# Patient Record
Sex: MALE | Race: WHITE | NOT HISPANIC OR LATINO | URBAN - METROPOLITAN AREA
[De-identification: names, ages, dates, MRNs, and addresses within clinical notes are randomized per-mention and may not be internally consistent; named-entity substitution may affect disease eponyms.]

---

## 2017-08-16 ENCOUNTER — APPOINTMENT (OUTPATIENT)
Dept: URBAN - METROPOLITAN AREA CLINIC 200 | Age: 67
Setting detail: DERMATOLOGY
End: 2017-08-28

## 2017-08-16 DIAGNOSIS — L57.0 ACTINIC KERATOSIS: ICD-10-CM

## 2017-08-16 DIAGNOSIS — D485 NEOPLASM OF UNCERTAIN BEHAVIOR OF SKIN: ICD-10-CM

## 2017-08-16 DIAGNOSIS — L30.4 ERYTHEMA INTERTRIGO: ICD-10-CM

## 2017-08-16 PROBLEM — D48.5 NEOPLASM OF UNCERTAIN BEHAVIOR OF SKIN: Status: ACTIVE | Noted: 2017-08-16

## 2017-08-16 PROBLEM — E03.9 HYPOTHYROIDISM, UNSPECIFIED: Status: ACTIVE | Noted: 2017-08-16

## 2017-08-16 PROBLEM — I10 ESSENTIAL (PRIMARY) HYPERTENSION: Status: ACTIVE | Noted: 2017-08-16

## 2017-08-16 PROCEDURE — 17000 DESTRUCT PREMALG LESION: CPT

## 2017-08-16 PROCEDURE — 99213 OFFICE O/P EST LOW 20 MIN: CPT | Mod: 25

## 2017-08-16 PROCEDURE — OTHER LIQUID NITROGEN: OTHER

## 2017-08-16 PROCEDURE — 11100: CPT | Mod: 59

## 2017-08-16 PROCEDURE — OTHER BIOPSY BY SHAVE METHOD: OTHER

## 2017-08-16 PROCEDURE — 17003 DESTRUCT PREMALG LES 2-14: CPT

## 2017-08-16 PROCEDURE — OTHER PRESCRIPTION: OTHER

## 2017-08-16 RX ORDER — NYSTATIN AND TRIAMCINOLONE ACETONIDE 100000; 1 [USP'U]/G; MG/G
CREAM TOPICAL
Qty: 1 | Refills: 5 | Status: ERX

## 2017-08-16 ASSESSMENT — LOCATION SIMPLE DESCRIPTION DERM
LOCATION SIMPLE: LEFT CHEEK
LOCATION SIMPLE: RIGHT EAR
LOCATION SIMPLE: NOSE
LOCATION SIMPLE: LEFT THIGH

## 2017-08-16 ASSESSMENT — LOCATION DETAILED DESCRIPTION DERM
LOCATION DETAILED: NASAL ROOT
LOCATION DETAILED: RIGHT SUPERIOR HELIX
LOCATION DETAILED: NASAL DORSUM
LOCATION DETAILED: LEFT ANTERIOR PROXIMAL THIGH
LOCATION DETAILED: LEFT SUPERIOR LATERAL MALAR CHEEK

## 2017-08-16 ASSESSMENT — LOCATION ZONE DERM
LOCATION ZONE: FACE
LOCATION ZONE: LEG
LOCATION ZONE: EAR
LOCATION ZONE: NOSE

## 2017-08-16 NOTE — PROCEDURE: BIOPSY BY SHAVE METHOD
Anesthesia Volume In Cc (Will Not Render If 0): 0.1
Type Of Destruction Used: Curettage
Notification Instructions: Patient will be notified of biopsy results. However, patient instructed to call the office if not contacted within 2 weeks.
Bill [id number] For Specimen Handling/Conveyance To Laboratory?: no
Biopsy Method: Personna blade
X Size Of Lesion In Cm: 0
Wound Care: Vaseline
Size Of Lesion In Cm: 0.5
Biopsy Type: H and E
Anesthesia Type: 1% lidocaine with epinephrine
Post-Care Instructions: I reviewed with the patient in detail post-care instructions. Patient is to keep the biopsy site dry overnight, and then apply bacitracin twice daily until healed. Patient may apply hydrogen peroxide soaks to remove any crusting.
Dressing: bandage
Hemostasis: Drysol
consent was obtained and risks were reviewed including but not limited to scarring, infection, bleeding, scabbing, incomplete removal, nerve damage and allergy to anesthesia.
Billing Type: Third-Party Bill
Detail Level: Detailed

## 2017-08-16 NOTE — PROCEDURE: LIQUID NITROGEN
Post-Care Instructions: I reviewed with the patient in detail post-care instructions. Patient is to wear sunprotection, and avoid picking at any of the treated lesions. Pt may apply Vaseline to crusted or scabbing areas.
Detail Level: Detailed
Consent: The patient's consent was obtained including but not limited to risks of crusting, scabbing, blistering, scarring, darker or lighter pigmentary change, recurrence, incomplete removal and infection.
Number Of Freeze-Thaw Cycles: 2 freeze-thaw cycles
Render Post-Care Instructions In Note?: no
Duration Of Freeze Thaw-Cycle (Seconds): 10

## 2017-09-18 ENCOUNTER — APPOINTMENT (OUTPATIENT)
Dept: URBAN - METROPOLITAN AREA CLINIC 200 | Age: 67
Setting detail: DERMATOLOGY
End: 2017-09-24

## 2017-09-18 DIAGNOSIS — L57.8 OTHER SKIN CHANGES DUE TO CHRONIC EXPOSURE TO NONIONIZING RADIATION: ICD-10-CM

## 2017-09-18 DIAGNOSIS — L57.0 ACTINIC KERATOSIS: ICD-10-CM

## 2017-09-18 PROCEDURE — 17004 DESTROY PREMAL LESIONS 15/>: CPT

## 2017-09-18 PROCEDURE — OTHER COUNSELING: OTHER

## 2017-09-18 PROCEDURE — 99213 OFFICE O/P EST LOW 20 MIN: CPT | Mod: 25

## 2017-09-18 PROCEDURE — OTHER LIQUID NITROGEN: OTHER

## 2017-09-18 ASSESSMENT — LOCATION DETAILED DESCRIPTION DERM
LOCATION DETAILED: LEFT LATERAL MALAR CHEEK
LOCATION DETAILED: LEFT LATERAL ZYGOMA
LOCATION DETAILED: LEFT DISTAL DORSAL FOREARM
LOCATION DETAILED: RIGHT CENTRAL MALAR CHEEK
LOCATION DETAILED: LEFT DISTAL RADIAL DORSAL FOREARM
LOCATION DETAILED: RIGHT INFERIOR LATERAL MALAR CHEEK
LOCATION DETAILED: LEFT SUPERIOR CENTRAL MALAR CHEEK
LOCATION DETAILED: LEFT CENTRAL MALAR CHEEK
LOCATION DETAILED: RIGHT MEDIAL MALAR CHEEK
LOCATION DETAILED: LEFT INFERIOR LATERAL FOREHEAD
LOCATION DETAILED: LEFT PROXIMAL RADIAL DORSAL FOREARM
LOCATION DETAILED: LEFT FOREHEAD
LOCATION DETAILED: NASAL DORSUM
LOCATION DETAILED: LEFT DISTAL POSTERIOR UPPER ARM
LOCATION DETAILED: RIGHT LATERAL MALAR CHEEK
LOCATION DETAILED: LEFT SUPERIOR LATERAL MALAR CHEEK
LOCATION DETAILED: RIGHT MEDIAL UPPER BACK
LOCATION DETAILED: LEFT INFERIOR CENTRAL MALAR CHEEK
LOCATION DETAILED: LEFT EXTERNAL AUDITORY CANAL
LOCATION DETAILED: LEFT SUPERIOR LATERAL BUCCAL CHEEK
LOCATION DETAILED: RIGHT INFERIOR CENTRAL MALAR CHEEK
LOCATION DETAILED: RIGHT INFERIOR HELIX
LOCATION DETAILED: RIGHT INFERIOR MEDIAL FOREHEAD

## 2017-09-18 ASSESSMENT — LOCATION SIMPLE DESCRIPTION DERM
LOCATION SIMPLE: LEFT CHEEK
LOCATION SIMPLE: LEFT FOREHEAD
LOCATION SIMPLE: LEFT ZYGOMA
LOCATION SIMPLE: LEFT UPPER ARM
LOCATION SIMPLE: RIGHT EAR
LOCATION SIMPLE: RIGHT CHEEK
LOCATION SIMPLE: LEFT FOREARM
LOCATION SIMPLE: LEFT EAR
LOCATION SIMPLE: RIGHT FOREHEAD
LOCATION SIMPLE: NOSE
LOCATION SIMPLE: RIGHT UPPER BACK

## 2017-09-18 ASSESSMENT — LOCATION ZONE DERM
LOCATION ZONE: ARM
LOCATION ZONE: NOSE
LOCATION ZONE: TRUNK
LOCATION ZONE: FACE
LOCATION ZONE: EAR

## 2017-09-18 NOTE — PROCEDURE: LIQUID NITROGEN
Duration Of Freeze Thaw-Cycle (Seconds): 10
Detail Level: Detailed
Consent: The patient's consent was obtained including but not limited to risks of crusting, scabbing, blistering, scarring, darker or lighter pigmentary change, recurrence, incomplete removal and infection.
Number Of Freeze-Thaw Cycles: 2 freeze-thaw cycles
Post-Care Instructions: I reviewed with the patient in detail post-care instructions. Patient is to wear sunprotection, and avoid picking at any of the treated lesions. Pt may apply Vaseline to crusted or scabbing areas.
Render Post-Care Instructions In Note?: no

## 2018-03-06 LAB
ALBUMIN SERPL-MCNC: 4 G/DL (ref 3.6–4.8)
ALBUMIN/GLOB SERPL: 1.9 {RATIO} (ref 1.2–2.2)
ALP SERPL-CCNC: 110 IU/L (ref 39–117)
ALT SERPL-CCNC: 18 IU/L (ref 0–44)
AST SERPL-CCNC: 13 IU/L (ref 0–40)
BILIRUB SERPL-MCNC: 0.9 MG/DL (ref 0–1.2)
BUN SERPL-MCNC: 20 MG/DL (ref 8–27)
BUN/CREAT SERPL: 12 (ref 10–24)
CALCIUM SERPL-MCNC: 9.1 MG/DL (ref 8.6–10.2)
CHLORIDE SERPL-SCNC: 100 MMOL/L (ref 96–106)
CO2 SERPL-SCNC: 26 MMOL/L (ref 18–29)
CREAT SERPL-MCNC: 1.69 MG/DL (ref 0.76–1.27)
GFR SERPLBLD CREATININE-BSD FMLA CKD-EPI: 41 ML/MIN/1.73
GFR SERPLBLD CREATININE-BSD FMLA CKD-EPI: 47 ML/MIN/1.73
GLOBULIN SER CALC-MCNC: 2.1 G/DL (ref 1.5–4.5)
GLUCOSE SERPL-MCNC: 282 MG/DL (ref 65–99)
HBA1C MFR BLD: 7.9 % (ref 4.8–5.6)
POTASSIUM SERPL-SCNC: 3.7 MMOL/L (ref 3.5–5.2)
PROT SERPL-MCNC: 6.1 G/DL (ref 6–8.5)
SODIUM SERPL-SCNC: 139 MMOL/L (ref 134–144)

## 2018-03-08 ENCOUNTER — TELEPHONE (OUTPATIENT)
Dept: PRIMARY CARE | Facility: CLINIC | Age: 68
End: 2018-03-08

## 2018-03-08 RX ORDER — HYDROCHLOROTHIAZIDE 25 MG/1
25 TABLET ORAL DAILY
Qty: 15 TABLET | Refills: 0 | Status: SHIPPED | OUTPATIENT
Start: 2018-03-08 | End: 2018-12-26 | Stop reason: ALTCHOICE

## 2018-03-08 NOTE — TELEPHONE ENCOUNTER
I dont want to put him on longterm or chronic fluid meds due to renal function. But we gotta try somehting.  Ill send in hctz 25 mg and we'll see how that looks

## 2018-03-16 ENCOUNTER — TRANSCRIBE ORDERS (OUTPATIENT)
Dept: UROLOGY | Facility: HOSPITAL | Age: 68
End: 2018-03-16

## 2018-03-16 ENCOUNTER — APPOINTMENT (OUTPATIENT)
Dept: PREADMISSION TESTING | Facility: HOSPITAL | Age: 68
Setting detail: HOSPITAL OUTPATIENT SURGERY
End: 2018-03-16
Payer: MEDICARE

## 2018-03-16 VITALS
TEMPERATURE: 98.1 F | SYSTOLIC BLOOD PRESSURE: 120 MMHG | HEART RATE: 60 BPM | BODY MASS INDEX: 33.5 KG/M2 | WEIGHT: 261 LBS | HEIGHT: 74 IN | DIASTOLIC BLOOD PRESSURE: 80 MMHG

## 2018-03-16 DIAGNOSIS — Z01.818 PREOP TESTING: Primary | ICD-10-CM

## 2018-03-16 DIAGNOSIS — N13.2 HYDRONEPHROSIS WITH RENAL AND URETERAL CALCULUS OBSTRUCTION: Primary | ICD-10-CM

## 2018-03-16 DIAGNOSIS — N13.2 HYDRONEPHROSIS WITH RENAL AND URETERAL CALCULUS OBSTRUCTION: ICD-10-CM

## 2018-03-16 DIAGNOSIS — Z01.818 PREOP TESTING: ICD-10-CM

## 2018-03-16 PROBLEM — I48.20 CHRONIC ATRIAL FIBRILLATION (CMS/HCC): Status: ACTIVE | Noted: 2018-03-16

## 2018-03-16 PROBLEM — E03.9 HYPOTHYROID: Status: ACTIVE | Noted: 2018-03-16

## 2018-03-16 PROBLEM — N20.0 KIDNEY STONE: Status: ACTIVE | Noted: 2018-03-16

## 2018-03-16 PROBLEM — E78.5 HLD (HYPERLIPIDEMIA): Status: ACTIVE | Noted: 2018-03-16

## 2018-03-16 PROBLEM — I25.810 CAD (CORONARY ARTERY DISEASE) OF ARTERY BYPASS GRAFT: Status: ACTIVE | Noted: 2018-03-16

## 2018-03-16 PROBLEM — E11.9 DM (DIABETES MELLITUS) (CMS/HCC): Status: ACTIVE | Noted: 2018-03-16

## 2018-03-16 PROBLEM — I10 HTN (HYPERTENSION): Status: ACTIVE | Noted: 2018-03-16

## 2018-03-16 LAB
BACTERIA URNS QL MICRO: ABNORMAL /UL
BILIRUB UR QL STRIP.AUTO: ABNORMAL MG/DL
CLARITY UR REFRACT.AUTO: ABNORMAL
COLOR UR AUTO: ABNORMAL
ERYTHROCYTE [DISTWIDTH] IN BLOOD BY AUTOMATED COUNT: 14.1 % (ref 11.6–14.4)
GLUCOSE UR STRIP.AUTO-MCNC: ABNORMAL MG/DL
HCT VFR BLDCO AUTO: 34.4 % (ref 40–51)
HGB BLD-MCNC: 11.7 G/DL (ref 13.7–17.5)
HGB UR QL STRIP.AUTO: 3
HYALINE CASTS #/AREA URNS LPF: ABNORMAL /LPF
KETONES UR STRIP.AUTO-MCNC: ABNORMAL MG/DL
LEUKOCYTE ESTERASE UR QL STRIP.AUTO: 2
MCH RBC QN AUTO: 30.5 PG (ref 28–33.2)
MCHC RBC AUTO-ENTMCNC: 34 G/DL (ref 32.2–36.5)
MCV RBC AUTO: 89.6 FL (ref 83–98)
NITRITE UR QL STRIP.AUTO: ABNORMAL
PDW BLD AUTO: 11 FL (ref 9.4–12.4)
PH UR STRIP.AUTO: ABNORMAL [PH]
PLATELET # BLD AUTO: 164 K/UL (ref 150–350)
PROT UR QL STRIP.AUTO: ABNORMAL
RBC # BLD AUTO: 3.84 M/UL (ref 4.5–5.8)
RBC #/AREA URNS HPF: ABNORMAL /HPF
SP GR UR REFRACT.AUTO: 1.02
SQUAMOUS URNS QL MICRO: 2 /HPF
UROBILINOGEN UR STRIP-ACNC: ABNORMAL EU/DL
WBC # BLD AUTO: 9.43 K/UL (ref 3.8–10.5)
WBC #/AREA URNS HPF: ABNORMAL /HPF

## 2018-03-16 PROCEDURE — 36415 COLL VENOUS BLD VENIPUNCTURE: CPT

## 2018-03-16 PROCEDURE — 81003 URINALYSIS AUTO W/O SCOPE: CPT

## 2018-03-16 PROCEDURE — 85027 COMPLETE CBC AUTOMATED: CPT

## 2018-03-16 RX ORDER — LEVOTHYROXINE SODIUM 88 UG/1
TABLET ORAL
Refills: 5 | Status: ON HOLD | COMMUNITY
Start: 2018-01-09 | End: 2018-07-06 | Stop reason: ALTCHOICE

## 2018-03-16 RX ORDER — SPIRONOLACTONE 25 MG/1
TABLET ORAL
Refills: 3 | Status: ON HOLD | COMMUNITY
Start: 2018-01-16 | End: 2018-07-06 | Stop reason: ALTCHOICE

## 2018-03-16 RX ORDER — VITAMIN E (DL,TOCOPHERYL ACET) 90 MG
400 CAPSULE ORAL DAILY
COMMUNITY
Start: 2016-03-25

## 2018-03-16 RX ORDER — AMIODARONE HYDROCHLORIDE 200 MG/1
TABLET ORAL
Refills: 0 | Status: ON HOLD | COMMUNITY
Start: 2018-03-04 | End: 2018-07-06 | Stop reason: ALTCHOICE

## 2018-03-16 RX ORDER — CARVEDILOL 25 MG/1
25 TABLET ORAL 2 TIMES DAILY WITH MEALS
COMMUNITY
Start: 2016-03-25 | End: 2018-09-25

## 2018-03-16 RX ORDER — AMLODIPINE BESYLATE 5 MG/1
5 TABLET ORAL
COMMUNITY
Start: 2016-03-25 | End: 2018-03-19 | Stop reason: ENTERED-IN-ERROR

## 2018-03-16 RX ORDER — OMEPRAZOLE 40 MG/1
CAPSULE, DELAYED RELEASE ORAL
Refills: 3 | Status: ON HOLD | COMMUNITY
Start: 2018-03-04 | End: 2018-07-06 | Stop reason: ALTCHOICE

## 2018-03-16 RX ORDER — APIXABAN 5 MG/1
TABLET, FILM COATED ORAL
Refills: 3 | Status: ON HOLD | COMMUNITY
Start: 2018-03-04 | End: 2018-07-06 | Stop reason: HOSPADM

## 2018-03-16 RX ORDER — CLONIDINE HYDROCHLORIDE 0.2 MG/1
TABLET ORAL
Refills: 3 | Status: ON HOLD | COMMUNITY
Start: 2018-03-04 | End: 2018-07-06 | Stop reason: ALTCHOICE

## 2018-03-16 RX ORDER — INSULIN GLARGINE 100 [IU]/ML
20 INJECTION, SOLUTION SUBCUTANEOUS DAILY
COMMUNITY
End: 2018-03-19 | Stop reason: ENTERED-IN-ERROR

## 2018-03-16 RX ORDER — ATORVASTATIN CALCIUM 40 MG/1
40 TABLET, FILM COATED ORAL
COMMUNITY
Start: 2016-03-25

## 2018-03-16 RX ORDER — TAMSULOSIN HYDROCHLORIDE 0.4 MG/1
CAPSULE ORAL
Refills: 0 | COMMUNITY
Start: 2018-02-08 | End: 2018-03-19 | Stop reason: ENTERED-IN-ERROR

## 2018-03-16 ASSESSMENT — ENCOUNTER SYMPTOMS
ABDOMINAL PAIN: 0
APNEA: 0
FLANK PAIN: 0
NUMBNESS: 0
CHEST TIGHTNESS: 0
FEVER: 0
CHILLS: 0
HEMATURIA: 1
FREQUENCY: 1
VOMITING: 0
NAUSEA: 0
MUSCULOSKELETAL NEGATIVE: 1
WEAKNESS: 0
SHORTNESS OF BREATH: 0
WOUND: 0
DIFFICULTY URINATING: 1
PALPITATIONS: 0

## 2018-03-16 ASSESSMENT — PAIN SCALES - GENERAL: PAINLEVEL: 0-NO PAIN

## 2018-03-16 NOTE — H&P
Chief complaint: kidney stone  HPI: 67 year old male with history of kidney stones and renal cancer s/p partial nephrectomy 9/2016. He states that he was admitted in February 2018 with kidney stone. He states that he had a right ureteral stent placed 2/16/18. His creatinine was over 5 during the hospital admission;last check 1.6=followed by Dr Hernandez nephrologist. He denies flank pain currently. He has gross hematuria. NO fever /chills    Allergies:   Allergies   Allergen Reactions   • Amlodipine      Muscle swelling   • Cephalexin Monohydrate    • Codeine Other (see comments)     unknown   • Latex, Natural Rubber      Added based on information entered during case entry, please review and add reactions, type, and severity as needed   • Oxycodone      Other reaction(s): Hives     Patient's Meds:   Current Outpatient Prescriptions:   •  aspirin 81 mg enteric coated tablet, Take 81 mg by mouth daily., Disp: , Rfl:   •  atorvastatin (LIPITOR) 40 mg tablet, Take 40 mg by mouth daily.  , Disp: , Rfl:   •  carvedilol (COREG) 25 mg tablet, Take 25 mg by mouth 2 (two) times a day with meals.  , Disp: , Rfl:   •  chlorthalidone (HYGROTEN) 25 mg tablet, Take 25 mg by mouth daily., Disp: , Rfl:   •  coenzyme Q10 (CO Q-10) 400 mg capsule, Take 400 mg by mouth daily.  , Disp: , Rfl:   •  INSULIN GLARGINE,HUM.REC.ANLOG (INSULIN GLARGINE SUBQ), Inject 15 unit marking on U-100 syringe under the skin daily., Disp: , Rfl:   •  lamoTRIgine (LaMICtal) 100 mg tablet, Take 100 mg by mouth daily., Disp: , Rfl:   •  metFORMIN (GLUCOPHAGE) 500 mg tablet, Take 500 mg by mouth 2 (two) times a day with meals., Disp: , Rfl:   •  mirtazapine (REMERON) 15 mg tablet, Take 15 mg by mouth nightly., Disp: , Rfl:   •  amiodarone (PACERONE) 200 mg tablet, TK 1 T PO D, Disp: , Rfl: 0  •  cloNIDine (CATAPRES) 0.2 mg tablet, TK 1 T PO BID, Disp: , Rfl: 3  •  ELIQUIS 5 mg tablet, TK 1 T PO TWICE DAILY. DO NOT. STOP UNLESS DIRECTED BY DOCTOR.STILLABOWER,  Disp: , Rfl: 3  •  hydrochlorothiazide (HYDRODIURIL) 25 mg tablet, Take 1 tablet (25 mg total) by mouth daily for 15 doses., Disp: 15 tablet, Rfl: 0  •  levothyroxine (SYNTHROID) 88 mcg tablet, TK 1 T PO D, Disp: , Rfl: 5  •  omeprazole (PriLOSEC) 40 mg capsule, TK 1 C PO D, Disp: , Rfl: 3  •  spironolactone (ALDACTONE) 25 mg tablet, TK 1 T PO D, Disp: , Rfl: 3  Medical History:   Past Medical History:   Diagnosis Date   • Abnormal ECG     a fib   • BPH (benign prostatic hyperplasia)    • Coronary artery disease    • Disease of thyroid gland    • Hypertension    • Renal calculi     right   • Renal cancer (CMS/HCC) (HCC)    • Sleep apnea     uses cpap   • Type 2 diabetes mellitus (CMS/HCC) (HCC)      Surgical History:   Past Surgical History:   Procedure Laterality Date   • COLONOSCOPY  2016   • CORONARY ARTERY BYPASS GRAFT      x3   • NEPHRECTOMY Right    • TONSILLECTOMY     • UVULECTOMY  2005     Social History:   Social History     Social History   • Marital status:      Spouse name: N/A   • Number of children: N/A   • Years of education: N/A     Social History Main Topics   • Smoking status: Never Smoker   • Smokeless tobacco: Never Used   • Alcohol use No   • Drug use: No   • Sexual activity: Not Asked     Other Topics Concern   • None     Social History Narrative   • None     Family History: non contributory  Review of Systems   Constitutional: Negative for chills and fever.   HENT: Negative for dental problem and hearing loss.    Eyes: Positive for visual disturbance (glasses).   Respiratory: Negative for apnea, chest tightness and shortness of breath.         LILLIAM +CPAP  History of bronchitis with inhaler use 2017   Cardiovascular: Negative for palpitations and leg swelling.        CAD/CABG 5/2017/A Fib.;controlled with medication.Cardiologist Dr Mercado;last stress test 2017   HTN=current medication  Hyperlipidemia-current medication  Syncope 2/2018-due to hyponatremia/dehydration  >4MET activity    Gastrointestinal: Negative for abdominal pain, nausea and vomiting.        GERD-controlled with medication   Endocrine:        DM II-last HgbA1c 7.6 3/2018  Hypothyroidism-current treatment   Genitourinary: Positive for difficulty urinating, frequency (q 2 hours) and hematuria. Negative for flank pain.        Renal cancer s/p right partial nephrectomy 2017  Kidney stones;recent ARF/hydronephrosis-nephrologist Dr Ayala  BPH   Musculoskeletal: Negative.    Skin: Negative for wound.        Lesion of nose current   Neurological: Positive for syncope (near syncope 2/9/18). Negative for weakness and numbness.     Vitals:   Vitals:    03/16/18 0838   BP: 120/80   Pulse: 60   Temp: 36.7 °C (98.1 °F)   BMI 33.6  Physical Exam   Constitutional: He is oriented to person, place, and time. He appears well-developed and well-nourished.   HENT:   Head: Normocephalic.   Eyes: Pupils are equal, round, and reactive to light.   Neck: Normal range of motion. Neck supple.   Cardiovascular: Normal rate and intact distal pulses.    Irregularly irreg.   Pulmonary/Chest: Effort normal and breath sounds normal.   Abdominal: Soft. Bowel sounds are normal.   Genitourinary:   Genitourinary Comments: deferred   Musculoskeletal: Normal range of motion.   Neurological: He is alert and oriented to person, place, and time.   Skin: Skin is warm and dry.   Crusted lesion nose   Psychiatric: He has a normal mood and affect.     Patient Active Problem List   Diagnosis   • Kidney stone   • Chronic atrial fibrillation (CMS/HCC) ([facility])   • CAD (coronary artery disease) of artery bypass graft   • HTN (hypertension)   • HLD (hyperlipidemia)   • Hypothyroid   • DM (diabetes mellitus) (CMS/HCC) ([facility])   • Anxiety   • GERD (gastroesophageal reflux disease)       Assessment/Plan:hydronephrosis/renal calculus s/p stent placement.Plan for cysto.,right ureteroscopy, laser lithotripsy, stone basket extraction, stent change.  Patient on Eliquis -will hold per  cardiologist recommendation  Cardiac clearance : Dr Mercado.  Patient instructed to take carvedilol, amiodarone am of surgery  Accu check on admission;last HgbA1c 7.6 Patient instructed to hold am insulin day of surgery  LILLIAM-patient instructed to bring CPAP to hospital

## 2018-03-16 NOTE — PRE-PROCEDURE INSTRUCTIONS
1. We will call you between 1600 and 1900 on the date before your surgery to determine that arrival time for your procedure.   2. Please report to outpatient registration on the day of your procedure.   3. Please follow the following fasting guidelines:   · Nothing to eat or drink 8 hours prior to arrival   4. Early on the morning of the procedure please take your usual dose of the listed medications with a sip of water:    Carvedilol, amiodarone   5. Other Instructions: bring CPAP to hospital.Hold Eliquis per Dr Mercado instruction   6. If you develop a cold, cough, fever, rash, or other symptom prior to the data of the procedure, please report it to your physician immediately.   7. If you need to cancel the procedure for any reason, please contact your physician or call the unit listed above.   8. Make arrangements to have someone drive you home from the procedure. If you have not arranged for transportation home, your surgery may be cancelled.    9. You may not take public transportation unless accompanied by a responsible person.   10. You may not drive a car or operate complex or potentially dangerous machinery for 24 hours following anesthesia and/or sedation.   11. If it is medically necessary for you to have a longer stay, you will be informed as soon as the decision is made.   12. Do not wear or being anything of value to the hospital including jewelry of any kind. Do not wear make-up or contact lenses. DO bring your glasses and hearing aid.   13. Dress in comfortable clothes.   14.  If instructed, please bring a copy of your Advanced Directive (Living Will/Durable Power of ) on the day of your procedure.      Pre operative instructions given as per protocol.  Form explained by:      I have read and understand the above information. I have had sufficient opportunity to ask questions I might have and they have been answered to my satisfaction. I agree to comply with the Patient Responsibilities  listed above and have received a copy of this form.

## 2018-03-19 RX ORDER — ASPIRIN 81 MG/1
81 TABLET ORAL DAILY
COMMUNITY

## 2018-03-19 RX ORDER — LAMOTRIGINE 100 MG/1
100 TABLET ORAL NIGHTLY
COMMUNITY
End: 2018-07-16 | Stop reason: ALTCHOICE

## 2018-03-19 RX ORDER — MIRTAZAPINE 15 MG/1
15 TABLET, FILM COATED ORAL NIGHTLY
Status: ON HOLD | COMMUNITY
End: 2018-07-06 | Stop reason: ALTCHOICE

## 2018-03-19 RX ORDER — METFORMIN HYDROCHLORIDE 500 MG/1
500 TABLET ORAL 2 TIMES DAILY WITH MEALS
Status: ON HOLD | COMMUNITY
End: 2018-10-18 | Stop reason: ALTCHOICE

## 2018-03-19 RX ORDER — CHLORTHALIDONE 25 MG/1
25 TABLET ORAL DAILY
COMMUNITY
End: 2018-07-16 | Stop reason: ALTCHOICE

## 2018-03-20 PROBLEM — N28.9 KIDNEY DISEASE: Status: ACTIVE | Noted: 2018-03-16

## 2018-03-20 PROBLEM — E78.00 HYPERCHOLESTEREMIA: Status: ACTIVE | Noted: 2018-03-16

## 2018-03-20 PROBLEM — I48.91 ATRIAL FIBRILLATION (CMS/HCC): Status: ACTIVE | Noted: 2018-03-16

## 2018-03-23 ENCOUNTER — APPOINTMENT (OUTPATIENT)
Dept: LAB | Facility: HOSPITAL | Age: 68
End: 2018-03-23
Attending: UROLOGY
Payer: MEDICARE

## 2018-03-23 PROCEDURE — 87086 URINE CULTURE/COLONY COUNT: CPT | Mod: DBM

## 2018-03-24 LAB — BACTERIA UR CULT: NORMAL

## 2018-03-29 RX ORDER — CIPROFLOXACIN 2 MG/ML
200 INJECTION, SOLUTION INTRAVENOUS ONCE
Status: CANCELLED | OUTPATIENT
Start: 2018-03-30 | End: 2018-03-30

## 2018-03-29 RX ORDER — CIPROFLOXACIN 2 MG/ML
200 INJECTION, SOLUTION INTRAVENOUS ONCE
Status: DISCONTINUED | OUTPATIENT
Start: 2018-03-29 | End: 2018-03-29

## 2018-03-30 ENCOUNTER — HOSPITAL ENCOUNTER (OUTPATIENT)
Facility: HOSPITAL | Age: 68
Setting detail: HOSPITAL OUTPATIENT SURGERY
Discharge: HOME | End: 2018-03-30
Attending: UROLOGY | Admitting: UROLOGY
Payer: MEDICARE

## 2018-03-30 ENCOUNTER — ANESTHESIA (OUTPATIENT)
Dept: OPERATING ROOM | Facility: HOSPITAL | Age: 68
Setting detail: HOSPITAL OUTPATIENT SURGERY
End: 2018-03-30
Payer: MEDICARE

## 2018-03-30 ENCOUNTER — APPOINTMENT (OUTPATIENT)
Dept: RADIOLOGY | Facility: HOSPITAL | Age: 68
Setting detail: HOSPITAL OUTPATIENT SURGERY
End: 2018-03-30
Attending: UROLOGY
Payer: MEDICARE

## 2018-03-30 VITALS
DIASTOLIC BLOOD PRESSURE: 88 MMHG | SYSTOLIC BLOOD PRESSURE: 181 MMHG | TEMPERATURE: 98.2 F | RESPIRATION RATE: 18 BRPM | HEIGHT: 74 IN | WEIGHT: 259 LBS | OXYGEN SATURATION: 100 % | BODY MASS INDEX: 33.24 KG/M2 | HEART RATE: 60 BPM

## 2018-03-30 DIAGNOSIS — Z01.818 PREOP TESTING: ICD-10-CM

## 2018-03-30 LAB
GLUCOSE BLD-MCNC: 275 MG/DL (ref 70–99)
GLUCOSE BLD-MCNC: 323 MG/DL (ref 70–99)

## 2018-03-30 PROCEDURE — 36000003 HC OR LEVEL 3 INITIAL 30MIN: Performed by: UROLOGY

## 2018-03-30 PROCEDURE — 25000000 HC PHARMACY GENERAL: Performed by: NURSE ANESTHETIST, CERTIFIED REGISTERED

## 2018-03-30 PROCEDURE — 0T768DZ DILATION OF RIGHT URETER WITH INTRALUMINAL DEVICE, VIA NATURAL OR ARTIFICIAL OPENING ENDOSCOPIC: ICD-10-PCS | Performed by: UROLOGY

## 2018-03-30 PROCEDURE — 36000013 HC OR LEVEL 3 EA ADDL MIN: Performed by: UROLOGY

## 2018-03-30 PROCEDURE — C1758 CATHETER, URETERAL: HCPCS | Performed by: UROLOGY

## 2018-03-30 PROCEDURE — 71000002 HC PACU PHASE 2 INITIAL 30MIN: Performed by: UROLOGY

## 2018-03-30 PROCEDURE — 63600000 HC DRUGS/DETAIL CODE: Performed by: NURSE ANESTHETIST, CERTIFIED REGISTERED

## 2018-03-30 PROCEDURE — 71000011 HC PACU PHASE 1 EA ADDL MIN: Performed by: UROLOGY

## 2018-03-30 PROCEDURE — 63600000 HC DRUGS/DETAIL CODE: Performed by: UROLOGY

## 2018-03-30 PROCEDURE — 71000001 HC PACU PHASE 1 INITIAL 30MIN: Performed by: UROLOGY

## 2018-03-30 PROCEDURE — 27200000 HC STERILE SUPPLY: Performed by: UROLOGY

## 2018-03-30 PROCEDURE — 0TF68ZZ FRAGMENTATION IN RIGHT URETER, VIA NATURAL OR ARTIFICIAL OPENING ENDOSCOPIC: ICD-10-PCS | Performed by: UROLOGY

## 2018-03-30 PROCEDURE — 71000012 HC PACU PHASE 2 EA ADDL MIN: Performed by: UROLOGY

## 2018-03-30 PROCEDURE — 63700000 HC SELF-ADMINISTRABLE DRUG: Performed by: UROLOGY

## 2018-03-30 PROCEDURE — 74018 RADEX ABDOMEN 1 VIEW: CPT

## 2018-03-30 PROCEDURE — C2617 STENT, NON-COR, TEM W/O DEL: HCPCS | Performed by: UROLOGY

## 2018-03-30 PROCEDURE — 37000001 HC ANESTHESIA GENERAL: Performed by: UROLOGY

## 2018-03-30 PROCEDURE — BT141ZZ FLUOROSCOPY OF KIDNEYS, URETERS AND BLADDER USING LOW OSMOLAR CONTRAST: ICD-10-PCS | Performed by: UROLOGY

## 2018-03-30 PROCEDURE — 63600000 HC DRUGS/DETAIL CODE: Mod: GZ | Performed by: UROLOGY

## 2018-03-30 PROCEDURE — 63600105 HC IODINE BASED CONTRAST: Performed by: UROLOGY

## 2018-03-30 DEVICE — STENT URETERAL 6FR 26CM: Type: IMPLANTABLE DEVICE | Status: FUNCTIONAL

## 2018-03-30 RX ORDER — ACETAMINOPHEN 325 MG/1
650 TABLET ORAL EVERY 4 HOURS PRN
Status: DISCONTINUED | OUTPATIENT
Start: 2018-03-30 | End: 2018-03-30 | Stop reason: HOSPADM

## 2018-03-30 RX ORDER — LIDOCAINE HYDROCHLORIDE 20 MG/ML
INJECTION, SOLUTION EPIDURAL; INFILTRATION; INTRACAUDAL; PERINEURAL AS NEEDED
Status: DISCONTINUED | OUTPATIENT
Start: 2018-03-30 | End: 2018-03-30 | Stop reason: SURG

## 2018-03-30 RX ORDER — PROPOFOL 10 MG/ML
INJECTION, EMULSION INTRAVENOUS AS NEEDED
Status: DISCONTINUED | OUTPATIENT
Start: 2018-03-30 | End: 2018-03-30 | Stop reason: SURG

## 2018-03-30 RX ORDER — LEVOFLOXACIN 500 MG/1
500 TABLET, FILM COATED ORAL DAILY
Status: DISCONTINUED | OUTPATIENT
Start: 2018-03-30 | End: 2018-03-30 | Stop reason: HOSPADM

## 2018-03-30 RX ORDER — CIPROFLOXACIN 250 MG/1
250 TABLET, FILM COATED ORAL 2 TIMES DAILY
Qty: 20 TABLET | Refills: 0 | Status: SHIPPED | OUTPATIENT
Start: 2018-03-30 | End: 2019-01-03 | Stop reason: ALTCHOICE

## 2018-03-30 RX ORDER — FENTANYL CITRATE 50 UG/ML
INJECTION, SOLUTION INTRAMUSCULAR; INTRAVENOUS AS NEEDED
Status: DISCONTINUED | OUTPATIENT
Start: 2018-03-30 | End: 2018-03-30 | Stop reason: SURG

## 2018-03-30 RX ORDER — CIPROFLOXACIN 2 MG/ML
200 INJECTION, SOLUTION INTRAVENOUS ONCE
Status: COMPLETED | OUTPATIENT
Start: 2018-03-30 | End: 2018-03-30

## 2018-03-30 RX ORDER — EPHEDRINE SULFATE/0.9% NACL/PF 50 MG/5 ML
SYRINGE (ML) INTRAVENOUS AS NEEDED
Status: DISCONTINUED | OUTPATIENT
Start: 2018-03-30 | End: 2018-03-30 | Stop reason: SURG

## 2018-03-30 RX ORDER — SODIUM CHLORIDE, SODIUM LACTATE, POTASSIUM CHLORIDE, CALCIUM CHLORIDE 600; 310; 30; 20 MG/100ML; MG/100ML; MG/100ML; MG/100ML
INJECTION, SOLUTION INTRAVENOUS CONTINUOUS
Status: DISCONTINUED | OUTPATIENT
Start: 2018-03-30 | End: 2018-03-30 | Stop reason: HOSPADM

## 2018-03-30 RX ORDER — ONDANSETRON HYDROCHLORIDE 2 MG/ML
INJECTION, SOLUTION INTRAVENOUS AS NEEDED
Status: DISCONTINUED | OUTPATIENT
Start: 2018-03-30 | End: 2018-03-30 | Stop reason: SURG

## 2018-03-30 RX ORDER — MIDAZOLAM HYDROCHLORIDE 2 MG/2ML
INJECTION, SOLUTION INTRAMUSCULAR; INTRAVENOUS AS NEEDED
Status: DISCONTINUED | OUTPATIENT
Start: 2018-03-30 | End: 2018-03-30 | Stop reason: SURG

## 2018-03-30 RX ADMIN — Medication 10 MG: at 09:37

## 2018-03-30 RX ADMIN — FENTANYL CITRATE 25 MCG: 50 INJECTION, SOLUTION INTRAMUSCULAR; INTRAVENOUS at 08:58

## 2018-03-30 RX ADMIN — FENTANYL CITRATE 25 MCG: 50 INJECTION, SOLUTION INTRAMUSCULAR; INTRAVENOUS at 09:43

## 2018-03-30 RX ADMIN — IOHEXOL 30 ML: 300 INJECTION, SOLUTION INTRAVENOUS at 11:20

## 2018-03-30 RX ADMIN — PROPOFOL 200 MG: 10 INJECTION, EMULSION INTRAVENOUS at 09:07

## 2018-03-30 RX ADMIN — Medication 10 MG: at 10:32

## 2018-03-30 RX ADMIN — LEVOFLOXACIN 500 MG: 500 TABLET, FILM COATED ORAL at 13:32

## 2018-03-30 RX ADMIN — FENTANYL CITRATE 50 MCG: 50 INJECTION, SOLUTION INTRAMUSCULAR; INTRAVENOUS at 09:24

## 2018-03-30 RX ADMIN — MIDAZOLAM HYDROCHLORIDE 2 MG: 1 INJECTION, SOLUTION INTRAMUSCULAR; INTRAVENOUS at 08:58

## 2018-03-30 RX ADMIN — LIDOCAINE HYDROCHLORIDE 100 MG: 20 INJECTION, SOLUTION INTRAVENOUS at 09:07

## 2018-03-30 RX ADMIN — ONDANSETRON 4 MG: 2 INJECTION INTRAMUSCULAR; INTRAVENOUS at 10:52

## 2018-03-30 RX ADMIN — CIPROFLOXACIN 200 MG: 2 INJECTION, SOLUTION INTRAVENOUS at 09:02

## 2018-03-30 RX ADMIN — SODIUM CHLORIDE, POTASSIUM CHLORIDE, SODIUM LACTATE AND CALCIUM CHLORIDE: 600; 310; 30; 20 INJECTION, SOLUTION INTRAVENOUS at 07:54

## 2018-03-30 ASSESSMENT — PAIN - FUNCTIONAL ASSESSMENT: PAIN_FUNCTIONAL_ASSESSMENT: NO/DENIES PAIN

## 2018-03-30 NOTE — ANESTHESIA PREPROCEDURE EVALUATION
Anesthesia ROS/MED HX    Anesthesia History - neg  Pulmonary    Sleep apnea  Neuro/Psych - neg  Cardiovascular   CAD   hypertension  GI/Hepatic   GERD  Endo/Other   Diabetes (poorly controlled)   Hypothyroidism   Chronic renal disease   Body Habitus: Obese      Relevant Problems   No active problems are marked relevant to this note.       Physical Exam    Airway   Mallampati: II   TM distance: >3 FB   Neck ROM: full  Cardiovascular - normal   Rhythm: regular   Rate: normal  Pulmonary - normal   clear to auscultation        Anesthesia Plan    Plan: general    Technique: general LMA     Lines and Monitors: PIV     Airway: natural airway / supplemental oxygen   ASA 3  Anesthetic plan and risks discussed with: patient  Induction:    intravenous   Postop Plan:   Patient Disposition: phase II then home   Pain Management: IV analgesics

## 2018-03-30 NOTE — PERIOPERATIVE NURSING NOTE
1115 right ureteral stent black thread pull noted coming out of meatus and taped to shaft of penis by .

## 2018-03-30 NOTE — PERIOPERATIVE NURSING NOTE
1120  from anesthesia made aware of patient's blood glucose (=275).  She did not wish to treat.  No new orders.

## 2018-03-30 NOTE — ANESTHESIA POSTPROCEDURE EVALUATION
Patient: Nathan Alonzo    Procedure Summary     Date:  03/30/18 Room / Location:   OR 10 /  OR    Anesthesia Start:  0900 Anesthesia Stop:  1107    Procedure:  Cysto, Bilateral Retrograde, Bilateral Ureterosocpy laser lithotripsy, Right stent exchange (Bilateral ) Diagnosis:  (hydro w/ renal stone)    Surgeon:  Nazario Romo MD Responsible Provider:  Juliette Campos DO    Anesthesia Type:  general ASA Status:  3          Anesthesia Type: general  PACU Vitals  3/30/2018 1101 - 3/30/2018 1201      3/30/2018 1105 3/30/2018 1110 3/30/2018 1115 3/30/2018 1120    BP: (!)  190/94 - (!)  187/94 -    Temp: 36.8 °C (98.2 °F) - - -    Pulse: (!)  54 (!)  53 (!)  53 (!)  53    Resp: 13 (!)  21 (!)  11 13    SpO2: 97 % 100 % 100 % 100 %              3/30/2018 1125 3/30/2018 1130 3/30/2018 1135 3/30/2018 1140    BP: (!)  192/92 - - -    Temp: - - - -    Pulse: (!)  53 (!)  53 (!)  53 (!)  53    Resp: 14 12 13 14    SpO2: 100 % 100 % 100 % 100 %              3/30/2018 1145 3/30/2018 1150          BP: (!)  198/89 -      Temp: - -      Pulse: (!)  53 (!)  53      Resp: 17 14      SpO2: 100 % 98 %              Anesthesia Post Evaluation    Pain management: adequate  Mode of pain management: IV medication  Patient location during evaluation: PACU  Patient participation: complete - patient participated  Level of consciousness: awake and alert  Cardiovascular status: acceptable  Respiratory status: acceptable  Hydration status: acceptable  Anesthetic complications: no

## 2018-03-30 NOTE — H&P (VIEW-ONLY)
Chief complaint: kidney stone  HPI: 67 year old male with history of kidney stones and renal cancer s/p partial nephrectomy 9/2016. He states that he was admitted in February 2018 with kidney stone. He states that he had a right ureteral stent placed 2/16/18. His creatinine was over 5 during the hospital admission;last check 1.6=followed by Dr Hernandez nephrologist. He denies flank pain currently. He has gross hematuria. NO fever /chills    Allergies:   Allergies   Allergen Reactions   • Amlodipine      Muscle swelling   • Cephalexin Monohydrate    • Codeine Other (see comments)     unknown   • Latex, Natural Rubber      Added based on information entered during case entry, please review and add reactions, type, and severity as needed   • Oxycodone      Other reaction(s): Hives     Patient's Meds:   Current Outpatient Prescriptions:   •  aspirin 81 mg enteric coated tablet, Take 81 mg by mouth daily., Disp: , Rfl:   •  atorvastatin (LIPITOR) 40 mg tablet, Take 40 mg by mouth daily.  , Disp: , Rfl:   •  carvedilol (COREG) 25 mg tablet, Take 25 mg by mouth 2 (two) times a day with meals.  , Disp: , Rfl:   •  chlorthalidone (HYGROTEN) 25 mg tablet, Take 25 mg by mouth daily., Disp: , Rfl:   •  coenzyme Q10 (CO Q-10) 400 mg capsule, Take 400 mg by mouth daily.  , Disp: , Rfl:   •  INSULIN GLARGINE,HUM.REC.ANLOG (INSULIN GLARGINE SUBQ), Inject 15 unit marking on U-100 syringe under the skin daily., Disp: , Rfl:   •  lamoTRIgine (LaMICtal) 100 mg tablet, Take 100 mg by mouth daily., Disp: , Rfl:   •  metFORMIN (GLUCOPHAGE) 500 mg tablet, Take 500 mg by mouth 2 (two) times a day with meals., Disp: , Rfl:   •  mirtazapine (REMERON) 15 mg tablet, Take 15 mg by mouth nightly., Disp: , Rfl:   •  amiodarone (PACERONE) 200 mg tablet, TK 1 T PO D, Disp: , Rfl: 0  •  cloNIDine (CATAPRES) 0.2 mg tablet, TK 1 T PO BID, Disp: , Rfl: 3  •  ELIQUIS 5 mg tablet, TK 1 T PO TWICE DAILY. DO NOT. STOP UNLESS DIRECTED BY DOCTOR.STILLABOWER,  Disp: , Rfl: 3  •  hydrochlorothiazide (HYDRODIURIL) 25 mg tablet, Take 1 tablet (25 mg total) by mouth daily for 15 doses., Disp: 15 tablet, Rfl: 0  •  levothyroxine (SYNTHROID) 88 mcg tablet, TK 1 T PO D, Disp: , Rfl: 5  •  omeprazole (PriLOSEC) 40 mg capsule, TK 1 C PO D, Disp: , Rfl: 3  •  spironolactone (ALDACTONE) 25 mg tablet, TK 1 T PO D, Disp: , Rfl: 3  Medical History:   Past Medical History:   Diagnosis Date   • Abnormal ECG     a fib   • BPH (benign prostatic hyperplasia)    • Coronary artery disease    • Disease of thyroid gland    • Hypertension    • Renal calculi     right   • Renal cancer (CMS/HCC) (HCC)    • Sleep apnea     uses cpap   • Type 2 diabetes mellitus (CMS/HCC) (HCC)      Surgical History:   Past Surgical History:   Procedure Laterality Date   • COLONOSCOPY  2016   • CORONARY ARTERY BYPASS GRAFT      x3   • NEPHRECTOMY Right    • TONSILLECTOMY     • UVULECTOMY  2005     Social History:   Social History     Social History   • Marital status:      Spouse name: N/A   • Number of children: N/A   • Years of education: N/A     Social History Main Topics   • Smoking status: Never Smoker   • Smokeless tobacco: Never Used   • Alcohol use No   • Drug use: No   • Sexual activity: Not Asked     Other Topics Concern   • None     Social History Narrative   • None     Family History: non contributory  Review of Systems   Constitutional: Negative for chills and fever.   HENT: Negative for dental problem and hearing loss.    Eyes: Positive for visual disturbance (glasses).   Respiratory: Negative for apnea, chest tightness and shortness of breath.         LILLIAM +CPAP  History of bronchitis with inhaler use 2017   Cardiovascular: Negative for palpitations and leg swelling.        CAD/CABG 5/2017/A Fib.;controlled with medication.Cardiologist Dr Mercado;last stress test 2017   HTN=current medication  Hyperlipidemia-current medication  Syncope 2/2018-due to hyponatremia/dehydration  >4MET activity    Gastrointestinal: Negative for abdominal pain, nausea and vomiting.        GERD-controlled with medication   Endocrine:        DM II-last HgbA1c 7.6 3/2018  Hypothyroidism-current treatment   Genitourinary: Positive for difficulty urinating, frequency (q 2 hours) and hematuria. Negative for flank pain.        Renal cancer s/p right partial nephrectomy 2017  Kidney stones;recent ARF/hydronephrosis-nephrologist Dr Ayala  BPH   Musculoskeletal: Negative.    Skin: Negative for wound.        Lesion of nose current   Neurological: Positive for syncope (near syncope 2/9/18). Negative for weakness and numbness.     Vitals:   Vitals:    03/16/18 0838   BP: 120/80   Pulse: 60   Temp: 36.7 °C (98.1 °F)   BMI 33.6  Physical Exam   Constitutional: He is oriented to person, place, and time. He appears well-developed and well-nourished.   HENT:   Head: Normocephalic.   Eyes: Pupils are equal, round, and reactive to light.   Neck: Normal range of motion. Neck supple.   Cardiovascular: Normal rate and intact distal pulses.    Irregularly irreg.   Pulmonary/Chest: Effort normal and breath sounds normal.   Abdominal: Soft. Bowel sounds are normal.   Genitourinary:   Genitourinary Comments: deferred   Musculoskeletal: Normal range of motion.   Neurological: He is alert and oriented to person, place, and time.   Skin: Skin is warm and dry.   Crusted lesion nose   Psychiatric: He has a normal mood and affect.     Patient Active Problem List   Diagnosis   • Kidney stone   • Chronic atrial fibrillation (CMS/HCC) ([facility])   • CAD (coronary artery disease) of artery bypass graft   • HTN (hypertension)   • HLD (hyperlipidemia)   • Hypothyroid   • DM (diabetes mellitus) (CMS/HCC) ([facility])   • Anxiety   • GERD (gastroesophageal reflux disease)       Assessment/Plan:hydronephrosis/renal calculus s/p stent placement.Plan for cysto.,right ureteroscopy, laser lithotripsy, stone basket extraction, stent change.  Patient on Eliquis -will hold per  cardiologist recommendation  Cardiac clearance : Dr Mercado.  Patient instructed to take carvedilol, amiodarone am of surgery  Accu check on admission;last HgbA1c 7.6 Patient instructed to hold am insulin day of surgery  LILLIAM-patient instructed to bring CPAP to hospital

## 2018-03-30 NOTE — OP NOTE
Cysto, Bilateral Retrograde, Bilateral Ureterosocpy laser lithotripsy, Right stent exchange (B) Procedure Note      Pre-operative Diagnosis: Hydronephrosis with renal and ureteral calculi, renal insufficiency, retained bilateral double-J stents.  BPH.  History of renal cell CA with partial nephrectomy    Post-operative Diagnosis: same .  Presence of right ureteral calculi.  No ureteral calculi on the left.  Right ureteral stricture      Procedure : cystoscopy, removal of bilateral double-J stents.  Left ureteroscopy and retrograde.  Right ureteroscopy with ureteral dilatation and laser fragmentation of stone.  Right retrograde and right double-J    Surgeon: Nazario Romo MD      Anesthesia: General LMA anesthesia      Indications:   Patient was admitted with renal insufficiency and high creatinine.  He did have bilateral renal calculi and right ureteral calculi.  Patient has a history of renal cell CA and had a partial nephrectomy at another institution.  His kidney function deteriorated and bilateral double-J stent were placed with improvement of his renal function.  Patient is here for further evaluation and management of this new condition.  Discussed options of treatment with risks and complications.  Informed consent was obtained      Procedure Details   Patient was placed in the dorsolithotomy position.  General anesthesia was obtained.  Patient was prepped and draped in the usual manner.  Cystoscopy was done which showed large prostate and a quick view of the bladder did not show any tumors or stones.  The left double-J stent was removed first and then a left ureteroscopy and retrograde were performed and that showed only nonobstructing left renal calculi.  No ureteral calculi  Then the right double-J stent was removed.  Urethral dilatation was performed because of a mid ureteral stricture with a stone was impacted.  Then ureteroscopy was performed with laser fragmentation of his a ureteral stone and  renal stones.  Retrograde performed which showed no evidence of obstruction at this time and a new double-J stent was placed    Estimated Blood Loss:  No blood loss documented.           Drains: Right double-J stent                    Specimens: * No specimens in log *           Implants:   Implant Name Type Inv. Item Serial No.  Lot No. LRB No. Used   STENT URETERAL 6FR 26CM - [id number] Stent STENT URETERAL 6FR 26CM  [employer] [id number]  1   STENT URETERAL 6FR 26CM - [id number]  STENT URETERAL 6FR 26CM  [employer]  Left 1   STENT URETERAL 6FR 26CM - [id number]   STENT URETERAL 6FR 26CM   [employer]   Right 1              Complications:  none           Disposition: PACU - hemodynamically stable.           Condition: stable    Nazario Romo MD

## 2018-03-30 NOTE — OR SURGEON
Pre-Procedure patient identification:  I am the primary operating surgeon/proceduralist and I have identified the patient on 03/30/18 at 8:53 AM Nazario Romo MD  Phone Number: [phone]

## 2018-03-30 NOTE — DISCHARGE INSTRUCTIONS
Need to come to office in 1 week to remove stent      Cystoscopy, Care After  Refer to this sheet in the next few weeks. These instructions provide you with information about caring for yourself after your procedure. Your health care provider may also give you more specific instructions. Your treatment has been planned according to current medical practices, but problems sometimes occur. Call your health care provider if you have any problems or questions after your procedure.  What can I expect after the procedure?  After the procedure, it is common to have:  · Mild pain when you urinate. Pain should stop within a few minutes after you urinate. This may last for up to 1 week.  · A small amount of blood in your urine for several days.  · Feeling like you need to urinate but producing only a small amount of urine.  Follow these instructions at home:    Medicines  · Take over-the-counter and prescription medicines only as told by your health care provider.  · If you were prescribed an antibiotic medicine, take it as told by your health care provider. Do not stop taking the antibiotic even if you start to feel better.  General instructions    · Return to your normal activities as told by your health care provider. Ask your health care provider what activities are safe for you.  · Do not drive for 24 hours if you received a sedative.  · Watch for any blood in your urine. If the amount of blood in your urine increases, call your health care provider.  · Follow instructions from your health care provider about eating or drinking restrictions.  · If a tissue sample was removed for testing (biopsy) during your procedure, it is your responsibility to get your test results. Ask your health care provider or the department performing the test when your results will be ready.  · Drink enough fluid to keep your urine clear or pale yellow.  · Keep all follow-up visits as told by your health care provider. This is  important.  Contact a health care provider if:  · You have pain that gets worse or does not get better with medicine, especially pain when you urinate.  · You have difficulty urinating.  Get help right away if:  · You have more blood in your urine.  · You have blood clots in your urine.  · You have abdominal pain.  · You have a fever or chills.  · You are unable to urinate.  ·   General Anesthesia, Adult, Care After  These instructions provide you with information about caring for yourself after your procedure. Your health care provider may also give you more specific instructions. Your treatment has been planned according to current medical practices, but problems sometimes occur. Call your health care provider if you have any problems or questions after your procedure.  What can I expect after the procedure?  After the procedure, it is common to have:  · Vomiting.  · A sore throat.  · Mental slowness.  It is common to feel:  · Nauseous.  · Cold or shivery.  · Sleepy.  · Tired.  · Sore or achy, even in parts of your body where you did not have surgery.  Follow these instructions at home:  For at least 24 hours after the procedure:  · Do not:  ¨ Participate in activities where you could fall or become injured.  ¨ Drive.  ¨ Use heavy machinery.  ¨ Drink alcohol.  ¨ Take sleeping pills or medicines that cause drowsiness.  ¨ Make important decisions or sign legal documents.  ¨ Take care of children on your own.  · Rest.  Eating and drinking  · If you vomit, drink water, juice, or soup when you can drink without vomiting.  · Drink enough fluid to keep your urine clear or pale yellow.  · Make sure you have little or no nausea before eating solid foods.  · Follow the diet recommended by your health care provider.  General instructions  · Have a responsible adult stay with you until you are awake and alert.  · Return to your normal activities as told by your health care provider. Ask your health care provider what  activities are safe for you.  · Take over-the-counter and prescription medicines only as told by your health care provider.  · If you smoke, do not smoke without supervision.  · Keep all follow-up visits as told by your health care provider. This is important.  Contact a health care provider if:  · You continue to have nausea or vomiting at home, and medicines are not helpful.  · You cannot drink fluids or start eating again.  · You cannot urinate after 8-12 hours.  · You develop a skin rash.  · You have fever.  · You have increasing redness at the site of your procedure.

## 2018-03-30 NOTE — ANESTHESIA PROCEDURE NOTES
Airway  Urgency: elective    Start Time: 3/30/2018 9:07 AM  Airway not difficult    General Information and Staff    Patient location during procedure: OR  Resident/CRNA: NI KRAMER    Indications and Patient Condition  Indications for airway management: anesthesia  Sedation level: deep  Preoxygenated: yes  Mask difficulty assessment: 0 - not attempted    Final Airway Details  Final airway type: supraglottic airway (LMA)      Successful airway: classic  Size 5    Number of attempts at approach: 1

## 2018-04-19 ENCOUNTER — OFFICE VISIT (OUTPATIENT)
Dept: PRIMARY CARE | Facility: CLINIC | Age: 68
End: 2018-04-19
Payer: MEDICARE

## 2018-04-19 VITALS
OXYGEN SATURATION: 97 % | DIASTOLIC BLOOD PRESSURE: 62 MMHG | BODY MASS INDEX: 33.24 KG/M2 | RESPIRATION RATE: 16 BRPM | WEIGHT: 259 LBS | SYSTOLIC BLOOD PRESSURE: 120 MMHG | HEART RATE: 86 BPM | HEIGHT: 74 IN

## 2018-04-19 DIAGNOSIS — R42 VERTIGO: ICD-10-CM

## 2018-04-19 DIAGNOSIS — I10 ESSENTIAL HYPERTENSION: Primary | ICD-10-CM

## 2018-04-19 DIAGNOSIS — E11.9 TYPE 2 DIABETES MELLITUS WITHOUT COMPLICATION, WITHOUT LONG-TERM CURRENT USE OF INSULIN (CMS/HCC): ICD-10-CM

## 2018-04-19 PROCEDURE — 99214 OFFICE O/P EST MOD 30 MIN: CPT | Performed by: FAMILY MEDICINE

## 2018-04-19 RX ORDER — MECLIZINE HCL 25MG 25 MG/1
25 TABLET, CHEWABLE ORAL 3 TIMES DAILY PRN
Qty: 20 TABLET | Refills: 0 | Status: SHIPPED | OUTPATIENT
Start: 2018-04-19 | End: 2018-12-26 | Stop reason: ALTCHOICE

## 2018-04-19 ASSESSMENT — ENCOUNTER SYMPTOMS
NAUSEA: 0
BACK PAIN: 0
TROUBLE SWALLOWING: 0
NERVOUS/ANXIOUS: 0
BLOOD IN STOOL: 0
EYE PAIN: 0
WEAKNESS: 0
PALPITATIONS: 0
DIARRHEA: 0
FREQUENCY: 0
NECK PAIN: 0
FATIGUE: 0
HEADACHES: 0
SHORTNESS OF BREATH: 0
DIZZINESS: 1
CHEST TIGHTNESS: 0
ABDOMINAL PAIN: 0
ARTHRALGIAS: 0
ACTIVITY CHANGE: 0
DYSURIA: 0

## 2018-04-19 NOTE — PROGRESS NOTES
Daily Progress Note      Subjective      Patient ID: Nathan Alonzo is a 67 y.o. male.    HPI  For bp f/u, doing well, feeling better    Also, c/o 2-3 w dizzy w/ laying down, balance, wobbly  No ha, other issues, no uri    The following have been reviewed and updated as appropriate in this visit:       Review of Systems   Constitutional: Negative for activity change and fatigue.   HENT: Negative for congestion, ear pain, tinnitus and trouble swallowing.         Balance issue w/ positional changes   Eyes: Negative for pain.   Respiratory: Negative for chest tightness and shortness of breath.    Cardiovascular: Negative for chest pain and palpitations.   Gastrointestinal: Negative for abdominal pain, blood in stool, diarrhea and nausea.   Genitourinary: Negative for dysuria, frequency and urgency.   Musculoskeletal: Negative for arthralgias, back pain and neck pain.   Neurological: Positive for dizziness. Negative for weakness and headaches.   Psychiatric/Behavioral: The patient is not nervous/anxious.    All other systems reviewed and are negative.      Current Outpatient Prescriptions   Medication Sig Dispense Refill   • amiodarone (PACERONE) 200 mg tablet TK 1 T PO D  0   • aspirin 81 mg enteric coated tablet Take 81 mg by mouth daily.     • atorvastatin (LIPITOR) 40 mg tablet Take 40 mg by mouth daily.       • carvedilol (COREG) 25 mg tablet Take 25 mg by mouth 2 (two) times a day with meals.       • chlorthalidone (HYGROTEN) 25 mg tablet Take 25 mg by mouth daily.     • cloNIDine (CATAPRES) 0.2 mg tablet TK 1 T PO BID  3   • coenzyme Q10 (CO Q-10) 400 mg capsule Take 400 mg by mouth daily.       • ELIQUIS 5 mg tablet TK 1 T PO TWICE DAILY. DO NOT. STOP UNLESS DIRECTED BY DOCTOR.STILLABOWER  3   • INSULIN GLARGINE,HUM.REC.ANLOG (INSULIN GLARGINE SUBQ) Inject 15 unit marking on U-100 syringe under the skin daily.     • lamoTRIgine (LaMICtal) 100 mg tablet Take 100 mg by mouth daily.     • levothyroxine  (SYNTHROID) 88 mcg tablet TK 1 T PO D  5   • metFORMIN (GLUCOPHAGE) 500 mg tablet Take 500 mg by mouth 2 (two) times a day with meals.     • mirtazapine (REMERON) 15 mg tablet Take 15 mg by mouth nightly.     • omeprazole (PriLOSEC) 40 mg capsule TK 1 C PO D  3   • spironolactone (ALDACTONE) 25 mg tablet TK 1 T PO D  3     No current facility-administered medications for this visit.      Past Medical History:   Diagnosis Date   • Abnormal ECG     a fib   • BPH (benign prostatic hyperplasia)    • Coronary artery disease    • Disease of thyroid gland    • Hypertension    • Renal calculi     right   • Renal cancer (CMS/HCC) (HCC)    • Sleep apnea     uses cpap   • Type 2 diabetes mellitus (CMS/HCC) (HCC)      History reviewed. No pertinent family history.  Past Surgical History:   Procedure Laterality Date   • COLONOSCOPY  2016   • CORONARY ARTERY BYPASS GRAFT      x3   • NEPHRECTOMY Right    • TONSILLECTOMY     • UVULECTOMY  2005     Social History     Social History   • Marital status:      Spouse name: N/A   • Number of children: N/A   • Years of education: N/A     Occupational History   • Not on file.     Social History Main Topics   • Smoking status: Never Smoker   • Smokeless tobacco: Never Used   • Alcohol use No   • Drug use: No   • Sexual activity: Defer     Other Topics Concern   • Not on file     Social History Narrative   • No narrative on file     Allergies   Allergen Reactions   • Amlodipine      Muscle swelling   • Cephalexin Monohydrate    • Codeine Other (see comments)     unknown   • Latex, Natural Rubber      Added based on information entered during case entry, please review and add reactions, type, and severity as needed   • Oxycodone      Other reaction(s): Hives       Objective   I have reviewed the patient's pertinent labs. Pertinent labs are within normal limits.    Physical Exam   HENT:   Right Ear: Hearing, tympanic membrane, external ear and ear canal normal. No decreased hearing is  noted.   Left Ear: Hearing, tympanic membrane, external ear and ear canal normal. No decreased hearing is noted.   Cardiovascular: Normal rate, regular rhythm, S1 normal, S2 normal, normal heart sounds and normal pulses.    No murmur heard.      Assessment/Plan     Problem List Items Addressed This Visit     Hypertension - Primary      Other Visit Diagnoses     Vertigo            No orders of the defined types were placed in this encounter.    bp much better  Will c/w present rx, dx, tx  Diary x 6 m  Discussed, described, reviewed at length  Aware, agrees, and  understands plan    Will antivert prn/qhs  Discussed, described, reviewed at length  Aware, agrees, and  understands plan  W/u if no change  T/c in 5 d    Serafin Zavaleta, DO  4/19/2018

## 2018-04-26 ENCOUNTER — TELEPHONE (OUTPATIENT)
Dept: PRIMARY CARE | Facility: CLINIC | Age: 68
End: 2018-04-26

## 2018-04-26 NOTE — TELEPHONE ENCOUNTER
Pt requested hospital records from feb regarding kidney stones for new appt with nephrologist. Faxed to patient at [phone].

## 2018-05-04 ENCOUNTER — APPOINTMENT (OUTPATIENT)
Dept: URBAN - METROPOLITAN AREA CLINIC 200 | Age: 68
Setting detail: DERMATOLOGY
End: 2018-05-09

## 2018-05-04 DIAGNOSIS — L57.0 ACTINIC KERATOSIS: ICD-10-CM

## 2018-05-04 DIAGNOSIS — B35.3 TINEA PEDIS: ICD-10-CM

## 2018-05-04 DIAGNOSIS — D485 NEOPLASM OF UNCERTAIN BEHAVIOR OF SKIN: ICD-10-CM

## 2018-05-04 PROBLEM — D48.5 NEOPLASM OF UNCERTAIN BEHAVIOR OF SKIN: Status: ACTIVE | Noted: 2018-05-04

## 2018-05-04 PROBLEM — E03.9 HYPOTHYROIDISM, UNSPECIFIED: Status: ACTIVE | Noted: 2018-05-04

## 2018-05-04 PROCEDURE — OTHER BIOPSY BY SHAVE METHOD: OTHER

## 2018-05-04 PROCEDURE — 17000 DESTRUCT PREMALG LESION: CPT

## 2018-05-04 PROCEDURE — OTHER LIQUID NITROGEN: OTHER

## 2018-05-04 PROCEDURE — OTHER PRESCRIPTION: OTHER

## 2018-05-04 PROCEDURE — 99213 OFFICE O/P EST LOW 20 MIN: CPT | Mod: 25

## 2018-05-04 PROCEDURE — 11100: CPT | Mod: 59

## 2018-05-04 PROCEDURE — 17003 DESTRUCT PREMALG LES 2-14: CPT

## 2018-05-04 RX ORDER — KETOCONAZOLE 20 MG/G
CREAM TOPICAL QD
Qty: 1 | Refills: 10 | Status: ERX

## 2018-05-04 RX ORDER — FLUOROURACIL 50 MG/G
CREAM TOPICAL
Qty: 1 | Refills: 2 | Status: ERX

## 2018-05-04 ASSESSMENT — LOCATION DETAILED DESCRIPTION DERM
LOCATION DETAILED: RIGHT SUPERIOR LATERAL MALAR CHEEK
LOCATION DETAILED: LEFT DISTAL DORSAL FOREARM
LOCATION DETAILED: LEFT FOREHEAD
LOCATION DETAILED: RIGHT SUPERIOR HELIX
LOCATION DETAILED: RIGHT CENTRAL BUCCAL CHEEK
LOCATION DETAILED: LEFT DORSAL FOOT
LOCATION DETAILED: LEFT INFERIOR LATERAL FOREHEAD
LOCATION DETAILED: INFERIOR MID FOREHEAD
LOCATION DETAILED: LEFT LATERAL MALAR CHEEK
LOCATION DETAILED: RIGHT MEDIAL ZYGOMA
LOCATION DETAILED: RIGHT CENTRAL EYEBROW
LOCATION DETAILED: NASAL DORSUM

## 2018-05-04 ASSESSMENT — LOCATION ZONE DERM
LOCATION ZONE: ARM
LOCATION ZONE: NOSE
LOCATION ZONE: EAR
LOCATION ZONE: FACE
LOCATION ZONE: FEET

## 2018-05-04 ASSESSMENT — LOCATION SIMPLE DESCRIPTION DERM
LOCATION SIMPLE: INFERIOR FOREHEAD
LOCATION SIMPLE: LEFT FOREARM
LOCATION SIMPLE: RIGHT CHEEK
LOCATION SIMPLE: RIGHT ZYGOMA
LOCATION SIMPLE: RIGHT EYEBROW
LOCATION SIMPLE: RIGHT EAR
LOCATION SIMPLE: LEFT CHEEK
LOCATION SIMPLE: LEFT FOREHEAD
LOCATION SIMPLE: NOSE
LOCATION SIMPLE: LEFT FOOT

## 2018-05-04 ASSESSMENT — PAIN INTENSITY VAS: HOW INTENSE IS YOUR PAIN 0 BEING NO PAIN, 10 BEING THE MOST SEVERE PAIN POSSIBLE?: NO PAIN

## 2018-05-04 NOTE — PROCEDURE: BIOPSY BY SHAVE METHOD
Type Of Destruction Used: Curettage
consent was obtained and risks were reviewed including but not limited to scarring, infection, bleeding, scabbing, incomplete removal, nerve damage and allergy to anesthesia.
Biopsy Method: Personna blade
Wound Care: Vaseline
Billing Type: Third-Party Bill
Destruction After The Procedure: No
Additional Anesthesia Volume In Cc (Will Not Render If 0): 0
Hemostasis: Drysol
Anesthesia Type: 1% lidocaine with epinephrine
Post-Care Instructions: I reviewed with the patient in detail post-care instructions. Patient is to keep the biopsy site dry overnight, and then apply bacitracin twice daily until healed. Patient may apply hydrogen peroxide soaks to remove any crusting.
Dressing: bandage
Size Of Lesion In Cm: 0.5
Biopsy Type: H and E
Notification Instructions: Patient will be notified of biopsy results. However, patient instructed to call the office if not contacted within 2 weeks.
Anesthesia Volume In Cc (Will Not Render If 0): 0.1
Detail Level: Detailed
Was A Bandage Applied: Yes

## 2018-05-30 ENCOUNTER — APPOINTMENT (OUTPATIENT)
Dept: URBAN - METROPOLITAN AREA CLINIC 200 | Age: 68
Setting detail: DERMATOLOGY
End: 2018-05-30

## 2018-05-30 PROBLEM — C44.321 SQUAMOUS CELL CARCINOMA OF SKIN OF NOSE: Status: ACTIVE | Noted: 2018-05-30

## 2018-05-30 PROCEDURE — OTHER REFERRAL: OTHER

## 2018-07-03 ENCOUNTER — APPOINTMENT (EMERGENCY)
Dept: RADIOLOGY | Facility: HOSPITAL | Age: 68
End: 2018-07-03
Attending: EMERGENCY MEDICINE
Payer: MEDICARE

## 2018-07-03 ENCOUNTER — HOSPITAL ENCOUNTER (EMERGENCY)
Facility: HOSPITAL | Age: 68
Discharge: HOME | End: 2018-07-03
Attending: EMERGENCY MEDICINE | Admitting: EMERGENCY MEDICINE
Payer: MEDICARE

## 2018-07-03 VITALS
DIASTOLIC BLOOD PRESSURE: 85 MMHG | HEIGHT: 74 IN | SYSTOLIC BLOOD PRESSURE: 138 MMHG | BODY MASS INDEX: 33.11 KG/M2 | HEART RATE: 60 BPM | OXYGEN SATURATION: 99 % | WEIGHT: 258 LBS | TEMPERATURE: 98 F | RESPIRATION RATE: 16 BRPM

## 2018-07-03 DIAGNOSIS — S09.90XA HEAD INJURY, INITIAL ENCOUNTER: ICD-10-CM

## 2018-07-03 DIAGNOSIS — R55 NEAR SYNCOPE: ICD-10-CM

## 2018-07-03 DIAGNOSIS — S00.81XA ABRASION OF FOREHEAD, INITIAL ENCOUNTER: ICD-10-CM

## 2018-07-03 DIAGNOSIS — E86.0 DEHYDRATION: ICD-10-CM
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
## 2018-07-03 LAB
ANION GAP SERPL CALC-SCNC: 6 MEQ/L (ref 3–15)
APTT PPP: 49 SEC. (ref 23–35)
ATRIAL RATE: 250
BASOPHILS # BLD: 0.03 K/UL (ref 0.01–0.1)
BASOPHILS NFR BLD: 0.3 %
BUN SERPL-MCNC: 24 MG/DL (ref 8–20)
CALCIUM SERPL-MCNC: 8.8 MG/DL (ref 8.9–10.3)
CHLORIDE SERPL-SCNC: 101 MMOL/L (ref 98–109)
CK SERPL-CCNC: 107 IU/L (ref 16–300)
CO2 SERPL-SCNC: 26 MMOL/L (ref 22–32)
CREAT SERPL-MCNC: 1.7 MG/DL (ref 0.8–1.3)
DIFFERENTIAL METHOD BLD: ABNORMAL
EOSINOPHIL # BLD: 0.22 K/UL (ref 0.04–0.54)
EOSINOPHIL NFR BLD: 1.9 %
ERYTHROCYTE [DISTWIDTH] IN BLOOD BY AUTOMATED COUNT: 13.8 % (ref 11.6–14.4)
GFR SERPL CREATININE-BSD FRML MDRD: 40.2 ML/MIN/1.73M*2
GLUCOSE SERPL-MCNC: 237 MG/DL (ref 70–99)
HCT VFR BLDCO AUTO: 34 % (ref 40–51)
HGB BLD-MCNC: 11.9 G/DL (ref 13.7–17.5)
IMM GRANULOCYTES # BLD AUTO: 0.09 K/UL (ref 0–0.08)
IMM GRANULOCYTES NFR BLD AUTO: 0.8 %
INR PPP: 1.4 INR
LYMPHOCYTES # BLD: 1.03 K/UL (ref 1.2–3.5)
LYMPHOCYTES NFR BLD: 8.7 %
MCH RBC QN AUTO: 30.6 PG (ref 28–33.2)
MCHC RBC AUTO-ENTMCNC: 35 G/DL (ref 32.2–36.5)
MCV RBC AUTO: 87.4 FL (ref 83–98)
MONOCYTES # BLD: 0.7 K/UL (ref 0.3–1)
MONOCYTES NFR BLD: 5.9 %
NEUTROPHILS # BLD: 9.81 K/UL (ref 1.7–7)
NEUTS SEG NFR BLD: 82.4 %
NRBC BLD-RTO: 0 %
P AXIS: 69
PDW BLD AUTO: 10.8 FL (ref 9.4–12.4)
PLATELET # BLD AUTO: 166 K/UL (ref 150–350)
POTASSIUM SERPL-SCNC: 3.6 MMOL/L (ref 3.6–5.1)
PROTHROMBIN TIME: 17 SEC. (ref 12.2–14.5)
QRS DURATION: 160
QT INTERVAL: 482
QTC CALCULATION(BAZETT): 497
R AXIS: 263
RBC # BLD AUTO: 3.89 M/UL (ref 4.5–5.8)
SODIUM SERPL-SCNC: 133 MMOL/L (ref 136–144)
T WAVE AXIS: -27
TROPONIN I SERPL-MCNC: 0.03 NG/ML
VENTRICULAR RATE: 64
WBC # BLD AUTO: 11.88 K/UL (ref 3.8–10.5)

## 2018-07-03 PROCEDURE — 96360 HYDRATION IV INFUSION INIT: CPT

## 2018-07-03 PROCEDURE — 80048 BASIC METABOLIC PNL TOTAL CA: CPT | Performed by: EMERGENCY MEDICINE

## 2018-07-03 PROCEDURE — 70450 CT HEAD/BRAIN W/O DYE: CPT

## 2018-07-03 PROCEDURE — 84484 ASSAY OF TROPONIN QUANT: CPT | Performed by: EMERGENCY MEDICINE

## 2018-07-03 PROCEDURE — 3E033GC INTRODUCTION OF OTHER THERAPEUTIC SUBSTANCE INTO PERIPHERAL VEIN, PERCUTANEOUS APPROACH: ICD-10-PCS | Performed by: EMERGENCY MEDICINE

## 2018-07-03 PROCEDURE — 82550 ASSAY OF CK (CPK): CPT | Performed by: EMERGENCY MEDICINE

## 2018-07-03 PROCEDURE — 85610 PROTHROMBIN TIME: CPT | Performed by: EMERGENCY MEDICINE

## 2018-07-03 PROCEDURE — 99284 EMERGENCY DEPT VISIT MOD MDM: CPT | Mod: 25

## 2018-07-03 PROCEDURE — 85025 COMPLETE CBC W/AUTO DIFF WBC: CPT | Performed by: EMERGENCY MEDICINE

## 2018-07-03 PROCEDURE — 36415 COLL VENOUS BLD VENIPUNCTURE: CPT | Performed by: EMERGENCY MEDICINE

## 2018-07-03 PROCEDURE — 25800000 HC PHARMACY IV SOLUTIONS: Performed by: EMERGENCY MEDICINE

## 2018-07-03 PROCEDURE — 85730 THROMBOPLASTIN TIME PARTIAL: CPT | Performed by: EMERGENCY MEDICINE

## 2018-07-03 PROCEDURE — 93005 ELECTROCARDIOGRAM TRACING: CPT | Performed by: EMERGENCY MEDICINE

## 2018-07-03 RX ADMIN — SODIUM CHLORIDE 1000 ML: 9 INJECTION, SOLUTION INTRAVENOUS at 11:28

## 2018-07-03 ASSESSMENT — ENCOUNTER SYMPTOMS
FLANK PAIN: 0
LIGHT-HEADEDNESS: 1
FACIAL ASYMMETRY: 0
CHEST TIGHTNESS: 0
SEIZURES: 0
ACTIVITY CHANGE: 0
NECK STIFFNESS: 0
DIARRHEA: 0
PHOTOPHOBIA: 0
SPEECH DIFFICULTY: 0
TREMORS: 0
NECK PAIN: 0
DIZZINESS: 0
SHORTNESS OF BREATH: 0
APPETITE CHANGE: 0
LOSS OF CONSCIOUSNESS: 0
NUMBNESS: 0
BACK PAIN: 0
TROUBLE SWALLOWING: 0
VOMITING: 0
FEVER: 0
HEADACHES: 0
NAUSEA: 0
PALPITATIONS: 0
WEAKNESS: 0
ABDOMINAL PAIN: 0

## 2018-07-03 NOTE — ED PROVIDER NOTES
HPI     Chief Complaint   Patient presents with   • Fall     Had skin cancer removed yesterday, this morning was at f/u appointment and became dizzy and fell at dr. office.          History provided by:  Patient  Trauma  Mechanism of injury: Became lightheaded after leaving doctor's appointment and was falling forward.  Caught himself but brushed his forehead along carpet floor and fall  Injury location: face  Injury location detail: forehead     Fall:       Fall occurred: walking       Impact surface: carpet       Point of impact: face       Entrapped after fall: no    Protective equipment:        None       Suspicion of alcohol use: no       Suspicion of drug use: no    EMS/PTA data:       Ambulatory at scene: yes       Blood loss: none       Responsiveness: alert       Oriented to: person, place, situation and time       Loss of consciousness: no       Amnesic to event: no    Current symptoms:       Pain scale: 1/10       Pain quality: burning       Pain timing: constant       Associated symptoms:             Denies abdominal pain, back pain, chest pain, headache, loss of consciousness, nausea, neck pain, seizures and vomiting.     Relevant PMH:       Medical risk factors:             Past MI and CABG.        Pharmacological risk factors:             Anticoagulation therapy.        Patient History     Past Medical History:   Diagnosis Date   • Abnormal ECG     a fib   • BPH (benign prostatic hyperplasia)    • Coronary artery disease    • Disease of thyroid gland    • Hypertension    • Renal calculi     right   • Renal cancer (CMS/HCC) (HCC)    • Sleep apnea     uses cpap   • Type 2 diabetes mellitus (CMS/HCC) (HCC)        Past Surgical History:   Procedure Laterality Date   • COLONOSCOPY  2016   • CORONARY ARTERY BYPASS GRAFT      x3   • NEPHRECTOMY Right    • TONSILLECTOMY     • UVULECTOMY  2005       History reviewed. No pertinent family history.    Social History   Substance Use Topics   • Smoking status:  "Never Smoker   • Smokeless tobacco: Never Used   • Alcohol use No       Systems Reviewed from Nursing Triage:          Review of Systems     Review of Systems   Constitutional: Negative for activity change, appetite change and fever.   HENT: Negative for congestion and trouble swallowing.    Eyes: Negative for photophobia and visual disturbance.   Respiratory: Negative for chest tightness and shortness of breath.    Cardiovascular: Negative for chest pain, palpitations and leg swelling.   Gastrointestinal: Negative for abdominal pain, diarrhea, nausea and vomiting.   Genitourinary: Negative for flank pain.   Musculoskeletal: Negative for back pain, gait problem, neck pain and neck stiffness.   Neurological: Positive for light-headedness. Negative for dizziness, tremors, seizures, loss of consciousness, facial asymmetry, speech difficulty, weakness, numbness and headaches.        Physical Exam     ED Triage Vitals [07/03/18 1056]   Temp Heart Rate Resp BP SpO2   36.7 °C (98 °F) 61 16 122/88 97 %      Temp Source Heart Rate Source Patient Position BP Location FiO2 (%) (Set)   Oral -- Lying Right upper arm --                     Patient Vitals for the past 24 hrs:   BP Temp Temp src Pulse Resp SpO2 Height Weight   07/03/18 1056 122/88 36.7 °C (98 °F) Oral 61 16 97 % 1.88 m (6' 2\") 117 kg (258 lb)           Physical Exam   Constitutional: He is oriented to person, place, and time. He appears well-developed and well-nourished.   HENT:   Head: Normocephalic.   Right Ear: External ear normal.   Left Ear: External ear normal.   Mouth/Throat: Oropharynx is clear and moist.   Bilateral periorbital ecchymosis and swelling (unrelated to fall, due to recent dermatologic surgery for removal of skin cancer) incision along nasal bridge clean dry and intact  Right upper forehead 3 cm abrasion secondary to today's trauma   Eyes: Conjunctivae and EOM are normal. Pupils are equal, round, and reactive to light. No scleral icterus. "   Neck: Normal range of motion and full passive range of motion without pain. Neck supple. No spinous process tenderness and no muscular tenderness present. Normal range of motion present.   Cardiovascular: Normal rate, normal heart sounds and intact distal pulses.    No murmur heard.  Irregular   Pulmonary/Chest: Effort normal and breath sounds normal. He exhibits no tenderness.   Abdominal: Soft. Bowel sounds are normal. He exhibits no distension. There is no tenderness.   Musculoskeletal: Normal range of motion. He exhibits no edema or tenderness.   Neurological: He is alert and oriented to person, place, and time. He displays normal reflexes. No cranial nerve deficit or sensory deficit. He exhibits normal muscle tone. Coordination normal.   Skin: Skin is warm and dry.   Nursing note and vitals reviewed.           Procedures    ED Course & MDM     Labs Reviewed   CBC AND DIFFERENTIAL    Narrative:     The following orders were created for panel order CBC and differential.  Procedure                               Abnormality         Status                     ---------                               -----------         ------                     CBC[[id number]]                                               In process                 Diff Count[[id number]]                                        In process                   Please view results for these tests on the individual orders.   PROTIME-INR   PTT   BASIC METABOLIC PANEL   CK TOTAL WITH CKMB REFLEX    Narrative:     The following orders were created for panel order CK total and CKMB.  Procedure                               Abnormality         Status                     ---------                               -----------         ------                     CK[[id number]]                                                In process                   Please view results for these tests on the individual orders.   TROPONIN I   CBC   DIFF COUNT   CK, TOTAL (PERF)       ECG  12 lead    (Results Pending)   CT HEAD WITHOUT IV CONTRAST    (Results Pending)           [facility]         ED Course as of Jul 03 1347   Tue Jul 03, 2018   1245 Symptoms resolved.  Vitals stable.  Negative tilt.  Patient denies headache.  Normal neurologic exam.  Per patient he believes he was mildly dehydrated as a result of the recent procedure and lack of sleep.  Feels much better now after eating drinking getting IV fluids  [JK]      ED Course User Index  [JK] Lupillo Walker MD         Clinical Impressions as of Jul 03 1347   Fall, initial encounter   Head injury, initial encounter   Dehydration   Abrasion of forehead, initial encounter   Near syncope        Lupillo Walker MD  07/03/18 0018

## 2018-07-05 ENCOUNTER — HOSPITAL ENCOUNTER (OUTPATIENT)
Facility: HOSPITAL | Age: 68
Setting detail: OBSERVATION
Discharge: HOME | End: 2018-07-06
Attending: EMERGENCY MEDICINE | Admitting: EMERGENCY MEDICINE
Payer: MEDICARE

## 2018-07-05 DIAGNOSIS — I10 ESSENTIAL HYPERTENSION: ICD-10-CM

## 2018-07-05 DIAGNOSIS — I16.0 HYPERTENSIVE URGENCY: Primary | ICD-10-CM

## 2018-07-05 DIAGNOSIS — I48.91 ATRIAL FIBRILLATION, UNSPECIFIED TYPE (CMS/HCC): ICD-10-CM

## 2018-07-05 LAB
ALBUMIN SERPL-MCNC: 4.3 G/DL (ref 3.4–5)
ALP SERPL-CCNC: 73 IU/L (ref 35–126)
ALT SERPL-CCNC: 21 IU/L (ref 16–63)
ANION GAP SERPL CALC-SCNC: 8 MEQ/L (ref 3–15)
AST SERPL-CCNC: 20 IU/L (ref 15–41)
BACTERIA URNS QL MICRO: 1 /UL
BASOPHILS # BLD: 0.07 K/UL (ref 0.01–0.1)
BASOPHILS NFR BLD: 0.5 %
BILIRUB SERPL-MCNC: 1.3 MG/DL (ref 0.3–1.2)
BILIRUB UR QL STRIP.AUTO: NEGATIVE MG/DL
BUN SERPL-MCNC: 24 MG/DL (ref 8–20)
CALCIUM SERPL-MCNC: 8.7 MG/DL (ref 8.9–10.3)
CHLORIDE SERPL-SCNC: 106 MMOL/L (ref 98–109)
CLARITY UR REFRACT.AUTO: CLEAR
CO2 SERPL-SCNC: 23 MMOL/L (ref 22–32)
COLOR UR AUTO: YELLOW
CREAT SERPL-MCNC: 1.3 MG/DL (ref 0.8–1.3)
DIFFERENTIAL METHOD BLD: ABNORMAL
EOSINOPHIL # BLD: 0.08 K/UL (ref 0.04–0.54)
EOSINOPHIL NFR BLD: 0.5 %
ERYTHROCYTE [DISTWIDTH] IN BLOOD BY AUTOMATED COUNT: 14.3 % (ref 11.6–14.4)
GFR SERPL CREATININE-BSD FRML MDRD: 54.7 ML/MIN/1.73M*2
GLUCOSE SERPL-MCNC: 206 MG/DL (ref 70–99)
GLUCOSE UR STRIP.AUTO-MCNC: ABNORMAL MG/DL
HCT VFR BLDCO AUTO: 35.7 % (ref 40–51)
HGB BLD-MCNC: 12.3 G/DL (ref 13.7–17.5)
HGB UR QL STRIP.AUTO: ABNORMAL
HYALINE CASTS #/AREA URNS LPF: ABNORMAL /LPF
IMM GRANULOCYTES # BLD AUTO: 0.15 K/UL (ref 0–0.08)
IMM GRANULOCYTES NFR BLD AUTO: 1 %
INR PPP: 1.1 INR
KETONES UR STRIP.AUTO-MCNC: 2 MG/DL
LEUKOCYTE ESTERASE UR QL STRIP.AUTO: NEGATIVE
LYMPHOCYTES # BLD: 0.64 K/UL (ref 1.2–3.5)
LYMPHOCYTES NFR BLD: 4.1 %
MCH RBC QN AUTO: 30.1 PG (ref 28–33.2)
MCHC RBC AUTO-ENTMCNC: 34.5 G/DL (ref 32.2–36.5)
MCV RBC AUTO: 87.3 FL (ref 83–98)
MONOCYTES # BLD: 0.47 K/UL (ref 0.3–1)
MONOCYTES NFR BLD: 3 %
NEUTROPHILS # BLD: 14.08 K/UL (ref 1.7–7)
NEUTS SEG NFR BLD: 90.9 %
NITRITE UR QL STRIP.AUTO: NEGATIVE
NRBC BLD-RTO: 0 %
PDW BLD AUTO: 11 FL (ref 9.4–12.4)
PH UR STRIP.AUTO: 6.5 [PH]
PLATELET # BLD AUTO: 162 K/UL (ref 150–350)
POTASSIUM SERPL-SCNC: 3.5 MMOL/L (ref 3.6–5.1)
PROT SERPL-MCNC: 7 G/DL (ref 6–8.2)
PROT UR QL STRIP.AUTO: 1
PROTHROMBIN TIME: 13.9 SEC. (ref 12.2–14.5)
RBC # BLD AUTO: 4.09 M/UL (ref 4.5–5.8)
RBC #/AREA URNS HPF: ABNORMAL /HPF
SODIUM SERPL-SCNC: 137 MMOL/L (ref 136–144)
SP GR UR REFRACT.AUTO: 1.01
SQUAMOUS URNS QL MICRO: ABNORMAL /HPF
UROBILINOGEN UR STRIP-ACNC: 0.2 EU/DL
WBC # BLD AUTO: 15.49 K/UL (ref 3.8–10.5)
WBC #/AREA URNS HPF: ABNORMAL /HPF

## 2018-07-05 PROCEDURE — 63600000 HC DRUGS/DETAIL CODE: Performed by: EMERGENCY MEDICINE

## 2018-07-05 PROCEDURE — 85025 COMPLETE CBC W/AUTO DIFF WBC: CPT | Performed by: EMERGENCY MEDICINE

## 2018-07-05 PROCEDURE — 3E033GC INTRODUCTION OF OTHER THERAPEUTIC SUBSTANCE INTO PERIPHERAL VEIN, PERCUTANEOUS APPROACH: ICD-10-PCS | Performed by: EMERGENCY MEDICINE

## 2018-07-05 PROCEDURE — 25800000 HC PHARMACY IV SOLUTIONS: Performed by: EMERGENCY MEDICINE

## 2018-07-05 PROCEDURE — 96361 HYDRATE IV INFUSION ADD-ON: CPT

## 2018-07-05 PROCEDURE — 99220 PR INITIAL OBSERVATION CARE/DAY 70 MINUTES: CPT | Performed by: HOSPITALIST

## 2018-07-05 PROCEDURE — 82947 ASSAY GLUCOSE BLOOD QUANT: CPT | Performed by: EMERGENCY MEDICINE

## 2018-07-05 PROCEDURE — 36415 COLL VENOUS BLD VENIPUNCTURE: CPT | Performed by: EMERGENCY MEDICINE

## 2018-07-05 PROCEDURE — 85610 PROTHROMBIN TIME: CPT | Performed by: EMERGENCY MEDICINE

## 2018-07-05 PROCEDURE — 93005 ELECTROCARDIOGRAM TRACING: CPT | Performed by: EMERGENCY MEDICINE

## 2018-07-05 PROCEDURE — 81001 URINALYSIS AUTO W/SCOPE: CPT | Performed by: EMERGENCY MEDICINE

## 2018-07-05 PROCEDURE — G0378 HOSPITAL OBSERVATION PER HR: HCPCS

## 2018-07-05 PROCEDURE — 63700000 HC SELF-ADMINISTRABLE DRUG: Performed by: EMERGENCY MEDICINE

## 2018-07-05 PROCEDURE — 99285 EMERGENCY DEPT VISIT HI MDM: CPT | Mod: 25

## 2018-07-05 RX ORDER — LABETALOL HCL 20 MG/4 ML
20 SYRINGE (ML) INTRAVENOUS ONCE
Status: DISCONTINUED | OUTPATIENT
Start: 2018-07-05 | End: 2018-07-05

## 2018-07-05 RX ORDER — SODIUM CHLORIDE 9 MG/ML
INJECTION, SOLUTION INTRAVENOUS ONCE
Status: COMPLETED | OUTPATIENT
Start: 2018-07-05 | End: 2018-07-05

## 2018-07-05 RX ORDER — MORPHINE SULFATE 2 MG/ML
4 INJECTION, SOLUTION INTRAMUSCULAR; INTRAVENOUS ONCE
Status: COMPLETED | OUTPATIENT
Start: 2018-07-05 | End: 2018-07-05

## 2018-07-05 RX ORDER — ONDANSETRON HYDROCHLORIDE 2 MG/ML
INJECTION, SOLUTION INTRAVENOUS
Status: DISPENSED
Start: 2018-07-05 | End: 2018-07-06

## 2018-07-05 RX ORDER — VALSARTAN 160 MG/1
160 TABLET ORAL DAILY
Status: DISCONTINUED | OUTPATIENT
Start: 2018-07-05 | End: 2018-07-06

## 2018-07-05 RX ORDER — CLONIDINE HYDROCHLORIDE 0.2 MG/1
0.2 TABLET ORAL ONCE
Status: COMPLETED | OUTPATIENT
Start: 2018-07-05 | End: 2018-07-05

## 2018-07-05 RX ORDER — HYDRALAZINE HYDROCHLORIDE 25 MG/1
50 TABLET, FILM COATED ORAL ONCE
Status: COMPLETED | OUTPATIENT
Start: 2018-07-05 | End: 2018-07-05

## 2018-07-05 RX ORDER — GLIPIZIDE 5 MG/1
5 TABLET ORAL
Status: ON HOLD | COMMUNITY
End: 2018-07-06 | Stop reason: ALTCHOICE

## 2018-07-05 RX ORDER — ONDANSETRON HYDROCHLORIDE 2 MG/ML
4 INJECTION, SOLUTION INTRAVENOUS EVERY 6 HOURS PRN
Status: DISCONTINUED | OUTPATIENT
Start: 2018-07-05 | End: 2018-07-06 | Stop reason: HOSPADM

## 2018-07-05 RX ORDER — EPLERENONE 25 MG/1
25 TABLET, FILM COATED ORAL DAILY
COMMUNITY
End: 2019-01-29 | Stop reason: HOSPADM

## 2018-07-05 RX ADMIN — HYDRALAZINE HYDROCHLORIDE 50 MG: 25 TABLET, FILM COATED ORAL at 20:41

## 2018-07-05 RX ADMIN — CLONIDINE HYDROCHLORIDE 0.2 MG: 0.2 TABLET ORAL at 18:21

## 2018-07-05 RX ADMIN — MORPHINE SULFATE 4 MG: 2 INJECTION, SOLUTION INTRAMUSCULAR; INTRAVENOUS at 23:42

## 2018-07-05 RX ADMIN — MORPHINE SULFATE 4 MG: 2 INJECTION, SOLUTION INTRAMUSCULAR; INTRAVENOUS at 19:54

## 2018-07-05 RX ADMIN — SODIUM CHLORIDE: 9 INJECTION, SOLUTION INTRAVENOUS at 20:40

## 2018-07-05 RX ADMIN — VALSARTAN 160 MG: 160 TABLET, FILM COATED ORAL at 20:41

## 2018-07-05 ASSESSMENT — ENCOUNTER SYMPTOMS
FACIAL SWELLING: 1
CARDIOVASCULAR NEGATIVE: 1
RESPIRATORY NEGATIVE: 1
NEUROLOGICAL NEGATIVE: 1
CONSTITUTIONAL NEGATIVE: 1
MUSCULOSKELETAL NEGATIVE: 1
EYES NEGATIVE: 1
GASTROINTESTINAL NEGATIVE: 1

## 2018-07-05 NOTE — ED PROVIDER NOTES
HPI     Chief Complaint   Patient presents with   • Hypertension     Pt had skin grafting done at [facility] and his post op BP was very high. Pt received Hydralazine and Labetolol with no relief from his high BP.        HPI   7:30 PM  67-year-old male presents from a local surgery center.  Has had recent surgery on skin cancer this week.  Today he went back for plastic surgery repair.  He was sent to the emergency department with elevated blood pressures.  His pressure has been 228/115.  Denies headache, however he does have pain related to the surgery.  Denies nausea or vomiting, just states that he does not feel well.  He was here earlier this week having fallen and struck his head.  CAT scan of the brain at that time was negative for intracranial bleeding.  Lab work done at that time that was unremarkable.  Patient states that he took a 5 mg of carvedilol at noon today.  He had 0.2 mg of clonidine prior to arrival here.  He feels that there is a third medication that he has been taking but I see no mention of it in his medication reconciliation.    7:57 PM  Apparently, this patient received 4 doses of 5 mg IV hydralazine and 2 doses of 5 mg labetalol IV without any change.            Patient History     Past Medical History:   Diagnosis Date   • Abnormal ECG     a fib   • BPH (benign prostatic hyperplasia)    • Coronary artery disease    • Disease of thyroid gland    • Hypertension    • Renal calculi     right   • Renal cancer (CMS/HCC) (HCC)    • Sleep apnea     uses cpap   • Type 2 diabetes mellitus (CMS/HCC) (HCC)        Past Surgical History:   Procedure Laterality Date   • COLONOSCOPY  2016   • CORONARY ARTERY BYPASS GRAFT      x3   • NEPHRECTOMY Right    • TONSILLECTOMY     • UVULECTOMY  2005       No family history on file.    Social History   Substance Use Topics   • Smoking status: Never Smoker   • Smokeless tobacco: Never Used   • Alcohol use No       Systems Reviewed from Nursing Triage:           "Review of Systems     Review of Systems   Constitutional: Negative.    HENT: Positive for facial swelling.    Eyes: Negative.    Respiratory: Negative.    Cardiovascular: Negative.    Gastrointestinal: Negative.    Genitourinary: Negative.    Musculoskeletal: Negative.    Neurological: Negative.    All other systems reviewed and are negative.        Physical Exam     ED Triage Vitals [07/05/18 1753]   Temp Heart Rate Resp BP SpO2   36.7 °C (98 °F) 75 12 (!) 220/123 98 %      Temp Source Heart Rate Source Patient Position BP Location FiO2 (%) (Set)   Oral Monitor Lying Right upper arm --                     Patient Vitals for the past 24 hrs:   BP Temp Temp src Pulse Resp SpO2 Height Weight   07/05/18 1914 - - - 77 13 98 % - -   07/05/18 1900 - - - 77 20 96 % - -   07/05/18 1900 (!) 228/117 - - 77 20 97 % - -   07/05/18 1900 (!) 228/117 - - - - - - -   07/05/18 1849 (!) 226/121 - - 77 19 98 % - -   07/05/18 1821 (!) 217/118 - - 76 - - - -   07/05/18 1818 (!) 217/118 - - 76 14 98 % - -   07/05/18 1753 (!) 220/123 36.7 °C (98 °F) Oral 75 12 98 % 1.88 m (6' 2\") 117 kg (259 lb)           Physical Exam   Constitutional: He is oriented to person, place, and time. He appears well-developed and well-nourished.   HENT:   Right Ear: External ear normal.   Left Ear: External ear normal.   An abrasion over the right forehead with healing scab. No evidence of infection.  No evidence of infection about the surgical sites.   Eyes: EOM are normal. Pupils are equal, round, and reactive to light.   Neck: Normal range of motion. Neck supple.   Cardiovascular: Normal rate, regular rhythm, normal heart sounds and intact distal pulses.    Pulmonary/Chest: Effort normal and breath sounds normal.   Abdominal: Soft. Bowel sounds are normal. He exhibits no distension and no mass. There is no tenderness. There is no guarding.   Musculoskeletal: Normal range of motion. He exhibits no edema, tenderness or deformity.   Neurological: He is alert " and oriented to person, place, and time.   No Focal deficits.               Procedures    ED Course & MDM     Labs Reviewed - No data to display    No orders to display           MDM    It seems that we will need to start an IV infusion for control of the patient's Bp, however he is allergic to Nicardipine by virtue of previous reactions to amlodipine which includes swelling of legs. Perhaps IV NTG. Will d/w cardiology.    8:12 PM  Discussed with Dr. Cross, commending 160 mg of Diovan by mouth, and 50 mg of hydralazine by mouth.  And then, admit the patient to a monitored bed with 50 mg of hydralazine 3 times daily.  Will be admitted to a monitored bed.  Critical Care 30 minutes.             ED Course as of Jul 06 0012   Thu Jul 05, 2018   2228 [facility]: 30.1 [RG]      ED Course User Index  [RG] Joey Goins DO         Clinical Impressions as of Jul 06 0012   Hypertensive urgency        Joey Goins,   07/05/18 2013       Joey Goins,   07/06/18 0014

## 2018-07-06 VITALS
RESPIRATION RATE: 18 BRPM | BODY MASS INDEX: 33.24 KG/M2 | HEART RATE: 59 BPM | DIASTOLIC BLOOD PRESSURE: 66 MMHG | TEMPERATURE: 98.8 F | HEIGHT: 74 IN | OXYGEN SATURATION: 100 % | SYSTOLIC BLOOD PRESSURE: 106 MMHG | WEIGHT: 259 LBS

## 2018-07-06 LAB
ATRIAL RATE: 240
ATRIAL RATE: 78
GLUCOSE BLD-MCNC: 170 MG/DL (ref 70–99)
GLUCOSE BLD-MCNC: 188 MG/DL (ref 70–99)
GLUCOSE BLD-MCNC: 234 MG/DL (ref 70–99)
P AXIS: 249
P AXIS: 95
PR INTERVAL: 176
QRS DURATION: 168
QRS DURATION: 172
QT INTERVAL: 444
QT INTERVAL: 512
QTC CALCULATION(BAZETT): 506
QTC CALCULATION(BAZETT): 512
R AXIS: -77
R AXIS: -89
T WAVE AXIS: -41
T WAVE AXIS: -51
VENTRICULAR RATE: 60
VENTRICULAR RATE: 78

## 2018-07-06 PROCEDURE — 96376 TX/PRO/DX INJ SAME DRUG ADON: CPT

## 2018-07-06 PROCEDURE — 93005 ELECTROCARDIOGRAM TRACING: CPT | Performed by: HOSPITALIST

## 2018-07-06 PROCEDURE — 96374 THER/PROPH/DIAG INJ IV PUSH: CPT

## 2018-07-06 PROCEDURE — 99217 PR OBSERVATION CARE DISCHARGE MANAGEMENT: CPT | Performed by: HOSPITALIST

## 2018-07-06 PROCEDURE — 63700000 HC SELF-ADMINISTRABLE DRUG: Performed by: HOSPITALIST

## 2018-07-06 PROCEDURE — 3E013VG INTRODUCTION OF INSULIN INTO SUBCUTANEOUS TISSUE, PERCUTANEOUS APPROACH: ICD-10-PCS | Performed by: HOSPITALIST

## 2018-07-06 PROCEDURE — 63600000 HC DRUGS/DETAIL CODE: Performed by: HOSPITALIST

## 2018-07-06 PROCEDURE — 25800000 HC PHARMACY IV SOLUTIONS: Performed by: HOSPITALIST

## 2018-07-06 PROCEDURE — G0378 HOSPITAL OBSERVATION PER HR: HCPCS

## 2018-07-06 PROCEDURE — 96372 THER/PROPH/DIAG INJ SC/IM: CPT

## 2018-07-06 PROCEDURE — 96375 TX/PRO/DX INJ NEW DRUG ADDON: CPT

## 2018-07-06 PROCEDURE — 25000000 HC PHARMACY GENERAL: Performed by: HOSPITALIST

## 2018-07-06 RX ORDER — ACETAMINOPHEN 325 MG/1
650 TABLET ORAL EVERY 4 HOURS PRN
Status: DISCONTINUED | OUTPATIENT
Start: 2018-07-06 | End: 2018-07-06 | Stop reason: HOSPADM

## 2018-07-06 RX ORDER — PANTOPRAZOLE SODIUM 40 MG/1
40 TABLET, DELAYED RELEASE ORAL DAILY
Status: DISCONTINUED | OUTPATIENT
Start: 2018-07-06 | End: 2018-07-06 | Stop reason: HOSPADM

## 2018-07-06 RX ORDER — LEVOTHYROXINE SODIUM 88 UG/1
88 TABLET ORAL
Status: DISCONTINUED | OUTPATIENT
Start: 2018-07-06 | End: 2018-07-06

## 2018-07-06 RX ORDER — INSULIN GLARGINE 100 [IU]/ML
20 INJECTION, SOLUTION SUBCUTANEOUS NIGHTLY
Status: DISCONTINUED | OUTPATIENT
Start: 2018-07-07 | End: 2018-07-06 | Stop reason: HOSPADM

## 2018-07-06 RX ORDER — DEXTROSE 40 %
15-30 GEL (GRAM) ORAL AS NEEDED
Status: DISCONTINUED | OUTPATIENT
Start: 2018-07-06 | End: 2018-07-06 | Stop reason: HOSPADM

## 2018-07-06 RX ORDER — OMEPRAZOLE 40 MG/1
40 CAPSULE, DELAYED RELEASE ORAL
COMMUNITY

## 2018-07-06 RX ORDER — MIRTAZAPINE 15 MG/1
15 TABLET, FILM COATED ORAL NIGHTLY
Qty: 30 TABLET | Refills: 0
Start: 2018-07-06 | End: 2019-01-03 | Stop reason: ALTCHOICE

## 2018-07-06 RX ORDER — MIRTAZAPINE 15 MG/1
15 TABLET, FILM COATED ORAL NIGHTLY
Status: DISCONTINUED | OUTPATIENT
Start: 2018-07-06 | End: 2018-07-06 | Stop reason: HOSPADM

## 2018-07-06 RX ORDER — CLONIDINE HYDROCHLORIDE 0.2 MG/1
0.2 TABLET ORAL 2 TIMES DAILY
Status: DISCONTINUED | OUTPATIENT
Start: 2018-07-06 | End: 2018-07-06 | Stop reason: HOSPADM

## 2018-07-06 RX ORDER — LEVOTHYROXINE SODIUM 100 UG/1
100 TABLET ORAL
COMMUNITY

## 2018-07-06 RX ORDER — NITROGLYCERIN 0.4 MG/1
0.4 TABLET SUBLINGUAL EVERY 5 MIN PRN
Status: DISCONTINUED | OUTPATIENT
Start: 2018-07-06 | End: 2018-07-06

## 2018-07-06 RX ORDER — DEXTROSE 50 % IN WATER (D50W) INTRAVENOUS SYRINGE
25 AS NEEDED
Status: DISCONTINUED | OUTPATIENT
Start: 2018-07-06 | End: 2018-07-06 | Stop reason: HOSPADM

## 2018-07-06 RX ORDER — EPLERENONE 25 MG/1
25 TABLET, FILM COATED ORAL DAILY
Status: DISCONTINUED | OUTPATIENT
Start: 2018-07-06 | End: 2018-07-06 | Stop reason: HOSPADM

## 2018-07-06 RX ORDER — ATROPINE SULFATE 0.1 MG/ML
0.5 INJECTION INTRAVENOUS EVERY 5 MIN PRN
Status: DISCONTINUED | OUTPATIENT
Start: 2018-07-06 | End: 2018-07-06 | Stop reason: HOSPADM

## 2018-07-06 RX ORDER — CARVEDILOL 25 MG/1
25 TABLET ORAL 2 TIMES DAILY WITH MEALS
Status: DISCONTINUED | OUTPATIENT
Start: 2018-07-06 | End: 2018-07-06 | Stop reason: HOSPADM

## 2018-07-06 RX ORDER — IBUPROFEN 200 MG
16-32 TABLET ORAL AS NEEDED
Status: DISCONTINUED | OUTPATIENT
Start: 2018-07-06 | End: 2018-07-06 | Stop reason: HOSPADM

## 2018-07-06 RX ORDER — NITROGLYCERIN 40 MG/100ML
10 INJECTION INTRAVENOUS
Status: DISCONTINUED | OUTPATIENT
Start: 2018-07-06 | End: 2018-07-06

## 2018-07-06 RX ORDER — ATORVASTATIN CALCIUM 20 MG/1
20 TABLET, FILM COATED ORAL DAILY
Status: DISCONTINUED | OUTPATIENT
Start: 2018-07-06 | End: 2018-07-06

## 2018-07-06 RX ORDER — AMIODARONE HYDROCHLORIDE 200 MG/1
200 TABLET ORAL DAILY
COMMUNITY
End: 2021-08-18 | Stop reason: ENTERED-IN-ERROR

## 2018-07-06 RX ORDER — AMIODARONE HYDROCHLORIDE 200 MG/1
200 TABLET ORAL DAILY
Status: DISCONTINUED | OUTPATIENT
Start: 2018-07-06 | End: 2018-07-06 | Stop reason: HOSPADM

## 2018-07-06 RX ORDER — INSULIN ASPART 100 [IU]/ML
3-5 INJECTION, SOLUTION INTRAVENOUS; SUBCUTANEOUS
Status: DISCONTINUED | OUTPATIENT
Start: 2018-07-06 | End: 2018-07-06

## 2018-07-06 RX ORDER — MORPHINE SULFATE 4 MG/ML
4 INJECTION, SOLUTION INTRAMUSCULAR; INTRAVENOUS
Status: DISCONTINUED | OUTPATIENT
Start: 2018-07-06 | End: 2018-07-06 | Stop reason: HOSPADM

## 2018-07-06 RX ORDER — SPIRONOLACTONE 25 MG/1
25 TABLET ORAL ONCE
Status: DISCONTINUED | OUTPATIENT
Start: 2018-07-06 | End: 2018-07-06

## 2018-07-06 RX ORDER — ATORVASTATIN CALCIUM 40 MG/1
40 TABLET, FILM COATED ORAL DAILY
Status: DISCONTINUED | OUTPATIENT
Start: 2018-07-07 | End: 2018-07-06 | Stop reason: HOSPADM

## 2018-07-06 RX ORDER — GLIPIZIDE 5 MG/1
5 TABLET ORAL
Status: DISCONTINUED | OUTPATIENT
Start: 2018-07-06 | End: 2018-07-06

## 2018-07-06 RX ORDER — CLINDAMYCIN HYDROCHLORIDE 150 MG/1
300 CAPSULE ORAL 3 TIMES DAILY
Status: DISCONTINUED | OUTPATIENT
Start: 2018-07-06 | End: 2018-07-06 | Stop reason: HOSPADM

## 2018-07-06 RX ORDER — METFORMIN HYDROCHLORIDE 500 MG/1
500 TABLET ORAL 2 TIMES DAILY WITH MEALS
Status: DISCONTINUED | OUTPATIENT
Start: 2018-07-06 | End: 2018-07-06

## 2018-07-06 RX ORDER — ASPIRIN 81 MG/1
81 TABLET ORAL DAILY
Status: DISCONTINUED | OUTPATIENT
Start: 2018-07-06 | End: 2018-07-06 | Stop reason: HOSPADM

## 2018-07-06 RX ORDER — INSULIN ASPART 100 [IU]/ML
0-14 INJECTION, SOLUTION INTRAVENOUS; SUBCUTANEOUS
Status: DISCONTINUED | OUTPATIENT
Start: 2018-07-06 | End: 2018-07-06 | Stop reason: HOSPADM

## 2018-07-06 RX ORDER — POTASSIUM CHLORIDE 20 MEQ/1
20 TABLET, EXTENDED RELEASE ORAL ONCE
Status: COMPLETED | OUTPATIENT
Start: 2018-07-06 | End: 2018-07-06

## 2018-07-06 RX ORDER — CLONIDINE HYDROCHLORIDE 0.2 MG/1
0.3 TABLET ORAL WEEKLY
COMMUNITY
End: 2018-08-01 | Stop reason: ALTCHOICE

## 2018-07-06 RX ORDER — CLINDAMYCIN HYDROCHLORIDE 150 MG/1
300 CAPSULE ORAL 3 TIMES DAILY
COMMUNITY
End: 2018-07-16 | Stop reason: ALTCHOICE

## 2018-07-06 RX ORDER — LAMOTRIGINE 100 MG/1
100 TABLET ORAL DAILY
Status: DISCONTINUED | OUTPATIENT
Start: 2018-07-06 | End: 2018-07-06 | Stop reason: HOSPADM

## 2018-07-06 RX ORDER — LEVOTHYROXINE SODIUM 100 UG/1
100 TABLET ORAL
Status: DISCONTINUED | OUTPATIENT
Start: 2018-07-07 | End: 2018-07-06 | Stop reason: HOSPADM

## 2018-07-06 RX ORDER — CHLORTHALIDONE 25 MG/1
25 TABLET ORAL DAILY
Status: DISCONTINUED | OUTPATIENT
Start: 2018-07-06 | End: 2018-07-06 | Stop reason: HOSPADM

## 2018-07-06 RX ORDER — HYDRALAZINE HYDROCHLORIDE 25 MG/1
50 TABLET, FILM COATED ORAL EVERY 8 HOURS
Status: DISCONTINUED | OUTPATIENT
Start: 2018-07-06 | End: 2018-07-06

## 2018-07-06 RX ADMIN — CLONIDINE HYDROCHLORIDE 0.2 MG: 0.2 TABLET ORAL at 01:17

## 2018-07-06 RX ADMIN — CARVEDILOL 25 MG: 25 TABLET, FILM COATED ORAL at 08:45

## 2018-07-06 RX ADMIN — CLONIDINE HYDROCHLORIDE 0.2 MG: 0.2 TABLET ORAL at 08:45

## 2018-07-06 RX ADMIN — APIXABAN 5 MG: 5 TABLET, FILM COATED ORAL at 11:26

## 2018-07-06 RX ADMIN — LAMOTRIGINE 100 MG: 100 TABLET ORAL at 08:46

## 2018-07-06 RX ADMIN — ASPIRIN 81 MG: 81 TABLET, COATED ORAL at 08:45

## 2018-07-06 RX ADMIN — ACETAMINOPHEN 650 MG: 325 TABLET ORAL at 01:17

## 2018-07-06 RX ADMIN — ACETAMINOPHEN 650 MG: 325 TABLET ORAL at 05:14

## 2018-07-06 RX ADMIN — INSULIN ASPART 8 UNITS: 100 INJECTION, SOLUTION INTRAVENOUS; SUBCUTANEOUS at 17:42

## 2018-07-06 RX ADMIN — POTASSIUM CHLORIDE 20 MEQ: 1500 TABLET, EXTENDED RELEASE ORAL at 08:46

## 2018-07-06 RX ADMIN — INSULIN ASPART 4 UNITS: 100 INJECTION, SOLUTION INTRAVENOUS; SUBCUTANEOUS at 08:51

## 2018-07-06 RX ADMIN — ACETAMINOPHEN 650 MG: 325 TABLET ORAL at 12:37

## 2018-07-06 RX ADMIN — NITROGLYCERIN 10 MCG/MIN: 40 INJECTION INTRAVENOUS at 01:23

## 2018-07-06 RX ADMIN — CLINDAMYCIN HYDROCHLORIDE 300 MG: 150 CAPSULE ORAL at 15:06

## 2018-07-06 RX ADMIN — LEVOTHYROXINE SODIUM 88 MCG: 88 TABLET ORAL at 05:16

## 2018-07-06 RX ADMIN — SODIUM CHLORIDE 500 ML: 9 INJECTION, SOLUTION INTRAVENOUS at 15:06

## 2018-07-06 RX ADMIN — INSULIN ASPART 6 UNITS: 100 INJECTION, SOLUTION INTRAVENOUS; SUBCUTANEOUS at 12:37

## 2018-07-06 RX ADMIN — AMIODARONE HYDROCHLORIDE 200 MG: 200 TABLET ORAL at 08:46

## 2018-07-06 RX ADMIN — ACETAMINOPHEN 650 MG: 325 TABLET ORAL at 18:10

## 2018-07-06 RX ADMIN — ATORVASTATIN CALCIUM 20 MG: 20 TABLET, FILM COATED ORAL at 08:46

## 2018-07-06 RX ADMIN — CHLORTHALIDONE 25 MG: 25 TABLET ORAL at 08:45

## 2018-07-06 RX ADMIN — PANTOPRAZOLE SODIUM 40 MG: 40 TABLET, DELAYED RELEASE ORAL at 08:46

## 2018-07-06 NOTE — PLAN OF CARE
Problem: Patient Care Overview  Goal: Plan of Care Review  Outcome: Ongoing (interventions implemented as appropriate)   07/06/18 1650   Coping/Psychosocial   Plan Of Care Reviewed With patient;family   Plan of Care Review   Progress improving     Goal: Individualization & Mutuality  Outcome: Ongoing (interventions implemented as appropriate)    Goal: Discharge Needs Assessment  Outcome: Ongoing (interventions implemented as appropriate)   07/06/18 1650   DC Needs Assessment   Concerns To Be Addressed no discharge needs identified     Goal: Interprofessional Rounds/Family Conf  Outcome: Ongoing (interventions implemented as appropriate)   07/06/18 1650   Interdisciplinary Rounds/Family Conf   Participants ;nursing;physician;family;social work/services       Problem: Mood Impairment (Anxiety Signs/Symptoms) (Adult)  Goal: Improved Mood Symptoms  Outcome: Ongoing (interventions implemented as appropriate)   07/06/18 1650   Improved Mood Symptoms   Improved Mood Symptoms Time Frame for Action Step (STG) 1 day       Problem: Social/Occupational/Functional Impairment (Anxiety Signs/Symptoms) (Adult)  Goal: Improved Social/Occupational/Functional Skills  Outcome: Ongoing (interventions implemented as appropriate)   07/06/18 1650   Improved Social/Occupational/Functional Skills   Improved Social/Occupational/Functional Skills Time Frame for Action Step (STG) 1 day   Improved Social/Occupational/Functional Skills Action Step (STG) Outcome making progress toward outcome       Problem: Cardiac Impairment (Adult)  Goal: Cardiac Fxn [place]  Outcome: Ongoing (interventions implemented as appropriate)   07/06/18 1650   Cardiac Impairment (Adult)   Cardiac Fxn [place] demonstrating adequate progress     Goal: Cardiac Manage/Monitor  Outcome: Ongoing (interventions implemented as appropriate)   07/06/18 1650   Cardiac Impairment (Adult)   Cardiac Manage/Monitor demonstrating adequate progress       Problem:  Pain, Acute (Adult)  Goal: Identify Related Risk Factors and Signs and Symptoms  Outcome: Ongoing (interventions implemented as appropriate)    Goal: Acceptable Pain Control/Comfort Level  Outcome: Ongoing (interventions implemented as appropriate)   07/06/18 1650   Pain, Acute (Adult)   Acceptable Pain Control/Comfort Level making progress toward outcome

## 2018-07-06 NOTE — H&P
Hospital Medicine Service -  History & Physical        CHIEF COMPLAINT   Uncontrolled blood pressure     HISTORY OF PRESENT ILLNESS      Nathan Alonzo is a 67 y.o. 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  Patient denies any chest pain, shortness of breath, abdominal pain, changes in bowel habits, dysuria, new joint pain, or abnormal bleeding.  Patient reports headache located at the forehead and the bridge of the nose where his recent surgery took place.  Patient reports he did have some lightheadedness earlier in the week resulting in a fall which resulted in bruising to the forehead, for which he came to [place] [facility] and was ultimately discharged home.  Patient reports some nausea but denies vomiting.    Case discussed with Dr. Prasad, ER, who reports patient received both IV hydralazine which did not affect the blood pressure, and IV labetalol at the surgery center which caused bradycardia and did not affect her blood pressure.  Patient does not tolerate amlodipine so IV Cardene was not considered. ER physician spoke with on-call cardiology who did not recommend IV medication, but recommended initiating Diovan 160 mg p.o., and hydralazine 3 times daily and observing patient on a monitor.      PAST MEDICAL AND SURGICAL HISTORY      Past Medical History:   Diagnosis Date   • Abnormal ECG     a fib   • BPH (benign prostatic hyperplasia)    • Coronary artery disease    • Disease of thyroid gland    • Hypertension    • Renal calculi     right   • Renal cancer (CMS/HCC) (HCC)    • Sleep apnea     uses cpap   • Type 2 diabetes mellitus (CMS/HCC) (HCC)        Past Surgical History:   Procedure Laterality Date   • COLONOSCOPY  2016   • CORONARY ARTERY BYPASS GRAFT      x3   • NEPHRECTOMY Right    • TONSILLECTOMY     •  UVULECTOMY  2005       PCP: Serafin Zavaleta, DO    MEDICATIONS      Prior to Admission medications    Medication Sig Start Date End Date Taking? Authorizing Provider   aspirin 81 mg enteric coated tablet Take 81 mg by mouth daily.   Yes Historical Provider, MD   atorvastatin (LIPITOR) 20 mg tablet Take 20 mg by mouth daily.   3/25/16  Yes Historical Provider, MD   carvedilol (COREG) 25 mg tablet Take 25 mg by mouth 2 (two) times a day with meals.   3/25/16  Yes Historical Provider, MD   cloNIDine (CATAPRES) 0.2 mg tablet TK 1 T PO BID 3/4/18  Yes Historical Provider, MD   coenzyme Q10 (CO Q-10) 400 mg capsule Take 400 mg by mouth daily.   3/25/16  Yes Historical Provider, MD   ELIQUIS 5 mg tablet TK 1 T PO TWICE DAILY. DO NOT. STOP UNLESS DIRECTED BY DOCTOR.STILLABOWER 3/4/18  Yes Historical Provider, MD   eplerenone (INSPRA) 25 mg tablet Take 25 mg by mouth daily.   Yes Historical Provider, MD   glipiZIDE (GLUCOTROL) 5 mg tablet Take 5 mg by mouth daily with breakfast.   Yes Historical Provider, MD   INSULIN GLARGINE,HUM.REC.ANLOG (INSULIN GLARGINE SUBQ) Inject 20 unit marking on U-100 syringe under the skin daily.     Yes Historical Provider, MD   metFORMIN (GLUCOPHAGE) 500 mg tablet Take 500 mg by mouth 2 (two) times a day with meals.   Yes Historical Provider, MD   omeprazole (PriLOSEC) 40 mg capsule TK 1 C PO D 3/4/18  Yes Historical Provider, MD   amiodarone (PACERONE) 200 mg tablet TK 1 T PO D 3/4/18   Historical Provider, MD   chlorthalidone (HYGROTEN) 25 mg tablet Take 25 mg by mouth daily.    Historical Provider, MD   lamoTRIgine (LaMICtal) 100 mg tablet Take 100 mg by mouth daily.    Historical Provider, MD   levothyroxine (SYNTHROID) 88 mcg tablet TK 1 T PO D 1/9/18   Historical Provider, MD   mirtazapine (REMERON) 15 mg tablet Take 15 mg by mouth nightly.    Historical Provider, MD   spironolactone (ALDACTONE) 25 mg tablet TK 1 T PO D 1/16/18   Historical Provider, MD       ALLERGIES      Amlodipine;  Cephalexin monohydrate; Codeine; Latex, natural rubber; and Oxycodone    FAMILY HISTORY      History reviewed. No pertinent family history.    SOCIAL HISTORY      Social History     Social History   • Marital status:      Spouse name: N/A   • Number of children: N/A   • Years of education: N/A     Social History Main Topics   • Smoking status: Never Smoker   • Smokeless tobacco: Never Used   • Alcohol use No   • Drug use: No   • Sexual activity: Defer     Other Topics Concern   • None     Social History Narrative   • None       REVIEW OF SYSTEMS      All other systems reviewed and negative except as noted in HPI    PHYSICAL EXAMINATION      Temp:  [36.7 °C (98 °F)] 36.7 °C (98 °F)  Heart Rate:  [75-95] 79  Resp:  [12-20] 17  BP: (200-236)/(103-123) 220/118  Body mass index is 33.25 kg/m².    Physical Exam       General:  Alert, cooperative, no distress, appears stated age   Head:  Normocephalic, without obvious abnormality, bruising to right forehead, BL brusing and periorbital swelling - patient can open BL eyes on command    Eyes:  PERRL, conjunctiva clear, sclera anicteric, EOMI BL - BL swelling around the eyes, does not affect the orbits   Mouth/Throat:  Mucosa moist, lips and tongue normal and extends to the midline, pharynx clear   Neck:  Supple, symmetrical, trachea midline, no adenopathy, no masses appreciated   Lungs:  Respirations unlabored, clear to auscultation bilaterally   Heart:  RRR, S1 and S2 normal, no m/r/g   Abdomen:  Soft, non-tender, (+) BS, ND, no masses, no organomegaly   Extremities:  Extremities normal, atraumatic, no cyanosis,  No edema   Musculoskeletal:  No injury or deformity, moves all limbs with good range of motion   Pulses:  2+ and symmetric x4 extremities   Skin:  Face is dressed, no drainage   Lymph nodes:  Cervical and axillary nodes normal   Neurologic:  AAOx3. Grossly non-focal, power symmetric, no sensory deficits   Behavior/Emotional  Appropriate, cooperative              LABS / IMAGING / EKG        Labs    Results from last 7 days  Lab Units 07/05/18 1945   WBC K/uL 15.49*   HEMOGLOBIN g/dL 12.3*   HEMATOCRIT % 35.7*   PLATELETS K/uL 162       Results from last 7 days  Lab Units 07/05/18 1945   SODIUM mmol/L 137   POTASSIUM mmol/L 3.5*   CHLORIDE mmol/L 106   CO2 mmol/L 23   BUN mg/dL 24*   CREATININE mg/dL 1.3   CALCIUM mg/dL 8.7*   ALBUMIN g/dL 4.3   BILIRUBIN TOTAL mg/dL 1.3*   ALK PHOS IU/L 73   ALT IU/L 21   AST IU/L 20   GLUCOSE mg/dL 206*           Imaging  I have independently reviewed the pertinent imaging from the last 24 hrs. and Significant findings include:     CT head 7/3/18:    IMPRESSION: No evidence of acute intracranial pathology.    ECG/Telemetry  I have independently reviewed the ECG. Significant findings include possible ectopic atrial rhythm at 78 bpm, RBBB.    ASSESSMENT AND PLAN           * Hypertensive urgency   Assessment & Plan    Observe on PCU  Patient received 0.2 clonidine in ER  Per cardilogy recommendations, pt also received diovan 160 mg PO given - can continue daily, started hydralazine 50 mg x1 - can continue TID  Will resume home meds of carvedilol 25 mg BID and clonidine 0.2 mg BID and chlorthalidone 25 mg daily  Patient does not tolerate dihydripirine CCB, labetalol reportedly caused bradycardia, IV hydralazine was not effective  If BPs remain >200/100 - will initiate IV nitro for blood pressure control  Consult Dr Castelan, cardiology        Type 2 diabetes mellitus (CMS/[facility]) ([facility])   Assessment & Plan    Cont glargine  Hold glipzide and metformin while in hospital  accuchecks  novolog prn hyperglycemia        CAD (coronary artery disease) of artery bypass graft   Assessment & Plan    Holding ASA and eliquis as pt is post op - pt thinks he was told to hold it for now  Can resume statin        Atrial fibrillation (CMS/[facility]) ([facility])   Assessment & Plan    currenlty in ectopic atrial rhtyhm  Pt thinks his Eliquis is on hold post  op  Cont coreg        OCD (obsessive compulsive disorder)   Assessment & Plan    Cont lamictal, remeron        GERD (gastroesophageal reflux disease)   Assessment & Plan    Cont PPI        Hypothyroid   Assessment & Plan    Cont levothyroxine             VTE Assessment: [place] VTE Score: 1  VTE Prophylaxis Plan: early ambulation  Code Status: Full Code  Estimated Discharge Date: 7/5/2018  Disposition Planning: anticipate DC to home when medically stable     Reagan Cooper DO  7/6/2018

## 2018-07-06 NOTE — ASSESSMENT & PLAN NOTE
No evidence of ischemia. His renal impairment is chronic and has improved with IV fluids. His headache is most probably related to recent facial procedure.     His BP has stabilized on his usual outpatient medical regimen. Would continue such. His presentation is likely a result of missing his medications for procedure in combination with the stress and physical trauma of the procedure itself.

## 2018-07-06 NOTE — PROGRESS NOTES
Met with pt and spouse.  explained role of care coordination team.     LIVING ARRANGEMENT: lives with spouse in [facility] level [facility].  1 step into home.  From entrance main floor is about 8 steps down.  About 8 steps up to bed and bath.      DME: denies use of medical equipment at home    HOME SERVICES: had homecare services thru [facility] after heart surgery    ADL'S: independent with adl's   Not homebound   Drives car   Works full time.      POA: spouse radha is roselyn [phone]    PCP: adis MABRY; 7 years in [employer].  Not service connected with [facility]

## 2018-07-06 NOTE — NURSING NOTE
"Upon arrival to PCU, pt \"tired\" and did not want to answer questions for admission assessments. Pt would like to \"scream if one more person asks about meds.\" Pt also refusing assessment, wishing to sleep. Dr. Cooper aware of BP readings and meds as documented in MAR.   "

## 2018-07-06 NOTE — ASSESSMENT & PLAN NOTE
Observe on PCU  Patient received 0.2 clonidine in ER  Per cardilogy recommendations, pt also received diovan 160 mg PO given - can continue daily, started hydralazine 50 mg x1 - can continue TID  Will resume home meds of carvedilol 25 mg BID and clonidine 0.2 mg BID and chlorthalidone 25 mg daily  Patient does not tolerate dihydripirine CCB, labetalol reportedly caused bradycardia, IV hydralazine was not effective  If BPs remain >200/100 - will initiate IV nitro for blood pressure control  Consult Dr Castelan, cardiology

## 2018-07-06 NOTE — ASSESSMENT & PLAN NOTE
Holding ASA and eliquis as pt is post op - pt thinks he was told to hold it for now  Can resume statin

## 2018-07-06 NOTE — ASSESSMENT & PLAN NOTE
Cont glargine  Hold glipzide and metformin while in hospital  accuchecks  novolog prn hyperglycemia

## 2018-07-06 NOTE — DISCHARGE SUMMARY
[facility] Medicine Service -  Inpatient Discharge Summary        BRIEF OVERVIEW   Admitting Provider: Reagan Cooper DO  Attending Provider: Adrien Jeter DO Attending phys phone: [phone]    PCP: Serafin Zavaleta [phone]    Admission Date: 7/5/2018  Discharge Date: 7/6/2018     DISCHARGE DIAGNOSES      Primary Discharge Diagnosis  Hypertensive urgency    Secondary Discharge Diagnoses  Active Hospital Problems    Diagnosis Date Noted   • Hypertensive urgency 07/05/2018   • Atrial fibrillation (CMS/[facility]) ([facility]) 03/16/2018   • CAD (coronary artery disease) of artery bypass graft 03/16/2018   • Hypothyroid 03/16/2018   • Type 2 diabetes mellitus (CMS/[facility]) ([facility]) 03/16/2018   • GERD (gastroesophageal reflux disease) 03/25/2016   • OCD (obsessive compulsive disorder) 03/25/2016      Resolved Hospital Problems    Diagnosis Date Noted Date Resolved   No resolved problems to display.       SUMMARY OF HOSPITALIZATION      Hospital Course  Mr. Alonzo is a 67-year-old male with past medical history of difficult control hypertension, CAD, type 2 diabetes, morbid obesity, recent diagnosis of squamous cell carcinoma of the face status post excision and skin grafting on 7/5/18, presents from the surgery center with accelerated hypertension.  Patient had missed most of his a.m. medications prior to the skin grafting procedure, and in the setting of stress from the procedure, pain, likely precipitated to elevated blood pressure.  In the emergency department the patient received IV hydralazine and p.o. clonidine without much success.  The patient was subsequently placed on nitroglycerin drip due to an adverse reaction to calcium channel blockers in the past.  Patient's blood pressure then achieved control, but dropped little bit too precipitously.  The patient was subsequently given his home oral medications including chlorthalidone, carvedilol, eplerenone, clonidine, resulting in lower blood pressures.  Patient  had been essentially asymptomatic denied chest pain shortness of breath.  Mild headache reported on admission.  Due to lower blood pressures prior to discharge, the patient was given a 500 cc bolus of IV fluids, which improved his pressure.  His blood pressure was in the 120s over 70s at discharge.  Patient will need close outpatient follow-up regarding his squamous cell cancer and plastic surgery needs.  In terms of hypertension and difficult to control hypertension, the patient is on multiple agents.  Patient may need a secondary hypertension workup.  I suspect that untreated obstructive sleep apnea may be at the root of his difficult to control hypertension.  This was discussed with the patient and his wife and encouragement for follow-up and sleep study were discussed.      Of note Eliquis was restarted.  Stable for discharge.  Home monitoring of blood pressure was discussed.    DC time 40 min: discussing plan of care with patient, coordinating care for discharge, med rec, dc instructions.       Exam on Day of Discharge  Physical Exam  Patient seen and examined at discharge.    Consults During Admission  IP CONSULT TO CARDIOLOGY    DISCHARGE MEDICATIONS   New, changed, or stopped medications from this admission:       Medication List      CONTINUE taking these medications    amiodarone 200 mg tablet  Commonly known as:  PACERONE  Take 200 mg by mouth daily.     apixaban 5 mg tablet  Commonly known as:  ELIQUIS  Take 5 mg by mouth 2 (two) times a day.     aspirin 81 mg enteric coated tablet  Take 81 mg by mouth daily.     atorvastatin 20 mg tablet  Commonly known as:  LIPITOR  Take 40 mg by mouth daily.     carvedilol 25 mg tablet  Commonly known as:  COREG  Take 25 mg by mouth 2 (two) times a day with meals.     chlorthalidone 25 mg tablet  Commonly known as:  HYGROTEN  Take 25 mg by mouth daily.     clindamycin 150 mg capsule  Commonly known as:  CLEOCIN  Take 300 mg by mouth 3 (three) times a day. X 7 day  duration started 7/5/18 in the evening     cloNIDine 0.2 mg tablet  Commonly known as:  CATAPRES  Take 0.2 mg by mouth 2 (two) times a day.     CO Q-10 400 mg capsule  Generic drug:  coenzyme Q10  Take 400 mg by mouth daily.     eplerenone 25 mg tablet  Commonly known as:  INSPRA  Take 25 mg by mouth daily.     INSULIN GLARGINE SUBQ  Inject 20 unit marking on U-100 syringe under the skin every morning.     lamoTRIgine 100 mg tablet  Commonly known as:  LaMICtal  Take 100 mg by mouth nightly.     levothyroxine 100 mcg tablet  Commonly known as:  SYNTHROID  Take 100 mcg by mouth daily.     metFORMIN 500 mg tablet  Commonly known as:  GLUCOPHAGE  Take 500 mg by mouth 2 (two) times a day with meals.     mirtazapine 15 mg tablet  Commonly known as:  REMERON  Take 1 tablet (15 mg total) by mouth nightly.     omeprazole 40 mg capsule  Commonly known as:  PriLOSEC  Take 40 mg by mouth daily before breakfast.            Complete list of medications at discharge:    Nathan Alonzo   [place] Medication Instructions SHE:[id number]    Printed on:07/06/18 2461   Medication Information                      amiodarone (PACERONE) 200 mg tablet  Take 200 mg by mouth daily.             apixaban (ELIQUIS) 5 mg tablet  Take 5 mg by mouth 2 (two) times a day.             aspirin 81 mg enteric coated tablet  Take 81 mg by mouth daily.             atorvastatin (LIPITOR) 20 mg tablet  Take 40 mg by mouth daily.               carvedilol (COREG) 25 mg tablet  Take 25 mg by mouth 2 (two) times a day with meals.               chlorthalidone (HYGROTEN) 25 mg tablet  Take 25 mg by mouth daily.             clindamycin (CLEOCIN) 150 mg capsule  Take 300 mg by mouth 3 (three) times a day. X 7 day duration started 7/5/18 in the evening             cloNIDine (CATAPRES) 0.2 mg tablet  Take 0.2 mg by mouth 2 (two) times a day.             coenzyme Q10 (CO Q-10) 400 mg capsule  Take 400 mg by mouth daily.               eplerenone (INSPRA) 25 mg  tablet  Take 25 mg by mouth daily.             INSULIN GLARGINE,HUM.REC.ANLOG (INSULIN GLARGINE SUBQ)  Inject 20 unit marking on U-100 syringe under the skin every morning.               lamoTRIgine (LaMICtal) 100 mg tablet  Take 100 mg by mouth nightly.               levothyroxine (SYNTHROID) 100 mcg tablet  Take 100 mcg by mouth daily.             metFORMIN (GLUCOPHAGE) 500 mg tablet  Take 500 mg by mouth 2 (two) times a day with meals.             mirtazapine (REMERON) 15 mg tablet  Take 1 tablet (15 mg total) by mouth nightly.             omeprazole (PriLOSEC) 40 mg capsule  Take 40 mg by mouth daily before breakfast.                      PROCEDURES / LABS / IMAGING          Pertinent Imaging  Ct Head Without Iv Contrast    Result Date: 7/3/2018  IMPRESSION: No evidence of acute intracranial pathology.       OUTPATIENT  FOLLOW-UP / REFERRALS / PENDING TESTS        Outpatient Follow-Up Appointments  Future Appointments  Date Time Provider Department Center   10/18/2018 8:00 AM Serafin Zavaleta DO [id number] None       DISCHARGE DISPOSITION      Disposition: Home     Code Status At Discharge: Full Code    Physician Order for Life-Sustaining Treatment Document Status      No documents found

## 2018-07-06 NOTE — ASSESSMENT & PLAN NOTE
CHADSVasc 4. The patient is chronically anticoagulated on Eliquis as an outpatient, at appropriate dose of 5mg BID for age < 80, weight > 130 lbs. He does have chronic renal impairment; creatinine was 1.7 on arrival, now 1.3.     His rate has been controlled. It appears that he has paroxysms of atrial ectopy and atrial flutter. As when I last evaluated the patient, he is on a maintenance dose of amiodarone for unclear reasons. I will not discontinue the medication at this time but he should follow up with his outpatient cardiologist regarding this medication.     His rate is controlled with carvedilol. Continue. He should resume Eliquis at this time; it was held in advance of procedure with plastic surgery. There is no active bleeding at the bandage site and he is currently in Aflutter.

## 2018-07-06 NOTE — CONSULTS
Cardiology Consult Note  Subjective     Nathan Alonzo is a 67 y.o. male who was admitted for Hypertensive urgency [I16.0]. Nathan Alonzo was referred by primary service for hypertensive urgency. Nathan Alonzo is a 66 yo male, known to our practice from prior consultation 2/2018, with past medical history as listed below. His cardiac care is typically managed by a Dr. Mercado in [place] .     Mr. Alonzo was present at [place] 4 days ago for a procedure to excise a suspected malignant lesion. The following day, he experienced lightheadedness which resulted in a fall, which is quite similar to the condition for which I evaluated Mr. Alonzo back in February. He was discharged home from the ER following that fall. Yesterday, he was present in the surgery center here again for further work with plastic surgery. He had not taken his BP medications that day, as he had reportedly been instructed not to. He also did not take his Eliquis.     The patient's BP did acutely rise as high as 234/129. He was treated with IV labetalol, which did not affect the blood pressure and caused bradycardia. He received IB hydralazine, which also did not affect the blood pressure. On call cardiology, which was the [facility] nd not our practice, overnight recommended 160mg Diovan as well as PO hydralazine TID.     The patient's BP is now controlled. In all of this, he did not experience chest discomfort, shortness of breath, palpitations, dizziness, lightheadedness, near syncope, syncope. His BUN/Cr on arrival was 24/1.7, which has improved to 24/1.3. This patient does have a history of chronic renal impairment and renal cancer with partial nephrectomy.     He is now resting comfortably, elevated in the bed, on room air. He has mild soreness at the procedure site. He has no acute complaints otherwise.     Unable to review outside records..    Past Medical History:   Diagnosis Date   • Abnormal ECG     a fib   • BPH (benign  prostatic hyperplasia)    • Coronary artery disease    • Disease of thyroid gland    • Hypertension    • Renal calculi     right   • Renal cancer (CMS/HCC) (HCC)    • Sleep apnea     uses cpap   • Type 2 diabetes mellitus (CMS/HCC) (HCC)        Past Surgical History:   Procedure Laterality Date   • COLONOSCOPY  2016   • CORONARY ARTERY BYPASS GRAFT      x3   • NEPHRECTOMY Right    • TONSILLECTOMY     • UVULECTOMY  2005       Social History     Social History Narrative   • Denies ever smoking       History reviewed. No history of coronary artery disease in either parent < 66 yo    Amlodipine; Cephalexin monohydrate; Codeine; Latex, natural rubber; and Oxycodone      Current Facility-Administered Medications:   •  acetaminophen (TYLENOL) tablet 650 mg, 650 mg, oral, q4h PRN, Reagan Cooper DO, 650 mg at 07/06/18 0514  •  amiodarone (PACERONE) tablet 200 mg, 200 mg, oral, Daily, Reagan Cooper DO, 200 mg at 07/06/18 0846  •  apixaban (ELIQUIS) tablet 5 mg, 5 mg, oral, BID, Adrien Jeter DO  •  aspirin enteric coated tablet 81 mg, 81 mg, oral, Daily, Reagan Cooper DO, 81 mg at 07/06/18 0845  •  atorvastatin (LIPITOR) tablet 20 mg, 20 mg, oral, Daily, Reagan Cooper DO, 20 mg at 07/06/18 0846  •  atropine injection 0.5 mg, 0.5 mg, intravenous, q5 min PRN, Reagan Cooper DO  •  carvedilol (COREG) tablet 25 mg, 25 mg, oral, BID with meals, Reagan Cooper DO, 25 mg at 07/06/18 0845  •  chlorthalidone (HYGROTEN) tablet 25 mg, 25 mg, oral, Daily, Reagan Cooper DO, 25 mg at 07/06/18 0845  •  cloNIDine (CATAPRES) tablet 0.2 mg, 0.2 mg, oral, BID, Reagan Cooper DO, 0.2 mg at 07/06/18 0845  •  glucose chewable tablet 16-32 g of dextrose, 16-32 g of dextrose, oral, PRN **OR** dextrose 40 % oral gel 15-30 g of dextrose, 15-30 g of dextrose, oral, PRN **OR** glucagon (GLUCAGEN) injection 1 mg, 1 mg, intramuscular, PRN **OR** dextrose in water injection 12.5 g, 25 mL, intravenous, PRN, Reagan Cooper DO  •   eplerenone (INSPRA) tablet 25 mg, 25 mg, oral, Daily, Reagan Cooper DO  •  insulin aspart U-100 (NovoLOG) pen 0-14 Units, 0-14 Units, subcutaneous, With meals & nightly, Adrien Jeter, , 4 Units at 07/06/18 0851  •  [START ON 7/7/2018] insulin glargine (LANTUS SOLOSTAR) pen 20 Units, 20 Units, subcutaneous, Nightly, Reagan Cooper DO  •  lamoTRIgine (LaMICtal) tablet 100 mg, 100 mg, oral, Daily, Reagan Cooper DO, 100 mg at 07/06/18 0846  •  levothyroxine (SYNTHROID) tablet 88 mcg, 88 mcg, oral, Daily (6:30a), Reagan Cooper DO, 88 mcg at 07/06/18 0516  •  mirtazapine (REMERON) tablet 15 mg, 15 mg, oral, Nightly, Reagan Cooper DO  •  morphine injection 4 mg, 4 mg, intravenous, q3h PRN, Reagan Cooper DO  •  ondansetron (ZOFRAN) injection 4 mg, 4 mg, intravenous, q6h PRN, Reagan Cooper DO  •  pantoprazole (PROTONIX) tablet,delayed release (DR/EC) 40 mg, 40 mg, oral, Daily, Reagan Cooper DO, 40 mg at 07/06/18 0846    Review of Systems  Constitutional: negative for fevers, fatigue and chills  Eyes: negative for blurred vision and visual disturbance  Ears, nose, mouth, throat, and face: negative for ringing in the ears, nose bleed and bleeding gums  Respiratory: negative for cough and shortness of breath  Cardiovascular: negative for chest pain, chest pressure/discomfort, exertional chest pressure/discomfort, irregular heart beat, near-syncope, orthopnea, palpitations, paroxysmal nocturnal dyspnea and syncope  Gastrointestinal: negative for nausea, vomiting and diarrhea  Musculoskeletal:positive for facial pain  Neurological: negative for numbness, weakness and tingling  Behavioral/Psych: negative for suicidal ideation and homocidal ideation  Endocrine: negative for cold intolerance and heat intolerance    Objective     Labs   CMP Results       07/05/18 07/03/18 03/05/18                    1945 1127 1358          133 (L) 139         K 3.5 (L) 3.6 3.7         Cl 106 101 100         CO2  23 26 26         Glucose 206 (H) 237 (H) 282 (H)         BUN 24 (H) 24 (H) 20         Creatinine 1.3 1.7 (H) 1.69 (H)         Calcium 8.7 (L) 8.8 (L) -         Anion Gap 8 6 -         AST 20 - 13         ALT 21 - 18         Albumin 4.3 - 4.0         EGFR 54.7 (L) 40.2 (L) 41 (L)            47 (L)         Comment for K at 1945 on 07/05/18:    Results obtained on plasma. Plasma Potassium values may be up to 0.4 mEQ/L less than serum values. The differences may be greater for patients with high platelet or white cell counts.    Comment for K at 1127 on 07/03/18:    Results obtained on plasma. Plasma Potassium values may be up to 0.4 mEQ/L less than serum values. The differences may be greater for patients with high platelet or white cell counts.        CBC Results       07/05/18 07/03/18 03/16/18 1945 1127 0912         WBC 15.49 (H) 11.88 (H) 9.43         RBC 4.09 (L) 3.89 (L) 3.84 (L)         HGB 12.3 (L) 11.9 (L) 11.7 (L)         HCT 35.7 (L) 34.0 (L) 34.4 (L)         MCV 87.3 87.4 89.6         MCH 30.1 30.6 30.5         MCHC 34.5 35.0 34.0          166 164                     Troponin I Results       07/03/18 02/09/18 02/09/18 1127 1951 1027         Troponin I 0.03 0.03 0.03         Comment for Troponin I at 1951 on 02/09/18:  A rise or fall of troponin with at least one abnormal value is consistent with myocardial injury or necrosis in the presence of appropriate clinical, ECG, and/or imaging abnormalities.    Comment for Troponin I at 1027 on 02/09/18:  A rise or fall of troponin with at least one abnormal value is consistent with myocardial injury or necrosis in the presence of appropriate clinical, ECG, and/or imaging abnormalities.        PT/PTT Results       07/05/18 07/03/18 02/09/18 1945 1127 1026         PT 13.9 17.0 (H) 18.1 (H)         INR 1.1 1.4 1.5         PTT - 49 (H) 68 (H)         Comment for INR at 1945 on 07/05/18:    INR has no  "defined significance when PT is within Reference Range.    Comment for INR at 1127 on 07/03/18:    Moderate Intensity Anticoagulation = 2.0 to 3.0, High Intensity = 2.5 to 3.5    Comment for INR at 1026 on 02/09/18:  MODERATE INTENSITY ANTICOAGULATION = 2.0 TO 3.0, HIGH INTENSITY = 2.5 TO 3.5    Comment for PTT at 1127 on 07/03/18:    The Standard Therapeutic Range for Heparin is 68 to 101 seconds.    Comment for PTT at 1026 on 02/09/18:  THE STANDARD THERAPEUTIC RANGE FOR HEPARIN IS 68  SECONDS.        Lab Results   Component Value Date    CHOL 93 02/10/2018    TRIG 144 02/10/2018    HDL 31 (L) 02/10/2018    LDLCALC 33 02/10/2018    TSH 2.97 02/11/2018    HGBA1C 7.9 (H) 03/05/2018       Imaging  No studies available    ECG   Initial Ecg with an ectopic atrial rhythm, right bundle branch block, left axis deviation and no ischemic changes  Ecg this AM with atrial flutter with 4:1 conduction, right bundle branch block, left axis deviation, no ischemic changes    Telemetry  Atrial flutter with a controlled rate    Physical Exam  /80   Pulse 60   Temp 36.5 °C (97.7 °F) (Oral)   Resp 17   Ht 1.88 m (6' 2\")   Wt 117 kg (259 lb)   SpO2 99%   BMI 33.25 kg/m²     General Appearance:  Alert, no distress   Head:  Ecchymosis to bilateral orbits, bandage to nasal area without bleeding   Eyes:  Conjunctiva/corneas clear, EOM's intact   Endocrine: No thyroid enlargement    Lungs:   Clear to auscultation bilaterally, respirations unlabored, no rales, no wheezing   Heart:  Regularly irregular rhythm, S1 and S2 normal, no murmur, rub or gallop   Abdomen:   Soft, non-tender, no masses, no organomegaly   Vascular: Pulses 2+ and symmetric all extremities, no carotid bruit or jugular vein distention   Musculoskeletal:  Skin: No injury or deformity  Skin color, texture, turgor normal, no rashes or lesions, no cyanosis or edema   Extremities: Extremities normal, atraumatic   Behavior/Emotional: Appropriate, cooperative "           Assessment   * Hypertensive urgency   Assessment & Plan    No evidence of ischemia. His renal impairment is chronic and has improved with IV fluids. His headache is most probably related to recent facial procedure.     His BP has stabilized on his usual outpatient medical regimen. Would continue such. His presentation is likely a result of missing his medications for procedure in combination with the stress and physical trauma of the procedure itself.         CAD (coronary artery disease) of artery bypass graft   Assessment & Plan    Status post CABG x 3 in 2017.     He has no symptoms to suggest angina. His initial troponin was 0.03. Ecg is without ischemic changes; his left axis deviation and right bundle branch block are chronic in nature.     Would resume ASA at this time. Continue beta blocker and atorvastatin. This patient with CAD in the setting of diabetes would benefit from ACE or ARB; in fact, ARB seems to have done well to control his BP here. However, he does have chronic renal impairment. Will defer the addition of ACE/ARB to his outpatient nephrologist.         Atrial fibrillation (CMS/HCC) (HCC)   Assessment & Plan    CHADSVasc 4. The patient is chronically anticoagulated on Eliquis as an outpatient, at appropriate dose of 5mg BID for age < 80, weight > 130 lbs. He does have chronic renal impairment; creatinine was 1.7 on arrival, now 1.3.     His rate has been controlled. It appears that he has paroxysms of atrial ectopy and atrial flutter. As when I last evaluated the patient, he is on a maintenance dose of amiodarone for unclear reasons. I will not discontinue the medication at this time but he should follow up with his outpatient cardiologist regarding this medication.     His rate is controlled with carvedilol. Continue. He should resume Eliquis at this time; it was held in advance of procedure with plastic surgery. There is no active bleeding at the bandage site and he is currently in  Aflutter.            Patient has stabilized from a cardiovascular standpoint. No objection to discharge at the discretion of the primary service, with the above management recommendations.      XANDER Jackson  7/6/2018

## 2018-07-06 NOTE — DISCHARGE INSTRUCTIONS
You were admitted to [facility] for treatment of hypertensive urgency.     This likely happened due to recent surgery/stress from that event/pain/holding home medications.     Your blood pressure was a little low after receiving these medications. Would HOLD CARVEDILOL dose this evening. Please take CLONIDINE.     Please resume your normal blood pressure medications tomorrow morning.    Please monitor your blood pressure at home. Check at least 2 times per day and write down the numbers for your PCP.     If you have symptoms, headache, lightheadness/dizziness, etc, please check your blood pressure.     Diabetic management, equally important. Please see literature.     Please follow wound care and antibiotic instructions for your facial graft.     No changes in other medications.     Call your doctors with questions.      A discharge summary will be sent to your primary care provider to ensure continuity of care. Please bring your after visit summary with you to your next appointment so your provider can review the this document.

## 2018-07-06 NOTE — ASSESSMENT & PLAN NOTE
Status post CABG x 3 in 2017.     He has no symptoms to suggest angina. His initial troponin was 0.03. Ecg is without ischemic changes; his left axis deviation and right bundle branch block are chronic in nature.     Would resume ASA at this time. Continue beta blocker and atorvastatin. This patient with CAD in the setting of diabetes would benefit from ACE or ARB; in fact, ARB seems to have done well to control his BP here. However, he does have chronic renal impairment. Will defer the addition of ACE/ARB to his outpatient nephrologist.

## 2018-07-06 NOTE — PLAN OF CARE
Problem: Patient Care Overview  Goal: Discharge Needs Assessment   07/06/18 8395   DC Needs Assessment   Concerns To Be Addressed denies needs/concerns at this time   Readmission Within The Last 30 Days no previous admission in last 30 days   Anticipated Discharge Disposition home without services   Activity/Self Care ROS   Equipment Currently Used at Home none

## 2018-07-09 ENCOUNTER — TRANSCRIBE ORDERS (OUTPATIENT)
Dept: SCHEDULING | Age: 68
End: 2018-07-09

## 2018-07-09 ENCOUNTER — PATIENT OUTREACH (OUTPATIENT)
Dept: CASE MANAGEMENT | Facility: CLINIC | Age: 68
End: 2018-07-09

## 2018-07-09 DIAGNOSIS — N13.2 HYDRONEPHROSIS WITH RENAL AND URETERAL CALCULUS OBSTRUCTION: Primary | ICD-10-CM

## 2018-07-09 NOTE — PROGRESS NOTES
NAME: Nathan Alonzo    MRN: [id number]    YOB: 1950    EVENT DETAILS    Discharging Facility: [facility]  Date of Admission: 07/05/18  Date of Discharge: 07/06/18  Discharge Instructions Reviewed?: Yes (partially reviewed dc inst and then pt had to take another call. )         Reason for Admission:  Hypertensive urgency      HPI: Spoke to pt briefly and then he had to take another call. Pt presented to hospital from surgical center with uncontrolled Bp. 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 Pt was seen by cardiology. Pt was dc to home in stable condition.     Pt reports that he was back to work and denied Ha, blurred vision, Cp, SOB or dizziness. Pt reports that he has been off balance and wobbly for the past 1-2 months and had a fall when leaving the plastic surgeon office on 7/3. CC was not able to review meds as pt had to get off phone quickly and take another call. Per after care summary, no change in meds. Pt hasn't been checking his BP and is going to get BP monitor today and start to check BID, CC inst to bring readings to f/u appt on 7/13. Pt is scheduled for TCM appt 7/13.     MEDICATION REVIEW:    Reported by:: Patient (unable to review meds, pt had to take another call. Confirmed that pt is taking clonidine BID)  Prescriptions Filled?: Yes (Pt reports no changes in meds and is taking as directed)     Was a medication discrepancy indentified?: No (unable to assess, not able to complete med reconciliation)     Medication understanding?: Yes     Medication Adherence?: Yes     Any side effects from medication?: No       Medication review none. Unable to review as pt had to get off phone.     Additional Comments:      FOLLOW-UP TESTS/PROCEDURES:      PCP 7/13 for TCM appt                BARRIERS TO CARE    Self-Care   Living Arrangement: Spouse or Significant Other       Socioeconomic  Financial Problems?: No     Transportation  Issues?: No            PLAN OF CARE:    Reviewed signs/symptoms of worsening condition or complication that necessitate a call to the Physician's office.  Educated patient on access to care.  RN phone number given for future care management needs.       Shayy Turner RN  [phone]

## 2018-07-10 ENCOUNTER — HOSPITAL ENCOUNTER (OUTPATIENT)
Dept: RADIOLOGY | Facility: HOSPITAL | Age: 68
Discharge: HOME | End: 2018-07-10
Attending: UROLOGY
Payer: MEDICARE

## 2018-07-10 ENCOUNTER — TRANSCRIBE ORDERS (OUTPATIENT)
Dept: REGISTRATION | Facility: HOSPITAL | Age: 68
End: 2018-07-10

## 2018-07-10 DIAGNOSIS — R94.4 ABNORMAL RESULTS OF KIDNEY FUNCTION STUDIES: ICD-10-CM

## 2018-07-10 DIAGNOSIS — N13.2 HYDRONEPHROSIS WITH RENAL AND URETERAL CALCULUS OBSTRUCTION: Primary | ICD-10-CM

## 2018-07-10 DIAGNOSIS — N13.2 HYDRONEPHROSIS WITH RENAL AND URETERAL CALCULUS OBSTRUCTION: ICD-10-CM

## 2018-07-10 PROCEDURE — 76770 US EXAM ABDO BACK WALL COMP: CPT

## 2018-07-10 PROCEDURE — 74018 RADEX ABDOMEN 1 VIEW: CPT

## 2018-07-13 ENCOUNTER — OFFICE VISIT (OUTPATIENT)
Dept: PRIMARY CARE | Facility: CLINIC | Age: 68
End: 2018-07-13
Payer: MEDICARE

## 2018-07-13 VITALS
RESPIRATION RATE: 16 BRPM | HEIGHT: 74 IN | SYSTOLIC BLOOD PRESSURE: 140 MMHG | BODY MASS INDEX: 34.39 KG/M2 | OXYGEN SATURATION: 98 % | WEIGHT: 268 LBS | HEART RATE: 80 BPM | DIASTOLIC BLOOD PRESSURE: 90 MMHG

## 2018-07-13 DIAGNOSIS — N28.9 KIDNEY DISEASE: ICD-10-CM

## 2018-07-13 DIAGNOSIS — I48.91 ATRIAL FIBRILLATION, UNSPECIFIED TYPE (CMS/HCC): Primary | ICD-10-CM

## 2018-07-13 DIAGNOSIS — I25.810 CORONARY ARTERY DISEASE INVOLVING CORONARY BYPASS GRAFT OF NATIVE HEART WITHOUT ANGINA PECTORIS: ICD-10-CM

## 2018-07-13 DIAGNOSIS — I10 ESSENTIAL HYPERTENSION: ICD-10-CM

## 2018-07-13 PROBLEM — R16.0 HEPATOMEGALY: Status: ACTIVE | Noted: 2018-07-13

## 2018-07-13 PROBLEM — Z85.820 HISTORY OF MELANOMA: Status: ACTIVE | Noted: 2018-07-13

## 2018-07-13 PROCEDURE — 99214 OFFICE O/P EST MOD 30 MIN: CPT | Performed by: FAMILY MEDICINE

## 2018-07-13 RX ORDER — INSULIN GLARGINE 100 [IU]/ML
INJECTION, SOLUTION SUBCUTANEOUS
Refills: 0 | COMMUNITY
Start: 2018-06-20 | End: 2018-08-20 | Stop reason: ENTERED-IN-ERROR

## 2018-07-13 RX ORDER — PEN NEEDLE, DIABETIC 31 GX5/16"
NEEDLE, DISPOSABLE MISCELLANEOUS SEE ADMIN INSTRUCTIONS
Refills: 0 | COMMUNITY
Start: 2018-05-13 | End: 2018-10-04 | Stop reason: ALTCHOICE

## 2018-07-13 ASSESSMENT — ENCOUNTER SYMPTOMS
FATIGUE: 0
DYSURIA: 0
NECK PAIN: 0
NAUSEA: 0
NERVOUS/ANXIOUS: 0
WEAKNESS: 0
DIARRHEA: 0
CHEST TIGHTNESS: 0
TROUBLE SWALLOWING: 0
FREQUENCY: 0
ACTIVITY CHANGE: 0
BLOOD IN STOOL: 0
SHORTNESS OF BREATH: 0
ABDOMINAL PAIN: 0
HEADACHES: 0
ARTHRALGIAS: 0
EYE PAIN: 0
BACK PAIN: 0
PALPITATIONS: 0

## 2018-07-13 NOTE — PROGRESS NOTES
Daily Progress Note      Subjective      Patient ID: Nathan Alonzo is a 67 y.o. male.    HPI  Due to skin sx, developed pain, became dehydrated, fainted, went to er, tx'd and released. Occurred x 2.  Feels good now x several days.  No cp, v.n.omaira.n.sob, moralez, ha, issues  Wants  To review    The following have been reviewed and updated as appropriate in this visit:       Review of Systems   Constitutional: Negative for activity change and fatigue.   HENT: Negative for congestion, ear pain, tinnitus and trouble swallowing.    Eyes: Negative for pain.   Respiratory: Negative for chest tightness and shortness of breath.    Cardiovascular: Negative for chest pain and palpitations.   Gastrointestinal: Negative for abdominal pain, blood in stool, diarrhea and nausea.   Genitourinary: Negative for dysuria, frequency and urgency.   Musculoskeletal: Negative for arthralgias, back pain and neck pain.   Neurological: Negative for weakness and headaches.   Psychiatric/Behavioral: The patient is not nervous/anxious.    All other systems reviewed and are negative.      Current Outpatient Prescriptions   Medication Sig Dispense Refill   • amiodarone (PACERONE) 200 mg tablet Take 200 mg by mouth daily.     • apixaban (ELIQUIS) 5 mg tablet Take 5 mg by mouth 2 (two) times a day.     • aspirin 81 mg enteric coated tablet Take 81 mg by mouth daily.     • atorvastatin (LIPITOR) 20 mg tablet Take 40 mg by mouth daily.       • carvedilol (COREG) 25 mg tablet Take 25 mg by mouth 2 (two) times a day with meals.       • chlorthalidone (HYGROTEN) 25 mg tablet Take 25 mg by mouth daily.     • clindamycin (CLEOCIN) 150 mg capsule Take 300 mg by mouth 3 (three) times a day. X 7 day duration started 7/5/18 in the evening     • cloNIDine (CATAPRES) 0.2 mg tablet Take 0.2 mg by mouth 2 (two) times a day.     • coenzyme Q10 (CO Q-10) 400 mg capsule Take 400 mg by mouth daily.       • eplerenone (INSPRA) 25 mg tablet Take 25 mg by mouth daily.     •  "INSULIN GLARGINE,HUM.REC.ANLOG (INSULIN GLARGINE SUBQ) Inject 20 unit marking on U-100 syringe under the skin every morning.       • lamoTRIgine (LaMICtal) 100 mg tablet Take 100 mg by mouth nightly.       • levothyroxine (SYNTHROID) 100 mcg tablet Take 100 mcg by mouth daily.     • metFORMIN (GLUCOPHAGE) 500 mg tablet Take 500 mg by mouth 2 (two) times a day with meals.     • mirtazapine (REMERON) 15 mg tablet Take 1 tablet (15 mg total) by mouth nightly. 30 tablet 0   • omeprazole (PriLOSEC) 40 mg capsule Take 40 mg by mouth daily before breakfast.     • BD ULTRA-FINE RACHELE PEN NEEDLE 32 gauge x 5/32\" needle See admin instr.  0   • LANTUS SOLOSTAR U-100 INSULIN 100 unit/mL (3 mL) subcutaneous pen INJECT 20 UNITS QAM  0     No current facility-administered medications for this visit.      Past Medical History:   Diagnosis Date   • Abnormal ECG     a fib   • BPH (benign prostatic hyperplasia)    • Coronary artery disease    • Disease of thyroid gland    • Hypertension    • Renal calculi     right   • Renal cancer (CMS/HCC) (HCC)    • Sleep apnea     uses cpap   • Type 2 diabetes mellitus (CMS/HCC) ([facility])      No family history on file.  Past Surgical History:   Procedure Laterality Date   • COLONOSCOPY  2016   • CORONARY ARTERY BYPASS GRAFT      x3   • NEPHRECTOMY Right    • TONSILLECTOMY     • UVULECTOMY  2005     Social History     Social History   • Marital status:      Spouse name: N/A   • Number of children: N/A   • Years of education: N/A     Occupational History   • Not on file.     Social History Main Topics   • Smoking status: Never Smoker   • Smokeless tobacco: Never Used   • Alcohol use No   • Drug use: No   • Sexual activity: Defer     Other Topics Concern   • Not on file     Social History Narrative   • No narrative on file     Allergies   Allergen Reactions   • Oxycodone Other (see comments)     Other reaction(s): Hives and personality changes   • Amlodipine Other (see comments)     Muscle " swelling, dizziness, loss of muscle strength and pain   • Cephalexin Monohydrate    • Codeine Other (see comments)     unknown       Objective   I have reviewed the patient's pertinent labs. Pertinent labs are within normal limits.    Physical Exam   Cardiovascular: Normal rate, S1 normal, S2 normal and normal pulses.  An irregularly irregular rhythm present.   No murmur heard.      Assessment/Plan     Problem List Items Addressed This Visit     None        No orders of the defined types were placed in this encounter.    Ct reviewed, er reviewed, labs reviewed  Discussed  Discussed, described, reviewed at length  Aware, agrees, and  understands plan  Counseled, reviewed at length      Serafin Zavaleta,   7/13/2018

## 2018-07-16 ENCOUNTER — TELEPHONE (OUTPATIENT)
Dept: PRIMARY CARE | Facility: CLINIC | Age: 68
End: 2018-07-16

## 2018-07-19 ENCOUNTER — TRANSCRIBE ORDERS (OUTPATIENT)
Dept: SCHEDULING | Age: 68
End: 2018-07-19

## 2018-07-19 ENCOUNTER — TELEPHONE (OUTPATIENT)
Dept: PRIMARY CARE | Facility: CLINIC | Age: 68
End: 2018-07-19

## 2018-07-19 DIAGNOSIS — N13.2 HYDRONEPHROSIS WITH RENAL AND URETERAL CALCULUS OBSTRUCTION: Primary | ICD-10-CM

## 2018-07-19 DIAGNOSIS — R94.4 ABNORMAL RESULTS OF KIDNEY FUNCTION STUDIES: ICD-10-CM

## 2018-07-19 NOTE — TELEPHONE ENCOUNTER
Pt stated he started taking some potassium medication a few days ago and today he noticed he has a stream of blood in his urine.   Pt can be reached at [phone]

## 2018-07-21 LAB
BASOPHILS # BLD AUTO: 0 X10E3/UL (ref 0–0.2)
BASOPHILS NFR BLD AUTO: 0 %
EOSINOPHIL # BLD AUTO: 0.3 X10E3/UL (ref 0–0.4)
EOSINOPHIL NFR BLD AUTO: 3 %
ERYTHROCYTE [DISTWIDTH] IN BLOOD BY AUTOMATED COUNT: 16 % (ref 12.3–15.4)
HCT VFR BLD AUTO: 36.8 % (ref 37.5–51)
HGB BLD-MCNC: 12 G/DL (ref 13–17.7)
IMM GRANULOCYTES # BLD: 0 X10E3/UL (ref 0–0.1)
IMM GRANULOCYTES NFR BLD: 0 %
LYMPHOCYTES # BLD AUTO: 0.9 X10E3/UL (ref 0.7–3.1)
LYMPHOCYTES NFR BLD AUTO: 9 %
MCH RBC QN AUTO: 30.5 PG (ref 26.6–33)
MCHC RBC AUTO-ENTMCNC: 32.6 G/DL (ref 31.5–35.7)
MCV RBC AUTO: 93 FL (ref 79–97)
MONOCYTES # BLD AUTO: 0.7 X10E3/UL (ref 0.1–0.9)
MONOCYTES NFR BLD AUTO: 7 %
NEUTROPHILS # BLD AUTO: 8.4 X10E3/UL (ref 1.4–7)
NEUTROPHILS NFR BLD AUTO: 81 %
PLATELET # BLD AUTO: 182 X10E3/UL (ref 150–379)
RBC # BLD AUTO: 3.94 X10E6/UL (ref 4.14–5.8)
WBC # BLD AUTO: 10.4 X10E3/UL (ref 3.4–10.8)

## 2018-07-23 ENCOUNTER — TELEPHONE (OUTPATIENT)
Dept: PRIMARY CARE | Facility: CLINIC | Age: 68
End: 2018-07-23

## 2018-07-23 NOTE — TELEPHONE ENCOUNTER
----- Message from Serafin Zavaleta DO sent at 7/21/2018 11:14 AM EDT -----  Labs are stable     Spoke with pt, aware of lab results

## 2018-08-20 ENCOUNTER — TRANSCRIBE ORDERS (OUTPATIENT)
Dept: SCHEDULING | Age: 68
End: 2018-08-20

## 2018-08-20 DIAGNOSIS — R31.0 GROSS HEMATURIA: Primary | ICD-10-CM

## 2018-08-24 ENCOUNTER — HOSPITAL ENCOUNTER (OUTPATIENT)
Dept: RADIOLOGY | Facility: HOSPITAL | Age: 68
Discharge: HOME | End: 2018-08-24
Attending: UROLOGY
Payer: MEDICARE

## 2018-08-24 DIAGNOSIS — R94.4 ABNORMAL RESULTS OF KIDNEY FUNCTION STUDIES: ICD-10-CM

## 2018-08-24 DIAGNOSIS — N13.2 HYDRONEPHROSIS WITH RENAL AND URETERAL CALCULUS OBSTRUCTION: ICD-10-CM

## 2018-08-24 DIAGNOSIS — R31.0 GROSS HEMATURIA: ICD-10-CM

## 2018-08-24 PROCEDURE — 74178 CT ABD&PLV WO CNTR FLWD CNTR: CPT

## 2018-08-24 PROCEDURE — 63600105 HC IODINE BASED CONTRAST: Performed by: UROLOGY

## 2018-08-24 PROCEDURE — 76770 US EXAM ABDO BACK WALL COMP: CPT

## 2018-08-24 PROCEDURE — 74018 RADEX ABDOMEN 1 VIEW: CPT

## 2018-08-24 RX ADMIN — IOHEXOL 150 ML: 350 INJECTION, SOLUTION INTRAVENOUS at 14:44

## 2018-09-25 ENCOUNTER — CONSULT (OUTPATIENT)
Dept: PRIMARY CARE | Facility: CLINIC | Age: 68
End: 2018-09-25
Payer: MEDICARE

## 2018-09-25 VITALS
SYSTOLIC BLOOD PRESSURE: 130 MMHG | OXYGEN SATURATION: 98 % | HEIGHT: 74 IN | DIASTOLIC BLOOD PRESSURE: 80 MMHG | TEMPERATURE: 97.7 F | BODY MASS INDEX: 33.47 KG/M2 | RESPIRATION RATE: 14 BRPM | HEART RATE: 47 BPM | WEIGHT: 260.8 LBS

## 2018-09-25 DIAGNOSIS — Z79.4 TYPE 2 DIABETES MELLITUS WITH CHRONIC KIDNEY DISEASE, WITH LONG-TERM CURRENT USE OF INSULIN, UNSPECIFIED CKD STAGE (CMS/HCC): ICD-10-CM

## 2018-09-25 DIAGNOSIS — Z01.818 PREOP EXAMINATION: ICD-10-CM

## 2018-09-25 DIAGNOSIS — G47.30 SLEEP APNEA, UNSPECIFIED TYPE: ICD-10-CM

## 2018-09-25 DIAGNOSIS — E78.00 HYPERCHOLESTEREMIA: ICD-10-CM

## 2018-09-25 DIAGNOSIS — E03.9 HYPOTHYROIDISM, UNSPECIFIED TYPE: ICD-10-CM

## 2018-09-25 DIAGNOSIS — F42.9 OBSESSIVE-COMPULSIVE DISORDER, UNSPECIFIED TYPE: ICD-10-CM

## 2018-09-25 DIAGNOSIS — I25.810 CORONARY ARTERY DISEASE INVOLVING CORONARY BYPASS GRAFT OF NATIVE HEART WITHOUT ANGINA PECTORIS: ICD-10-CM

## 2018-09-25 DIAGNOSIS — I48.91 ATRIAL FIBRILLATION, UNSPECIFIED TYPE (CMS/HCC): ICD-10-CM

## 2018-09-25 DIAGNOSIS — N20.0 KIDNEY STONE: Primary | ICD-10-CM

## 2018-09-25 DIAGNOSIS — N28.9 KIDNEY DISEASE: ICD-10-CM

## 2018-09-25 DIAGNOSIS — I10 ESSENTIAL HYPERTENSION: ICD-10-CM

## 2018-09-25 DIAGNOSIS — I34.1 MITRAL VALVE PROLAPSE: ICD-10-CM

## 2018-09-25 DIAGNOSIS — E11.22 TYPE 2 DIABETES MELLITUS WITH CHRONIC KIDNEY DISEASE, WITH LONG-TERM CURRENT USE OF INSULIN, UNSPECIFIED CKD STAGE (CMS/HCC): ICD-10-CM

## 2018-09-25 PROCEDURE — 99214 OFFICE O/P EST MOD 30 MIN: CPT | Mod: 25 | Performed by: NURSE PRACTITIONER

## 2018-09-25 PROCEDURE — 93000 ELECTROCARDIOGRAM COMPLETE: CPT | Performed by: NURSE PRACTITIONER

## 2018-09-25 RX ORDER — CARVEDILOL 25 MG/1
12.5 TABLET ORAL 2 TIMES DAILY WITH MEALS
COMMUNITY
Start: 2018-09-25 | End: 2018-10-04 | Stop reason: ALTCHOICE

## 2018-09-25 RX ORDER — CLONIDINE HYDROCHLORIDE 0.2 MG/1
0.2 TABLET ORAL 2 TIMES DAILY
Status: DISCONTINUED | OUTPATIENT
Start: 2018-09-25 | End: 2019-03-28 | Stop reason: ENTERED-IN-ERROR

## 2018-09-25 ASSESSMENT — ENCOUNTER SYMPTOMS
EYES NEGATIVE: 1
RESPIRATORY NEGATIVE: 1
CARDIOVASCULAR NEGATIVE: 1
GASTROINTESTINAL NEGATIVE: 1
FLANK PAIN: 1
ADENOPATHY: 0
NEUROLOGICAL NEGATIVE: 1
DIFFICULTY URINATING: 0
ENDOCRINE NEGATIVE: 1
ALLERGIC/IMMUNOLOGIC NEGATIVE: 1
BRUISES/BLEEDS EASILY: 1
CONSTITUTIONAL NEGATIVE: 1
FREQUENCY: 0
PSYCHIATRIC NEGATIVE: 1
DYSURIA: 0
HEMATURIA: 0

## 2018-09-25 NOTE — PROGRESS NOTES
"Subjective      Patient ID: Nathan Alonzo is a 67 y.o. male.    Here for surgery for kidney stone lithotripsy on 10/18/18  With Dr Gonzalez.   Is seeing his cardiologist (Dr Mercado) today for cardiac clearance and low pulse rate.          The following have been reviewed and updated as appropriate in this visit:  Allergies  Meds  Problems       Review of Systems   Constitutional: Negative.    HENT: Negative.    Eyes: Negative.    Respiratory: Negative.    Cardiovascular: Negative.    Gastrointestinal: Negative.    Endocrine: Negative.    Genitourinary: Positive for flank pain (b'l \"minor\" pain.). Negative for decreased urine volume, difficulty urinating, discharge, dysuria, enuresis, frequency, genital sores, hematuria, penile pain, penile swelling, scrotal swelling, testicular pain and urgency.   Skin: Negative.    Allergic/Immunologic: Negative.    Neurological: Negative.    Hematological: Negative for adenopathy. Bruises/bleeds easily.   Psychiatric/Behavioral: Negative.      Current Outpatient Prescriptions   Medication Sig Dispense Refill   • amiodarone (PACERONE) 200 mg tablet Take 200 mg by mouth daily.     • apixaban (ELIQUIS) 5 mg tablet Take 5 mg by mouth 2 (two) times a day.     • aspirin 81 mg enteric coated tablet Take 81 mg by mouth daily.     • atorvastatin (LIPITOR) 20 mg tablet Take 40 mg by mouth daily.       • BD ULTRA-FINE RACHELE PEN NEEDLE 32 gauge x 5/32\" needle See admin instr.  0   • carvedilol (COREG) 25 mg tablet Take 0.5 tablets (12.5 mg total) by mouth 2 (two) times a day with meals.     • coenzyme Q10 (CO Q-10) 400 mg capsule Take 400 mg by mouth daily.       • eplerenone (INSPRA) 25 mg tablet Take 25 mg by mouth daily.     • finasteride (PROSCAR) 5 mg tablet Take 5 mg by mouth daily.     • insulin glargine (LANTUS SOLOSTAR) 100 unit/mL (3 mL) subcutaneous pen Inject 20 Units under the skin daily.     • lamoTRIgine (LaMICtal) 100 mg tablet Take 100 mg by mouth daily.     • " "levothyroxine (SYNTHROID) 100 mcg tablet Take 100 mcg by mouth daily.     • lisinopril (PRINIVIL) 40 mg tablet Take 40 mg by mouth daily.     • metFORMIN (GLUCOPHAGE) 500 mg tablet Take 500 mg by mouth 2 (two) times a day with meals.     • omeprazole (PriLOSEC) 40 mg capsule Take 40 mg by mouth daily before breakfast.     • potassium chloride 20 mEq packet Take 15 mEq by mouth 2 (two) times a day.       Current Facility-Administered Medications   Medication Dose Route Frequency Provider Last Rate Last Dose   • cloNIDine (CATAPRES) tablet 0.2 mg  0.2 mg oral BID Areli Gallo CRNP         Allergies   Allergen Reactions   • Oxycodone Other (see comments)      Hives     • Amlodipine Other (see comments)     ANKLE  & FEET  SWELLING    • Cephalexin Monohydrate Other (see comments)     PT CAN'T REMEMBER  REACTION   • Latex Rash       Objective     Physical Exam   Constitutional: He is oriented to person, place, and time. He appears well-developed and well-nourished. No distress.   HENT:   Head: Normocephalic and atraumatic.   Right Ear: External ear normal.   Left Ear: External ear normal.   Nose: Nose normal.   Mouth/Throat: Oropharynx is clear and moist. No oropharyngeal exudate.   Eyes: EOM are normal. Pupils are equal, round, and reactive to light. Right eye exhibits no discharge. Left eye exhibits no discharge. No scleral icterus.   Neck: Neck supple. No JVD present.   Cardiovascular: Regular rhythm and intact distal pulses.  Exam reveals no gallop and no friction rub.    Murmur heard.  EKG read by Dr Serafin Zavaleta as: \"bradycardia but otherwise no significant changes\".   Pulmonary/Chest: Effort normal and breath sounds normal. No respiratory distress. He has no wheezes. He has no rales. He exhibits no tenderness.   Abdominal: Soft. Bowel sounds are normal. He exhibits no distension and no mass. There is no tenderness. There is no rebound and no guarding. No hernia.   Musculoskeletal: Normal range of motion. He " exhibits no edema.   Lymphadenopathy:     He has no cervical adenopathy.   Neurological: He is alert and oriented to person, place, and time. No cranial nerve deficit.   Skin: He is not diaphoretic.   Psychiatric: He has a normal mood and affect. His behavior is normal. Judgment and thought content normal.       Assessment/Plan     Problem List Items Addressed This Visit     Kidney disease    Atrial fibrillation (CMS/HCC) ([facility])    Relevant Medications    carvedilol (COREG) 25 mg tablet    CAD (coronary artery disease) of artery bypass graft    Relevant Medications    carvedilol (COREG) 25 mg tablet    Hypertension    Relevant Medications    carvedilol (COREG) 25 mg tablet    cloNIDine (CATAPRES) tablet 0.2 mg (Start on 9/25/2018  9:45 AM)    Hypercholesteremia    Hypothyroid    Relevant Medications    carvedilol (COREG) 25 mg tablet    Type 2 diabetes mellitus (CMS/[facility]) ([facility])    OCD (obsessive compulsive disorder)    Sleep apnea    Mitral valve prolapse    Relevant Medications    carvedilol (COREG) 25 mg tablet      Other Visit Diagnoses     Kidney stone    -  Primary    Preop examination        Relevant Orders    ECG 12 LEAD OFFICE PERFORMED      [place] Class 1 perioperative risk category.  H&P and EKG faxed to surgeon's office.    MARIA ELENA Alvarenga  9/25/2018

## 2018-10-04 ENCOUNTER — TRANSCRIBE ORDERS (OUTPATIENT)
Dept: REGISTRATION | Facility: HOSPITAL | Age: 68
End: 2018-10-04

## 2018-10-04 ENCOUNTER — APPOINTMENT (OUTPATIENT)
Dept: PREADMISSION TESTING | Facility: HOSPITAL | Age: 68
End: 2018-10-04
Attending: UROLOGY
Payer: MEDICARE

## 2018-10-04 ENCOUNTER — APPOINTMENT (OUTPATIENT)
Dept: LAB | Facility: HOSPITAL | Age: 68
End: 2018-10-04
Attending: UROLOGY
Payer: MEDICARE

## 2018-10-04 VITALS
WEIGHT: 260 LBS | TEMPERATURE: 98.3 F | SYSTOLIC BLOOD PRESSURE: 176 MMHG | RESPIRATION RATE: 16 BRPM | HEART RATE: 48 BPM | DIASTOLIC BLOOD PRESSURE: 81 MMHG | HEIGHT: 74 IN | BODY MASS INDEX: 33.37 KG/M2

## 2018-10-04 DIAGNOSIS — N20.0 CALCULUS OF KIDNEY: ICD-10-CM

## 2018-10-04 DIAGNOSIS — N20.0 CALCULUS OF KIDNEY: Primary | ICD-10-CM

## 2018-10-04 LAB
ANION GAP SERPL CALC-SCNC: 7 MEQ/L (ref 3–15)
APTT PPP: 54 SEC (ref 23–35)
BACTERIA URNS QL MICRO: ABNORMAL /HPF
BILIRUB UR QL STRIP.AUTO: 1 MG/DL
BUN SERPL-MCNC: 25 MG/DL (ref 8–20)
CALCIUM SERPL-MCNC: 9.3 MG/DL (ref 8.9–10.3)
CHLORIDE SERPL-SCNC: 103 MEQ/L (ref 98–109)
CLARITY UR REFRACT.AUTO: ABNORMAL
CO2 SERPL-SCNC: 28 MEQ/L (ref 22–32)
COLOR UR AUTO: ABNORMAL
CREAT SERPL-MCNC: 1.6 MG/DL (ref 0.8–1.3)
ERYTHROCYTE [DISTWIDTH] IN BLOOD BY AUTOMATED COUNT: 12.8 % (ref 11.6–14.4)
GFR SERPL CREATININE-BSD FRML MDRD: 43.1 ML/MIN/1.73M*2
GLUCOSE SERPL-MCNC: 138 MG/DL (ref 70–99)
GLUCOSE UR STRIP.AUTO-MCNC: ABNORMAL MG/DL
HCT VFR BLDCO AUTO: 40.3 % (ref 40.1–51)
HGB BLD-MCNC: 13.6 G/DL (ref 13.7–17.5)
HGB UR QL STRIP.AUTO: 3
HYALINE CASTS #/AREA URNS LPF: ABNORMAL /LPF
INR PPP: 1.4 INR
KETONES UR STRIP.AUTO-MCNC: ABNORMAL MG/DL
LEUKOCYTE ESTERASE UR QL STRIP.AUTO: ABNORMAL
MCH RBC QN AUTO: 29.6 PG (ref 28–33.2)
MCHC RBC AUTO-ENTMCNC: 33.7 G/DL (ref 32.2–36.5)
MCV RBC AUTO: 87.6 FL (ref 83–98)
NITRITE UR QL STRIP.AUTO: NEGATIVE
PDW BLD AUTO: 11 FL (ref 9.4–12.4)
PH UR STRIP.AUTO: 5.5 [PH]
PLATELET # BLD AUTO: 166 K/UL (ref 150–350)
POTASSIUM SERPL-SCNC: 4.1 MEQ/L (ref 3.6–5.1)
PROT UR QL STRIP.AUTO: 2
PROTHROMBIN TIME: 17.1 SEC (ref 12.2–14.5)
RBC # BLD AUTO: 4.6 M/UL (ref 4.5–5.8)
RBC #/AREA URNS HPF: ABNORMAL /HPF
SODIUM SERPL-SCNC: 138 MEQ/L (ref 136–144)
SP GR UR REFRACT.AUTO: 1.02
SQUAMOUS URNS QL MICRO: 1 /HPF
UROBILINOGEN UR STRIP-ACNC: 0.2 EU/DL
WBC # BLD AUTO: 10.32 K/UL (ref 3.8–10.5)
WBC #/AREA URNS HPF: ABNORMAL /HPF

## 2018-10-04 PROCEDURE — 85610 PROTHROMBIN TIME: CPT | Mod: GA

## 2018-10-04 PROCEDURE — 85027 COMPLETE CBC AUTOMATED: CPT

## 2018-10-04 PROCEDURE — 87086 URINE CULTURE/COLONY COUNT: CPT

## 2018-10-04 PROCEDURE — 85730 THROMBOPLASTIN TIME PARTIAL: CPT | Mod: GA

## 2018-10-04 PROCEDURE — 80048 BASIC METABOLIC PNL TOTAL CA: CPT | Mod: 59

## 2018-10-04 PROCEDURE — 36415 COLL VENOUS BLD VENIPUNCTURE: CPT

## 2018-10-04 PROCEDURE — 81001 URINALYSIS AUTO W/SCOPE: CPT

## 2018-10-04 ASSESSMENT — ENCOUNTER SYMPTOMS
FREQUENCY: 1
ABDOMINAL PAIN: 0
HEADACHES: 0
AGITATION: 0
POLYDIPSIA: 0
CHILLS: 0
NECK STIFFNESS: 0
FLANK PAIN: 0
POLYPHAGIA: 0
DIFFICULTY URINATING: 1
WHEEZING: 0
NAUSEA: 0
BLOOD IN STOOL: 0
EYE DISCHARGE: 0
SEIZURES: 0
BRUISES/BLEEDS EASILY: 0
PALPITATIONS: 0
BACK PAIN: 0
WEAKNESS: 0
TROUBLE SWALLOWING: 0
CONSTIPATION: 0
ADENOPATHY: 0
TREMORS: 0
COUGH: 0
CHEST TIGHTNESS: 0
SHORTNESS OF BREATH: 0
LIGHT-HEADEDNESS: 0
SORE THROAT: 0
DIARRHEA: 0
FEVER: 0
DIZZINESS: 0
UNEXPECTED WEIGHT CHANGE: 0
DIAPHORESIS: 0
HEMATURIA: 0
FACIAL SWELLING: 0
SINUS PRESSURE: 0
EYE REDNESS: 0
CONFUSION: 0
APNEA: 0
DYSURIA: 0
JOINT SWELLING: 0
NUMBNESS: 0

## 2018-10-04 ASSESSMENT — PAIN SCALES - GENERAL: PAINLEVEL: 0-NO PAIN

## 2018-10-04 NOTE — H&P
Chief complaint: Left Renal Calculi    HPI:68 yo M with h/o Renal Calculi, is s/p previous intervention; Pt is here for further management.    Allergies   Allergen Reactions   • Oxycodone Other (see comments)      Hives     • Amlodipine Other (see comments)     ANKLE  & FEET  SWELLING    • Cephalexin Monohydrate Other (see comments)     PT CAN'T REMEMBER  REACTION   • Latex Rash      Current Outpatient Prescriptions:   •  amiodarone (PACERONE) 200 mg tablet, Take 200 mg by mouth daily., Disp: , Rfl:   •  apixaban (ELIQUIS) 5 mg tablet, Take 5 mg by mouth 2 (two) times a day. PT TO HOLD ELIQUIS 3 DAYS PRIOR TO SX AS PER HIS CARDIOLOGIST , Disp: , Rfl:   •  aspirin 81 mg enteric coated tablet, Take 81 mg by mouth daily. PT TO STOP 5 DAYS PRIOR TO SX AS PER HIS CARDIOLOGIST , Disp: , Rfl:   •  atorvastatin (LIPITOR) 40 mg tablet, Take 40 mg by mouth daily.  , Disp: , Rfl:   •  coenzyme Q10 (CO Q-10) 400 mg capsule, Take 400 mg by mouth daily.  , Disp: , Rfl:   •  eplerenone (INSPRA) 25 mg tablet, Take 25 mg by mouth daily., Disp: , Rfl:   •  finasteride (PROSCAR) 5 mg tablet, Take 5 mg by mouth daily., Disp: , Rfl:   •  insulin glargine (LANTUS SOLOSTAR) 100 unit/mL (3 mL) subcutaneous pen, Inject 20 Units under the skin every morning.  , Disp: , Rfl:   •  lamoTRIgine (LaMICtal) 100 mg tablet, Take 100 mg by mouth daily., Disp: , Rfl:   •  levothyroxine (SYNTHROID) 100 mcg tablet, Take 100 mcg by mouth daily., Disp: , Rfl:   •  lisinopril (PRINIVIL) 40 mg tablet, Take 40 mg by mouth daily., Disp: , Rfl:   •  metFORMIN (GLUCOPHAGE) 500 mg tablet, Take 500 mg by mouth 2 (two) times a day with meals., Disp: , Rfl:   •  omeprazole (PriLOSEC) 40 mg capsule, Take 40 mg by mouth daily before breakfast., Disp: , Rfl:     Current Facility-Administered Medications:   •  cloNIDine (CATAPRES) tablet 0.2 mg, 0.2 mg, oral, BID, Areli Gallo CRNP     Past Medical History:   Diagnosis Date   • A-fib (CMS/HCC) (HCC)    • Abnormal ECG      a fib   • Aortic valve disorder    • BPH (benign prostatic hyperplasia)    • BPH (benign prostatic hyperplasia)    • Coronary artery disease    • Disease of thyroid gland    • Hypertension    • Hypothyroidism    • Kidney stones    • Nocturia x2   • Renal calculi     right   • Renal cancer (CMS/HCC) (HCC)    • Right bundle branch block    • Sleep apnea     uses cpap   • Type 2 diabetes mellitus (CMS/HCC) (HCC)      Past Surgical History:   Procedure Laterality Date   • COLONOSCOPY  2016   • CORONARY ARTERY BYPASS GRAFT  2017    x3   • NEPHRECTOMY Right 2016    PARTIAL    • TONSILLECTOMY     • UVULECTOMY  2005     Social History     Social History   • Marital status:      Spouse name: N/A   • Number of children: N/A   • Years of education: N/A     Social History Main Topics   • Smoking status: Never Smoker   • Smokeless tobacco: Never Used   • Alcohol use No   • Drug use: No   • Sexual activity: Defer     Other Topics Concern   • None     Social History Narrative   • None     Family History   Problem Relation Age of Onset   • Lung cancer Mother    • Lung cancer Father    • Early death Father    • Diabetes Sister      Review of Systems   Constitutional: Negative for chills, diaphoresis, fever and unexpected weight change.   HENT: Negative for congestion, dental problem, ear pain, facial swelling, postnasal drip, sinus pressure, sore throat, tinnitus and trouble swallowing.    Eyes: Negative for discharge, redness and visual disturbance.   Respiratory: Negative for apnea, cough, chest tightness, shortness of breath and wheezing.    Cardiovascular: Negative for chest pain, palpitations and leg swelling.        H/o P/Afib, RBBB, Arotic Valve disorder   Gastrointestinal: Negative for abdominal pain, blood in stool, constipation, diarrhea and nausea.   Endocrine: Negative for polydipsia, polyphagia and polyuria.   Genitourinary: Positive for difficulty urinating (new- ? retention) and frequency (& Nocturia).  Negative for dysuria, flank pain, hematuria and urgency.        R/Partial Nephrectomy 2/2 Cancer; Kidney Stones presently   Musculoskeletal: Negative for back pain, gait problem, joint swelling and neck stiffness.   Skin: Negative for pallor and rash.   Allergic/Immunologic: Negative for immunocompromised state.   Neurological: Negative for dizziness, tremors, seizures, weakness, light-headedness, numbness and headaches.   Hematological: Negative for adenopathy. Does not bruise/bleed easily.   Psychiatric/Behavioral: Negative for agitation, behavioral problems and confusion.     Vitals:    10/04/18 1114   BP: (!) 176/81   Pulse: (!) 48   Resp: 16   Temp: 36.8 °C (98.3 °F)     Body mass index is 33.38 kg/m².  Physical Exam   Constitutional: He is oriented to person, place, and time. He appears well-developed and well-nourished.   HENT:   Head: Normocephalic and atraumatic.   Mouth/Throat: Oropharynx is clear and moist.   Eyes: EOM are normal. Pupils are equal, round, and reactive to light.   Neck: Normal range of motion. Neck supple.   Cardiovascular: Normal rate, regular rhythm and intact distal pulses.    EKG=SB   Pulmonary/Chest: Effort normal and breath sounds normal.   Abdominal: Soft. Bowel sounds are normal.   Musculoskeletal: He exhibits no tenderness.   Lymphadenopathy:     He has no cervical adenopathy.   Neurological: He is alert and oriented to person, place, and time.   Skin: Skin is warm and dry.   Psychiatric: He has a normal mood and affect. His behavior is normal.   Nursing note and vitals reviewed.    Assessment/Plan:  Renal Calculi /// Left ESWL, Possible Cysto, Place Left Stent    Continue with current management; NPO 8 hrs Pre-Arrival; Omeprazole, Levothyroxine, Amiodarone with sip water morning of surgery; Follow instructions for Eliquis & ASA; Bring Insulin dose morning of surgery; Medical clearance from [facility] and .

## 2018-10-05 LAB — BACTERIA UR CULT: NORMAL

## 2018-10-08 DIAGNOSIS — Z01.818 PREOP TESTING: Primary | ICD-10-CM

## 2018-10-11 ENCOUNTER — HOSPITAL ENCOUNTER (OUTPATIENT)
Facility: CLINIC | Age: 68
Discharge: HOME | End: 2018-10-11
Attending: EMERGENCY MEDICINE
Payer: MEDICARE

## 2018-10-11 VITALS — SYSTOLIC BLOOD PRESSURE: 180 MMHG | DIASTOLIC BLOOD PRESSURE: 98 MMHG | OXYGEN SATURATION: 97 % | HEART RATE: 67 BPM

## 2018-10-18 ENCOUNTER — ANESTHESIA EVENT (OUTPATIENT)
Dept: OPERATING ROOM | Facility: HOSPITAL | Age: 68
Setting detail: HOSPITAL OUTPATIENT SURGERY
End: 2018-10-18
Payer: MEDICARE

## 2018-10-18 ENCOUNTER — TRANSCRIBE ORDERS (OUTPATIENT)
Dept: RADIOLOGY | Facility: HOSPITAL | Age: 68
End: 2018-10-18

## 2018-10-18 ENCOUNTER — HOSPITAL ENCOUNTER (OUTPATIENT)
Dept: RADIOLOGY | Facility: HOSPITAL | Age: 68
Setting detail: HOSPITAL OUTPATIENT SURGERY
Discharge: HOME | End: 2018-10-18
Attending: UROLOGY
Payer: MEDICARE

## 2018-10-18 ENCOUNTER — HOSPITAL ENCOUNTER (OUTPATIENT)
Facility: HOSPITAL | Age: 68
Setting detail: HOSPITAL OUTPATIENT SURGERY
Discharge: HOME | End: 2018-10-18
Attending: UROLOGY | Admitting: UROLOGY
Payer: MEDICARE

## 2018-10-18 VITALS
DIASTOLIC BLOOD PRESSURE: 72 MMHG | RESPIRATION RATE: 18 BRPM | TEMPERATURE: 97.7 F | SYSTOLIC BLOOD PRESSURE: 168 MMHG | HEART RATE: 68 BPM | OXYGEN SATURATION: 99 %

## 2018-10-18 DIAGNOSIS — N20.0 KIDNEY STONE: Primary | ICD-10-CM

## 2018-10-18 DIAGNOSIS — N20.0 CALCULUS OF KIDNEY: ICD-10-CM

## 2018-10-18 DIAGNOSIS — N20.0 CALCULUS OF KIDNEY: Primary | ICD-10-CM

## 2018-10-18 LAB
GLUCOSE BLD-MCNC: 187 MG/DL (ref 70–99)
GLUCOSE BLD-MCNC: 190 MG/DL (ref 70–99)
POCT TEST: ABNORMAL
POCT TEST: ABNORMAL

## 2018-10-18 PROCEDURE — 71000012 HC PACU PHASE 2 EA ADDL MIN: Performed by: UROLOGY

## 2018-10-18 PROCEDURE — 71000011 HC PACU PHASE 1 EA ADDL MIN: Performed by: UROLOGY

## 2018-10-18 PROCEDURE — 74018 RADEX ABDOMEN 1 VIEW: CPT

## 2018-10-18 PROCEDURE — 63600000 HC DRUGS/DETAIL CODE: Performed by: ANESTHESIOLOGY

## 2018-10-18 PROCEDURE — C1769 GUIDE WIRE: HCPCS | Performed by: UROLOGY

## 2018-10-18 PROCEDURE — 37000001 HC ANESTHESIA GENERAL: Performed by: UROLOGY

## 2018-10-18 PROCEDURE — 27200000 HC STERILE SUPPLY: Performed by: UROLOGY

## 2018-10-18 PROCEDURE — 71000001 HC PACU PHASE 1 INITIAL 30MIN: Performed by: UROLOGY

## 2018-10-18 PROCEDURE — 63600000 HC DRUGS/DETAIL CODE: Mod: JW | Performed by: NURSE ANESTHETIST, CERTIFIED REGISTERED

## 2018-10-18 PROCEDURE — C2617 STENT, NON-COR, TEM W/O DEL: HCPCS | Performed by: UROLOGY

## 2018-10-18 PROCEDURE — 25000000 HC PHARMACY GENERAL: Performed by: NURSE ANESTHETIST, CERTIFIED REGISTERED

## 2018-10-18 PROCEDURE — 0TF4XZZ FRAGMENTATION IN LEFT KIDNEY PELVIS, EXTERNAL APPROACH: ICD-10-PCS | Performed by: UROLOGY

## 2018-10-18 PROCEDURE — 63600000 HC DRUGS/DETAIL CODE: Performed by: UROLOGY

## 2018-10-18 PROCEDURE — 36000003 HC OR LEVEL 3 INITIAL 30MIN: Performed by: UROLOGY

## 2018-10-18 PROCEDURE — 71000002 HC PACU PHASE 2 INITIAL 30MIN: Performed by: UROLOGY

## 2018-10-18 PROCEDURE — 63600000 HC DRUGS/DETAIL CODE: Performed by: NURSE ANESTHETIST, CERTIFIED REGISTERED

## 2018-10-18 PROCEDURE — 63700000 HC SELF-ADMINISTRABLE DRUG: Performed by: ANESTHESIOLOGY

## 2018-10-18 PROCEDURE — C1758 CATHETER, URETERAL: HCPCS | Performed by: UROLOGY

## 2018-10-18 PROCEDURE — 36000013 HC OR LEVEL 3 EA ADDL MIN: Performed by: UROLOGY

## 2018-10-18 DEVICE — STENT CONTOUR 6X28: Type: IMPLANTABLE DEVICE | Status: FUNCTIONAL

## 2018-10-18 RX ORDER — LISINOPRIL 20 MG/1
40 TABLET ORAL DAILY
Status: DISCONTINUED | OUTPATIENT
Start: 2018-10-18 | End: 2018-10-18 | Stop reason: HOSPADM

## 2018-10-18 RX ORDER — FENTANYL CITRATE 50 UG/ML
INJECTION, SOLUTION INTRAMUSCULAR; INTRAVENOUS AS NEEDED
Status: DISCONTINUED | OUTPATIENT
Start: 2018-10-18 | End: 2018-10-18 | Stop reason: SURG

## 2018-10-18 RX ORDER — SODIUM CHLORIDE, SODIUM LACTATE, POTASSIUM CHLORIDE, CALCIUM CHLORIDE 600; 310; 30; 20 MG/100ML; MG/100ML; MG/100ML; MG/100ML
INJECTION, SOLUTION INTRAVENOUS CONTINUOUS
Status: DISCONTINUED | OUTPATIENT
Start: 2018-10-18 | End: 2018-10-18 | Stop reason: HOSPADM

## 2018-10-18 RX ORDER — MIDAZOLAM HYDROCHLORIDE 2 MG/2ML
INJECTION, SOLUTION INTRAMUSCULAR; INTRAVENOUS AS NEEDED
Status: DISCONTINUED | OUTPATIENT
Start: 2018-10-18 | End: 2018-10-18 | Stop reason: SURG

## 2018-10-18 RX ORDER — FENTANYL CITRATE 50 UG/ML
50 INJECTION, SOLUTION INTRAMUSCULAR; INTRAVENOUS
Status: DISCONTINUED | OUTPATIENT
Start: 2018-10-18 | End: 2018-10-18 | Stop reason: HOSPADM

## 2018-10-18 RX ORDER — LABETALOL HYDROCHLORIDE 5 MG/ML
5 INJECTION, SOLUTION INTRAVENOUS AS NEEDED
Status: ACTIVE | OUTPATIENT
Start: 2018-10-18 | End: 2018-10-18

## 2018-10-18 RX ORDER — ONDANSETRON HYDROCHLORIDE 2 MG/ML
4 INJECTION, SOLUTION INTRAVENOUS
Status: DISCONTINUED | OUTPATIENT
Start: 2018-10-18 | End: 2018-10-18 | Stop reason: HOSPADM

## 2018-10-18 RX ORDER — DIPHENHYDRAMINE HCL 50 MG/ML
12.5 VIAL (ML) INJECTION
Status: DISCONTINUED | OUTPATIENT
Start: 2018-10-18 | End: 2018-10-18 | Stop reason: HOSPADM

## 2018-10-18 RX ORDER — ONDANSETRON HYDROCHLORIDE 2 MG/ML
INJECTION, SOLUTION INTRAVENOUS AS NEEDED
Status: DISCONTINUED | OUTPATIENT
Start: 2018-10-18 | End: 2018-10-18 | Stop reason: SURG

## 2018-10-18 RX ORDER — CIPROFLOXACIN 2 MG/ML
INJECTION, SOLUTION INTRAVENOUS
Status: COMPLETED
Start: 2018-10-18 | End: 2018-10-18

## 2018-10-18 RX ORDER — EPHEDRINE SULFATE/0.9% NACL/PF 50 MG/5 ML
SYRINGE (ML) INTRAVENOUS AS NEEDED
Status: DISCONTINUED | OUTPATIENT
Start: 2018-10-18 | End: 2018-10-18 | Stop reason: SURG

## 2018-10-18 RX ORDER — CIPROFLOXACIN 2 MG/ML
INJECTION, SOLUTION INTRAVENOUS AS NEEDED
Status: DISCONTINUED | OUTPATIENT
Start: 2018-10-18 | End: 2018-10-18 | Stop reason: SURG

## 2018-10-18 RX ORDER — TRAMADOL HYDROCHLORIDE 50 MG/1
50 TABLET ORAL EVERY 6 HOURS PRN
Qty: 30 TABLET | Refills: 0 | Status: SHIPPED | OUTPATIENT
Start: 2018-10-18 | End: 2018-12-26 | Stop reason: ALTCHOICE

## 2018-10-18 RX ORDER — LIDOCAINE HYDROCHLORIDE 20 MG/ML
INJECTION, SOLUTION EPIDURAL; INFILTRATION; INTRACAUDAL; PERINEURAL AS NEEDED
Status: DISCONTINUED | OUTPATIENT
Start: 2018-10-18 | End: 2018-10-18 | Stop reason: SURG

## 2018-10-18 RX ORDER — HYDRALAZINE HYDROCHLORIDE 20 MG/ML
INJECTION INTRAMUSCULAR; INTRAVENOUS AS NEEDED
Status: DISCONTINUED | OUTPATIENT
Start: 2018-10-18 | End: 2018-10-18 | Stop reason: SURG

## 2018-10-18 RX ORDER — PROPOFOL 10 MG/ML
INJECTION, EMULSION INTRAVENOUS AS NEEDED
Status: DISCONTINUED | OUTPATIENT
Start: 2018-10-18 | End: 2018-10-18 | Stop reason: SURG

## 2018-10-18 RX ORDER — LABETALOL HCL 20 MG/4 ML
SYRINGE (ML) INTRAVENOUS AS NEEDED
Status: DISCONTINUED | OUTPATIENT
Start: 2018-10-18 | End: 2018-10-18 | Stop reason: SURG

## 2018-10-18 RX ORDER — CIPROFLOXACIN 500 MG/1
500 TABLET ORAL 2 TIMES DAILY
Qty: 6 TABLET | Refills: 0 | Status: SHIPPED | OUTPATIENT
Start: 2018-10-18 | End: 2019-01-03 | Stop reason: ALTCHOICE

## 2018-10-18 RX ORDER — HYDRALAZINE HYDROCHLORIDE 20 MG/ML
5 INJECTION INTRAMUSCULAR; INTRAVENOUS EVERY 30 MIN PRN
Status: DISCONTINUED | OUTPATIENT
Start: 2018-10-18 | End: 2018-10-18 | Stop reason: HOSPADM

## 2018-10-18 RX ORDER — CLONIDINE HYDROCHLORIDE 0.2 MG/1
0.2 TABLET ORAL ONCE
Status: COMPLETED | OUTPATIENT
Start: 2018-10-18 | End: 2018-10-18

## 2018-10-18 RX ADMIN — CLONIDINE HYDROCHLORIDE 0.2 MG: 0.2 TABLET ORAL at 10:56

## 2018-10-18 RX ADMIN — FENTANYL CITRATE 50 MCG: 50 INJECTION, SOLUTION INTRAMUSCULAR; INTRAVENOUS at 10:59

## 2018-10-18 RX ADMIN — SODIUM CHLORIDE, POTASSIUM CHLORIDE, SODIUM LACTATE AND CALCIUM CHLORIDE: 600; 310; 30; 20 INJECTION, SOLUTION INTRAVENOUS at 07:29

## 2018-10-18 RX ADMIN — MIDAZOLAM HYDROCHLORIDE 2 MG: 1 INJECTION, SOLUTION INTRAMUSCULAR; INTRAVENOUS at 09:14

## 2018-10-18 RX ADMIN — HYDRALAZINE HYDROCHLORIDE 5 MG: 20 INJECTION INTRAMUSCULAR; INTRAVENOUS at 08:59

## 2018-10-18 RX ADMIN — FENTANYL CITRATE 50 MCG: 50 INJECTION, SOLUTION INTRAMUSCULAR; INTRAVENOUS at 11:31

## 2018-10-18 RX ADMIN — FENTANYL CITRATE 100 MCG: 50 INJECTION, SOLUTION INTRAMUSCULAR; INTRAVENOUS at 09:15

## 2018-10-18 RX ADMIN — LABETALOL HYDROCHLORIDE 10 MG: 5 INJECTION, SOLUTION INTRAVENOUS at 09:11

## 2018-10-18 RX ADMIN — Medication 10 MG: at 09:44

## 2018-10-18 RX ADMIN — LIDOCAINE HYDROCHLORIDE 5 ML: 20 INJECTION, SOLUTION INTRAVENOUS at 09:15

## 2018-10-18 RX ADMIN — FENTANYL CITRATE 50 MCG: 50 INJECTION, SOLUTION INTRAMUSCULAR; INTRAVENOUS at 09:23

## 2018-10-18 RX ADMIN — HYDRALAZINE HYDROCHLORIDE 5 MG: 20 INJECTION INTRAMUSCULAR; INTRAVENOUS at 08:49

## 2018-10-18 RX ADMIN — LISINOPRIL 40 MG: 20 TABLET ORAL at 11:55

## 2018-10-18 RX ADMIN — ONDANSETRON 4 MG: 2 INJECTION INTRAMUSCULAR; INTRAVENOUS at 10:11

## 2018-10-18 RX ADMIN — HYDRALAZINE HYDROCHLORIDE 5 MG: 20 INJECTION INTRAMUSCULAR; INTRAVENOUS at 12:41

## 2018-10-18 RX ADMIN — Medication 10 MG: at 09:52

## 2018-10-18 RX ADMIN — CIPROFLOXACIN 400 MG: 2 INJECTION, SOLUTION INTRAVENOUS at 09:00

## 2018-10-18 RX ADMIN — PROPOFOL 200 MG: 10 INJECTION, EMULSION INTRAVENOUS at 09:15

## 2018-10-18 ASSESSMENT — ENCOUNTER SYMPTOMS: DYSRHYTHMIAS: 1

## 2018-10-18 NOTE — BRIEF OP NOTE
Left ESWL Cysto Place Left Stent (L) Procedure Note    Procedure:    Left ESWL Cysto Place Left Stent  CPT(R) Code:  15755 - SD FRAGMENT KIDNEY STONE/ ESWL      Pre-op Diagnosis     * Renal calculus [N20.0]       Post-op Diagnosis     * Renal calculus [N20.0]    Surgeon(s) and Role:     * Marko Gonzalez MD - Primary    Anesthesia: General    Staff:   Circulator: Mayra Mott RN; Brigitte Carey RN  Scrub Person: Christina Oconnor    Procedure Details   13 mm left renal pelvic calculus status post left double-J stent and left ESWL    Estimated Blood Loss: No blood loss documented.    Specimens:                No specimens collected during this procedure.      Drains:      Implants:   Implant Name Type Inv. Item Serial No.  Lot No. LRB No. Used   STENT CONTOUR 6X28 - SSEE LOT - [id number] Urological stents STENT CONTOUR 6X28 SEE LOT [employer]-EP [id number] Left 1              Complications:  None; patient tolerated the procedure well.           Disposition: PACU - hemodynamically stable.           Condition: stable    Marko Gonzalez MD  Phone Number: [phone]

## 2018-10-18 NOTE — OP NOTE
14 mm left renal pelvic calculus  Cystoscopy placement of 6 x 28 double-J stent, left ESWL  Carlos HOYT  LMA  Instrument count and sponge count correct at end of procedure  Estimated blood loss 0 cc    Patient was placed in the lithotripter in the standard fashion.  Using biplanar fluoroscopy the stone was faintly visible consistent with a stone poorly calcified.  By CAT scan the stone measured 14 mm in largest diameter.  Because of this the patient was explained the risks and benefits of having a left double-J stent.  Patient consented to having a left double-J stent placed prior to this procedure.  After LMA, a flexible cystoscope was passed uneventfully into the patient's bladder.  Urethra was patent.  Prostate showed lateral lobe hypertrophy with no significant median lobe.  Multiple small bladder stones were noted all of the stones were less than 1 mm in size.  Numerous urine crystals were noted.  No bladder tumor.  The left ureteral orifice was identified.  A sensor wire was inserted into the left renal pelvis.  The wire was passed into the left upper pole bypassing the renal pelvic stone.  I followed this with a 6 x 28 double-J stent.  There was some resistance in the area of the stone consistent with a partial obstruction.  The stone was large enough to deviate the stent.  A total of 3000 shockwaves were delivered to the stone at 100 shockwave intervals from level 1 through level 5.  Patient was stable throughout.

## 2018-10-18 NOTE — ANESTHESIA PREPROCEDURE EVALUATION
Anesthesia ROS/MED HX    Anesthesia History    Previous anesthetics  No history of anesthetic complications  Pulmonary    Sleep apnea Not CPAP Compliant  Cardiovascular   CAD   hypertension  Dysrhythmias   CABG (x3 , 2017 )   ECG reviewed and cardiac clearance reviewed  Abnormal ECG  Comments: thck neck   GI/Hepatic   GERD    Control: well controlled  Endo/Other   Diabetes and patient Insulin dependent   Hypothyroidism  Body Habitus: Obese  ROS/MED HX Comments:    ECG: Marked sinus  Bradycardia   -Right bundle branch block with left axis -bifascicular block.    -Inferior infarct -probably not recent.    -  Nonspecific T-abnormality.       Patient Active Problem List   Diagnosis   • Kidney disease   • Atrial fibrillation (CMS/HCC)   • CAD (coronary artery disease) of artery bypass graft   • Hypertension   • Hypercholesteremia   • Hypothyroid   • Type 2 diabetes mellitus (CMS/HCC) (HCC)   • Anxiety   • GERD (gastroesophageal reflux disease)   • OCD (obsessive compulsive disorder)   • Sleep apnea   • Hypertensive urgency   • Hepatomegaly   • History of melanoma   • Mitral valve prolapse     Past Surgical History:   Procedure Laterality Date   • COLONOSCOPY  2016   • CORONARY ARTERY BYPASS GRAFT  2017    x3   • NEPHRECTOMY Right 2016    PARTIAL    • TONSILLECTOMY     • UVULECTOMY  2005     Prior to Admission medications    Medication Sig Start Date End Date Taking? Authorizing Provider   amiodarone (PACERONE) 200 mg tablet Take 200 mg by mouth daily.   Yes ProviderDamian MD   atorvastatin (LIPITOR) 40 mg tablet Take 40 mg by mouth daily.   3/25/16  Yes ProviderDamian MD   coenzyme Q10 (CO Q-10) 400 mg capsule Take 400 mg by mouth daily.   3/25/16  Yes ProviderDamian MD   eplerenone (INSPRA) 25 mg tablet Take 25 mg by mouth daily.   Yes ProviderDamian MD   finasteride (PROSCAR) 5 mg tablet Take 5 mg by mouth daily.   Yes ProviderDamian MD   insulin glargine (LANTUS SOLOSTAR) 100 unit/mL  (3 mL) subcutaneous pen Inject 20 Units under the skin every morning.     Yes Damian An MD   lamoTRIgine (LaMICtal) 100 mg tablet Take 100 mg by mouth daily.   Yes Damian An MD   levothyroxine (SYNTHROID) 100 mcg tablet Take 100 mcg by mouth daily.   Yes Damian An MD   lisinopril (PRINIVIL) 40 mg tablet Take 40 mg by mouth daily.   Yes Damian An MD   omeprazole (PriLOSEC) 40 mg capsule Take 40 mg by mouth daily before breakfast.   Yes Damian An MD   apixaban (ELIQUIS) 5 mg tablet Take 5 mg by mouth 2 (two) times a day. PT TO HOLD ELIQUIS 3 DAYS PRIOR TO SX AS PER HIS CARDIOLOGIST     Damian An MD   aspirin 81 mg enteric coated tablet Take 81 mg by mouth daily. PT TO STOP 5 DAYS PRIOR TO SX AS PER HIS CARDIOLOGIST     Damian An MD   metFORMIN (GLUCOPHAGE) 500 mg tablet Take 500 mg by mouth 2 (two) times a day with meals.  10/18/18  Damian An MD     CBC Results       10/04/18 07/20/18 07/05/18                    1231 0629 1945         WBC 10.32 10.4 15.49 (H)         RBC 4.60 3.94 (L) 4.09 (L)         HGB 13.6 (L) 12.0 (L) 12.3 (L)         HCT 40.3 36.8 (L) 35.7 (L)         MCV 87.6 93 87.3         MCH 29.6 30.5 30.1         MCHC 33.7 32.6 34.5          182 162                  BMP Results       10/04/18 07/05/18 07/03/18                    1231 1945 1127          137 133 (L)         K 4.1 3.5 (L) 3.6         Cl 103 106 101         CO2 28 23 26         Glucose 138 (H) 206 (H) 237 (H)         BUN 25 (H) 24 (H) 24 (H)         Creatinine 1.6 (H) 1.3 1.7 (H)         Calcium 9.3 8.7 (L) 8.8 (L)         Anion Gap 7 8 6         EGFR 43.1 (L) 54.7 (L) 40.2 (L)          Physical Exam    Airway   Mallampati: II   TM distance: >3 FB   Neck ROM: full  Cardiovascular    Rhythm: regular   Rate: normal  Pulmonary - normal   clear to auscultation  Other Findings   Thck neck   Dental - normal    Anesthesia Plan    Plan: general     Technique: general LMA     Lines and Monitors: PIV   ASA 3  Blood Products:   Use of Blood Products Discussed: No   Anesthetic plan and risks discussed with: patient  Induction:    intravenous   Postop Plan:   Patient Disposition: phase II then home   Pain Management: IV analgesics  Comments:    Plan: Pt hypertensive in holding area: did not taek his lisinopril this AM. Currently asymtomatic HTN treated with  5mg Hydralazine for 2 doses Q10 min BP now 187/96.   Ready to proceed to OR. Pt will receive his dose of Lisinopril in PACU stage 2.

## 2018-10-18 NOTE — OR SURGEON
Pre-Procedure patient identification:  I am the primary operating surgeon/proceduralist and I have identified the patient on 10/18/18 at 8:52 AM Marko Gonzalez MD  Phone Number: [phone]

## 2018-10-18 NOTE — ANESTHESIA PROCEDURE NOTES
Airway  Urgency: elective    Start Time: 10/18/2018 9:16 AM  Airway not difficult    General Information and Staff    Patient location during procedure: OR  Anesthesiologist: RAMIRO MERCADO  Resident/CRNA: NI KRAMER  Performed: resident/CRNA     Indications and Patient Condition  Indications for airway management: anesthesia  Sedation level: deep  Preoxygenated: yes  Patient position: sniffing  Mask difficulty assessment: 0 - not attempted    Final Airway Details  Final airway type: supraglottic airway (LMA)      Successful airway: classic  Size 5    Number of attempts at approach: 1  Atraumatic airway insertion

## 2018-10-18 NOTE — ANESTHESIA POSTPROCEDURE EVALUATION
Patient: Nathan Alonzo    Procedure Summary     Date:  10/18/18 Room / Location:   OR 4 / [facility] OR    Anesthesia Start:  0903 Anesthesia Stop:  1029    Procedure:  Left ESWL Cysto Place Left Stent (Left ) Diagnosis:       Renal calculus      (Renal Calc)    Surgeon:  Marko Gonzalez MD Responsible Provider:  Evens Kearney MD    Anesthesia Type:  general ASA Status:  3          Anesthesia Type: general  PACU Vitals  10/18/2018 1021 - 10/18/2018 1121      10/18/2018 1030 10/18/2018 1031 10/18/2018 1040 10/18/2018 1050    BP: (!)  201/92 - (!)  225/111 (!)  225/112    Temp: - 36.5 °C (97.7 °F) - -    Pulse: 64 - (!)  58 65    Resp: 20 - (!)  11 20    SpO2: 99 % - 100 % 99 %              10/18/2018 1056 10/18/2018 1100 10/18/2018 1110 10/18/2018 1120    BP: (!)  225/112 (!)  207/106 (!)  219/105 (!)  221/105    Temp: - - - -    Pulse: (!)  59 (!)  56 (!)  59 60    Resp: - 20 13 13    SpO2: - 98 % 95 % 96 %            Anesthesia Post Evaluation    Pain management: adequate  Mode of pain management: IV medication  Patient location during evaluation: PACU  Patient participation: complete - patient participated  Level of consciousness: awake and alert  Cardiovascular status: acceptable  Airway Patency: adequate  Respiratory status: acceptable  Hydration status: acceptable  Anesthetic complications: no  Comments: 0.2 mg Catapress PO, 40mg  Lisinopril PO, and 10mg Hydralazine given in PACU. BP now 160's/80's in PACU. Pt has been AAOx3 & asymptomatic. I spoke with him and his wife (over the phone) re: my concern for high BP's and his need make an appointment with his Cardiologist re:further titration of his BP medications.

## 2018-10-18 NOTE — PERIOPERATIVE NURSING NOTE
Phone discussion with patient's wife regarding patient status and plan of care (approved by patient).  BP improved and patient evaluated by Dr. Kearney.  OK for transfer to Stage 2 Recovery.  Patient's wife updated on plan of care by Dr. Kearney via telephone.  Patient tolerating po fluids and crackers.

## 2018-10-18 NOTE — PERIOPERATIVE NURSING NOTE
Patient evaluated by Dr. Kearney for hypertension.  Clonidine po administered and Fentanyl given for pressure discomfort.

## 2018-10-18 NOTE — DISCHARGE INSTRUCTIONS
Expect blood in your urine.  Stay well hydrated.  Call for fever greater than 101 °F, intractable vomiting, or uncontrolled pain.  See Dr. Romo in 2 weeks and please have CT scan performed prior to this visit.  You have a left double-J stent.  This is a temporary tube which will need to be removed.  If this temporary tube is not removed in a timely fashion, additional renal damage may occur.    General Anesthesia, Adult, Care After  These instructions provide you with information about caring for yourself after your procedure. Your health care provider may also give you more specific instructions. Your treatment has been planned according to current medical practices, but problems sometimes occur. Call your health care provider if you have any problems or questions after your procedure.  What can I expect after the procedure?  After the procedure, it is common to have:  · Vomiting.  · A sore throat.  · Mental slowness.  It is common to feel:  · Nauseous.  · Cold or shivery.  · Sleepy.  · Tired.  · Sore or achy, even in parts of your body where you did not have surgery.  Follow these instructions at home:  For at least 24 hours after the procedure:  · Do not:  ¨ Participate in activities where you could fall or become injured.  ¨ Drive.  ¨ Use heavy machinery.  ¨ Drink alcohol.  ¨ Take sleeping pills or medicines that cause drowsiness.  ¨ Make important decisions or sign legal documents.  ¨ Take care of children on your own.  · Rest.  Eating and drinking  · If you vomit, drink water, juice, or soup when you can drink without vomiting.  · Drink enough fluid to keep your urine clear or pale yellow.  · Make sure you have little or no nausea before eating solid foods.  · Follow the diet recommended by your health care provider.  General instructions  · Have a responsible adult stay with you until you are awake and alert.  · Return to your normal activities as told by your health care provider. Ask your health care  provider what activities are safe for you.  · Take over-the-counter and prescription medicines only as told by your health care provider.  · If you smoke, do not smoke without supervision.  · Keep all follow-up visits as told by your health care provider. This is important.  Contact a health care provider if:  · You continue to have nausea or vomiting at home, and medicines are not helpful.  · You cannot drink fluids or start eating again.  · You cannot urinate after 8-12 hours.  · You develop a skin rash.  · You have fever.  · You have increasing redness at the site of your procedure.  Get help right away if:  · You have difficulty breathing.  · You have chest pain.  · You have unexpected bleeding.  · You feel that you are having a life-threatening or urgent problem.  This information is not intended to replace advice given to you by your health care provider. Make sure you discuss any questions you have with your health care provider.  Document Released: 03/26/2002 Document Revised: 05/22/2017 Document Reviewed: 12/01/2016  Elsevier Interactive Patient Education © 2018 [employer]

## 2018-10-18 NOTE — PERIOPERATIVE NURSING NOTE
BP repeated, remains elevated.  Call made to Dr Kearney, notified of elevated bp.  Dr. Kearney will re-evaluate pt.

## 2018-10-18 NOTE — H&P (VIEW-ONLY)
Chief complaint: Left Renal Calculi    HPI:66 yo M with h/o Renal Calculi, is s/p previous intervention; Pt is here for further management.    Allergies   Allergen Reactions   • Oxycodone Other (see comments)      Hives     • Amlodipine Other (see comments)     ANKLE  & FEET  SWELLING    • Cephalexin Monohydrate Other (see comments)     PT CAN'T REMEMBER  REACTION   • Latex Rash      Current Outpatient Prescriptions:   •  amiodarone (PACERONE) 200 mg tablet, Take 200 mg by mouth daily., Disp: , Rfl:   •  apixaban (ELIQUIS) 5 mg tablet, Take 5 mg by mouth 2 (two) times a day. PT TO HOLD ELIQUIS 3 DAYS PRIOR TO SX AS PER HIS CARDIOLOGIST , Disp: , Rfl:   •  aspirin 81 mg enteric coated tablet, Take 81 mg by mouth daily. PT TO STOP 5 DAYS PRIOR TO SX AS PER HIS CARDIOLOGIST , Disp: , Rfl:   •  atorvastatin (LIPITOR) 40 mg tablet, Take 40 mg by mouth daily.  , Disp: , Rfl:   •  coenzyme Q10 (CO Q-10) 400 mg capsule, Take 400 mg by mouth daily.  , Disp: , Rfl:   •  eplerenone (INSPRA) 25 mg tablet, Take 25 mg by mouth daily., Disp: , Rfl:   •  finasteride (PROSCAR) 5 mg tablet, Take 5 mg by mouth daily., Disp: , Rfl:   •  insulin glargine (LANTUS SOLOSTAR) 100 unit/mL (3 mL) subcutaneous pen, Inject 20 Units under the skin every morning.  , Disp: , Rfl:   •  lamoTRIgine (LaMICtal) 100 mg tablet, Take 100 mg by mouth daily., Disp: , Rfl:   •  levothyroxine (SYNTHROID) 100 mcg tablet, Take 100 mcg by mouth daily., Disp: , Rfl:   •  lisinopril (PRINIVIL) 40 mg tablet, Take 40 mg by mouth daily., Disp: , Rfl:   •  metFORMIN (GLUCOPHAGE) 500 mg tablet, Take 500 mg by mouth 2 (two) times a day with meals., Disp: , Rfl:   •  omeprazole (PriLOSEC) 40 mg capsule, Take 40 mg by mouth daily before breakfast., Disp: , Rfl:     Current Facility-Administered Medications:   •  cloNIDine (CATAPRES) tablet 0.2 mg, 0.2 mg, oral, BID, Areli Gallo CRNP     Past Medical History:   Diagnosis Date   • A-fib (CMS/HCC) (HCC)    • Abnormal ECG      a fib   • Aortic valve disorder    • BPH (benign prostatic hyperplasia)    • BPH (benign prostatic hyperplasia)    • Coronary artery disease    • Disease of thyroid gland    • Hypertension    • Hypothyroidism    • Kidney stones    • Nocturia x2   • Renal calculi     right   • Renal cancer (CMS/HCC) (HCC)    • Right bundle branch block    • Sleep apnea     uses cpap   • Type 2 diabetes mellitus (CMS/HCC) (HCC)      Past Surgical History:   Procedure Laterality Date   • COLONOSCOPY  2016   • CORONARY ARTERY BYPASS GRAFT  2017    x3   • NEPHRECTOMY Right 2016    PARTIAL    • TONSILLECTOMY     • UVULECTOMY  2005     Social History     Social History   • Marital status:      Spouse name: N/A   • Number of children: N/A   • Years of education: N/A     Social History Main Topics   • Smoking status: Never Smoker   • Smokeless tobacco: Never Used   • Alcohol use No   • Drug use: No   • Sexual activity: Defer     Other Topics Concern   • None     Social History Narrative   • None     Family History   Problem Relation Age of Onset   • Lung cancer Mother    • Lung cancer Father    • Early death Father    • Diabetes Sister      Review of Systems   Constitutional: Negative for chills, diaphoresis, fever and unexpected weight change.   HENT: Negative for congestion, dental problem, ear pain, facial swelling, postnasal drip, sinus pressure, sore throat, tinnitus and trouble swallowing.    Eyes: Negative for discharge, redness and visual disturbance.   Respiratory: Negative for apnea, cough, chest tightness, shortness of breath and wheezing.    Cardiovascular: Negative for chest pain, palpitations and leg swelling.        H/o P/Afib, RBBB, Arotic Valve disorder   Gastrointestinal: Negative for abdominal pain, blood in stool, constipation, diarrhea and nausea.   Endocrine: Negative for polydipsia, polyphagia and polyuria.   Genitourinary: Positive for difficulty urinating (new- ? retention) and frequency (& Nocturia).  Negative for dysuria, flank pain, hematuria and urgency.        R/Partial Nephrectomy 2/2 Cancer; Kidney Stones presently   Musculoskeletal: Negative for back pain, gait problem, joint swelling and neck stiffness.   Skin: Negative for pallor and rash.   Allergic/Immunologic: Negative for immunocompromised state.   Neurological: Negative for dizziness, tremors, seizures, weakness, light-headedness, numbness and headaches.   Hematological: Negative for adenopathy. Does not bruise/bleed easily.   Psychiatric/Behavioral: Negative for agitation, behavioral problems and confusion.     Vitals:    10/04/18 1114   BP: (!) 176/81   Pulse: (!) 48   Resp: 16   Temp: 36.8 °C (98.3 °F)     Body mass index is 33.38 kg/m².  Physical Exam   Constitutional: He is oriented to person, place, and time. He appears well-developed and well-nourished.   HENT:   Head: Normocephalic and atraumatic.   Mouth/Throat: Oropharynx is clear and moist.   Eyes: EOM are normal. Pupils are equal, round, and reactive to light.   Neck: Normal range of motion. Neck supple.   Cardiovascular: Normal rate, regular rhythm and intact distal pulses.    EKG=SB   Pulmonary/Chest: Effort normal and breath sounds normal.   Abdominal: Soft. Bowel sounds are normal.   Musculoskeletal: He exhibits no tenderness.   Lymphadenopathy:     He has no cervical adenopathy.   Neurological: He is alert and oriented to person, place, and time.   Skin: Skin is warm and dry.   Psychiatric: He has a normal mood and affect. His behavior is normal.   Nursing note and vitals reviewed.    Assessment/Plan:  Renal Calculi /// Left ESWL, Possible Cysto, Place Left Stent    Continue with current management; NPO 8 hrs Pre-Arrival; Omeprazole, Levothyroxine, Amiodarone with sip water morning of surgery; Follow instructions for Eliquis & ASA; Bring Insulin dose morning of surgery; Medical clearance from [facility] and .

## 2018-10-18 NOTE — OR SURGEON
Pre-Procedure patient identification:  I am the primary operating surgeon/proceduralist and I have identified the patient and confirmed laterality is left on 10/18/18 at 8:52 AM Marko Gonzalez MD  Phone Number: [phone]

## 2018-10-18 NOTE — PERIOPERATIVE NURSING NOTE
Patient remains hypertensive despite medications given.  Assisted patient to standing position to void.  Voiding tea colored urine.

## 2018-11-02 ENCOUNTER — HOSPITAL ENCOUNTER (OUTPATIENT)
Dept: RADIOLOGY | Facility: HOSPITAL | Age: 68
Discharge: HOME | End: 2018-11-02
Attending: UROLOGY
Payer: MEDICARE

## 2018-11-02 DIAGNOSIS — N20.0 KIDNEY STONE: ICD-10-CM

## 2018-11-02 PROCEDURE — 74176 CT ABD & PELVIS W/O CONTRAST: CPT

## 2018-11-05 ENCOUNTER — APPOINTMENT (OUTPATIENT)
Dept: URBAN - METROPOLITAN AREA CLINIC 200 | Age: 68
Setting detail: DERMATOLOGY
End: 2018-11-07

## 2018-11-05 DIAGNOSIS — D485 NEOPLASM OF UNCERTAIN BEHAVIOR OF SKIN: ICD-10-CM

## 2018-11-05 DIAGNOSIS — L57.0 ACTINIC KERATOSIS: ICD-10-CM

## 2018-11-05 PROBLEM — D48.5 NEOPLASM OF UNCERTAIN BEHAVIOR OF SKIN: Status: ACTIVE | Noted: 2018-11-05

## 2018-11-05 PROCEDURE — 17000 DESTRUCT PREMALG LESION: CPT

## 2018-11-05 PROCEDURE — 99213 OFFICE O/P EST LOW 20 MIN: CPT | Mod: 25

## 2018-11-05 PROCEDURE — OTHER BIOPSY BY SHAVE METHOD: OTHER

## 2018-11-05 PROCEDURE — 11100: CPT | Mod: 59

## 2018-11-05 PROCEDURE — 17003 DESTRUCT PREMALG LES 2-14: CPT

## 2018-11-05 PROCEDURE — OTHER LIQUID NITROGEN: OTHER

## 2018-11-05 ASSESSMENT — LOCATION SIMPLE DESCRIPTION DERM
LOCATION SIMPLE: RIGHT EAR
LOCATION SIMPLE: LEFT CHEEK
LOCATION SIMPLE: LEFT ZYGOMA
LOCATION SIMPLE: RIGHT FOREHEAD
LOCATION SIMPLE: LEFT PRETIBIAL REGION
LOCATION SIMPLE: RIGHT CHEEK
LOCATION SIMPLE: SCALP

## 2018-11-05 ASSESSMENT — LOCATION DETAILED DESCRIPTION DERM
LOCATION DETAILED: LEFT CENTRAL MANDIBULAR CHEEK
LOCATION DETAILED: RIGHT INFERIOR CENTRAL MALAR CHEEK
LOCATION DETAILED: LEFT CENTRAL ZYGOMA
LOCATION DETAILED: LEFT INFERIOR CENTRAL MALAR CHEEK
LOCATION DETAILED: RIGHT CENTRAL MALAR CHEEK
LOCATION DETAILED: LEFT PROXIMAL PRETIBIAL REGION
LOCATION DETAILED: RIGHT SUPERIOR FOREHEAD
LOCATION DETAILED: RIGHT CENTRAL MANDIBULAR CHEEK
LOCATION DETAILED: RIGHT INFERIOR MEDIAL MALAR CHEEK
LOCATION DETAILED: LEFT CENTRAL POSTAURICULAR SKIN
LOCATION DETAILED: LEFT SUPERIOR CENTRAL MALAR CHEEK
LOCATION DETAILED: RIGHT SUPERIOR HELIX

## 2018-11-05 ASSESSMENT — LOCATION ZONE DERM
LOCATION ZONE: EAR
LOCATION ZONE: SCALP
LOCATION ZONE: LEG
LOCATION ZONE: FACE

## 2018-11-05 NOTE — PROCEDURE: BIOPSY BY SHAVE METHOD
Electrodesiccation And Curettage Text: The wound bed was treated with electrodesiccation and curettage after the biopsy was performed.
Detail Level: Detailed
Additional Anesthesia Volume In Cc (Will Not Render If 0): 0
Was A Bandage Applied: Yes
Electrodesiccation Text: The wound bed was treated with electrodesiccation after the biopsy was performed.
Render Post-Care Instructions In Note?: no
Biopsy Type: H and E
Silver Nitrate Text: The wound bed was treated with silver nitrate after the biopsy was performed.
Size Of Lesion In Cm: 0.8
Anesthesia Type: 0.5% lidocaine without epinephrine
Curettage Text: The wound bed was treated with curettage after the biopsy was performed.
Hemostasis: Drysol
Type Of Destruction Used: Curettage
Notification Instructions: Patient will be notified of biopsy results. However, patient instructed to call the office if not contacted within 2 weeks.
Cryotherapy Text: The wound bed was treated with cryotherapy after the biopsy was performed.
Biopsy Method: Dermablade
Post-Care Instructions: I reviewed with the patient in detail post-care instructions. Patient is to keep the biopsy site dry overnight, and then apply bacitracin twice daily until healed. Patient may apply hydrogen peroxide soaks to remove any crusting.
Dressing: bandage
Consent: Written consent was obtained and risks were reviewed including but not limited to scarring, infection, bleeding, scabbing, incomplete removal, nerve damage and allergy to anesthesia.
Wound Care: Petrolatum
Depth Of Biopsy: dermis
Billing Type: Third-Party Bill
Anesthesia Volume In Cc (Will Not Render If 0): 0.5

## 2018-12-10 ENCOUNTER — HOSPITAL ENCOUNTER (OUTPATIENT)
Facility: HOSPITAL | Age: 68
Setting detail: HOSPITAL OUTPATIENT SURGERY
DRG: 660 | End: 2018-12-10
Attending: UROLOGY | Admitting: UROLOGY
Payer: MEDICARE

## 2018-12-21 ENCOUNTER — TELEPHONE (OUTPATIENT)
Dept: PRIMARY CARE | Facility: CLINIC | Age: 68
End: 2018-12-21

## 2018-12-21 RX ORDER — CIPROFLOXACIN 500 MG/1
500 TABLET ORAL 2 TIMES DAILY
Qty: 20 TABLET | Refills: 0 | Status: SHIPPED | OUTPATIENT
Start: 2018-12-21 | End: 2019-01-03 | Stop reason: ALTCHOICE

## 2018-12-21 NOTE — TELEPHONE ENCOUNTER
"Dr. Zavaleta, patient called, left a message stating \" I think that I have a UTI\". I called him back and he states that he is having urinary frequency, generally weak in legs and is \"shivering\" at times. He denies fever but states that he has had a urinary stent in since the end of October and he has noticed the urine is an \"awkward\" yellow color. He is asking for an antibiotic, do you think he should be seen?   "

## 2018-12-26 ENCOUNTER — OFFICE VISIT (OUTPATIENT)
Dept: PRIMARY CARE | Facility: CLINIC | Age: 68
End: 2018-12-26
Payer: MEDICARE

## 2018-12-26 VITALS
SYSTOLIC BLOOD PRESSURE: 144 MMHG | TEMPERATURE: 97.8 F | BODY MASS INDEX: 34.5 KG/M2 | DIASTOLIC BLOOD PRESSURE: 90 MMHG | WEIGHT: 268.8 LBS | HEART RATE: 47 BPM | HEIGHT: 74 IN | OXYGEN SATURATION: 98 % | RESPIRATION RATE: 14 BRPM

## 2018-12-26 DIAGNOSIS — R60.0 EDEMA OF BOTH LEGS: Primary | ICD-10-CM

## 2018-12-26 RX ORDER — FUROSEMIDE 20 MG/1
20 TABLET ORAL DAILY
Qty: 10 TABLET | Refills: 0 | Status: SHIPPED | OUTPATIENT
Start: 2018-12-26 | End: 2019-01-03 | Stop reason: ALTCHOICE

## 2018-12-26 RX ORDER — LISINOPRIL 40 MG/1
40 TABLET ORAL 2 TIMES DAILY
Status: ON HOLD | COMMUNITY
Start: 2018-12-26 | End: 2019-01-29

## 2018-12-26 RX ORDER — POTASSIUM CHLORIDE 750 MG/1
10 TABLET, EXTENDED RELEASE ORAL DAILY
Qty: 10 TABLET | Refills: 0 | Status: SHIPPED | OUTPATIENT
Start: 2018-12-26 | End: 2019-01-03 | Stop reason: ALTCHOICE

## 2018-12-26 ASSESSMENT — ENCOUNTER SYMPTOMS
COUGH: 0
NUMBNESS: 0
CONSTITUTIONAL NEGATIVE: 1
WEAKNESS: 0

## 2018-12-26 NOTE — PROGRESS NOTES
Subjective      Patient ID: Nathan Alonzo is a 68 y.o. male.      Started with leg pain 1 week ago. - (prior to taking the Cipro for UTI as ordered by Dr Zavaleta)..  Has swelling in both lower legs but L > R.   Was taken off HCTZ - he doesn't know why but it could be because of his kidney disease.  Has not been on any airplane trips.  Takes Eliquis 5 mg BID for Afib.        The following have been reviewed and updated as appropriate in this visit:  Allergies       Review of Systems   Constitutional: Negative.    Respiratory: Negative for cough.    Cardiovascular: Positive for leg swelling. Negative for chest pain. Palpitations: more in Left lower leg than the right lower leg.   Neurological: Negative for weakness and numbness.     Current Outpatient Prescriptions   Medication Sig Dispense Refill   • amiodarone (PACERONE) 200 mg tablet Take 200 mg by mouth daily.     • coenzyme Q10 (CO Q-10) 400 mg capsule Take 400 mg by mouth daily.       • finasteride (PROSCAR) 5 mg tablet Take 5 mg by mouth daily.     • levothyroxine (SYNTHROID) 100 mcg tablet Take 100 mcg by mouth daily.     • apixaban (ELIQUIS) 5 mg tablet Take 5 mg by mouth 2 (two) times a day. PT TO HOLD ELIQUIS 3 DAYS PRIOR TO SX AS PER HIS CARDIOLOGIST      • aspirin 81 mg enteric coated tablet Take 81 mg by mouth daily. PT TO STOP 5 DAYS PRIOR TO SX AS PER HIS CARDIOLOGIST      • atorvastatin (LIPITOR) 40 mg tablet Take 40 mg by mouth daily.       • ciprofloxacin (CIPRO) 500 mg tablet Take 1 tablet (500 mg total) by mouth 2 (two) times a day for 10 days. 20 tablet 0   • eplerenone (INSPRA) 25 mg tablet Take 25 mg by mouth daily.     • furosemide (LASIX) 20 mg tablet Take 1 tablet (20 mg total) by mouth daily for 10 days. 10 tablet 0   • insulin glargine (LANTUS SOLOSTAR) 100 unit/mL (3 mL) subcutaneous pen Inject 20 Units under the skin every morning.       • lisinopril (PRINIVIL) 40 mg tablet Take 1 tablet (40 mg total) by mouth 2 (two) times a day.     •  omeprazole (PriLOSEC) 40 mg capsule Take 40 mg by mouth daily before breakfast.     • potassium chloride (KLOR-CON 10) 10 mEq CR tablet Take 1 tablet (10 mEq total) by mouth daily. 10 tablet 0     Current Facility-Administered Medications   Medication Dose Route Frequency Provider Last Rate Last Dose   • cloNIDine (CATAPRES) tablet 0.2 mg  0.2 mg oral BID Areli Gallo CRNP         Allergies   Allergen Reactions   • Oxycodone Other (see comments)      Hives     • Amlodipine Other (see comments)     ANKLE  & FEET  SWELLING    • Cephalexin Monohydrate Other (see comments)     PT CAN'T REMEMBER  REACTION   • Latex Rash       Objective     Physical Exam   Constitutional: He is oriented to person, place, and time. He appears well-developed and well-nourished. No distress.   Cardiovascular: Normal rate, regular rhythm, normal heart sounds and intact distal pulses.  Exam reveals no gallop and no friction rub.    No murmur heard.  Pulmonary/Chest: Effort normal and breath sounds normal. No respiratory distress. He has no wheezes. He has no rales. He exhibits no tenderness.   Musculoskeletal: He exhibits edema (LLE with trace to 1+ edema. NO calf tenderness when squeezed or with plantar or dorsiflexion of the left foot. Ankles & lower leg is swolle.. No pitting edema of the RIGHT LE.).   Neurological: He is alert and oriented to person, place, and time.   Skin: He is not diaphoretic.       Assessment/Plan     Problem List Items Addressed This Visit     None      Visit Diagnoses     Edema of both legs    -  Primary    Findings & plans disc with Dr Milton Richardson. He rec: Lasix 20 mg QD & potassium for 7-10 days. Rechk in 7 or 10 dyas.          MARIA ELENA Alvarenga  12/26/2018

## 2018-12-26 NOTE — PATIENT INSTRUCTIONS
Take the furosemide (and the potassium) once a day for only 7 days.  Come back to be rechecked in 8 days.  Elevate your feet as often as possible.

## 2019-01-03 ENCOUNTER — OFFICE VISIT (OUTPATIENT)
Dept: PRIMARY CARE | Facility: CLINIC | Age: 69
End: 2019-01-03
Payer: MEDICARE

## 2019-01-03 VITALS
BODY MASS INDEX: 33.62 KG/M2 | HEART RATE: 74 BPM | RESPIRATION RATE: 18 BRPM | WEIGHT: 262 LBS | OXYGEN SATURATION: 98 % | HEIGHT: 74 IN | SYSTOLIC BLOOD PRESSURE: 130 MMHG | DIASTOLIC BLOOD PRESSURE: 82 MMHG

## 2019-01-03 DIAGNOSIS — I25.810 CORONARY ARTERY DISEASE INVOLVING CORONARY BYPASS GRAFT OF NATIVE HEART WITHOUT ANGINA PECTORIS: ICD-10-CM

## 2019-01-03 DIAGNOSIS — E11.22 TYPE 2 DIABETES MELLITUS WITH CHRONIC KIDNEY DISEASE, WITH LONG-TERM CURRENT USE OF INSULIN, UNSPECIFIED CKD STAGE (CMS/HCC): ICD-10-CM

## 2019-01-03 DIAGNOSIS — I48.91 ATRIAL FIBRILLATION, UNSPECIFIED TYPE (CMS/HCC): ICD-10-CM

## 2019-01-03 DIAGNOSIS — N28.9 KIDNEY DISEASE: Primary | ICD-10-CM

## 2019-01-03 DIAGNOSIS — C64.2 MALIGNANT NEOPLASM OF LEFT KIDNEY (CMS/HCC): ICD-10-CM

## 2019-01-03 DIAGNOSIS — R60.9 EDEMA, UNSPECIFIED TYPE: ICD-10-CM

## 2019-01-03 DIAGNOSIS — Z79.4 TYPE 2 DIABETES MELLITUS WITH CHRONIC KIDNEY DISEASE, WITH LONG-TERM CURRENT USE OF INSULIN, UNSPECIFIED CKD STAGE (CMS/HCC): ICD-10-CM

## 2019-01-03 PROBLEM — C64.9 RENAL CANCER (CMS/HCC): Status: ACTIVE | Noted: 2019-01-03

## 2019-01-03 PROCEDURE — 99213 OFFICE O/P EST LOW 20 MIN: CPT | Performed by: FAMILY MEDICINE

## 2019-01-03 ASSESSMENT — ENCOUNTER SYMPTOMS
DIARRHEA: 0
TROUBLE SWALLOWING: 0
NECK PAIN: 0
NAUSEA: 0
BLOOD IN STOOL: 0
WEAKNESS: 0
FREQUENCY: 0
BACK PAIN: 0
NERVOUS/ANXIOUS: 0
SHORTNESS OF BREATH: 0
FATIGUE: 0
PALPITATIONS: 0
ABDOMINAL PAIN: 0
EYE PAIN: 0
ARTHRALGIAS: 0
DYSURIA: 0
ACTIVITY CHANGE: 0
CHEST TIGHTNESS: 0
HEADACHES: 0

## 2019-01-03 NOTE — PROGRESS NOTES
Daily Progress Note      Subjective      Patient ID: Nathan Alonzo is a 68 y.o. male.    HPI  Was seen 12/26, for f/u  Edema better  Seeing uro next week for stone procedure, stent retrieval  Feeling well, no new c/o    The following have been reviewed and updated as appropriate in this visit:       Review of Systems   Constitutional: Negative for activity change and fatigue.   HENT: Negative for congestion, ear pain, tinnitus and trouble swallowing.    Eyes: Negative for pain.   Respiratory: Negative for chest tightness and shortness of breath.    Cardiovascular: Negative for chest pain and palpitations.   Gastrointestinal: Negative for abdominal pain, blood in stool, diarrhea and nausea.   Genitourinary: Negative for dysuria, frequency and urgency.   Musculoskeletal: Negative for arthralgias, back pain and neck pain.   Neurological: Negative for weakness and headaches.   Psychiatric/Behavioral: The patient is not nervous/anxious.    All other systems reviewed and are negative.      Current Outpatient Prescriptions   Medication Sig Dispense Refill   • amiodarone (PACERONE) 200 mg tablet Take 200 mg by mouth daily.     • apixaban (ELIQUIS) 5 mg tablet Take 5 mg by mouth 2 (two) times a day. PT TO HOLD ELIQUIS 3 DAYS PRIOR TO SX AS PER HIS CARDIOLOGIST      • aspirin 81 mg enteric coated tablet Take 81 mg by mouth daily. PT TO STOP 5 DAYS PRIOR TO SX AS PER HIS CARDIOLOGIST      • atorvastatin (LIPITOR) 40 mg tablet Take 40 mg by mouth daily.       • coenzyme Q10 (CO Q-10) 400 mg capsule Take 400 mg by mouth daily.       • eplerenone (INSPRA) 25 mg tablet Take 25 mg by mouth daily.     • finasteride (PROSCAR) 5 mg tablet Take 5 mg by mouth daily.     • insulin glargine (LANTUS SOLOSTAR) 100 unit/mL (3 mL) subcutaneous pen Inject 20 Units under the skin every morning.       • levothyroxine (SYNTHROID) 100 mcg tablet Take 100 mcg by mouth daily.     • lisinopril (PRINIVIL) 40 mg tablet Take 1 tablet (40 mg total)  by mouth 2 (two) times a day.     • omeprazole (PriLOSEC) 40 mg capsule Take 40 mg by mouth daily before breakfast.       Current Facility-Administered Medications   Medication Dose Route Frequency Provider Last Rate Last Dose   • cloNIDine (CATAPRES) tablet 0.2 mg  0.2 mg oral BID Areli Gallo CRNP         Past Medical History:   Diagnosis Date   • A-fib (CMS/HCC) ([facility])    • Abnormal ECG     a fib   • Aortic valve disorder    • BPH (benign prostatic hyperplasia)    • BPH (benign prostatic hyperplasia)    • Coronary artery disease    • Disease of thyroid gland    • Hypertension    • Hypothyroidism    • Kidney stones    • Nocturia x2   • Renal calculi     right   • Renal cancer (CMS/HCC) ([facility])    • Right bundle branch block    • Sleep apnea     uses cpap   • Type 2 diabetes mellitus (CMS/HCC) ([facility])      Family History   Problem Relation Age of Onset   • Lung cancer Mother    • Lung cancer Father    • Early death Father    • Diabetes Sister      Past Surgical History:   Procedure Laterality Date   • COLONOSCOPY  2016   • CORONARY ARTERY BYPASS GRAFT  2017    x3   • NEPHRECTOMY Right 2016    PARTIAL    • TONSILLECTOMY     • UVULECTOMY  2005     Social History     Social History   • Marital status:      Spouse name: N/A   • Number of children: N/A   • Years of education: N/A     Occupational History   • Not on file.     Social History Main Topics   • Smoking status: Never Smoker   • Smokeless tobacco: Never Used   • Alcohol use No   • Drug use: No   • Sexual activity: Defer     Other Topics Concern   • Not on file     Social History Narrative   • No narrative on file     Allergies   Allergen Reactions   • Oxycodone Other (see comments)      Hives     • Amlodipine Other (see comments)     ANKLE  & FEET  SWELLING    • Cephalexin Monohydrate Other (see comments)     PT CAN'T REMEMBER  REACTION   • Latex Rash       Objective   I have reviewed the patient's pertinent labs. Pertinent labs are within normal  limits.    Physical Exam   Cardiovascular: S1 normal, S2 normal, normal heart sounds and normal pulses.  An irregularly irregular rhythm present.   No murmur heard.  Pulmonary/Chest: Effort normal and breath sounds normal.       Assessment/Plan     Problem List Items Addressed This Visit     Kidney disease - Primary    Atrial fibrillation (CMS/HCC)    CAD (coronary artery disease) of artery bypass graft    Type 2 diabetes mellitus (CMS/HCC) (HCC)    Renal cancer (CMS/HCC) (HCC)      Other Visit Diagnoses     Edema, unspecified type            No orders of the defined types were placed in this encounter.    Need all labs, had endo labs, need to review  Stable at present  Will rakan vivas  Reviewed, discussed  Procedure reviewed    Serafin Zavaleta,   1/3/2019

## 2019-01-15 ENCOUNTER — APPOINTMENT (OUTPATIENT)
Dept: LAB | Facility: HOSPITAL | Age: 69
End: 2019-01-15
Attending: UROLOGY
Payer: MEDICARE

## 2019-01-15 ENCOUNTER — HOSPITAL ENCOUNTER (OUTPATIENT)
Dept: CARDIOLOGY | Facility: HOSPITAL | Age: 69
Discharge: HOME | End: 2019-01-15
Attending: UROLOGY
Payer: MEDICARE

## 2019-01-15 ENCOUNTER — APPOINTMENT (OUTPATIENT)
Dept: PREADMISSION TESTING | Facility: HOSPITAL | Age: 69
End: 2019-01-15
Attending: UROLOGY
Payer: MEDICARE

## 2019-01-15 ENCOUNTER — TRANSCRIBE ORDERS (OUTPATIENT)
Dept: REGISTRATION | Facility: HOSPITAL | Age: 69
End: 2019-01-15

## 2019-01-15 VITALS
RESPIRATION RATE: 18 BRPM | HEIGHT: 74 IN | WEIGHT: 263.9 LBS | HEART RATE: 61 BPM | TEMPERATURE: 97.4 F | BODY MASS INDEX: 33.87 KG/M2 | SYSTOLIC BLOOD PRESSURE: 215 MMHG | DIASTOLIC BLOOD PRESSURE: 94 MMHG

## 2019-01-15 DIAGNOSIS — N20.0 CALCULUS OF KIDNEY: ICD-10-CM

## 2019-01-15 DIAGNOSIS — N20.0 CALCULUS OF KIDNEY: Primary | ICD-10-CM

## 2019-01-15 LAB
ABO + RH BLD: NORMAL
ANION GAP SERPL CALC-SCNC: 10 MEQ/L (ref 3–15)
APTT PPP: 61 SEC (ref 23–35)
BACTERIA URNS QL MICRO: ABNORMAL /HPF
BILIRUB UR QL STRIP.AUTO: NEGATIVE MG/DL
BLD GP AB SCN SERPL QL: NEGATIVE
BUN SERPL-MCNC: 27 MG/DL (ref 8–20)
CALCIUM SERPL-MCNC: 9 MG/DL (ref 8.9–10.3)
CHLORIDE SERPL-SCNC: 100 MEQ/L (ref 98–109)
CLARITY UR REFRACT.AUTO: CLEAR
CO2 SERPL-SCNC: 26 MEQ/L (ref 22–32)
COLOR UR AUTO: YELLOW
CREAT SERPL-MCNC: 1.9 MG/DL
D AG BLD QL: POSITIVE
ERYTHROCYTE [DISTWIDTH] IN BLOOD BY AUTOMATED COUNT: 13.4 % (ref 11.6–14.4)
GFR SERPL CREATININE-BSD FRML MDRD: 35.2 ML/MIN/1.73M*2
GLUCOSE SERPL-MCNC: 317 MG/DL (ref 70–99)
GLUCOSE UR STRIP.AUTO-MCNC: >=1000 MG/DL
HCT VFR BLDCO AUTO: 41.4 %
HGB BLD-MCNC: 13.8 G/DL
HGB UR QL STRIP.AUTO: 3
HYALINE CASTS #/AREA URNS LPF: ABNORMAL /LPF
INR PPP: 1.2 INR
KETONES UR STRIP.AUTO-MCNC: NEGATIVE MG/DL
LABORATORY COMMENT REPORT: NORMAL
LEUKOCYTE ESTERASE UR QL STRIP.AUTO: NEGATIVE
MCH RBC QN AUTO: 29.5 PG (ref 28–33.2)
MCHC RBC AUTO-ENTMCNC: 33.3 G/DL (ref 32.2–36.5)
MCV RBC AUTO: 88.5 FL (ref 83–98)
NITRITE UR QL STRIP.AUTO: NEGATIVE
PDW BLD AUTO: 10.8 FL (ref 9.4–12.4)
PH UR STRIP.AUTO: 6 [PH]
PLATELET # BLD AUTO: 176 K/UL
POTASSIUM SERPL-SCNC: 3.7 MEQ/L (ref 3.6–5.1)
PROT UR QL STRIP.AUTO: 2
PROTHROMBIN TIME: 15 SEC (ref 12.2–14.5)
RBC # BLD AUTO: 4.68 M/UL (ref 4.5–5.8)
RBC #/AREA URNS HPF: ABNORMAL /HPF
SODIUM SERPL-SCNC: 136 MEQ/L (ref 136–144)
SP GR UR REFRACT.AUTO: 1.02
SQUAMOUS URNS QL MICRO: ABNORMAL /HPF
UROBILINOGEN UR STRIP-ACNC: 0.2 EU/DL
WBC # BLD AUTO: 9.59 K/UL
WBC #/AREA URNS HPF: ABNORMAL /HPF

## 2019-01-15 PROCEDURE — 36415 COLL VENOUS BLD VENIPUNCTURE: CPT

## 2019-01-15 PROCEDURE — 87086 URINE CULTURE/COLONY COUNT: CPT

## 2019-01-15 PROCEDURE — 85027 COMPLETE CBC AUTOMATED: CPT

## 2019-01-15 PROCEDURE — 81001 URINALYSIS AUTO W/SCOPE: CPT

## 2019-01-15 PROCEDURE — 86900 BLOOD TYPING SEROLOGIC ABO: CPT

## 2019-01-15 PROCEDURE — 80048 BASIC METABOLIC PNL TOTAL CA: CPT | Mod: 59

## 2019-01-15 PROCEDURE — 85610 PROTHROMBIN TIME: CPT | Mod: GA

## 2019-01-15 PROCEDURE — 85730 THROMBOPLASTIN TIME PARTIAL: CPT | Mod: GA

## 2019-01-15 ASSESSMENT — ENCOUNTER SYMPTOMS
HEMATURIA: 0
MUSCULOSKELETAL NEGATIVE: 1
NAUSEA: 0
FREQUENCY: 1
ABDOMINAL PAIN: 0
FEVER: 0
VOMITING: 0
WOUND: 0
APNEA: 0
DYSURIA: 0
NUMBNESS: 0
ROS GI COMMENTS: GERD
CHILLS: 0
WEAKNESS: 0
FLANK PAIN: 0
CHEST TIGHTNESS: 0
PALPITATIONS: 1
SHORTNESS OF BREATH: 0

## 2019-01-15 ASSESSMENT — PAIN SCALES - GENERAL: PAINLEVEL: 0-NO PAIN

## 2019-01-15 NOTE — PRE-PROCEDURE INSTRUCTIONS
1. We will call you between 4pm to 7pm 2 days before the date of your surgery to determine that arrival time for your procedure.   2. Please report to outpatient registration on the day of your procedure.   3. Please follow the following fasting guidelines:   · Nothing to eat or drink 8 hours prior to arrival   4. Early on the morning of the procedure please take your usual dose of the listed medications with a sip of water:  clonidine  Amiodarone  levothyroxine   5. Other Instructions: hold Eliquis 2 days and aspirin 5 days before procedure.Body wash as directed.   6. If you develop a cold, cough, fever, rash, or other symptom prior to the data of the procedure, please report it to your physician immediately.   7. If you need to cancel the procedure for any reason, please contact your physician or call the unit listed above.   8. Make arrangements to have someone drive you home from the procedure. If you have not arranged for transportation home, your surgery may be cancelled.    9. You may not take public transportation unless accompanied by a responsible person.   10. You may not drive a car or operate complex or potentially dangerous machinery for 24 hours following anesthesia and/or sedation.   11. If it is medically necessary for you to have a longer stay, you will be informed as soon as the decision is made.   12. Do not wear or being anything of value to the hospital including jewelry of any kind. Do not wear make-up or contact lenses. DO bring your glasses and hearing aid.   13. Dress in comfortable clothes.   14.  If instructed, please bring a copy of your Advanced Directive (Living Will/Durable Power of ) on the day of your procedure.      Pre operative instructions given as per protocol.  Form explained by:      I have read and understand the above information. I have had sufficient opportunity to ask questions I might have and they have been answered to my satisfaction. I agree to comply with  the Patient Responsibilities listed above and have received a copy of this form.

## 2019-01-15 NOTE — H&P
Chief complaint: left renal calculus  HPI: 68 year old male with a history of renal calculus and renal cell carcinoma s/p right partial nephrectomy.He had bilateral renal calculus with stone basket extraction 3/2018  and left ESWL with stent placed 10/18/2018.He states that the left renal pelvic calculus is 15mm and left lower pole calculus is 9mm. He denies hematuria.He has occasional perineal discomfort.No fever/chills  Allergies:   Allergies   Allergen Reactions   • Oxycodone Other (see comments)      Hives     • Amlodipine Other (see comments)     ANKLE  & FEET  SWELLING    • Cephalexin Monohydrate Other (see comments)     PT CAN'T REMEMBER  REACTION   • Latex Rash     Patient's Meds:   Current Outpatient Prescriptions:   •  amiodarone (PACERONE) 200 mg tablet, Take 200 mg by mouth daily., Disp: , Rfl:   •  apixaban (ELIQUIS) 5 mg tablet, Take 5 mg by mouth 2 (two) times a day. PT TO HOLD ELIQUIS 3 DAYS PRIOR TO SX AS PER HIS CARDIOLOGIST , Disp: , Rfl:   •  aspirin 81 mg enteric coated tablet, Take 81 mg by mouth daily. PT TO STOP 5 DAYS PRIOR TO SX AS PER HIS CARDIOLOGIST , Disp: , Rfl:   •  atorvastatin (LIPITOR) 40 mg tablet, Take 40 mg by mouth daily.  , Disp: , Rfl:   •  coenzyme Q10 (CO Q-10) 400 mg capsule, Take 400 mg by mouth daily.  , Disp: , Rfl:   •  eplerenone (INSPRA) 25 mg tablet, Take 25 mg by mouth daily., Disp: , Rfl:   •  finasteride (PROSCAR) 5 mg tablet, Take 5 mg by mouth daily., Disp: , Rfl:   •  insulin glargine (LANTUS SOLOSTAR) 100 unit/mL (3 mL) subcutaneous pen, Inject 20 Units under the skin 2 (two) times a day.  , Disp: , Rfl:   •  levothyroxine (SYNTHROID) 100 mcg tablet, Take 100 mcg by mouth daily., Disp: , Rfl:   •  lisinopril (PRINIVIL) 40 mg tablet, Take 1 tablet (40 mg total) by mouth 2 (two) times a day., Disp: , Rfl:   •  omeprazole (PriLOSEC) 40 mg capsule, Take 40 mg by mouth daily before breakfast., Disp: , Rfl:     Current Facility-Administered Medications:   •   cloNIDine (CATAPRES) tablet 0.2 mg, 0.2 mg, oral, BID, Areli Gallo CRNP  Medical History:   Past Medical History:   Diagnosis Date   • A-fib (CMS/HCC) (HCC)    • Abnormal ECG     a fib   • Aortic valve disorder    • BPH (benign prostatic hyperplasia)    • BPH (benign prostatic hyperplasia)    • Coronary artery disease    • Disease of thyroid gland    • Hypertension    • Hypothyroidism    • Kidney stones    • Nocturia    • Renal calculi     right   • Renal cancer (CMS/HCC) (HCC)     pt denies   • Right bundle branch block    • Sleep apnea     uses cpap   • Type 2 diabetes mellitus (CMS/HCC) (HCC)      Surgical History:   Past Surgical History:   Procedure Laterality Date   • COLONOSCOPY  2016   • CORONARY ARTERY BYPASS GRAFT  2017    x3   • KIDNEY STONE SURGERY Right 2018   • NEPHRECTOMY Right 2016    PARTIAL    • TONSILLECTOMY     • UVULECTOMY  2005     Social History:   Social History     Social History   • Marital status:      Spouse name: N/A   • Number of children: N/A   • Years of education: N/A     Social History Main Topics   • Smoking status: Never Smoker   • Smokeless tobacco: Never Used   • Alcohol use No   • Drug use: No   • Sexual activity: Defer     Other Topics Concern   • None     Social History Narrative   • None     Family History:   Family History   Problem Relation Age of Onset   • Lung cancer Mother    • Lung cancer Father    • Early death Father    • Diabetes Sister      Review of Systems   Constitutional: Negative for chills and fever.   HENT: Negative for dental problem and hearing loss.    Eyes: Negative for visual disturbance.   Respiratory: Negative for apnea, chest tightness and shortness of breath.         LILLIAM +CPAP   Cardiovascular: Positive for palpitations (occasional in past) and leg swelling (recent lower leg edema). Negative for chest pain.        CAD s/p CABG 2017, atrial fib., HTN, dyslipidemia, >4MET activity   Gastrointestinal: Negative for abdominal pain, nausea and  "vomiting.        GERD   Endocrine:        DM;last HgbA1c 8.3 12/2018 states that combination drug/ lantus bid started in past few months   Genitourinary: Positive for frequency (nocturia). Negative for dysuria, flank pain and hematuria.        Renal calculus, right renal cell carcinoma s/p partial nephrectomy, CKD   Musculoskeletal: Negative.    Skin: Negative for wound.   Neurological: Negative for weakness and numbness.     Vitals:   Vitals:    01/15/19 1416   BP: (!) 215/94   Pulse: 61   Resp: 18   Temp: 36.3 °C (97.4 °F)     Body mass index is 33.88 kg/m².  Physical Exam   Constitutional: He is oriented to person, place, and time. He appears well-developed and well-nourished.   HENT:   Head: Normocephalic.   Eyes: Pupils are equal, round, and reactive to light.   Neck: Normal range of motion. Neck supple.   Cardiovascular: Normal rate, regular rhythm, normal heart sounds and intact distal pulses.    EKG SB with R BBB   Pulmonary/Chest: Effort normal and breath sounds normal.   Abdominal: Soft. Bowel sounds are normal. There is no tenderness.   Genitourinary:   Genitourinary Comments: deferred   Musculoskeletal: Normal range of motion. He exhibits edema (left lower leg +1 edema measures 17 \";right lower leg 16 \"). He exhibits no tenderness.   Neurological: He is alert and oriented to person, place, and time.   Skin: Skin is warm and dry.   Psychiatric: He has a normal mood and affect.     Patient Active Problem List   Diagnosis   • Kidney disease   • Atrial fibrillation (CMS/HCC)   • CAD (coronary artery disease) of artery bypass graft   • Hypertension   • Hypercholesteremia   • Hypothyroid   • Type 2 diabetes mellitus (CMS/HCC) ([facility])   • Anxiety   • GERD (gastroesophageal reflux disease)   • OCD (obsessive compulsive disorder)   • Sleep apnea   • Hypertensive urgency   • Hepatomegaly   • History of melanoma   • Mitral valve prolapse   • Renal cancer (CMS/HCC) ([facility])       Assessment/Plan:left renal " calculus  Plan: left PCN,percutaneous stone fragmentation and removal  Clearance Dr Zavaleta  Cardiologist Dr Mercado-holding Eliquis 2 days pre op and aspirin 5 days pre op.  Nephrologist Dr Hernandez seen in office today-to start new diuretic-patient will bring medication name to hospital

## 2019-01-16 DIAGNOSIS — Z01.818 PREOP TESTING: Primary | ICD-10-CM

## 2019-01-16 LAB — BACTERIA UR CULT: NORMAL

## 2019-01-22 ENCOUNTER — APPOINTMENT (INPATIENT)
Dept: RADIOLOGY | Facility: HOSPITAL | Age: 69
DRG: 660 | End: 2019-01-22
Attending: NURSE PRACTITIONER
Payer: MEDICARE

## 2019-01-22 ENCOUNTER — HOSPITAL ENCOUNTER (INPATIENT)
Dept: CARDIOLOGY | Facility: HOSPITAL | Age: 69
LOS: 7 days | Discharge: HOME | DRG: 660 | End: 2019-01-29
Attending: UROLOGY | Admitting: UROLOGY
Payer: MEDICARE

## 2019-01-22 DIAGNOSIS — N20.0 CALCULUS, RENAL: ICD-10-CM

## 2019-01-22 DIAGNOSIS — R00.1 BRADYCARDIA: Primary | ICD-10-CM

## 2019-01-22 DIAGNOSIS — N20.0 KIDNEY STONE: ICD-10-CM

## 2019-01-22 PROBLEM — N18.30 CKD (CHRONIC KIDNEY DISEASE) STAGE 3, GFR 30-59 ML/MIN (CMS/HCC): Status: ACTIVE | Noted: 2019-01-22

## 2019-01-22 PROBLEM — F31.9 BIPOLAR 1 DISORDER (CMS/HCC): Status: ACTIVE | Noted: 2019-01-22

## 2019-01-22 LAB
ATRIAL RATE: 46
GLUCOSE BLD-MCNC: 250 MG/DL (ref 70–99)
GLUCOSE BLD-MCNC: 286 MG/DL (ref 70–99)
P AXIS: 73
POCT TEST: ABNORMAL
POCT TEST: ABNORMAL
PR INTERVAL: 210
QRS DURATION: 168
QT INTERVAL: 574
QTC CALCULATION(BAZETT): 502
R AXIS: 270
T WAVE AXIS: 79
VENTRICULAR RATE: 46

## 2019-01-22 PROCEDURE — 27200000 HC STERILE SUPPLY

## 2019-01-22 PROCEDURE — 20600000 HC ROOM AND CARE INTERMEDIATE/TELEMETRY

## 2019-01-22 PROCEDURE — 99223 1ST HOSP IP/OBS HIGH 75: CPT | Performed by: HOSPITALIST

## 2019-01-22 PROCEDURE — 74018 RADEX ABDOMEN 1 VIEW: CPT

## 2019-01-22 PROCEDURE — 0T9130Z DRAINAGE OF LEFT KIDNEY WITH DRAINAGE DEVICE, PERCUTANEOUS APPROACH: ICD-10-PCS | Performed by: RADIOLOGY

## 2019-01-22 PROCEDURE — C1729 CATH, DRAINAGE: HCPCS

## 2019-01-22 PROCEDURE — 71000011 HC PACU PHASE 1 EA ADDL MIN

## 2019-01-22 PROCEDURE — 63600000 HC DRUGS/DETAIL CODE: Performed by: PHYSICIAN ASSISTANT

## 2019-01-22 PROCEDURE — 63600000 HC DRUGS/DETAIL CODE: Performed by: RADIOLOGY

## 2019-01-22 PROCEDURE — BT1F1ZZ FLUOROSCOPY OF LEFT KIDNEY, URETER AND BLADDER USING LOW OSMOLAR CONTRAST: ICD-10-PCS | Performed by: RADIOLOGY

## 2019-01-22 PROCEDURE — C1769 GUIDE WIRE: HCPCS

## 2019-01-22 PROCEDURE — C1894 INTRO/SHEATH, NON-LASER: HCPCS

## 2019-01-22 PROCEDURE — 36100390 IR PERCUTANEOUS NEPHROSTOMY

## 2019-01-22 PROCEDURE — 93005 ELECTROCARDIOGRAM TRACING: CPT | Performed by: HOSPITALIST

## 2019-01-22 PROCEDURE — 63700000 HC SELF-ADMINISTRABLE DRUG: Performed by: NURSE PRACTITIONER

## 2019-01-22 PROCEDURE — 63700000 HC SELF-ADMINISTRABLE DRUG: Performed by: HOSPITALIST

## 2019-01-22 PROCEDURE — 63600000 HC DRUGS/DETAIL CODE: Performed by: HOSPITALIST

## 2019-01-22 PROCEDURE — C1887 CATHETER, GUIDING: HCPCS

## 2019-01-22 PROCEDURE — 71000001 HC PACU PHASE 1 INITIAL 30MIN

## 2019-01-22 PROCEDURE — 63700000 HC SELF-ADMINISTRABLE DRUG: Performed by: PHYSICIAN ASSISTANT

## 2019-01-22 PROCEDURE — 25000000 HC PHARMACY GENERAL: Performed by: RADIOLOGY

## 2019-01-22 PROCEDURE — 76942 ECHO GUIDE FOR BIOPSY: CPT

## 2019-01-22 PROCEDURE — 63600000 HC DRUGS/DETAIL CODE: Performed by: NURSE PRACTITIONER

## 2019-01-22 RX ORDER — MORPHINE SULFATE 2 MG/ML
2 INJECTION, SOLUTION INTRAMUSCULAR; INTRAVENOUS EVERY 4 HOURS PRN
Status: DISCONTINUED | OUTPATIENT
Start: 2019-01-22 | End: 2019-01-22

## 2019-01-22 RX ORDER — FENTANYL CITRATE 50 UG/ML
INJECTION, SOLUTION INTRAMUSCULAR; INTRAVENOUS
Status: COMPLETED | OUTPATIENT
Start: 2019-01-22 | End: 2019-01-22

## 2019-01-22 RX ORDER — HYDRALAZINE HYDROCHLORIDE 25 MG/1
50 TABLET, FILM COATED ORAL EVERY 8 HOURS
Status: DISCONTINUED | OUTPATIENT
Start: 2019-01-22 | End: 2019-01-23

## 2019-01-22 RX ORDER — CLONIDINE HYDROCHLORIDE 0.1 MG/1
0.2 TABLET ORAL 2 TIMES DAILY
Status: DISCONTINUED | OUTPATIENT
Start: 2019-01-22 | End: 2019-01-24

## 2019-01-22 RX ORDER — LIDOCAINE HYDROCHLORIDE 10 MG/ML
INJECTION, SOLUTION INFILTRATION; PERINEURAL
Status: COMPLETED | OUTPATIENT
Start: 2019-01-22 | End: 2019-01-22

## 2019-01-22 RX ORDER — MIDAZOLAM HYDROCHLORIDE 2 MG/2ML
INJECTION, SOLUTION INTRAMUSCULAR; INTRAVENOUS
Status: COMPLETED | OUTPATIENT
Start: 2019-01-22 | End: 2019-01-22

## 2019-01-22 RX ORDER — CIPROFLOXACIN 2 MG/ML
400 INJECTION, SOLUTION INTRAVENOUS
Status: DISCONTINUED | OUTPATIENT
Start: 2019-01-22 | End: 2019-01-23

## 2019-01-22 RX ORDER — HYDRALAZINE HYDROCHLORIDE 20 MG/ML
10 INJECTION INTRAMUSCULAR; INTRAVENOUS EVERY 8 HOURS PRN
Status: DISCONTINUED | OUTPATIENT
Start: 2019-01-22 | End: 2019-01-22

## 2019-01-22 RX ORDER — AMIODARONE HYDROCHLORIDE 200 MG/1
200 TABLET ORAL DAILY
Status: DISCONTINUED | OUTPATIENT
Start: 2019-01-22 | End: 2019-01-29 | Stop reason: HOSPADM

## 2019-01-22 RX ORDER — MORPHINE SULFATE 4 MG/ML
3 INJECTION, SOLUTION INTRAMUSCULAR; INTRAVENOUS ONCE
Status: COMPLETED | OUTPATIENT
Start: 2019-01-22 | End: 2019-01-22

## 2019-01-22 RX ORDER — ONDANSETRON 4 MG/1
4 TABLET, ORALLY DISINTEGRATING ORAL EVERY 8 HOURS PRN
Status: DISCONTINUED | OUTPATIENT
Start: 2019-01-22 | End: 2019-01-22

## 2019-01-22 RX ORDER — INSULIN ASPART 100 [IU]/ML
20 INJECTION, SUSPENSION SUBCUTANEOUS
Status: ON HOLD | COMMUNITY
End: 2019-03-28

## 2019-01-22 RX ORDER — CIPROFLOXACIN 2 MG/ML
400 INJECTION, SOLUTION INTRAVENOUS ONCE
Status: COMPLETED | OUTPATIENT
Start: 2019-01-22 | End: 2019-01-22

## 2019-01-22 RX ORDER — PANTOPRAZOLE SODIUM 40 MG/1
40 TABLET, DELAYED RELEASE ORAL DAILY
Status: DISCONTINUED | OUTPATIENT
Start: 2019-01-22 | End: 2019-01-29 | Stop reason: HOSPADM

## 2019-01-22 RX ORDER — MORPHINE SULFATE 4 MG/ML
3 INJECTION, SOLUTION INTRAMUSCULAR; INTRAVENOUS EVERY 4 HOURS PRN
Status: DISCONTINUED | OUTPATIENT
Start: 2019-01-22 | End: 2019-01-25

## 2019-01-22 RX ORDER — INSULIN ASPART 100 [IU]/ML
5 INJECTION, SOLUTION INTRAVENOUS; SUBCUTANEOUS
Status: DISCONTINUED | OUTPATIENT
Start: 2019-01-23 | End: 2019-01-23

## 2019-01-22 RX ORDER — DIPHENHYDRAMINE HCL 50 MG/ML
25 VIAL (ML) INJECTION EVERY 6 HOURS PRN
Status: DISCONTINUED | OUTPATIENT
Start: 2019-01-22 | End: 2019-01-29 | Stop reason: HOSPADM

## 2019-01-22 RX ORDER — LISINOPRIL 20 MG/1
40 TABLET ORAL 2 TIMES DAILY
Status: DISCONTINUED | OUTPATIENT
Start: 2019-01-22 | End: 2019-01-24

## 2019-01-22 RX ORDER — HYDRALAZINE HYDROCHLORIDE 20 MG/ML
20 INJECTION INTRAMUSCULAR; INTRAVENOUS EVERY 8 HOURS PRN
Status: DISCONTINUED | OUTPATIENT
Start: 2019-01-22 | End: 2019-01-29 | Stop reason: HOSPADM

## 2019-01-22 RX ORDER — LEVOTHYROXINE SODIUM 100 UG/1
100 TABLET ORAL
Status: DISCONTINUED | OUTPATIENT
Start: 2019-01-23 | End: 2019-01-29 | Stop reason: HOSPADM

## 2019-01-22 RX ORDER — ACETAMINOPHEN 325 MG/1
650 TABLET ORAL EVERY 4 HOURS PRN
Status: DISCONTINUED | OUTPATIENT
Start: 2019-01-22 | End: 2019-01-29 | Stop reason: HOSPADM

## 2019-01-22 RX ORDER — INSULIN ASPART 100 [IU]/ML
10 INJECTION, SUSPENSION SUBCUTANEOUS
COMMUNITY
End: 2019-03-25

## 2019-01-22 RX ORDER — ACETAMINOPHEN 325 MG/1
975 TABLET ORAL ONCE
Status: COMPLETED | OUTPATIENT
Start: 2019-01-22 | End: 2019-01-22

## 2019-01-22 RX ORDER — ATORVASTATIN CALCIUM 40 MG/1
40 TABLET, FILM COATED ORAL
Status: DISCONTINUED | OUTPATIENT
Start: 2019-01-22 | End: 2019-01-29 | Stop reason: HOSPADM

## 2019-01-22 RX ORDER — SPIRONOLACTONE AND HYDROCHLOROTHIAZIDE 25; 25 MG/1; MG/1
1 TABLET ORAL DAILY
COMMUNITY
End: 2019-01-29 | Stop reason: HOSPADM

## 2019-01-22 RX ORDER — INSULIN ASPART 100 [IU]/ML
2-10 INJECTION, SOLUTION INTRAVENOUS; SUBCUTANEOUS
Status: DISCONTINUED | OUTPATIENT
Start: 2019-01-22 | End: 2019-01-29 | Stop reason: HOSPADM

## 2019-01-22 RX ADMIN — MIDAZOLAM HYDROCHLORIDE 0.5 MG: 1 INJECTION, SOLUTION INTRAMUSCULAR; INTRAVENOUS at 13:00

## 2019-01-22 RX ADMIN — MORPHINE SULFATE 2 MG: 2 INJECTION, SOLUTION INTRAMUSCULAR; INTRAVENOUS at 15:11

## 2019-01-22 RX ADMIN — HYDRALAZINE HYDROCHLORIDE 10 MG: 20 INJECTION INTRAMUSCULAR; INTRAVENOUS at 18:29

## 2019-01-22 RX ADMIN — MIDAZOLAM HYDROCHLORIDE 1 MG: 1 INJECTION, SOLUTION INTRAMUSCULAR; INTRAVENOUS at 12:00

## 2019-01-22 RX ADMIN — FENTANYL CITRATE 25 MCG: 50 INJECTION, SOLUTION INTRAMUSCULAR; INTRAVENOUS at 13:00

## 2019-01-22 RX ADMIN — MORPHINE SULFATE 3 MG: 4 INJECTION INTRAVENOUS at 17:22

## 2019-01-22 RX ADMIN — MORPHINE SULFATE 3 MG: 4 INJECTION, SOLUTION INTRAMUSCULAR; INTRAVENOUS at 19:56

## 2019-01-22 RX ADMIN — CIPROFLOXACIN 400 MG: 2 INJECTION, SOLUTION INTRAVENOUS at 10:51

## 2019-01-22 RX ADMIN — LAMOTRIGINE 150 MG: 25 TABLET ORAL at 17:22

## 2019-01-22 RX ADMIN — FENTANYL CITRATE 25 MCG: 50 INJECTION, SOLUTION INTRAMUSCULAR; INTRAVENOUS at 12:13

## 2019-01-22 RX ADMIN — CLONIDINE HYDROCHLORIDE 0.2 MG: 0.1 TABLET ORAL at 18:53

## 2019-01-22 RX ADMIN — HYDRALAZINE HYDROCHLORIDE 50 MG: 25 TABLET, FILM COATED ORAL at 21:18

## 2019-01-22 RX ADMIN — ATORVASTATIN CALCIUM 40 MG: 40 TABLET, FILM COATED ORAL at 17:22

## 2019-01-22 RX ADMIN — MIDAZOLAM HYDROCHLORIDE 0.5 MG: 1 INJECTION, SOLUTION INTRAMUSCULAR; INTRAVENOUS at 12:13

## 2019-01-22 RX ADMIN — FENTANYL CITRATE 50 MCG: 50 INJECTION, SOLUTION INTRAMUSCULAR; INTRAVENOUS at 12:00

## 2019-01-22 RX ADMIN — PANTOPRAZOLE SODIUM 40 MG: 40 TABLET, DELAYED RELEASE ORAL at 17:22

## 2019-01-22 RX ADMIN — LIDOCAINE HYDROCHLORIDE 10 ML: 10 INJECTION, SOLUTION INFILTRATION; PERINEURAL at 12:00

## 2019-01-22 RX ADMIN — ACETAMINOPHEN 975 MG: 325 TABLET ORAL at 15:11

## 2019-01-22 RX ADMIN — MIDAZOLAM HYDROCHLORIDE 1 MG: 1 INJECTION, SOLUTION INTRAMUSCULAR; INTRAVENOUS at 13:01

## 2019-01-22 RX ADMIN — FENTANYL CITRATE 50 MCG: 50 INJECTION, SOLUTION INTRAMUSCULAR; INTRAVENOUS at 12:56

## 2019-01-22 RX ADMIN — MIDAZOLAM HYDROCHLORIDE 1 MG: 1 INJECTION, SOLUTION INTRAMUSCULAR; INTRAVENOUS at 12:44

## 2019-01-22 RX ADMIN — FENTANYL CITRATE 50 MCG: 50 INJECTION, SOLUTION INTRAMUSCULAR; INTRAVENOUS at 12:44

## 2019-01-22 RX ADMIN — AMIODARONE HYDROCHLORIDE 200 MG: 200 TABLET ORAL at 17:22

## 2019-01-22 RX ADMIN — LISINOPRIL 40 MG: 20 TABLET ORAL at 17:21

## 2019-01-22 RX ADMIN — MIDAZOLAM HYDROCHLORIDE 1 MG: 1 INJECTION, SOLUTION INTRAMUSCULAR; INTRAVENOUS at 12:56

## 2019-01-22 RX ADMIN — FENTANYL CITRATE 50 MCG: 50 INJECTION, SOLUTION INTRAMUSCULAR; INTRAVENOUS at 13:01

## 2019-01-22 ASSESSMENT — COGNITIVE AND FUNCTIONAL STATUS - GENERAL
HELP NEEDED FOR BATHING: 3 - A LITTLE
CLIMB 3 TO 5 STEPS WITH RAILING: 3 - A LITTLE
WALKING IN HOSPITAL ROOM: 3 - A LITTLE
MOVING TO AND FROM BED TO CHAIR: 3 - A LITTLE
DRESSING REGULAR LOWER BODY CLOTHING: 3 - A LITTLE
EATING MEALS: 4 - NONE
TOILETING: 3 - A LITTLE
HELP NEEDED FOR PERSONAL GROOMING: 3 - A LITTLE
DRESSING REGULAR UPPER BODY CLOTHING: 3 - A LITTLE
STANDING UP FROM CHAIR USING ARMS: 3 - A LITTLE

## 2019-01-22 NOTE — Clinical Note
Patient placed on procedure table in prone position. Positioning devices: all pressure points padded and safety strap across legs.

## 2019-01-22 NOTE — Clinical Note
Patient placed on procedure table in prone position. Positioning devices: arm board under arms and safety strap across legs. Legs and chest strapped

## 2019-01-22 NOTE — Clinical Note
The left chest was clipped, marked  and prepped with ChloraPrep. The patient was draped in a sterile fashion after allowing for the recommended dry time. Left flank

## 2019-01-22 NOTE — ASSESSMENT & PLAN NOTE
known RBBB  No symptoms  Cardiology following; HTN management per cardiology recs  Monitor on telemetry

## 2019-01-22 NOTE — NURSING NOTE
Rec'd pt as a transfer from  with /115; pt denies headache or blurry vision. Medicated with 10mg IV hydralizine at 1820. PT appears flushed on arrival; rec'd 3mg 1xIV morphine prior to transfer. Nephrostomy tube draining pink tinged urine; emptied for 200cc on arrival.

## 2019-01-22 NOTE — POST-PROCEDURE NOTE
Interventional Radiology Brief Postprocedure Note    Nathan Alonzo     Attending: Francisco    Diagnosis: Left sided nephrolithiasis     Description of procedure: Left PCNL    Contrast: 60 ml Omnipaque 300     Anesthesia:  Conscious Sedation    Medications: 400 mg ciprofloxacin, 5 mg versed and 250 mcg fentanyl      Complications: None      Estimated Blood Loss: minimal    Anticoagulation: Hold     Specimens: none    Findings: Technically successful placement of left PCNL    1/22/2019 1:15 PM

## 2019-01-22 NOTE — Clinical Note
Patient placed on procedure table in prone position. Positioning devices: safety strap across legs.

## 2019-01-22 NOTE — NURSING NOTE
Received patient from IR via stretcher. Patient ambulated to the bed safely with assistance.  BP elevated, physician aware and at bedside with patient. Medicated for pain but states no relief. Additional dose ordered and administered. EKG performed and handed to physician. BP rechecked and remains elevated. Meds administered per order. Patient transferred to PCU via bed accompanied by RN. Report given to Belkys.

## 2019-01-22 NOTE — Clinical Note
Patient placed on procedure table in prone position with arms at side. Positioning devices: all pressure points padded, arm board under arms and safety strap across legs.

## 2019-01-22 NOTE — ASSESSMENT & PLAN NOTE
S/p percutaneous nephrostomy per IR 1/22  S/p removal of L stent in office 1/22  further intervention on the stone done in OR 1/23  DVT prophylaxis per primary urology team.  On SCD's (hematuria post procedure)  Off abx  Tube management per urology/IR - possible nephrostomy removal today by IR   ASA was stopped 5 days prior to the procedure and Eliquis was stopped this past Saturday per the direction of his primary cardiologist. Resume when ok with urology

## 2019-01-22 NOTE — PLAN OF CARE
Problem: Patient Care Overview  Goal: Plan of Care Review  Outcome: Ongoing (interventions implemented as appropriate)   01/22/19 1823   Coping/Psychosocial   Plan Of Care Reviewed With patient   Plan of Care Review   Progress no change   Outcome Summary patient admitted from IR. Oriented to call bell and use. Son present at bedside. Tube patent.

## 2019-01-22 NOTE — CONSULTS
Hospital Medicine Service -  Inpatient Consultation         Requesting Physician: Dr. Yeager    Reason for Consultation: medical management     HISTORY OF PRESENT ILLNESS        This is a 68 y.o. male history as stated below including nephrolithiasis, HTN, CKD3, DM, RCC s/p partial nephrectomy, CAD, RBBB, afib.  Patient was admitted by urology service today for ongoing treatment of L nephrolithiasis. The patient underwent lithotripsy 10/18 with L ureteral stent placement. He was seen in the urology office today and the stent was removed. He was sent to IR for percutaneous nephrostomy placement and was admitted to med/surg unit. Urology is planning further intervention on the stone in the OR tentatively for tomorrow.    [facility] was consulted for medical management. Case was discussed with Ethel Patton, and outside records were reviewed and the medication list was verified with the patient at bedside.      On the med surge unit, the patient was noted to have uncontrolled HTN in the setting of post procedure pain with BP in range of 212 systolic. No symptoms. Patient denies CP, SOB, palpitations, fever, cough, abd pain, vomiting, diarrhea, BRBPR, melena, hematemesis, dysuria, hematuria, headache, vision change, vertigo, numbness/tingling/focal weakness, light headedness, or additional complaint.      He has a history of difficult to control HTN in the past and had accelerated HTN in the past after a procedure several months ago. The patient is receiving pain medication and BP med regimen is being adjusted and he is being transferred to PCU for further monitoring and management of his HTN. Cardiology has also been consulted.       PAST MEDICAL AND SURGICAL HISTORY        Past Medical History:   Diagnosis Date   • A-fib (CMS/HCC) (HCC)    • Abnormal ECG     a fib   • Aortic valve disorder    • BPH (benign prostatic hyperplasia)    • BPH (benign prostatic hyperplasia)    • Coronary artery disease    • Disease of thyroid  gland    • Hypertension    • Hypothyroidism    • Kidney stones    • Nocturia    • Renal calculi     right   • Renal cancer (CMS/HCC) (HCC)     pt denies   • Right bundle branch block    • Sleep apnea     uses cpap   • Type 2 diabetes mellitus (CMS/HCC) (HCC)        Past Surgical History:   Procedure Laterality Date   • COLONOSCOPY  2016   • CORONARY ARTERY BYPASS GRAFT  2017    x3   • KIDNEY STONE SURGERY Right 2018   • NEPHRECTOMY Right 2016    PARTIAL    • TONSILLECTOMY     • UVULECTOMY  2005       Serafin Zavaleta, DO    MEDICATIONS        Home Medications:  Facility-Administered Medications Prior to Admission   Medication Dose Route Frequency Provider Last Rate Last Dose   • cloNIDine (CATAPRES) tablet 0.2 mg  0.2 mg oral BID Areli Gallo CRNP         Prescriptions Prior to Admission   Medication Sig Dispense Refill Last Dose   • amiodarone (PACERONE) 200 mg tablet Take 200 mg by mouth daily.   1/21/2019 at Unknown time   • apixaban (ELIQUIS) 5 mg tablet Take 5 mg by mouth 2 (two) times a day. PT TO HOLD ELIQUIS 3 DAYS PRIOR TO SX AS PER HIS CARDIOLOGIST    Past Week at Unknown time   • aspirin 81 mg enteric coated tablet Take 81 mg by mouth daily. PT TO STOP 5 DAYS PRIOR TO SX AS PER HIS CARDIOLOGIST    Past Week at Unknown time   • atorvastatin (LIPITOR) 40 mg tablet Take 40 mg by mouth daily.     1/21/2019 at Unknown time   • coenzyme Q10 (CO Q-10) 400 mg capsule Take 400 mg by mouth daily.     1/21/2019 at Unknown time   • eplerenone (INSPRA) 25 mg tablet Take 25 mg by mouth daily.   1/21/2019 at Unknown time   • finasteride (PROSCAR) 5 mg tablet Take 5 mg by mouth daily.   1/21/2019 at Unknown time   • insulin asp prt-insulin aspart (novoLOG MIX 70-30) 100 unit/mL (70-30) subcutaneous pen Inject 15 Units under the skin daily with breakfast.      • insulin asp prt-insulin aspart (novoLOG MIX 70-30) 100 unit/mL (70-30) subcutaneous pen Inject 10 Units under the skin daily with lunch.      • insulin NPH and  "regular human (NovoLIN 70-30) 100 unit/mL (70-30) injection Inject 15 Units under the skin nightly.      • levothyroxine (SYNTHROID) 100 mcg tablet Take 100 mcg by mouth daily.   1/22/2019 at Unknown time   • lisinopril (PRINIVIL) 40 mg tablet Take 1 tablet (40 mg total) by mouth 2 (two) times a day.   1/22/2019 at Unknown time   • omeprazole (PriLOSEC) 40 mg capsule Take 40 mg by mouth daily before breakfast.   1/21/2019 at Unknown time   • spironolacton-hydrochlorothiaz (ALDACTAZIDE) 25-25 mg per tablet Take 1 tablet by mouth daily.   1/21/2019 at Unknown time       Current inpatient medications were personally reviewed.    ALLERGIES        Oxycodone; Amlodipine; Cephalexin monohydrate; and Latex    FAMILY HISTORY        Family History   Problem Relation Age of Onset   • Lung cancer Mother    • Lung cancer Father    • Early death Father    • Diabetes Sister        SOCIAL HISTORY        Social History     Social History   • Marital status:      Spouse name: N/A   • Number of children: N/A   • Years of education: N/A     Social History Main Topics   • Smoking status: Never Smoker   • Smokeless tobacco: Never Used   • Alcohol use No   • Drug use: No   • Sexual activity: Defer     Other Topics Concern   • None     Social History Narrative   • None       REVIEW OF SYSTEMS        All other systems reviewed and negative except as noted in HPI    PHYSICAL EXAMINATION        BP (!) 221/115   Pulse (!) 52   Temp 36.3 °C (97.3 °F) (Axillary)   Resp 16   Ht 1.88 m (6' 2\")   Wt 118 kg (261 lb)   SpO2 99%   BMI 33.51 kg/m²   Body mass index is 33.51 kg/m².    Intake/Output Summary (Last 24 hours) at 01/22/19 1903  Last data filed at 01/22/19 1700   Gross per 24 hour   Intake              300 ml   Output              300 ml   Net                0 ml       Physical Exam:  General: nontoxic appearing, no acute distress  HEENT:  PERRL, anicteric sclera   Neck: supple, no JVD  Cardiac: RRR, +S1/S2, no murmur, no rub "   Lungs: clear bilaterally, no wheezing/rales/rhonchi  Abdomen: soft, NT/ND, +BS, no rebound/guarding   L flank nephrotomy tube draining pink urine  Extremities: no edema, distal perfusion intact  Neuro: AAOx3, nonfocal. CN's intact grossly. No focal motor deficit. No sensory deficit.  Skin: no rash. Clean, dry, intact.   Psych: cooperative     LABS / EKG        Labs  Reviewed    ECG/Telemetry  Reviewed    Imaging  I have independently reviewed the pertinent imaging from the last 24 hrs.    Previous outside records reviewed.    ASSESSMENT AND RECOMMENDATIONS           Hypertensive urgency   Assessment & Plan    Patient was admitted after IR percutaneous nephrostomy tube placement  On the med/surg unit, patient's BP is 212/90.  Asymptomatic. No HA, vision change, numbness/tingling/weakness/vertigo, CP, SOB, or additional complaint.   Patient has a known history of difficult to control HTN and has a history of known accelerated HTN in the past after procedures.  He is in significant pain from the nephrostomy tube which is likely driving his HTN.  Giving IV morphine now for improved pain control.  The patient took his lisinopril and clonidine doses this morning but did not take his diuretics. He has a history of intolerance to calcium channel blockers and he has been taken off of his carvedilol(presumably due to resting bradycardia)  Give his evening dose of lisinopril now.  He is due for clonidine this evening.   Spoke to the patient's wife who states that po hydralazine had been successful in controlling his HTN during past hospitalizations.   Diuretics have been on hold given his NPO status today for nephrostomy tube and he will be NPO tonight for possible stone extraction tomorrow.   Transfer to PCU for close monitoring.   Cardiology consult for HTN management as well as preoperative cardiac clearance.           Kidney stone   Assessment & Plan    S/p percutaneous nephrostomy per IR today  S/p removal of L stent in  office today  Urology is planning on further intervention on the stone in am  Antibiotics and DVT prophylaxis per primary urology team.  Cardiology consulted for preoperative clearance  Will need improved HTN management prior to proceeding to the OR  ASA was stopped 5 days ago and Eliquis was stopped this past Saturday per the direction of his primary cardiologist        Bipolar 1 disorder (CMS/[facility]) ([facility])   Assessment & Plan    Continue lamictal per outpatient regimen        CKD (chronic kidney disease) stage 3, GFR 30-59 ml/min (CMS/[facility]) ([facility])   Assessment & Plan    Baseline Cr appears to be in range of 1.6 to 2.0 on review of past records  Cr 1.9 today  Making urine and PCN is draining  Ongoing stone management per urology  Diuretics on hold for now  Monitor BMP and volume status  Continue ACE           Bradycardia   Assessment & Plan    Sinus bradycardia with known RBBB  No symptoms  Hold parameters on clonidine  Cardiology evaluation  Monitor on telemetry        GERD (gastroesophageal reflux disease)   Assessment & Plan    Continue Protonix        Type 2 diabetes mellitus (CMS/[facility]) ([facility])   Assessment & Plan    Continue to monitor accuchecks  Mealtime novolog with correction insulin  Adjust regimen prn  Patient will be NPO at midnight for possible OR tomorrow          Hypothyroid   Assessment & Plan    Continue levothyroxine        Hypercholesteremia   Assessment & Plan    statin        Hypertension   Assessment & Plan    Monitor on telemetry  Continue lisinopril/clonidine  Monitor with pain management  Follow cardiology recommendation        Atrial fibrillation (CMS/[facility])   Assessment & Plan    History of afib on amiodarone and Eliquis as outpatient  Currently in sinus rhythm  Monitor on telemetry  Cardiology consulted  Patient is on aspirin and Eliquis which was held-resume medication per urology and cardiology                  Maxim Casiano,   1/22/2019  7:03 PM

## 2019-01-22 NOTE — Clinical Note
The left abdomen was clipped, marked  and prepped with ChloraPrep. The patient was draped in a sterile fashion after allowing for the recommended dry time. Left side of back prepped

## 2019-01-22 NOTE — PROGRESS NOTES
68 yr old with h/o nephrolithiasis admitted for left PCNL by Dr Kent. Has 15mm left renal pelvic stone. Has had multiple stone procedures in the past. His left ureteral stent was removed by Dr Kent in the office this morning.   Was seen in IR this morning and had PCN placed.  He was cleared by his PCP and cardiologist preop.We will ask cardiology to see him here, he will be seen by the hospitalist service for medical management.  His preop urine culture was negative 1/15/19  Check labs in am  NPO after midnight

## 2019-01-22 NOTE — ASSESSMENT & PLAN NOTE
Patient was admitted after IR percutaneous nephrostomy tube placement  On the med/surg unit, patient's BP was 212/90. Asymptomatic.   Patient has a known history of difficult to control HTN and has a history of known accelerated HTN in the past after procedures.  He was in significant pain from the nephrostomy tube which is likely driving his HTN.  HTN improved with pain control.   BP controlled when pain is controlled.  Cardiology consult appreciated. HTN management per cardiology. ACE and Diuretics on hold given renal function and nephrolithiasis/procedures.  He has a history of intolerance to calcium channel blockers.  On propranolol, clonidine, and hydralazine.   Continue telemetry  Continue to monitor and adjust meds prn

## 2019-01-22 NOTE — POST-PROCEDURE NOTE
Pt fully awake, speaking with son. VS wnl. Terry now a 10. Report called to Radha.  Left side dressing clean and dry, blood tinge urine noted in drainage bag- WNL

## 2019-01-22 NOTE — Clinical Note
clipped, marked  and prepped with ChloraPrep. The patient was draped in a sterile fashion after allowing for the recommended dry time. Left flank

## 2019-01-22 NOTE — ASSESSMENT & PLAN NOTE
History of afib on amiodarone and Eliquis as outpatient  Currently in sinus rhythm  Monitor on telemetry  Cardiology consulted  Patient is on aspirin and Eliquis which was held-resume medication per urology and cardiology

## 2019-01-22 NOTE — ASSESSMENT & PLAN NOTE
Baseline Cr appears to be in range of 1.6 to 2.0 on review of past records  Cr 2.1 increased to 3.0 post op, now trending down.   Making urine. Madsen functioning  Ongoing stone and madsen management per urology  Diuretics on hold for now. Hold ACE  Monitor BMP and volume status  Nephrology following

## 2019-01-23 ENCOUNTER — APPOINTMENT (INPATIENT)
Dept: RADIOLOGY | Facility: HOSPITAL | Age: 69
DRG: 660 | End: 2019-01-23
Attending: UROLOGY
Payer: MEDICARE

## 2019-01-23 ENCOUNTER — ANESTHESIA EVENT (INPATIENT)
Dept: OPERATING ROOM | Facility: HOSPITAL | Age: 69
DRG: 660 | End: 2019-01-23
Payer: MEDICARE

## 2019-01-23 ENCOUNTER — ANESTHESIA (INPATIENT)
Dept: OPERATING ROOM | Facility: HOSPITAL | Age: 69
DRG: 660 | End: 2019-01-23
Payer: MEDICARE

## 2019-01-23 LAB
ANION GAP SERPL CALC-SCNC: 10 MEQ/L (ref 3–15)
BASOPHILS # BLD: 0.04 K/UL (ref 0.01–0.1)
BASOPHILS NFR BLD: 0.3 %
BUN SERPL-MCNC: 29 MG/DL (ref 8–20)
CALCIUM SERPL-MCNC: 8.7 MG/DL (ref 8.9–10.3)
CHLORIDE SERPL-SCNC: 99 MEQ/L (ref 98–109)
CO2 SERPL-SCNC: 24 MEQ/L (ref 22–32)
CREAT SERPL-MCNC: 2.1 MG/DL
DIFFERENTIAL METHOD BLD: ABNORMAL
EOSINOPHIL # BLD: 0.32 K/UL (ref 0.04–0.54)
EOSINOPHIL NFR BLD: 2.4 %
ERYTHROCYTE [DISTWIDTH] IN BLOOD BY AUTOMATED COUNT: 13.5 % (ref 11.6–14.4)
GFR SERPL CREATININE-BSD FRML MDRD: 31.4 ML/MIN/1.73M*2
GLUCOSE BLD-MCNC: 251 MG/DL (ref 70–99)
GLUCOSE BLD-MCNC: 257 MG/DL (ref 70–99)
GLUCOSE BLD-MCNC: 265 MG/DL (ref 70–99)
GLUCOSE BLD-MCNC: 340 MG/DL (ref 70–99)
GLUCOSE SERPL-MCNC: 271 MG/DL (ref 70–99)
HCT VFR BLDCO AUTO: 43.6 %
HGB BLD-MCNC: 14.8 G/DL
IMM GRANULOCYTES # BLD AUTO: 0.08 K/UL (ref 0–0.08)
IMM GRANULOCYTES NFR BLD AUTO: 0.6 %
LYMPHOCYTES # BLD: 0.77 K/UL (ref 1.2–3.5)
LYMPHOCYTES NFR BLD: 5.9 %
MAGNESIUM SERPL-MCNC: 2.1 MG/DL (ref 1.8–2.5)
MCH RBC QN AUTO: 28.9 PG (ref 28–33.2)
MCHC RBC AUTO-ENTMCNC: 33.9 G/DL (ref 32.2–36.5)
MCV RBC AUTO: 85.2 FL (ref 83–98)
MONOCYTES # BLD: 0.85 K/UL (ref 0.3–1)
MONOCYTES NFR BLD: 6.5 %
NEUTROPHILS # BLD: 11.01 K/UL (ref 1.7–7)
NEUTS SEG NFR BLD: 84.3 %
NRBC BLD-RTO: 0 %
PDW BLD AUTO: 10.8 FL (ref 9.4–12.4)
PLATELET # BLD AUTO: 181 K/UL
POCT TEST: ABNORMAL
POTASSIUM SERPL-SCNC: 4 MEQ/L (ref 3.6–5.1)
RBC # BLD AUTO: 5.12 M/UL (ref 4.5–5.8)
SODIUM SERPL-SCNC: 133 MEQ/L (ref 136–144)
TROPONIN I SERPL-MCNC: 0.04 NG/ML
WBC # BLD AUTO: 13.07 K/UL

## 2019-01-23 PROCEDURE — 63700000 HC SELF-ADMINISTRABLE DRUG: Performed by: INTERNAL MEDICINE

## 2019-01-23 PROCEDURE — 74018 RADEX ABDOMEN 1 VIEW: CPT

## 2019-01-23 PROCEDURE — 63600000 HC DRUGS/DETAIL CODE: Performed by: HOSPITALIST

## 2019-01-23 PROCEDURE — 27200000 HC STERILE SUPPLY: Performed by: UROLOGY

## 2019-01-23 PROCEDURE — 63700000 HC SELF-ADMINISTRABLE DRUG: Performed by: UROLOGY

## 2019-01-23 PROCEDURE — 83735 ASSAY OF MAGNESIUM: CPT | Performed by: HOSPITALIST

## 2019-01-23 PROCEDURE — 36415 COLL VENOUS BLD VENIPUNCTURE: CPT | Performed by: NURSE PRACTITIONER

## 2019-01-23 PROCEDURE — 63600105 HC IODINE BASED CONTRAST: Performed by: UROLOGY

## 2019-01-23 PROCEDURE — C1758 CATHETER, URETERAL: HCPCS | Performed by: UROLOGY

## 2019-01-23 PROCEDURE — 80048 BASIC METABOLIC PNL TOTAL CA: CPT | Performed by: NURSE PRACTITIONER

## 2019-01-23 PROCEDURE — 63600000 HC DRUGS/DETAIL CODE: Performed by: NURSE ANESTHETIST, CERTIFIED REGISTERED

## 2019-01-23 PROCEDURE — 25800000 HC PHARMACY IV SOLUTIONS: Performed by: NURSE ANESTHETIST, CERTIFIED REGISTERED

## 2019-01-23 PROCEDURE — 25000000 HC PHARMACY GENERAL: Performed by: NURSE ANESTHETIST, CERTIFIED REGISTERED

## 2019-01-23 PROCEDURE — C1769 GUIDE WIRE: HCPCS | Performed by: UROLOGY

## 2019-01-23 PROCEDURE — 63700000 HC SELF-ADMINISTRABLE DRUG: Performed by: NURSE PRACTITIONER

## 2019-01-23 PROCEDURE — 63600000 HC DRUGS/DETAIL CODE: Performed by: UROLOGY

## 2019-01-23 PROCEDURE — 36000014 HC OR LEVEL 4 EA ADDL MIN: Performed by: UROLOGY

## 2019-01-23 PROCEDURE — 63600000 HC DRUGS/DETAIL CODE: Performed by: ANESTHESIOLOGY

## 2019-01-23 PROCEDURE — 99233 SBSQ HOSP IP/OBS HIGH 50: CPT | Performed by: HOSPITALIST

## 2019-01-23 PROCEDURE — 63600105 HC IODINE BASED CONTRAST: Mod: JW | Performed by: UROLOGY

## 2019-01-23 PROCEDURE — 82365 CALCULUS SPECTROSCOPY: CPT | Performed by: UROLOGY

## 2019-01-23 PROCEDURE — 63700000 HC SELF-ADMINISTRABLE DRUG: Performed by: HOSPITALIST

## 2019-01-23 PROCEDURE — 71000001 HC PACU PHASE 1 INITIAL 30MIN: Performed by: UROLOGY

## 2019-01-23 PROCEDURE — 0TC17ZZ EXTIRPATION OF MATTER FROM LEFT KIDNEY, VIA NATURAL OR ARTIFICIAL OPENING: ICD-10-PCS | Performed by: UROLOGY

## 2019-01-23 PROCEDURE — 36000004 HC OR LEVEL 4 INITIAL 30MIN: Performed by: UROLOGY

## 2019-01-23 PROCEDURE — 20600000 HC ROOM AND CARE INTERMEDIATE/TELEMETRY

## 2019-01-23 PROCEDURE — 84484 ASSAY OF TROPONIN QUANT: CPT | Performed by: INTERNAL MEDICINE

## 2019-01-23 PROCEDURE — C1729 CATH, DRAINAGE: HCPCS | Performed by: UROLOGY

## 2019-01-23 PROCEDURE — 85025 COMPLETE CBC W/AUTO DIFF WBC: CPT | Performed by: NURSE PRACTITIONER

## 2019-01-23 PROCEDURE — C1894 INTRO/SHEATH, NON-LASER: HCPCS | Performed by: UROLOGY

## 2019-01-23 PROCEDURE — 0T25X0Z CHANGE DRAINAGE DEVICE IN KIDNEY, EXTERNAL APPROACH: ICD-10-PCS | Performed by: UROLOGY

## 2019-01-23 PROCEDURE — 63700000 HC SELF-ADMINISTRABLE DRUG: Performed by: ANESTHESIOLOGY

## 2019-01-23 PROCEDURE — 37000001 HC ANESTHESIA GENERAL: Performed by: UROLOGY

## 2019-01-23 PROCEDURE — 71000011 HC PACU PHASE 1 EA ADDL MIN: Performed by: UROLOGY

## 2019-01-23 RX ORDER — FENTANYL CITRATE 50 UG/ML
50 INJECTION, SOLUTION INTRAMUSCULAR; INTRAVENOUS
Status: DISCONTINUED | OUTPATIENT
Start: 2019-01-23 | End: 2019-01-23 | Stop reason: HOSPADM

## 2019-01-23 RX ORDER — INSULIN ASPART 100 [IU]/ML
7 INJECTION, SOLUTION INTRAVENOUS; SUBCUTANEOUS
Status: DISCONTINUED | OUTPATIENT
Start: 2019-01-23 | End: 2019-01-29 | Stop reason: HOSPADM

## 2019-01-23 RX ORDER — LIDOCAINE HYDROCHLORIDE 10 MG/ML
INJECTION, SOLUTION INFILTRATION; PERINEURAL AS NEEDED
Status: DISCONTINUED | OUTPATIENT
Start: 2019-01-23 | End: 2019-01-23 | Stop reason: SURG

## 2019-01-23 RX ORDER — EPHEDRINE SULFATE 50 MG/ML
INJECTION, SOLUTION INTRAVENOUS AS NEEDED
Status: DISCONTINUED | OUTPATIENT
Start: 2019-01-23 | End: 2019-01-23 | Stop reason: SURG

## 2019-01-23 RX ORDER — PHENYLEPHRINE HYDROCHLORIDE 10 MG/ML
INJECTION INTRAVENOUS AS NEEDED
Status: DISCONTINUED | OUTPATIENT
Start: 2019-01-23 | End: 2019-01-23 | Stop reason: SURG

## 2019-01-23 RX ORDER — GENTAMICIN SULFATE 80 MG/100ML
80 INJECTION, SOLUTION INTRAVENOUS ONCE
Status: DISCONTINUED | OUTPATIENT
Start: 2019-01-23 | End: 2019-01-23 | Stop reason: HOSPADM

## 2019-01-23 RX ORDER — FENTANYL CITRATE 50 UG/ML
INJECTION, SOLUTION INTRAMUSCULAR; INTRAVENOUS AS NEEDED
Status: DISCONTINUED | OUTPATIENT
Start: 2019-01-23 | End: 2019-01-23 | Stop reason: SURG

## 2019-01-23 RX ORDER — ROCURONIUM BROMIDE 10 MG/ML
INJECTION, SOLUTION INTRAVENOUS AS NEEDED
Status: DISCONTINUED | OUTPATIENT
Start: 2019-01-23 | End: 2019-01-23 | Stop reason: SURG

## 2019-01-23 RX ORDER — DEXAMETHASONE SODIUM PHOSPHATE 4 MG/ML
INJECTION, SOLUTION INTRA-ARTICULAR; INTRALESIONAL; INTRAMUSCULAR; INTRAVENOUS; SOFT TISSUE AS NEEDED
Status: DISCONTINUED | OUTPATIENT
Start: 2019-01-23 | End: 2019-01-23 | Stop reason: SURG

## 2019-01-23 RX ORDER — ONDANSETRON HYDROCHLORIDE 2 MG/ML
INJECTION, SOLUTION INTRAVENOUS AS NEEDED
Status: DISCONTINUED | OUTPATIENT
Start: 2019-01-23 | End: 2019-01-23 | Stop reason: SURG

## 2019-01-23 RX ORDER — HYDROMORPHONE HYDROCHLORIDE 1 MG/ML
0.5 INJECTION, SOLUTION INTRAMUSCULAR; INTRAVENOUS; SUBCUTANEOUS
Status: DISCONTINUED | OUTPATIENT
Start: 2019-01-23 | End: 2019-01-23 | Stop reason: HOSPADM

## 2019-01-23 RX ORDER — PROPOFOL 10 MG/ML
INJECTION, EMULSION INTRAVENOUS AS NEEDED
Status: DISCONTINUED | OUTPATIENT
Start: 2019-01-23 | End: 2019-01-23 | Stop reason: SURG

## 2019-01-23 RX ORDER — ONDANSETRON HYDROCHLORIDE 2 MG/ML
4 INJECTION, SOLUTION INTRAVENOUS
Status: DISCONTINUED | OUTPATIENT
Start: 2019-01-23 | End: 2019-01-23 | Stop reason: HOSPADM

## 2019-01-23 RX ORDER — SODIUM CHLORIDE 9 MG/ML
INJECTION, SOLUTION INTRAVENOUS CONTINUOUS PRN
Status: DISCONTINUED | OUTPATIENT
Start: 2019-01-23 | End: 2019-01-23 | Stop reason: SURG

## 2019-01-23 RX ORDER — CLONIDINE HYDROCHLORIDE 0.1 MG/1
0.2 TABLET ORAL ONCE
Status: COMPLETED | OUTPATIENT
Start: 2019-01-23 | End: 2019-01-23

## 2019-01-23 RX ORDER — MIDAZOLAM HYDROCHLORIDE 2 MG/2ML
INJECTION, SOLUTION INTRAMUSCULAR; INTRAVENOUS AS NEEDED
Status: DISCONTINUED | OUTPATIENT
Start: 2019-01-23 | End: 2019-01-23 | Stop reason: SURG

## 2019-01-23 RX ORDER — SODIUM CHLORIDE, SODIUM LACTATE, POTASSIUM CHLORIDE, CALCIUM CHLORIDE 600; 310; 30; 20 MG/100ML; MG/100ML; MG/100ML; MG/100ML
INJECTION, SOLUTION INTRAVENOUS CONTINUOUS
Status: ACTIVE | OUTPATIENT
Start: 2019-01-23 | End: 2019-01-23

## 2019-01-23 RX ORDER — LISINOPRIL 20 MG/1
20 TABLET ORAL ONCE
Status: COMPLETED | OUTPATIENT
Start: 2019-01-23 | End: 2019-01-23

## 2019-01-23 RX ORDER — SULFAMETHOXAZOLE AND TRIMETHOPRIM 400; 80 MG/1; MG/1
1 TABLET ORAL EVERY 12 HOURS
Status: DISCONTINUED | OUTPATIENT
Start: 2019-01-23 | End: 2019-01-24

## 2019-01-23 RX ORDER — METOCLOPRAMIDE HYDROCHLORIDE 5 MG/ML
10 INJECTION INTRAMUSCULAR; INTRAVENOUS
Status: DISCONTINUED | OUTPATIENT
Start: 2019-01-23 | End: 2019-01-23 | Stop reason: HOSPADM

## 2019-01-23 RX ADMIN — MORPHINE SULFATE 3 MG: 4 INJECTION, SOLUTION INTRAMUSCULAR; INTRAVENOUS at 06:12

## 2019-01-23 RX ADMIN — FENTANYL CITRATE 25 MCG: 50 INJECTION, SOLUTION INTRAMUSCULAR; INTRAVENOUS at 10:15

## 2019-01-23 RX ADMIN — SODIUM CHLORIDE: 9 INJECTION, SOLUTION INTRAVENOUS at 10:23

## 2019-01-23 RX ADMIN — VANCOMYCIN HYDROCHLORIDE 1 G: 1 INJECTION, POWDER, LYOPHILIZED, FOR SOLUTION INTRAVENOUS at 10:17

## 2019-01-23 RX ADMIN — PHENYLEPHRINE HYDROCHLORIDE 100 MCG: 10 INJECTION INTRAVENOUS at 10:49

## 2019-01-23 RX ADMIN — LISINOPRIL 40 MG: 20 TABLET ORAL at 20:49

## 2019-01-23 RX ADMIN — SODIUM CHLORIDE, POTASSIUM CHLORIDE, SODIUM LACTATE AND CALCIUM CHLORIDE: 600; 310; 30; 20 INJECTION, SOLUTION INTRAVENOUS at 12:23

## 2019-01-23 RX ADMIN — PHENYLEPHRINE HYDROCHLORIDE 100 MCG: 10 INJECTION INTRAVENOUS at 10:31

## 2019-01-23 RX ADMIN — PANTOPRAZOLE SODIUM 40 MG: 40 TABLET, DELAYED RELEASE ORAL at 07:56

## 2019-01-23 RX ADMIN — SODIUM CHLORIDE: 9 INJECTION, SOLUTION INTRAVENOUS at 09:24

## 2019-01-23 RX ADMIN — HYDRALAZINE HYDROCHLORIDE 6 MG: 20 INJECTION INTRAMUSCULAR; INTRAVENOUS at 11:50

## 2019-01-23 RX ADMIN — FENTANYL CITRATE 100 MCG: 50 INJECTION, SOLUTION INTRAMUSCULAR; INTRAVENOUS at 09:45

## 2019-01-23 RX ADMIN — INSULIN ASPART 6 UNITS: 100 INJECTION, SOLUTION INTRAVENOUS; SUBCUTANEOUS at 08:23

## 2019-01-23 RX ADMIN — MORPHINE SULFATE 3 MG: 4 INJECTION, SOLUTION INTRAMUSCULAR; INTRAVENOUS at 00:59

## 2019-01-23 RX ADMIN — PHENYLEPHRINE HYDROCHLORIDE 200 MCG: 10 INJECTION INTRAVENOUS at 10:40

## 2019-01-23 RX ADMIN — ONDANSETRON 4 MG: 2 INJECTION INTRAMUSCULAR; INTRAVENOUS at 11:50

## 2019-01-23 RX ADMIN — EPHEDRINE SULFATE 5 MG: 50 INJECTION, SOLUTION INTRAVENOUS at 11:06

## 2019-01-23 RX ADMIN — CLONIDINE HYDROCHLORIDE 0.1 MG: 0.1 TABLET ORAL at 09:20

## 2019-01-23 RX ADMIN — LISINOPRIL 20 MG: 20 TABLET ORAL at 07:57

## 2019-01-23 RX ADMIN — ROCURONIUM BROMIDE 50 MG: 10 INJECTION, SOLUTION INTRAVENOUS at 09:45

## 2019-01-23 RX ADMIN — INSULIN ASPART 7 UNITS: 100 INJECTION, SOLUTION INTRAVENOUS; SUBCUTANEOUS at 17:05

## 2019-01-23 RX ADMIN — PHENYLEPHRINE HYDROCHLORIDE 100 MCG: 10 INJECTION INTRAVENOUS at 11:28

## 2019-01-23 RX ADMIN — ROCURONIUM BROMIDE 10 MG: 10 INJECTION, SOLUTION INTRAVENOUS at 11:08

## 2019-01-23 RX ADMIN — MIDAZOLAM HYDROCHLORIDE 2 MG: 1 INJECTION, SOLUTION INTRAMUSCULAR; INTRAVENOUS at 09:38

## 2019-01-23 RX ADMIN — EPHEDRINE SULFATE 10 MG: 50 INJECTION, SOLUTION INTRAVENOUS at 11:08

## 2019-01-23 RX ADMIN — ROCURONIUM BROMIDE 10 MG: 10 INJECTION, SOLUTION INTRAVENOUS at 10:45

## 2019-01-23 RX ADMIN — FENTANYL CITRATE 50 MCG: 50 INJECTION, SOLUTION INTRAMUSCULAR; INTRAVENOUS at 09:55

## 2019-01-23 RX ADMIN — PROPOFOL 200 MG: 10 INJECTION, EMULSION INTRAVENOUS at 09:45

## 2019-01-23 RX ADMIN — PHENYLEPHRINE HYDROCHLORIDE 100 MCG: 10 INJECTION INTRAVENOUS at 11:13

## 2019-01-23 RX ADMIN — HYDRALAZINE HYDROCHLORIDE 20 MG: 20 INJECTION INTRAMUSCULAR; INTRAVENOUS at 07:55

## 2019-01-23 RX ADMIN — FENTANYL CITRATE 50 MCG: 50 INJECTION, SOLUTION INTRAMUSCULAR; INTRAVENOUS at 12:22

## 2019-01-23 RX ADMIN — LAMOTRIGINE 150 MG: 25 TABLET ORAL at 07:56

## 2019-01-23 RX ADMIN — INSULIN ASPART 6 UNITS: 100 INJECTION, SOLUTION INTRAVENOUS; SUBCUTANEOUS at 21:35

## 2019-01-23 RX ADMIN — ATORVASTATIN CALCIUM 40 MG: 40 TABLET, FILM COATED ORAL at 17:05

## 2019-01-23 RX ADMIN — SULFAMETHOXAZOLE AND TRIMETHOPRIM 80 MG OF TRIMETHOPRIM: 400; 80 TABLET ORAL at 20:49

## 2019-01-23 RX ADMIN — IOHEXOL 60 ML: 300 INJECTION, SOLUTION INTRAVENOUS at 12:02

## 2019-01-23 RX ADMIN — HYDRALAZINE HYDROCHLORIDE 50 MG: 25 TABLET, FILM COATED ORAL at 05:48

## 2019-01-23 RX ADMIN — LIDOCAINE HYDROCHLORIDE 3 ML: 10 INJECTION, SOLUTION INFILTRATION; PERINEURAL at 09:45

## 2019-01-23 RX ADMIN — EPHEDRINE SULFATE 15 MG: 50 INJECTION, SOLUTION INTRAVENOUS at 10:46

## 2019-01-23 RX ADMIN — MORPHINE SULFATE 3 MG: 4 INJECTION, SOLUTION INTRAMUSCULAR; INTRAVENOUS at 22:41

## 2019-01-23 RX ADMIN — INSULIN ASPART 8 UNITS: 100 INJECTION, SOLUTION INTRAVENOUS; SUBCUTANEOUS at 17:06

## 2019-01-23 RX ADMIN — LEVOTHYROXINE SODIUM 100 MCG: 100 TABLET ORAL at 05:48

## 2019-01-23 RX ADMIN — CLONIDINE HYDROCHLORIDE 0.2 MG: 0.1 TABLET ORAL at 20:48

## 2019-01-23 RX ADMIN — INSULIN ASPART 6 UNITS: 100 INJECTION, SOLUTION INTRAVENOUS; SUBCUTANEOUS at 00:57

## 2019-01-23 RX ADMIN — DEXAMETHASONE SODIUM PHOSPHATE 8 MG: 4 INJECTION, SOLUTION INTRA-ARTICULAR; INTRALESIONAL; INTRAMUSCULAR; INTRAVENOUS; SOFT TISSUE at 10:24

## 2019-01-23 ASSESSMENT — ENCOUNTER SYMPTOMS: DYSRHYTHMIAS: 1

## 2019-01-23 NOTE — PLAN OF CARE
Problem: Patient Care Overview  Goal: Plan of Care Review  Outcome: Ongoing (interventions implemented as appropriate)  CM met with patient and son in the room.  ID, PCP and address verified with patient.  Care coordination role explained to patient and son with verbal understanding.  Business card provided to patient.  Unsure of needs at this time for patient.  Awaiting further in put from consulting physicians and departments.  CC team will continue to follow and assist with discharge planning.      Living Arrangements: Per patient, he lives in a 2 story townhouse with wife, Susan.  There is 1 step to enter the house and 10 steps between floors.      P.L.O.F: Per patient, he was independent with ambulation and ADL's    DME: Per patient, no DME at home    Home Care/Oxygen/Current Needs: per patient, No HC or oxygen currently in use    Anticipated Discharge Needs: Home without services vs home with home health    Ride: Yes will have ride home with son upon discharge      01/23/19 1400   Coping/Psychosocial   Plan Of Care Reviewed With patient   Plan of Care Review   Progress improving   Outcome Summary Discharge home without services VS home with home health     Goal: Discharge Needs Assessment  Outcome: Ongoing (interventions implemented as appropriate)   01/23/19 1400   DC Needs Assessment   Concerns To Be Addressed care coordination/care conferences   Readmission Within The Last 30 Days no previous admission in last 30 days   Provider Choice List(s) Given no   Anticipated Discharge Disposition home without services;home with home health services   Equipment Needed After Discharge none   Discharge Planning Comments Discharge discussed with patient, home with home health VS home without services   Current Health   Anticipated Changes Related to Illness none   Activity/Self Care ROS   Equipment Currently Used at Home none

## 2019-01-23 NOTE — PROGRESS NOTES
Hospital Medicine Service -  Daily Progress Note       SUBJECTIVE   Patient seen and examined.   Feeling better today  Pain controlled  Cleared by cardiology for the OR this am  No current complaint     OBJECTIVE      Vital signs in last 24 hours:  Temp:  [36.2 °C (97.1 °F)-36.9 °C (98.5 °F)] 36.7 °C (98 °F)  Heart Rate:  [52-79] 76  Resp:  [6-22] 15  BP: (115-221)/() 143/80    PHYSICAL EXAMINATION        General: nontoxic appearing, no acute distress  HEENT:  PERRL, anicteric sclera   Neck: supple, no JVD  Cardiac: RRR, +S1/S2, no murmur, no rub   Lungs: clear bilaterally, no wheezing/rales/rhonchi  Abdomen: soft, NT/ND, +BS, no rebound/guarding   Extremities: no edema, distal perfusion intact  : hematuria in madsen bag  Neuro: AAOx3, nonfocal. CN's intact grossly. No focal motor deficit. No sensory deficit.  Skin: no rash. Clean, dry, intact.   Psych: cooperative      LABS / IMAGING / TELE      Labs  I have reviewed the patient's current laboratory data.    Imaging  I have independently reviewed the pertinent imaging from the last 24 hrs.    ECG/Telemetry  Telemetry reviewed    ASSESSMENT AND PLAN      Hypertensive urgency   Assessment & Plan    Patient was admitted after IR percutaneous nephrostomy tube placement  On the med/surg unit, patient's BP was 212/90.  Asymptomatic. No HA, vision change, numbness/tingling/weakness/vertigo, CP, SOB, or additional complaint.   Patient has a known history of difficult to control HTN and has a history of known accelerated HTN in the past after procedures.  He was in significant pain from the nephrostomy tube which is likely driving his HTN.  HTN improved with pain control.   Cardiology consult appreciated. HTN management per cardiology. On clonidine and lisinopril. Prn hydralazine.  Diuretics on hold given renal function and nephrolithiasis/procedures.  He has a history of intolerance to calcium channel blockers and he has been taken off of his  carvedilol(presumably due to resting bradycardia)  Continue telemetry  Continue to monitor and adjust meds prn          Kidney stone   Assessment & Plan    S/p percutaneous nephrostomy per IR 1/22  S/p removal of L stent in office 1/22  further intervention on the stone done in OR 1/23  Antibiotics and DVT prophylaxis per primary urology team.  Cardiology following  ASA was stopped 5 days prior to the procedure and Eliquis was stopped this past Saturday per the direction of his primary cardiologist. Resume when ok with urology        Bipolar 1 disorder (CMS/[facility]) ([facility])   Assessment & Plan    Continue lamictal per outpatient regimen        CKD (chronic kidney disease) stage 3, GFR 30-59 ml/min (CMS/[facility]) ([facility])   Assessment & Plan    Baseline Cr appears to be in range of 1.6 to 2.0 on review of past records  Cr 2.1 today  Making urine   Ongoing stone and madsen management per urology  Diuretics on hold for now  Monitor BMP and volume status  Continue ACE   Nephrology consult appreciated          Bradycardia   Assessment & Plan    Sinus bradycardia with known RBBB  No symptoms  Hold parameters on clonidine  Cardiology following  Monitor on telemetry        GERD (gastroesophageal reflux disease)   Assessment & Plan    Continue Protonix        Type 2 diabetes mellitus (CMS/[facility]) ([facility])   Assessment & Plan    Continue to monitor accuchecks  Mealtime novolog with correction insulin  Adjust regimen prn  Resume outpatient regimen of insulin when tolerating po intake post op          Hypothyroid   Assessment & Plan    Continue levothyroxine        Hypercholesteremia   Assessment & Plan    statin        Hypertension   Assessment & Plan    Monitor on telemetry  HTN management per cardiology        Atrial fibrillation (CMS/[facility])   Assessment & Plan    History of afib on amiodarone and Eliquis as outpatient  Currently in sinus rhythm  Monitor on telemetry  Cardiology consulted  Patient is on aspirin and Eliquis which was held-resume  medication per urology and cardiology                  Maxim Casiano,   1/23/2019  4:19 PM

## 2019-01-23 NOTE — PROGRESS NOTES
Urology Daily Progress Note    Subjective    POST OP NOTE   S/p left PCNL    He is feeling well without complaints.  He denies any significant pain.  Urine is bloody via left pcn      Objective    Vital signs in last 24 hours:  Temp:  [36.2 °C (97.1 °F)-36.9 °C (98.5 °F)] 36.7 °C (98 °F)  Heart Rate:  [57-79] 76  Resp:  [6-22] 15  BP: (115-221)/() 143/80      Intake/Output this shift:  I/O this shift:  In: 767.1 [P.O.:240; I.V.:527.1]  Out: 475 [Urine:475]    Labs  CBC Results       01/23/19 01/15/19 10/04/18                    0550 1510 1231         WBC 13.07 (H) 9.59 10.32         RBC 5.12 4.68 4.60         HGB 14.8 13.8 13.6 (L)         HCT 43.6 41.4 40.3         MCV 85.2 88.5 87.6         MCH 28.9 29.5 29.6         MCHC 33.9 33.3 33.7          176 166                   BMP Results       01/23/19 01/15/19 10/04/18                    0551 1510 1231          (L) 136 138         K 4.0 3.7 4.1         Cl 99 100 103         CO2 24 26 28         Glucose 271 (H) 317 (H) 138 (H)         BUN 29 (H) 27 (H) 25 (H)         Creatinine 2.1 (H) 1.9 (H) 1.6 (H)         Calcium 8.7 (L) 9.0 9.3         Anion Gap 10 10 7         EGFR 31.4 (L) 35.2 (L) 43.1 (L)                   PT/PTT Results       01/15/19 10/04/18 07/05/18                    1510 1231 1945         PT 15.0 (H) 17.1 (H) 13.9         INR 1.2 1.4 1.1         PTT 61 (H) 54 (H) -         Comment for INR at 1510 on 01/15/19:    Moderate Intensity Anticoagulation = 2.0 to 3.0, High Intensity = 2.5 to   3.5    Comment for INR at 1231 on 10/04/18:    Moderate Intensity Anticoagulation = 2.0 to 3.0, High Intensity = 2.5 to   3.5    Comment for INR at 1945 on 07/05/18:    INR has no defined significance when PT is within Reference Range.    Comment for PTT at 1510 on 01/15/19:    The Standard Therapeutic Range for Heparin is 68 to 101 seconds.    Comment for PTT at 1231 on 10/04/18:    The Standard Therapeutic Range for Heparin is 68 to 101 seconds.       UA Results       01/15/19                          1510           Color Yellow           Clarity Clear           Glucose >=1000 (A)           Bilirubin Negative           Ketones Negative           Sp Grav 1.017           Blood +3 (A)           Ph 6.0           Protein +2 (A)           Urobilinogen 0.2           Nitrite Negative           Leuk Est Negative           WBC 0 TO 3           RBC 20 TO 35 (A)           Bacteria None Seen           Comment for Blood at 1510 on 01/15/19:    The sensitivity of the occult blood test is equivalent to approximately 4   intact RBC/HPF.    Comment for Leuk Est at 1510 on 01/15/19:    Results can be falsely negative due to high specific gravity, some   antibiotics, glucose >3 g/dl, or WBC other than neutrophils.      Lactate Results     No lab values to display.      No results found for: PSA            Imaging  Not applicable        Physical Exam:    Abdomen soft, nontender, nondistended    Assessment    S/p left PCNL    Plan     Doing well.  BP normal.  Minimal pain.  Maintain madsen and pcn.   Clear liquid diet tonite.  Regular diet in am.      True Kent MD  1/23/2019 5:06 PM

## 2019-01-23 NOTE — CONSULTS
STAT pre-op Cardiology clearance.  69 y/o patient known to us from prior admission here.   Discussed with Urology this a.m. Scheduled of OR this a.m. For uterostomy stone removal.    Extensive records from prior zdmission and more recent cardiology visit of 9/25/18 reviewed on paper chart here.      Hx. Of CAD, prior CABG and left atrial appendage closure 5/3/17  Chronic atrial fibrillation  Renal cancer with partial nephrectomy  DM  HTN  Hyperlipidemia  Bipolar disorder  Hypothyroidism  LILLIAM    OP care / a Dr. Mercado in [place].    ROS:  Admission ROS reviewed with patient and agree  Patient specifically denies any chest pain, SOB, Palpitations, LH or syncope since our last eval here and further substantiated by his o.p. Visit with [facility] cardiologist and recent clearance by his primary care physician.  Reports both reviewed.    Physical Exam:  General: nontoxic appearing, no acute distress  HEENT:  PERRL, anicteric sclera   Neck: supple, no JVD  Cardiac: RRR, +S1/S2, no murmur, no rub   Lungs: clear bilaterally, no wheezing/rales/rhonchi  Abdomen: soft, NT/ND, +BS, no rebound/guarding   L flank nephrotomy tube draining pink urine  Extremities: no edema, distal perfusion intact  Neuro: AAOx3, nonfocal. CN's intact grossly. No focal motor deficit. No sensory deficit.  Skin: no rash. Clean, dry, intact.   Psych: cooperative     FH:  NC  SH:  Non-smoker  No sig ETOH          EKG here with sinus bradycardia, first degree AV block, bifasicular heart block with RBBB and LADH.  No change C/W 9/25/18.    Impression:  CAD as above and per prior records  HT - chronic and difficult to control  To hold BP meds that would conrtibute to his bradycardia and first degree AV block as discussed with nursing and with Urology.  Will give 20 mg lisinopril (rather than 40 this a.m) given anesthesia concerns regarding hypotension with induction. To get his ordered hydralazine I.v. (given)    Discussed again with  Urology.  Cardiac cleared for surgery at moderate cardiac risk including with BP and CAD concerns addressed as best able pre-op.    Will get baseline troponin here for historical value post op.  Not to delay for results or based upon any elevation pre-op.

## 2019-01-23 NOTE — ANESTHESIA PREPROCEDURE EVALUATION
Anesthesia ROS/MED HX    Anesthesia History - neg  Pulmonary    Sleep apnea and CPAP Compliant  Neuro/Psych - neg  Cardiovascular   CAD   hypertension  Dysrhythmias and atrial fibrillation   ECG reviewed  Abnormal ECG  GI/Hepatic   liver disease   GERD  Musculoskeletal- neg  Endo/Other   Diabetes and patient Insulin dependent   Hypothyroidism      Past Surgical History:   Procedure Laterality Date   • COLONOSCOPY  2016   • CORONARY ARTERY BYPASS GRAFT  2017    x3   • KIDNEY STONE SURGERY Right 2018   • NEPHRECTOMY Right 2016    PARTIAL    • TONSILLECTOMY     • UVULECTOMY  2005       Physical Exam    Airway   Mallampati: III   TM distance: >3 FB   Neck ROM: full  Cardiovascular - normal   Rhythm: regular   Rate: normal  Pulmonary - normal   clear to auscultation  Dental - normal        Anesthesia Plan    Plan: general    Technique: general endotracheal     Airway: oral intubation   ASA 3  Anesthetic plan and risks discussed with: patient  Induction:    intravenous   Postop Plan:   Patient Disposition: inpatient floor planned admission   Pain Management: IV analgesics

## 2019-01-23 NOTE — PLAN OF CARE
Problem: Patient Care Overview  Goal: Plan of Care Review  Outcome: Ongoing (interventions implemented as appropriate)   01/23/19 0653   Coping/Psychosocial   Plan Of Care Reviewed With patient   Plan of Care Review   Progress progress toward functional goals as expected   Outcome Summary Patient blood pressure responding to oral hydralazine. Pain controlled with morphine, Nephrostomy draining pink tinged urine, Pt aware of plan for OR at 0930. CHG bath given     Goal: Individualization & Mutuality  Outcome: Ongoing (interventions implemented as appropriate)   01/23/19 0653   Individualization   Patient Specific Preferences Patient likes quiet at night and room darkened   Patient Specific Goals Controlled blood pressure and adequate pain relief   Patient Specific Interventions Frequent vital signs, heart monitor, pain medications and antihypertensives        Problem: Fall Risk (Adult)  Goal: Identify Related Risk Factors and Signs and Symptoms  Outcome: Outcome(s) Achieved Date Met: 01/23/19 01/23/19 0653   Fall Risk   Related Risk Factors (Fall Risk) bladder function altered;gait/mobility problems;sleep pattern alteration;environment unfamiliar;slipper/uneven surfaces   Signs and Symptoms (Fall Risk) presence of risk factors     Goal: Absence of Falls  Outcome: Ongoing (interventions implemented as appropriate)   01/23/19 0653   Fall Risk (Adult)   Absence of Falls making progress toward outcome       Problem: Pain, Acute (Adult)  Goal: Identify Related Risk Factors and Signs and Symptoms  Outcome: Ongoing (interventions implemented as appropriate)   01/23/19 0653   Pain, Acute   Related Risk Factors (Acute Pain) disease process;persistent pain;procedure/treatment   Signs and Symptoms (Acute Pain) verbalization of pain descriptors     Goal: Acceptable Pain Control/Comfort Level  Outcome: Ongoing (interventions implemented as appropriate)   01/23/19 0653   Pain, Acute (Adult)   Acceptable Pain Control/Comfort  Level making progress toward outcome       Problem: Hypertensive Disease/Crisis (Arterial) (Adult)  Goal: Signs and Symptoms of Listed Potential Problems Will be Absent, Minimized or Managed (Hypertensive Disease/Crisis)  Outcome: Ongoing (interventions implemented as appropriate)   01/23/19 0606   Hypertensive Disease/Crisis (Arterial)   Problems Assessed (Hypertensive Disease/Crisis (Arterial)) all   Problems Present (Hypertensive Disease/Crisis (Arterial)) situational response;renal dysfunction;severe hypertension/hypertensive crisis

## 2019-01-23 NOTE — CONSULTS
Nephrology Consult Note    Subjective     Nathan Alonzo is a 68 y.o. male who was admitted for Kidney stone [N20.0]  Kidney stone [N20.0]  Kidney stone [N20.0] and for whom we are consulted regarding chronic kidney disease.      The patient notes having found kidney stones in 2/2018 where a first attempt was made at removal 3/2018 that he says was not successful.  For the two left sided stones of size 15mm and 9mm another attempt was made where he had a left ureteral stent insertion and lithotripsy 10/18/18, but the stone did not fragment.  Today he underwent left percutaneous nephrostolithotomy and stent placement.  The stones were sent for chemical analysis.    He has a history of CKD and follows with Dr. Hernandez as an outpatient.  This CKD is in the setting of DM, HTN, obesity, nephrolithiasis, and age 67y/o.  He states NSAID use is rare.  He notes his HbA1C has generally been <7 though a recent HbA1C he cites as 8.5% for which he follows with endocrinology.  His creatinine on the [facility] system showed an isolated robyn creatinine of 1.3 on 7/5/18 with the creatinine more typically 1.6-2.1.  He came here with a pre-op creatinine of 1.9 on 1/15/19 and today it is 2.1.    His BP has been elevated in stage 2 levels until about 08:33 this morning.  He did note the prior procedure the day before left him uncomfortable, but since then BP has been running 115//81.  He cites a history of LILLIAM and uses CPAP.      Review of Systems  Denies SOB, nausea, feverishness, CP, swelling.  He has had reduced appetite.  He notes 30# weight loss since his CABG done 5/2017.  Otherwise ROS negative.    Medical History:   Past Medical History:   Diagnosis Date   • A-fib (CMS/HCC) (HCC)    • Abnormal ECG     a fib   • Aortic valve disorder    • BPH (benign prostatic hyperplasia)    • BPH (benign prostatic hyperplasia)    • Coronary artery disease    • Disease of thyroid gland    • Hypertension    • Hypothyroidism    • Kidney stones    •  Nocturia    • Renal calculi     right   • Renal cancer (CMS/HCC) (HCC)     pt denies   • Right bundle branch block    • Sleep apnea     uses cpap   • Type 2 diabetes mellitus (CMS/HCC) (HCC)    Obesity.    Surgical History:   Past Surgical History:   Procedure Laterality Date   • COLONOSCOPY  2016   • CORONARY ARTERY BYPASS GRAFT  2017    x3   • KIDNEY STONE SURGERY Right 2018   • NEPHRECTOMY Right 2016    PARTIAL    • TONSILLECTOMY     • UVULECTOMY  2005       Family History:   Family History   Problem Relation Age of Onset   • Lung cancer Mother    • Lung cancer Father    • Early death Father    • Diabetes Sister    Brother-nephrolithiasis    Social History:   Social History     Social History   • Marital status:      Spouse name: N/A   • Number of children: N/A   • Years of education: N/A     Social History Main Topics   • Smoking status: Never Smoker   • Smokeless tobacco: Never Used   • Alcohol use No   • Drug use: No   • Sexual activity: Defer     Other Topics Concern   • None     Social History Narrative   • None       Allergies: Oxycodone; Amlodipine; Cephalexin monohydrate; and Latex    Current Facility-Administered Medications   Medication Dose Route Frequency Provider Last Rate Last Dose   • acetaminophen (TYLENOL) tablet 650 mg  650 mg oral q4h PRN Ethel Patton CRNP       • amiodarone (PACERONE) tablet 200 mg  200 mg oral Daily Maxim Casiano DO   200 mg at 01/22/19 1722   • atorvastatin (LIPITOR) tablet 40 mg  40 mg oral Daily (6p) Ethel Patton CRNP   40 mg at 01/22/19 1722   • cloNIDine (CATAPRES) tablet 0.2 mg  0.2 mg oral BID Maxim Casiano DO   0.2 mg at 01/22/19 1853   • diphenhydrAMINE (BENADRYL) injection 25 mg  25 mg intravenous q6h PRN Ethel Patton CRNP       • hydrALAZINE (APRESOLINE) injection 20 mg  20 mg intravenous q8h PRN Joaquin Valencia DO   6 mg at 01/23/19 1150   • hydrALAZINE (APRESOLINE) tablet 50 mg  50 mg oral q8h THADDEUS Maxim Casiano DO   50 mg  at 01/23/19 0548   • insulin aspart U-100 (NovoLOG) pen 2-10 Units  2-10 Units subcutaneous With meals & nightly Maxim Casiano DO   6 Units at 01/23/19 0823   • insulin aspart U-100 (NovoLOG) pen 5 Units  5 Units subcutaneous TID with meals Maxim Casiano DO       • lactated ringer's infusion   intravenous Continuous Jaguar Andrews  mL/hr at 01/23/19 1223     • lamoTRIgine (LaMICtal) tablet 150 mg  150 mg oral Daily Ethel Patton CRNP   150 mg at 01/23/19 0756   • levothyroxine (SYNTHROID) tablet 100 mcg  100 mcg oral Daily (6:30a) Ethel Patton CRNP   100 mcg at 01/23/19 0548   • lisinopril (PRINIVIL) tablet 40 mg  40 mg oral BID Maxim Casiano DO   40 mg at 01/22/19 1721   • morphine injection 3 mg  3 mg intravenous q4h PRN Maxim Casiano DO   3 mg at 01/23/19 0612   • pantoprazole (PROTONIX) tablet,delayed release (DR/EC) 40 mg  40 mg oral Daily Ethel Patton CRNP   40 mg at 01/23/19 0756   • sulfamethoxazole-trimethoprim (BACTRIM) 400-80 mg per tablet 80 mg of trimethoprim  1 tablet oral q12h True Shaw MD           Vital signs in last 24 hours:  Temp:  [36.2 °C (97.1 °F)-36.9 °C (98.5 °F)] 36.5 °C (97.7 °F)  Heart Rate:  [44-79] 78  Resp:  [6-22] 18  BP: (115-221)/() 128/77         Intake/Output Summary (Last 24 hours) at 01/23/19 1537  Last data filed at 01/23/19 1300   Gross per 24 hour   Intake           767.08 ml   Output             2175 ml   Net         -1407.92 ml         Physical Exam  General Appearance:  Alert, no distress, appears stated age   Head:  Normocephalic, without obvious abnormality, atraumatic   Eyes:  Conjunctiva/corneas clear, EOMI, nonicteric sclera, PERRL     Ears:  No external abnormalities   Throat: Mucous membranes mildly dry and pink, no exudates   Neck: Supple, symmetrical, no adenopathy; thyroid: no enlargement   Lungs:   Clear to auscultation bilaterally, respirations unlabored   Cardiovascular S1 S2 normal, RRR, no  rub   Abdomen:   Soft, non-tender, bowel sounds active, no masses, no organomegaly, no bruit, bandages left flank   Genitourinary:  Urinary catheter present with a small amount of red fluid in the madsen bag, no scrotal swelling   Musculoskeletal No edema   Skin: Skin color, texture, turgor normal   Lymph nodes: Cervical, supraclavicular, and axillary nodes not palpable   Neurologic:  Behavior/  Emotional: CN II-XII intact, no asterixis  Appropriate, cooperative, euthymic       Objective   Labs   Recent Results (from the past 24 hour(s))   ECG 12 lead    Collection Time: 01/22/19  3:50 PM   Result Value Ref Range    Ventricular rate 46     Atrial rate 46     DE Interval 210     QRS duration 168     QT Interval 574     QTC Calculation(Bazett) 502     P Axis 73     R Axis 270     T Wave Axis 79    POCT Glucose    Collection Time: 01/22/19  5:14 PM   Result Value Ref Range    POCT Bedside Glucose 250 (H) 70 - 99 mg/dL    POC Test POC    POCT Glucose    Collection Time: 01/22/19  9:22 PM   Result Value Ref Range    POCT Bedside Glucose 286 (H) 70 - 99 mg/dL    POC Test POC    CBC    Collection Time: 01/23/19  5:50 AM   Result Value Ref Range    WBC 13.07 (H) 3.80 - 10.50 K/uL    RBC 5.12 4.50 - 5.80 M/uL    Hemoglobin 14.8 13.7 - 17.5 g/dL    Hematocrit 43.6 40.1 - 51.0 %    MCV 85.2 83.0 - 98.0 fL    MCH 28.9 28.0 - 33.2 pg    MCHC 33.9 32.2 - 36.5 g/dL    RDW 13.5 11.6 - 14.4 %    Platelets 181 150 - 350 K/uL    MPV 10.8 9.4 - 12.4 fL   Diff Count    Collection Time: 01/23/19  5:50 AM   Result Value Ref Range    Differential Type Auto     nRBC 0.0 <=0.0 %    Immature Granulocytes 0.6 %    Neutrophils 84.3 %    Lymphocytes 5.9 %    Monocytes 6.5 %    Eosinophils 2.4 %    Basophils 0.3 %    Immature Granulocytes, Absolute 0.08 0.00 - 0.08 K/uL    Neutrophils, Absolute 11.01 (H) 1.70 - 7.00 K/uL    Lymphocytes, Absolute 0.77 (L) 1.20 - 3.50 K/uL    Monocytes, Absolute 0.85 0.30 - 1.00 K/uL    Eosinophils, Absolute 0.32  0.04 - 0.54 K/uL    Basophils, Absolute 0.04 0.01 - 0.10 K/uL   Basic metabolic panel    Collection Time: 01/23/19  5:51 AM   Result Value Ref Range    Sodium 133 (L) 136 - 144 mEQ/L    Potassium 4.0 3.6 - 5.1 mEQ/L    Chloride 99 98 - 109 mEQ/L    CO2 24 22 - 32 mEQ/L    BUN 29 (H) 8 - 20 mg/dL    Creatinine 2.1 (H) 0.8 - 1.3 mg/dL    Glucose 271 (H) 70 - 99 mg/dL    Calcium 8.7 (L) 8.9 - 10.3 mg/dL    eGFR 31.4 (L) >=60.0 mL/min/1.73m*2    Anion Gap 10 3 - 15 mEQ/L   Magnesium    Collection Time: 01/23/19  5:51 AM   Result Value Ref Range    Magnesium 2.1 1.8 - 2.5 mg/dL   POCT Glucose    Collection Time: 01/23/19  7:35 AM   Result Value Ref Range    POCT Bedside Glucose 251 (H) 70 - 99 mg/dL    POC Test POC    Troponin I    Collection Time: 01/23/19  8:04 AM   Result Value Ref Range    Troponin I 0.04 <0.05 ng/mL   POCT Glucose    Collection Time: 01/23/19 12:04 PM   Result Value Ref Range    POCT Bedside Glucose 257 (H) 70 - 99 mg/dL    POC Test POC            UA Results       01/15/19                          1510           Color Yellow           Clarity Clear           Glucose &gt;=1000 (A)           Bilirubin Negative           Ketones Negative           Sp Grav 1.017           Blood +3 (A)           Ph 6.0           Protein +2 (A)           Urobilinogen 0.2           Nitrite Negative           Leuk Est Negative           WBC 0 TO 3           RBC 20 TO 35 (A)           Bacteria None Seen           Comment for Blood at 1510 on 01/15/19:    The sensitivity of the occult blood test is equivalent to approximately 4 intact RBC/HPF.    Comment for Leuk Est at 1510 on 01/15/19:    Results can be falsely negative due to high specific gravity, some antibiotics, glucose >3 g/dl, or WBC other than neutrophils.          Results from last 7 days  Lab Units 01/23/19  0804   TROPONIN I ng/mL 0.04         Imaging  I have reviewed the pertinent patient's imaging results for this admission.        IMPRESSION/RECOMMENDATIONS:    68 y.o. male being consulted for chronic kidney disease     1)  NEPHROLITHIASIS  Patient has bilateral stones where on CT 10/18/18 the left stones of 1.1cm and 1.6cm were seen.  He is now s/p  left percutaneous nephrostolithotomy and stent placement.  The stones have been sent for analysis.  Agree with hydration avoiding obstruction from blood clots and to maintain euvolemia in patient with reduced appetite.  Management per urology.  Bactrim dose is within dosing recommendations for his body weight and creatinine, but would be cautious (see below).    2)  CHRONIC KIDNEY DISEASE STAGE 3  The patient has had some variability in creatinine, but mainly between 1.6-2.1.  He was 1.9 before the surgery and is presently with creatinine 2.1.  CKD is in the setting of  DM, HTN, obesity, nephrolithiasis, and age 69y/o.  He follows with Dr. Hernandez in this regard and can continue to to so upon discharge.  For now, agree with hydration, maintaining euvolemia.  Avoid nephrotoxins (NSAIDs, aminoglycosides, contrast, etc.).  Dose medications for level of renal function.  Noted patient is on lisinopril and Bactrim, so need to monitor creatinine and potassium level.  Given K=4.0 and he is getting IVF (even though lactated ringer's) will not invoke a low potassium diet at this time.  Follow I/O, blood chemistry.    3)  HYPERTENSION  He was having a period of stage 2 level blood pressures, but notes was having more discomfort at the time.  BP much better controlled now.  Continue to monitor and adjust medications as needed.    Glenn A. Ladinsky, MD, PhD

## 2019-01-23 NOTE — ANESTHESIA PROCEDURE NOTES
Airway  Urgency: elective    Start Time: 1/23/2019 9:51 AM    General Information and Staff    Patient location during procedure: OR  Anesthesiologist: CLARISSA MUJICA  Resident/CRNA: RISHI CHAN    Indications and Patient Condition  Indications for airway management: anesthesia  Sedation level: deep  Preoxygenated: yes  Patient position: sniffing  Mask difficulty assessment: 2 - vent by mask + OA or adjuvant +/- NMBA    Final Airway Details  Final airway type: endotracheal airway      Successful airway: ETT    Successful intubation technique: direct laryngoscopy  Facilitating devices/methods: intubating stylet  Endotracheal tube insertion site: oral  Blade: Rick  Blade size: #3  ETT size: 7.0 mm  Cormack-Lehane Classification: grade IIb - view of arytenoids or posterior of glottis only  Placement verified by: chest auscultation and capnometry   Measured from: lips  Number of attempts at approach: 2  Ventilation between attempts: BVM  Number of other approaches attempted: 0  Atraumatic airway insertion

## 2019-01-23 NOTE — OP NOTE
Preoperative diagnosis-left renal calculi  Postoperative diagnosis-same  Procedure-left percutaneous nephrostolithotomy  Surgeon-Dr. True Kent  Anesthesia-General endotracheal  Estimated blood loss-minimal  Complications-none  Findings- 2 large calculi in the lower pole/renal pelvis on the left.    Operative indications    Nathan is a 68-year-old male with nephrolithiasis who was noted to have a 15 mm left renal pelvic calculus and a 9 mm left lower pole calculus.  He underwent left ureteral stent insertion and lithotripsy 10/18/18.  The stone did not appear to fragment.  We discussed various management options in the office including surveillance, repeat lithotripsy, ureteroscopic fragmentation and a percutaneous nephrostolithotomy and decided to proceed with a percutaneous nephrostolithotomy as the most definitive procedure.  The procedure and risks were reviewed in detail in the office and the consent form was reviewed and was signed.  In the holding area, once again I reviewed the procedure and answered all questions.    Operative procedure following informed consent, he was brought to the operative suite and was given general anesthesia. Pneumatic teds were placed.  He was given vancomycin 1 g IV and gentamicin 80 mg IV prior to the procedure.  A Méndez catheter was placed.  He was then placed into the prone position.  All extremities and pressure points were carefully checked.  He was sterilely prepped and draped.    A timeout was performed to confirm the patient and the procedure.    The stiffener was placed through the Malecot 24 Estonian nephroureteral catheter which had been placed in interventional radiology.  Contrast was injected through the stiffener to confirm position in the lower pole within the collecting system.  A 0.38 Amplatz Super Stiff guidewire was then advanced through the stiffener and down into the bladder under fluoroscopic visualization.  The Malecot catheter was removed.  A  dual-lumen access catheter was passed over the Super Stiff guidewire into the proximal ureter and a second 0.38 sensor guidewire was advanced down into the bladder under fluoroscopic visualization.  Next, serial dilation was performed using the Amplatz dilators from 24 Austrian to 28 Austrian with placement of a 28 Austrian sheath.  Contrast was injected through the sheath to confirm adequate position within the lower pole collecting system.  Nephroscopy was performed.  The 2 large calculi were easily identified and were treated using the cyber wand.  Multiple fragments were grasped using an alligator grasping forceps and were removed and were sent for chemical analysis.  A flexible ureteroscope was passed down into the proximal ureter and no ureteral fragments were identified.  Repeat nephroscopy was performed throughout the lower pole and renal pelvis with no additional fragments identified.  Contrast was injected through the sheath to define the anatomy and a new 24 Austrian Malecot nephroureteral catheter was placed over the guidewire under fluoroscopic guidance with placement of the Malecot portion into the lower pole calyx.  The tube was sutured to the skin using 2-0 silk sutures.  A sterile dressing was placed.  He was taken to the recovery room in stable condition.

## 2019-01-23 NOTE — OR SURGEON
Pre-Procedure patient identification:  I am the primary operating surgeon/proceduralist and I have identified the patient and confirmed laterality is left on 01/23/19 at 9:14 AM True Kent MD  Phone Number: [phone]     I reviewed the procedure once again in the holding area and answered all questions.

## 2019-01-23 NOTE — PLAN OF CARE
Problem: Hypertensive Disease/Crisis (Arterial) (Adult)  Goal: Signs and Symptoms of Listed Potential Problems Will be Absent, Minimized or Managed (Hypertensive Disease/Crisis)  Outcome: Outcome(s) Achieved Date Met: 01/23/19

## 2019-01-23 NOTE — ANESTHESIA POSTPROCEDURE EVALUATION
Patient: Nathan Alonzo    Procedure Summary     Date:  01/23/19 Room / Location:  [facility] OR 3 / [facility] OR    Anesthesia Start:  0941 Anesthesia Stop:  1155    Procedure:  Left PCNL (Left ) Diagnosis:       Calculus, renal      (Renal Calc)    Surgeon:  True Kent MD Responsible Provider:  Jaguar Andrews MD    Anesthesia Type:  general ASA Status:  3          Anesthesia Type: general  PACU Vitals  1/23/2019 1146 - 1/23/2019 1246      1/23/2019 1155 1/23/2019 1200 1/23/2019 1220 1/23/2019 1230    BP: (!)  149/81 - 124/78 123/82    Temp: 36.2 °C (97.1 °F) - - -    Pulse: 79 - 76 70    Resp: - - 14 (!)  6    SpO2: 96 % 97 % 97 % 98 %              1/23/2019 1240             BP: 115/76       Temp: -       Pulse: 75       Resp: 14       SpO2: 96 %               Anesthesia Post Evaluation    Pain management: adequate  Patient location during evaluation: PACU  Patient participation: complete - patient participated  Level of consciousness: awake and alert  Cardiovascular status: acceptable  Airway Patency: adequate  Respiratory status: acceptable  Hydration status: acceptable  Anesthetic complications: no

## 2019-01-23 NOTE — NURSING NOTE
0745: Rec'd pt in ed and assessed, aaaox3, vss except b/p elevated, cm shows  SR.  0750:  in to see pt  0800: clonidine and amiodarone on hold per   0840; pt to OR by bed.  0900: update given to wife via phone including med given.  0945: Update given to son.  1030; updated given to wife via phone including med.  1045: jose Brady updated family concerns.

## 2019-01-23 NOTE — NURSING NOTE
Spoke with Kimberly in pharmacy who needed Urology to verify that they wanted to order Ciprofloxacin due to prolonged QTC.  Spoke with Dr. Kent.  Will hold tonight's dose of Ciprofloxacin

## 2019-01-24 PROBLEM — I10 HYPERTENSION: Status: RESOLVED | Noted: 2018-03-16 | Resolved: 2019-01-24

## 2019-01-24 PROBLEM — R25.1 TREMOR: Status: ACTIVE | Noted: 2019-01-24

## 2019-01-24 LAB
ANION GAP SERPL CALC-SCNC: 11 MEQ/L (ref 3–15)
BUN SERPL-MCNC: 43 MG/DL (ref 8–20)
CALCIUM SERPL-MCNC: 9.2 MG/DL (ref 8.9–10.3)
CHLORIDE SERPL-SCNC: 101 MEQ/L (ref 98–109)
CO2 SERPL-SCNC: 21 MEQ/L (ref 22–32)
CREAT SERPL-MCNC: 2.9 MG/DL
ERYTHROCYTE [DISTWIDTH] IN BLOOD BY AUTOMATED COUNT: 13.7 % (ref 11.6–14.4)
GFR SERPL CREATININE-BSD FRML MDRD: 21.6 ML/MIN/1.73M*2
GLUCOSE BLD-MCNC: 155 MG/DL (ref 70–99)
GLUCOSE BLD-MCNC: 171 MG/DL (ref 70–99)
GLUCOSE BLD-MCNC: 199 MG/DL (ref 70–99)
GLUCOSE BLD-MCNC: 226 MG/DL (ref 70–99)
GLUCOSE SERPL-MCNC: 274 MG/DL (ref 70–99)
HCT VFR BLDCO AUTO: 42.2 %
HGB BLD-MCNC: 14.2 G/DL
MAGNESIUM SERPL-MCNC: 2.1 MG/DL (ref 1.8–2.5)
MCH RBC QN AUTO: 29.6 PG (ref 28–33.2)
MCHC RBC AUTO-ENTMCNC: 33.6 G/DL (ref 32.2–36.5)
MCV RBC AUTO: 87.9 FL (ref 83–98)
PDW BLD AUTO: 10.9 FL (ref 9.4–12.4)
PLATELET # BLD AUTO: 203 K/UL
POCT TEST: ABNORMAL
POTASSIUM SERPL-SCNC: 4.7 MEQ/L (ref 3.6–5.1)
RBC # BLD AUTO: 4.8 M/UL (ref 4.5–5.8)
SODIUM SERPL-SCNC: 133 MEQ/L (ref 136–144)
TROPONIN I SERPL-MCNC: 0.05 NG/ML
WBC # BLD AUTO: 18.8 K/UL

## 2019-01-24 PROCEDURE — 63700000 HC SELF-ADMINISTRABLE DRUG: Performed by: HOSPITALIST

## 2019-01-24 PROCEDURE — 99221 1ST HOSP IP/OBS SF/LOW 40: CPT | Performed by: INTERNAL MEDICINE

## 2019-01-24 PROCEDURE — 85027 COMPLETE CBC AUTOMATED: CPT | Performed by: HOSPITALIST

## 2019-01-24 PROCEDURE — 25800000 HC PHARMACY IV SOLUTIONS: Performed by: HOSPITALIST

## 2019-01-24 PROCEDURE — 80048 BASIC METABOLIC PNL TOTAL CA: CPT | Performed by: HOSPITALIST

## 2019-01-24 PROCEDURE — 63700000 HC SELF-ADMINISTRABLE DRUG: Performed by: INTERNAL MEDICINE

## 2019-01-24 PROCEDURE — 25000000 HC PHARMACY GENERAL: Performed by: NURSE PRACTITIONER

## 2019-01-24 PROCEDURE — 20600000 HC ROOM AND CARE INTERMEDIATE/TELEMETRY

## 2019-01-24 PROCEDURE — 63700000 HC SELF-ADMINISTRABLE DRUG: Performed by: PSYCHIATRY & NEUROLOGY

## 2019-01-24 PROCEDURE — 84484 ASSAY OF TROPONIN QUANT: CPT | Performed by: INTERNAL MEDICINE

## 2019-01-24 PROCEDURE — 99233 SBSQ HOSP IP/OBS HIGH 50: CPT | Performed by: HOSPITALIST

## 2019-01-24 PROCEDURE — 83735 ASSAY OF MAGNESIUM: CPT | Performed by: HOSPITALIST

## 2019-01-24 PROCEDURE — 63600000 HC DRUGS/DETAIL CODE: Performed by: HOSPITALIST

## 2019-01-24 PROCEDURE — 63700000 HC SELF-ADMINISTRABLE DRUG: Performed by: NURSE PRACTITIONER

## 2019-01-24 RX ORDER — INSULIN GLARGINE 100 [IU]/ML
18 INJECTION, SOLUTION SUBCUTANEOUS DAILY
Status: DISCONTINUED | OUTPATIENT
Start: 2019-01-24 | End: 2019-01-25

## 2019-01-24 RX ORDER — HYDRALAZINE HYDROCHLORIDE 25 MG/1
50 TABLET, FILM COATED ORAL EVERY 8 HOURS
Status: DISCONTINUED | OUTPATIENT
Start: 2019-01-24 | End: 2019-01-27

## 2019-01-24 RX ORDER — SODIUM CHLORIDE 9 MG/ML
INJECTION, SOLUTION INTRAVENOUS CONTINUOUS
Status: DISCONTINUED | OUTPATIENT
Start: 2019-01-24 | End: 2019-01-25

## 2019-01-24 RX ORDER — PROPRANOLOL HYDROCHLORIDE 10 MG/1
10 TABLET ORAL DAILY
Status: DISCONTINUED | OUTPATIENT
Start: 2019-01-24 | End: 2019-01-25

## 2019-01-24 RX ORDER — HYDRALAZINE HYDROCHLORIDE 25 MG/1
25 TABLET, FILM COATED ORAL EVERY 8 HOURS
Status: DISCONTINUED | OUTPATIENT
Start: 2019-01-24 | End: 2019-01-24

## 2019-01-24 RX ADMIN — HYDRALAZINE HYDROCHLORIDE 25 MG: 25 TABLET, FILM COATED ORAL at 08:37

## 2019-01-24 RX ADMIN — INSULIN ASPART 4 UNITS: 100 INJECTION, SOLUTION INTRAVENOUS; SUBCUTANEOUS at 08:32

## 2019-01-24 RX ADMIN — LEVOTHYROXINE SODIUM 100 MCG: 100 TABLET ORAL at 06:25

## 2019-01-24 RX ADMIN — MORPHINE SULFATE 3 MG: 4 INJECTION, SOLUTION INTRAMUSCULAR; INTRAVENOUS at 16:01

## 2019-01-24 RX ADMIN — SODIUM CHLORIDE: 9 INJECTION, SOLUTION INTRAVENOUS at 08:27

## 2019-01-24 RX ADMIN — AMIODARONE HYDROCHLORIDE 200 MG: 200 TABLET ORAL at 08:31

## 2019-01-24 RX ADMIN — INSULIN ASPART 2 UNITS: 100 INJECTION, SOLUTION INTRAVENOUS; SUBCUTANEOUS at 17:50

## 2019-01-24 RX ADMIN — MORPHINE SULFATE 3 MG: 4 INJECTION, SOLUTION INTRAMUSCULAR; INTRAVENOUS at 08:28

## 2019-01-24 RX ADMIN — INSULIN ASPART 7 UNITS: 100 INJECTION, SOLUTION INTRAVENOUS; SUBCUTANEOUS at 08:32

## 2019-01-24 RX ADMIN — PROPRANOLOL HYDROCHLORIDE 10 MG: 10 TABLET ORAL at 17:48

## 2019-01-24 RX ADMIN — ATORVASTATIN CALCIUM 40 MG: 40 TABLET, FILM COATED ORAL at 17:48

## 2019-01-24 RX ADMIN — HYDRALAZINE HYDROCHLORIDE 50 MG: 25 TABLET, FILM COATED ORAL at 21:33

## 2019-01-24 RX ADMIN — INSULIN ASPART 7 UNITS: 100 INJECTION, SOLUTION INTRAVENOUS; SUBCUTANEOUS at 12:48

## 2019-01-24 RX ADMIN — INSULIN ASPART 2 UNITS: 100 INJECTION, SOLUTION INTRAVENOUS; SUBCUTANEOUS at 22:11

## 2019-01-24 RX ADMIN — PANTOPRAZOLE SODIUM 40 MG: 40 TABLET, DELAYED RELEASE ORAL at 08:31

## 2019-01-24 RX ADMIN — MORPHINE SULFATE 3 MG: 4 INJECTION, SOLUTION INTRAMUSCULAR; INTRAVENOUS at 12:18

## 2019-01-24 RX ADMIN — INSULIN GLARGINE 18 UNITS: 100 INJECTION, SOLUTION SUBCUTANEOUS at 12:18

## 2019-01-24 RX ADMIN — MORPHINE SULFATE 3 MG: 4 INJECTION, SOLUTION INTRAMUSCULAR; INTRAVENOUS at 02:32

## 2019-01-24 RX ADMIN — HYDRALAZINE HYDROCHLORIDE 50 MG: 25 TABLET, FILM COATED ORAL at 14:15

## 2019-01-24 RX ADMIN — LAMOTRIGINE 150 MG: 25 TABLET ORAL at 08:30

## 2019-01-24 RX ADMIN — AZTREONAM 1 G: 1 INJECTION, POWDER, LYOPHILIZED, FOR SOLUTION INTRAMUSCULAR; INTRAVENOUS at 14:14

## 2019-01-24 RX ADMIN — MORPHINE SULFATE 3 MG: 4 INJECTION, SOLUTION INTRAMUSCULAR; INTRAVENOUS at 20:00

## 2019-01-24 RX ADMIN — INSULIN ASPART 7 UNITS: 100 INJECTION, SOLUTION INTRAVENOUS; SUBCUTANEOUS at 17:49

## 2019-01-24 NOTE — PROGRESS NOTES
Hospital Medicine Service -  Daily Progress Note       SUBJECTIVE   Patient seen and examined.   Feeling fine  No complaint  Pain controlled  Patient denies CP, SOB, palpitations, fever, cough, abd pain, vomiting, diarrhea, BRBPR, melena, hematemesis, dysuria, hematuria, headache, vision change, vertigo, numbness/tingling/focal weakness, light headedness, or additional complaint.    He is asking about a resting tremor that he has had for several months.     OBJECTIVE      Vital signs in last 24 hours:  Temp:  [36.2 °C (97.1 °F)-37.1 °C (98.7 °F)] 36.3 °C (97.4 °F)  Heart Rate:  [55-79] 65  Resp:  [6-20] 14  BP: (104-149)/(61-85) 148/82    PHYSICAL EXAMINATION        General: nontoxic appearing, no acute distress  HEENT:  PERRL, anicteric sclera   Neck: supple, no JVD  Cardiac: RRR, +S1/S2, no murmur, no rub   Lungs: clear bilaterally, no wheezing/rales/rhonchi  Abdomen: soft, NT/ND, +BS, no rebound/guarding   Extremities: no edema, distal perfusion intact  : hematuria in madsen bag is clearing  Neuro: AAOx3, nonfocal. CN's intact grossly. No focal motor deficit. No sensory deficit.  Skin: no rash. Clean, dry, intact.   Psych: cooperative      LABS / IMAGING / TELE      Labs  I have reviewed the patient's current laboratory data.    Imaging  I have independently reviewed the pertinent imaging from the last 24 hrs.    ECG/Telemetry  Telemetry reviewed    ASSESSMENT AND PLAN      Hypertensive urgency   Assessment & Plan    Patient was admitted after IR percutaneous nephrostomy tube placement  On the med/surg unit, patient's BP was 212/90. Asymptomatic.   Patient has a known history of difficult to control HTN and has a history of known accelerated HTN in the past after procedures.  He was in significant pain from the nephrostomy tube which is likely driving his HTN.  HTN improved with pain control.   Cardiology consult appreciated. HTN management per cardiology. on hydralazine. BP controlled currently.  ACE and  Diuretics on hold given renal function and nephrolithiasis/procedures.  He has a history of intolerance to calcium channel blockers. Dr. Polo to decide on resuming clonidine if necessary vs trial of low dose BB given his CAD history.  Continue telemetry  Continue to monitor and adjust meds prn          Kidney stone   Assessment & Plan    S/p percutaneous nephrostomy per IR 1/22  S/p removal of L stent in office 1/22  further intervention on the stone done in OR 1/23  Antibiotics and DVT prophylaxis per primary urology team.  On SCD's (hematuria post procedure)  QT prolongation and cephalosporin allergy noted. Would avoid Bactrim given his rise in Cr. Spoke to Urology who is consulting ID for further ABx recs.  ASA was stopped 5 days prior to the procedure and Eliquis was stopped this past Saturday per the direction of his primary cardiologist. Resume when ok with urology        Bipolar 1 disorder (CMS/[facility]) ([facility])   Assessment & Plan    Continue lamictal per outpatient regimen        CKD (chronic kidney disease) stage 3, GFR 30-59 ml/min (CMS/[facility]) ([facility])   Assessment & Plan    Baseline Cr appears to be in range of 1.6 to 2.0 on review of past records  Cr 2.1 increased to 2.9  Making urine. Madsen functioning  Ongoing stone and madsen management per urology  Diuretics on hold for now. Hold ACE  Change bactrim to alternate antibiotic choice.  Gently hydrate.   Monitor BMP and volume status  Nephrology following - call placed to discuss with nephrology          Bradycardia   Assessment & Plan    Sinus bradycardia on clonidine with known RBBB  No symptoms  Cardiology following; HTN management per cardiology recs  Monitor on telemetry        GERD (gastroesophageal reflux disease)   Assessment & Plan    Continue Protonix        Type 2 diabetes mellitus (CMS/[facility]) ([facility])   Assessment & Plan    Continue to monitor accuchecks  Mealtime novolog with correction insulin  Dietary intake has been variable and he has been  intermittently NPO for procedures  Adjust regimen prn  Consulted endocrinology for management. Discussed with Dr. Mcclelland.          Hypothyroid   Assessment & Plan    Continue levothyroxine        Hypercholesteremia   Assessment & Plan    statin        Atrial fibrillation (CMS/HCC)   Assessment & Plan    History of afib on amiodarone and Eliquis as outpatient  Currently in sinus rhythm  Monitor on telemetry  Cardiology consulted  Patient is on aspirin and Eliquis which was held-resume medication per urology and cardiology                  Maxim Casinao,   1/24/2019  10:19 AM

## 2019-01-24 NOTE — CONSULTS
Endocrinology Consult Note  Subjective     Nathan Alonzo is a 68 y.o. male who was admitted for Kidney stone [N20.0]  Kidney stone [N20.0]  Kidney stone [N20.0].     Patient was referred by  for uncontrolled type 2 diabetes mellitus x 7 years.   He denies any history of retinopathy or neuropathy.  History of coronary artery disease status post CABG x3 5/2017.  History of right bundle branch block.  History of primary hypothyroidism.  History of LILLIAM on CPAP.    By his account he has had a volitional 30 pound weight loss since his cardiac bypass in 2017.  He is a non-smoker.  He has CKD and is followed by nephrologist Dr. Gray in [place].  He is uncertain whether his chronic kidney disease on the basis of diabetes or another process.  He has a history of accelerated hypertension in the past with a history of calcium channel blocker intolerance.  His complicated antihypertensive regimen includes:  Catapres, lisinopril 40 mg, eplerenone 25 mg and Aldactazide hydrochlorothiazide 25/25.  He is admitted now with complicated nephrolithiasis.    He had a 15 mm left and 9 mm left renal stone.  Underwent a percutaneous nephrolithotomy and operative and intervention 1/23/2019.  He required a left-sided stent and lithotripsy 10/18/2018.    He has a history of bipolar illness hyperlipidemia obstructive sleep apnea.  He is followed by an endocrinologist at [facility] [place] in [place] named Dr. Ness.    His home regimen presently is:   NovoLog 7030 mix 15 units with breakfast, 10 units with lunch, 15 units with dinner.  He admits that he forgets the supper dose not infrequently.  Over the last month prior to admission his blood sugars have been slowly creeping up in the 140-180 range.  Does not recall experiencing any recent hypoglycemia.    Hemoglobin A1c 3/5/2018 was 7.9%.  All available notes and records from this hospitalization were reviewed.    Medical History:   Past Medical History:   Diagnosis Date    • A-fib (CMS/HCC) (HCC)    • Abnormal ECG     a fib   • Aortic valve disorder    • BPH (benign prostatic hyperplasia)    • BPH (benign prostatic hyperplasia)    • Coronary artery disease    • Disease of thyroid gland    • Hypertension    • Hypothyroidism    • Kidney stones    • Nocturia    • Renal calculi     right   • Renal cancer (CMS/HCC) (HCC)     pt denies   • Right bundle branch block    • Sleep apnea     uses cpap   • Type 2 diabetes mellitus (CMS/HCC) (HCC)        Surgical History:   Past Surgical History:   Procedure Laterality Date   • COLONOSCOPY  2016   • CORONARY ARTERY BYPASS GRAFT  2017    x3   • KIDNEY STONE SURGERY Right 2018   • NEPHRECTOMY Right 2016    PARTIAL    • TONSILLECTOMY     • UVULECTOMY  2005       Social History:   He is a non-smoker.  No alcohol.  No drugs.  He works as a  and also does small snow removal.  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    Family History:   Family History   Problem Relation Age of Onset   • Lung cancer Mother    • Lung cancer Father    • Early death Father    • Diabetes Sister      No current facility-administered medications on file prior to encounter.      Current Outpatient Prescriptions on File Prior to Encounter   Medication Sig Dispense Refill   • amiodarone (PACERONE) 200 mg tablet Take 200 mg by mouth daily.     • apixaban (ELIQUIS) 5 mg tablet Take 5 mg by mouth 2 (two) times a day. PT TO HOLD ELIQUIS 3 DAYS PRIOR TO SX AS PER HIS CARDIOLOGIST      • aspirin 81 mg enteric coated tablet Take 81 mg by mouth daily. PT TO STOP 5 DAYS PRIOR TO SX AS PER HIS CARDIOLOGIST      • atorvastatin (LIPITOR) 40 mg tablet Take 40 mg by mouth daily.       • coenzyme Q10 (CO Q-10) 400 mg capsule Take 400 mg by mouth daily.       • eplerenone (INSPRA) 25 mg tablet Take 25 mg by mouth daily.     • finasteride (PROSCAR) 5 mg tablet Take 5 mg by mouth daily.     • levothyroxine (SYNTHROID) 100 mcg tablet Take 100 mcg by  mouth daily.     • lisinopril (PRINIVIL) 40 mg tablet Take 1 tablet (40 mg total) by mouth 2 (two) times a day.     • omeprazole (PriLOSEC) 40 mg capsule Take 40 mg by mouth daily before breakfast.         Allergies: Oxycodone; Amlodipine; Cephalexin monohydrate; and Latex    Review of Systems  Volitional 30 pound wt loss isnce his CABG in 2017  No hypoglycemia at home.  No angina at home or CP here.  No SOB  No N/V  No diarrhea  Having an issue with constipation now, ? Pain meds.  Takes miralax at home sometimes.  I think she recently dropped something on his first toe and has a subungual hematoma.  Never had any foot ulcerations.  Occasional tingling in his feet.  Mood is stable.  Has discomfort from his percutaneous nephrolithotomy.  No other pain  He is also complaining of warmth in his face and some itching on his chest where there is an evolving rash which started today.    Spent 30 minutes face to face with the patient, and >75% of this time was in counseling and coordination of care.      Objective   Labs      Results from last 7 days  Lab Units 01/24/19  0500 01/23/19  0550   WBC K/uL 18.80* 13.07*   HEMOGLOBIN g/dL 14.2 14.8   HEMATOCRIT % 42.2 43.6   PLATELETS K/uL 203 181       Results from last 7 days  Lab Units 01/24/19  0500 01/23/19  0551   SODIUM mEQ/L 133* 133*   POTASSIUM mEQ/L 4.7 4.0   CHLORIDE mEQ/L 101 99   CO2 mEQ/L 21* 24   BUN mg/dL 43* 29*   CREATININE mg/dL 2.9* 2.1*   CALCIUM mg/dL 9.2 8.7*   GLUCOSE mg/dL 274* 271*     Physical Exam    Vitals:    01/24/19 0831 01/24/19 1125 01/24/19 1400 01/24/19 1535   BP: (!) 148/82 (!) 164/72 135/69 (!) 163/86   BP Location:  Right upper arm Right upper arm Right upper arm   Patient Position:  Lying Lying Sitting   Pulse: 65 62 67 70   Resp:  18 18 18   Temp:  36.6 °C (97.8 °F) 36.8 °C (98.2 °F) 36.6 °C (97.8 °F)   TempSrc:  Oral Oral Oral   SpO2:  97% 95% 100%   Weight:       Height:         General:     WDWN, in NAD; appearing stated age.  He is  very pleasant.    There is rather intense erythema of his face with milder erythema on his upper chest where he is scratching.    HEENT:      Facies symmetric; Anicteric, without gaze preference                      No thyroid or carotid bruit;                                          Moist mucous membranes                      No increased JVP.                      No cervical lymphadenopathy.  COR:           Normal S1, S2; no murmurs gallops or rubs  LUNGS:       Normal effort;                       Lungs clear without rales, wheezes or rhonchi.  ABDOMEN: NABS: Nontender, nondistended; no HSM or masses.   EXT:            No cyanosis, clubbing or edema or tremor.                      There is a tiny abrasion on the dorsal aspect of the right third toe first joint.  There is a resolving subungual hematoma at the left first toe from trauma.  No other deformities.  There is some lumbar Abel atrophy.  Feet are warm pedal pulses are difficult to appreciate.                     PSYCH:      Normal insight and judgment. Alert and O x 3.        Assessment   68 y.o. male being consulted for type 2 DM x 7 years duration without known retinopathy, neuropathy, unceratin baseline nephropathy; now sugars uncontrolled in the setting of complicated nephrolithiasis, sp perc nephrostomy 1 22 2019 and operative intervention on 1 23 2019,   now with acute on chronic renal insufficiency creat 29 and GFR 21---> up to 43/2.9  H/o complex and accelerated HTN in the past, CCB intolerant.  Troponin is 0.04- 0.05  Tremors for the last few months of unclear etiology  Received a singel dose of Dexamethasone 8 mg at [id number] yestereay and none since that time.  Sugarsr down from 340 to 226 this am.  GFR only 21 down from 31.    He has facial redness and  Pruritis that looks like red man syndrome possibly from Vancomycin yesterday.         Plan   He is 119 kg, so 0.3 unit/kg is 36 units as TDD should be safe if he continues decline in GFR.  Will  order Lantus 16 units now and reasses.  He has 7 units AC written.  Will correct AC meal to meal and follow. Thanks for the consult. Dr. Malloy covers on Saturday 1 26 19.  Peggy Mcclelland MD

## 2019-01-24 NOTE — PROGRESS NOTES
The patient, Mr. Nathan Alonzo, was visited by the community , Father Mariposa, and anointed.     Camila Lim  [facility] Intern  Ext. [phone]

## 2019-01-24 NOTE — STUDENT
"  Medical Student Note: For educational purposes only.  Do not use for coding or billing.     Medical Student Daily Progress Note    Mr. Alonzo is a 67 yo male with a PMH of Nephrolithiasis, HTN, CAD, DM, and renal cell carcinomas/p partial nephrectomy being consulted by neurology for evaluation of a resting tremor that has been present many months prior to admission.       Subjective     Tremor  - Reports tremor in both hands that is most prominent in the morning when he first wakes up  - Started \"months\" before current admission; reports 3-4 episodes per week  - Thinks they may be associated with his blood sugar since it occurs in the mornings when his levels are in the 200s  - Notices tremors while active and denies at rest; they occur during activities such as washing dishes or shaving   - Denies any numbness, tingling, or weakness in his hands before/during/after tremors  - Tremors typically resolve by early afternoon  - Unable to identify specific trigger and has not found anything that makes them better or worse   - Denies any history of tremors       Objective     Vital signs in last 24 hours:  Temp:  [36.3 °C (97.4 °F)-37.1 °C (98.7 °F)] 36.6 °C (97.8 °F)  Heart Rate:  [55-77] 62  Resp:  [12-20] 18  BP: (104-164)/(61-82) 164/72    Neuro Exam  - Mental Status: Alert and oriented x 3, language intact,   - CN: 2-12 intact   - Motor: +5 in the upper and lower extremities, no pronator drift, no tremor  - Sensory: no lateralizing sensory deficits  - Coordination: normal finger to nose  - Gait: not test       Assessment/Plan     1. Tremor  Assessment  Given that patient notices tremor with activities such as washing dishes or shaving would characterize as an essential tremor as opposed to a resting tremor. Would further classify as a kinetic tremor since it occurs during voluntary movements. Less likely postural given normal exam and since patient says not exacerbated by holding particular postures. Less likely " an intention tremor given normal coordination on physical exam. Tremor is likely exacerbated by work-related stress.  - Given history and exam, tremor does not have features concerning for Parkinson disease    Plan  - Trial propranolol 10 mg QAM   - Check TSH, T4  - F/u in the office as an outpatient    Milton Ascencio MS3

## 2019-01-24 NOTE — PLAN OF CARE
Problem: Patient Care Overview  Goal: Plan of Care Review  Outcome: Ongoing (interventions implemented as appropriate)   01/23/19 1400 01/24/19 0631   Coping/Psychosocial   Plan Of Care Reviewed With --  patient   Plan of Care Review   Progress improving --    Outcome Summary --  Patient blood pressure well controlled last night. Received adequate pain relief from medications. Ambulated in the [place] with walker     Goal: Individualization & Mutuality  Outcome: Ongoing (interventions implemented as appropriate)   01/23/19 0653 01/24/19 0631   Individualization   Patient Specific Preferences Patient likes quiet at night and room darkened --    Patient Specific Goals --  Controlled pain, wants to ambulate    Patient Specific Interventions --  ambulate in halls, heart monitor, vital signs, pain meds as indicated       Problem: Fall Risk (Adult)  Goal: Absence of Falls  Outcome: Ongoing (interventions implemented as appropriate)   01/24/19 0631   Fall Risk (Adult)   Absence of Falls making progress toward outcome       Problem: Pain, Acute (Adult)  Goal: Identify Related Risk Factors and Signs and Symptoms  Outcome: Ongoing (interventions implemented as appropriate)   01/23/19 0653   Pain, Acute   Related Risk Factors (Acute Pain) disease process;persistent pain;procedure/treatment   Signs and Symptoms (Acute Pain) verbalization of pain descriptors     Goal: Acceptable Pain Control/Comfort Level  Outcome: Ongoing (interventions implemented as appropriate)   01/24/19 0631   Pain, Acute (Adult)   Acceptable Pain Control/Comfort Level making progress toward outcome

## 2019-01-24 NOTE — CONSULTS
Infectious Disease Consult Note    Patient Name: Nathan Alonzo  MR#: [id number]  : 1950  Admission Date: 2019  Consult Date: 19 12:21 PM   Consultant: MARIA ELENA Mccormick    Reason for Consult: ATB management, s/p PCNL, allergies, elevated creat, prolonged QT  Referring Provider: Dr Casiano/ Dr Kent      History of Present Illness     Nathan Alonzo is a 68 y.o. male who was admitted on 2019 for nephrostomy tube in preparation for kidney stone procedure that was performed yesterday.   He does admit to some soreness inn the flank at the site of the tube.  He denies any recent fever, chills, nausea, vomiting, diarrhea, dysuria or hematuria.  He states he was treated for a urinary infection associated with hematuria in November and was taking cipro at that time.  He states his madsen catheter that was placed for the procedure was removed today without any issues.     Outside records were reviewed.    Allergies:   Allergies   Allergen Reactions   • Oxycodone Other (see comments)      Hives     • Amlodipine Other (see comments)     ANKLE  & FEET  SWELLING    • Cephalexin Monohydrate Other (see comments)     PT CAN'T REMEMBER  REACTION   • Latex Rash       Medical History:   Past Medical History:   Diagnosis Date   • A-fib (CMS/HCC) (Formerly [facility])    • Abnormal ECG     a fib   • Aortic valve disorder    • BPH (benign prostatic hyperplasia)    • BPH (benign prostatic hyperplasia)    • Coronary artery disease    • Disease of thyroid gland    • Hypertension    • Hypothyroidism    • Kidney stones    • Nocturia    • Renal calculi     right   • Renal cancer (CMS/HCC) (Formerly [facility])     pt denies   • Right bundle branch block    • Sleep apnea     uses cpap   • Type 2 diabetes mellitus (CMS/HCC) (Formerly [facility])        Surgical History:   Past Surgical History:   Procedure Laterality Date   • COLONOSCOPY  2016   • CORONARY ARTERY BYPASS GRAFT  2017    x3   • KIDNEY STONE SURGERY Right 2018   • NEPHRECTOMY Right 2016     PARTIAL    • TONSILLECTOMY     • UVULECTOMY  2005       Social History:   Social History     Social History   • Marital status:      Spouse name: N/A   • Number of children: N/A   • Years of education: N/A     Social History Main Topics   • Smoking status: Never Smoker   • Smokeless tobacco: Never Used   • Alcohol use No   • Drug use: No   • Sexual activity: Defer     Other Topics Concern   • None     Social History Narrative   • None          Travel Exposure:   Travel and Exposure Screening      Most Recent Value   Travel Screening   Traveled outside the [place] in the last month?  No Filed On: 01/22/2019 1019   Exposure Screening   Contact with someone with a communicable disease in the last month?  No Filed On: 01/22/2019 1019   Symptoms          Animal Exposure: none    Other Exposure: none    Family History:   Family History   Problem Relation Age of Onset   • Lung cancer Mother    • Lung cancer Father    • Early death Father    • Diabetes Sister        Review of Systems    All other systems reviewed and negative except as noted in the HPI.    Medications:    Current IP Meds         Frequency     insulin glargine (LANTUS SOLOSTAR) pen 18 Units      Daily     hydrALAZINE (APRESOLINE) tablet 25 mg      Every 8 hours     sodium chloride 0.9 % infusion      Continuous     sulfamethoxazole-trimethoprim (BACTRIM) 400-80 mg per tablet 80 mg of trimethoprim  Status:  Discontinued      Every 12 hours     insulin aspart U-100 (NovoLOG) pen 7 Units      3 times daily with meals     lactated ringer's infusion      Continuous     ondansetron (ZOFRAN) injection 4 mg  (ondansetron IV then metoclopramide if needed )  Status:  Discontinued      Every 15 min PRN     metoclopramide (REGLAN) injection 10 mg  (ondansetron IV then metoclopramide if needed )  Status:  Discontinued      Every 15 min PRN     fentaNYL (PF) (SUBLIMAZE) injection 50 mcg  Status:  Discontinued      Every 15 min PRN     HYDROmorphone (DILAUDID)  injection 0.5 mg  (morphine or hydromorphone IV, to be used after fentanyl doses are complete)  Status:  Discontinued      Every 15 min PRN     insulin aspart U-100 (NovoLOG) pen 5 Units  Status:  Discontinued      3 times daily with meals     levothyroxine (SYNTHROID) tablet 100 mcg      Daily (6:30a)     insulin aspart U-100 (NovoLOG) pen 2-10 Units      4 times daily with meals and nightly     hydrALAZINE (APRESOLINE) injection 20 mg      Every 8 hours PRN     cloNIDine (CATAPRES) tablet 0.2 mg  Status:  Discontinued      2 times daily     hydrALAZINE (APRESOLINE) tablet 50 mg  Status:  Discontinued      Every 8 hours     lisinopril (PRINIVIL) tablet 40 mg  Status:  Discontinued      2 times daily     atorvastatin (LIPITOR) tablet 40 mg      Daily (6p)     amiodarone (PACERONE) tablet 200 mg      Daily     lamoTRIgine (LaMICtal) tablet 150 mg      Daily     morphine injection 3 mg      Every 4 hours PRN     pantoprazole (PROTONIX) tablet,delayed release (DR/EC) 40 mg      Daily     acetaminophen (TYLENOL) tablet 650 mg      Every 4 hours PRN     diphenhydrAMINE (BENADRYL) injection 25 mg      Every 6 hours PRN                Objective     Vital Signs:    Patient Vitals for the past 72 hrs:   BP Temp Temp src Pulse Resp SpO2 Height Weight   01/24/19 1125 (!) 164/72 36.6 °C (97.8 °F) Oral 62 18 97 % - -   01/24/19 0831 (!) 148/82 - - 65 - - - -   01/24/19 0700 - - - - 14 99 % - -   01/24/19 0600 - - - - 16 99 % - -   01/24/19 0500 - - - - 19 100 % - -   01/24/19 0400 114/66 36.3 °C (97.4 °F) Oral (!) 55 16 99 % - -   01/24/19 0300 - - - - 14 99 % - -   01/24/19 0200 104/61 - - (!) 59 20 100 % - -   01/24/19 0100 - - - - 19 99 % - -   01/24/19 0000 105/69 36.7 °C (98 °F) Oral 67 18 99 % - -   01/23/19 2200 (!) 149/73 - - 66 18 99 % - -   01/23/19 2100 - - - - 18 99 % - -   01/23/19 2000 - - - - - 100 % - -   01/23/19 1931 132/76 36.5 °C (97.7 °F) - 71 20 98 % - -   01/23/19 1800 136/73 36.8 °C (98.2 °F) Oral 77 18  "97 % - -   19 1700 126/71 - - 71 15 97 % - -   19 1600 127/72 37.1 °C (98.7 °F) Oral 76 18 98 % - -   19 1530 (!) 143/80 36.7 °C (98 °F) Oral 76 15 97 % - -   19 1500 121/70 - - 74 14 96 % - -   19 1430 130/76 - - 76 12 - - -   19 1400 128/77 36.5 °C (97.7 °F) Oral 78 18 96 % - -   19 1345 (!) 148/82 - - 72 16 96 % - -   19 1325 137/81 36.5 °C (97.7 °F) Oral 72 18 95 % - -   19 1300 124/85 - - 71 17 94 % - -   19 1250 131/84 - - 74 16 95 % - -   19 1240 115/76 - - 75 14 96 % - -   19 1230 123/82 - - 70 (!) 6 98 % - -   19 1220 124/78 - - 76 14 97 % - -   19 1200 - - - - - 97 % - -   19 1155 (!) 149/81 36.2 °C (97.1 °F) Temporal 79 - 96 % - -   19 0833 (!) 189/91 36.9 °C (98.5 °F) Temporal 75 (!) 22 97 % - -   19 0755 (!) 193/100 36.5 °C (97.7 °F) Oral 60 16 96 % - -   19 0600 (!) 178/92 - - (!) 59 18 95 % - -   19 0400 (!) 180/96 36.3 °C (97.3 °F) Oral (!) 57 16 97 % - -   19 0200 (!) 179/90 - - (!) 58 16 96 % - -   19 0000 (!) 145/83 36.4 °C (97.6 °F) Oral 61 14 96 % - 119 kg (263 lb 4.8 oz)   19 2200 (!) 159/85 - - 65 16 96 % - -   19 2000 (!) 216/110 36.4 °C (97.5 °F) Oral 62 16 100 % - -   19 1900 (!) 206/106 - - - - - - -   19 1840 (!) 208/94 - - - - - - -   19 1820 (!) 221/115 - - - - - - -   19 1621 (!) 221/99 - - (!) 52 - - - -   19 1538 (!) 157/112 36.3 °C (97.3 °F) Axillary (!) 44 16 99 % - -   19 1500 (!) 156/112 - - - - - - -   19 1345 (!) 175/88 37 °C (98.6 °F) Oral (!) 44 16 97 % 1.88 m (6' 2\") 118 kg (261 lb)   19 1335 (!) 178/87 - - (!) 47 - 94 % - -   19 1330 (!) 172/90 - - (!) 48 - 94 % - -   19 1325 (!) 200/93 - - - 16 98 % - -   19 1039 - 37 °C (98.6 °F) Oral - - - - -   19 1021 - - - - - - 1.88 m (6' 2.02\") 118 kg (261 lb)       Temp (72hrs), Av.6 °C (97.9 °F), Min:36.2 °C (97.1 °F), " "Max:37.1 °C (98.7 °F)      Physical Exam:    BP (!) 163/86 (BP Location: Right upper arm, Patient Position: Sitting)   Pulse 70   Temp 36.6 °C (97.8 °F) (Oral)   Resp 18   Ht 1.88 m (6' 2\")   Wt 119 kg (263 lb 4.8 oz)   SpO2 100%   BMI 33.81 kg/m²   General appearance: alert, appears stated age, cooperative and no distress  Eyes: anicteric  Neck: no adenopathy and supple, symmetrical, trachea midline  Lungs: clear to auscultation bilaterally and nonlabored, no cough  Abdomen: soft, ND, NT, obese, left PCN with bloody urine  Extremities: normal ROM of the BLE  Skin: erythema of the face and chest and lateral arms, abrasion to the LUE wihtout drainage or surrounding erythema or edema, no tenderness or induration  Neurologic: Mental status: awake, alert, speech clear, follows commands, WINTER, mildly forgetful    Lines, Drains, Airways, Wounds:  Nephrostomy Left flank (Active)   Number of days: 1       Urethral Catheter Non-latex 16 Fr (Active)   Number of days: 1       Surgical Incision Flank Left (Active)   Number of days: 1       Labs:    CBC Results       01/24/19 01/23/19 01/15/19                    0500 0550 1510         WBC 18.80 (H) 13.07 (H) 9.59         RBC 4.80 5.12 4.68         HGB 14.2 14.8 13.8         HCT 42.2 43.6 41.4         MCV 87.9 85.2 88.5         MCH 29.6 28.9 29.5         MCHC 33.6 33.9 33.3          181 176                   BMP Results       01/24/19 01/23/19 01/15/19                    0500 0551 1510          (L) 133 (L) 136         K 4.7 4.0 3.7         Cl 101 99 100         CO2 21 (L) 24 26         Glucose 274 (H) 271 (H) 317 (H)         BUN 43 (H) 29 (H) 27 (H)         Creatinine 2.9 (H) 2.1 (H) 1.9 (H)         Calcium 9.2 8.7 (L) 9.0         Anion Gap 11 10 10         EGFR 21.6 (L) 31.4 (L) 35.2 (L)                   PT/PTT Results       01/15/19 10/04/18 07/05/18                    1510 1231 1945         PT 15.0 (H) 17.1 (H) 13.9         INR 1.2 1.4 1.1         PTT 61 " (H) 54 (H) -         Comment for INR at 1510 on 01/15/19:    Moderate Intensity Anticoagulation = 2.0 to 3.0, High Intensity = 2.5 to 3.5    Comment for INR at 1231 on 10/04/18:    Moderate Intensity Anticoagulation = 2.0 to 3.0, High Intensity = 2.5 to 3.5    Comment for INR at 1945 on 07/05/18:    INR has no defined significance when PT is within Reference Range.    Comment for PTT at 1510 on 01/15/19:    The Standard Therapeutic Range for Heparin is 68 to 101 seconds.    Comment for PTT at 1231 on 10/04/18:    The Standard Therapeutic Range for Heparin is 68 to 101 seconds.      UA Results       01/15/19                          1510           Color Yellow           Clarity Clear           Glucose &gt;=1000 (A)           Bilirubin Negative           Ketones Negative           Sp Grav 1.017           Blood +3 (A)           Ph 6.0           Protein +2 (A)           Urobilinogen 0.2           Nitrite Negative           Leuk Est Negative           WBC 0 TO 3           RBC 20 TO 35 (A)           Bacteria None Seen           Comment for Blood at 1510 on 01/15/19:    The sensitivity of the occult blood test is equivalent to approximately 4 intact RBC/HPF.    Comment for Leuk Est at 1510 on 01/15/19:    Results can be falsely negative due to high specific gravity, some antibiotics, glucose >3 g/dl, or WBC other than neutrophils.      Lactate Results     No lab values to display.          Microbiology Results     Procedure Component Value Units Date/Time    Urine culture (clean catch) [[id number]]  (Normal) Collected:  01/15/19 1510    Specimen:  Urine from Urine, Clean Catch Updated:  01/16/19 1408     Urine Culture No Growth (Limit of detection 1000 cfu/mL)          Pathology Results     ** No results found for the last 720 hours. **          Echo:         Imaging:    Radiology Imaging   XR ABDOMEN 1 VW    Narrative CLINICAL HISTORY: PCN placement.    COMMENT:    STUDY: Single view abdomen    COMPARISON: Abdomen  10/18/2018    AP view of the abdomen obtained.  Large bore nephroureterostomy tube present  overlying the left kidney and expected course of the left ureter.    There is air present in minimally distended stomach, small bowel loops and colon  compatible with a mild ileus.  Sternotomy wires are present.  Cardiac monitor  wires and leads overlie the chest.      Impression IMPRESSION:    Left nephroureterostomy tube in expected position.       Assessment     Renal calculi s/p PCN on 1/22/19 and nephrostolithotomy on 1/23/19  - urology following  - afebrile  - no active cultures, preop urine c/s negative on 1/15/19  Leukocytosis- afebrile so likely reactive secondary to recent surgical interventions  Rash- ? Allergic to medication, received bactrim, genamycin, vanco and cipro this admission so far, has taken courses of bactrim and cipro in the past so not likely to be the culprit, unclear if reaction from antibiotic or another medication this hospitalization, no respiratory symptoms  Allergy to cephalosporins- reaction was a rash, wife not sure if patient had PCN in the past,            Plan     Aztreonam pending additional urological intervention being planned for tomorrow, if remains afebrile can DC the antibiotics post procedure and can follow CBC to ensure leukocytosis resolves

## 2019-01-24 NOTE — ASSESSMENT & PLAN NOTE
Action tremor, most consistent with essential tremor.  Not currently seen on exam, but ongoing for the past few months and has been somewhat bothersome.  There are no findings to suggest Parkinson's disease.    Trial of low-dose propranolol  20 mg BID; consider Inderal LA 60mg HS   He may follow-up in the office for further titration and evaluation.

## 2019-01-24 NOTE — PROGRESS NOTES
+01/24/19 0400  36.3 (97.4)  --   55  16  114/66  99  Supplemental oxygen  Nasal cannula  2     01/24/19 0831  --  --  65  --   148/82  --     Wbc 18.8  Hem 14.2  Creat increased to 2.9  Urine output 1000ml  States he had a good night   Urine via madsen clear yellow  Urine via NT bloody  NT flushed    Assessment  Large left renal calculi  Left PCNL POD #1      Plan  Discussed with Dr Casiano  In light of allergies, elevated creat, prolonged QT, will get ID consult  Check labs in am  Nephrology following  Madsen removed  Void trial in progress  Plan to downsize NT tomorrow     Thank-you

## 2019-01-24 NOTE — PROGRESS NOTES
NEPHROLOGY PROGRESS NOTE      Subjective     Interval History: The patient denies nausea, SOB, LH, poor appetite.  His Méndez was removed about 4 hours prior to the encounter and he denies any urine output since.  He is also complaining of a cold right hand but no pain.    Objective     Vital signs in last 24 hours:  Temp:  [36.3 °C (97.4 °F)-36.8 °C (98.2 °F)] 36.6 °C (97.8 °F)  Heart Rate:  [55-71] 70  Resp:  [14-20] 18  BP: (104-164)/(61-86) 163/86           Intake/Output Summary (Last 24 hours) at 01/24/19 1819  Last data filed at 01/24/19 1414   Gross per 24 hour   Intake              100 ml   Output              525 ml   Net             -425 ml       Physical Exam  General:  Alert, NAD   HEENT:  nonicteric  Chest:  No w/r/r  Cardiovascular:  S1, S2 normal, RRR, no rub  Abdomen:  Soft, +BS  Musculoskeletal:  Extremities no edema.  His right hand is cool but so is his left hand though the right hand is more so.  The coloring is good he has a strong arterial pulse and good capillary refill of the right hand.  No swelling is noted.  No pain is noted.    Labs  BUN   Date Value Ref Range Status   01/24/2019 43 (H) 8 - 20 mg/dL Final     Creatinine   Date Value Ref Range Status   01/24/2019 2.9 (H) 0.8 - 1.3 mg/dL Final     Potassium   Date Value Ref Range Status   01/24/2019 4.7 3.6 - 5.1 mEQ/L Final     Hemoglobin   Date Value Ref Range Status   01/24/2019 14.2 13.7 - 17.5 g/dL Final     Sodium   Date Value Ref Range Status   01/24/2019 133 (L) 136 - 144 mEQ/L Final         Assessment/Plan:    1)  NEPHROLITHIASIS  Patient has bilateral stones where on CT 10/18/18 the left stones of 1.1cm and 1.6cm were seen.  He is now s/p  left percutaneous nephrostolithotomy and stent placement.  The stones have been sent for analysis.  Agree with hydration avoiding obstruction from blood clots and to maintain euvolemia in patient with reduced appetite.  Management per urology. Dose medications for level of renal function.   Infectious diseases on the case.     2)  CHRONIC KIDNEY DISEASE STAGE 3/ACUTE RENAL FAILURE  The patient has had some variability in creatinine, but mainly between 1.6-2.1.  He was 1.9 before the surgery and has creatinine rising from 2.1 to 2.9.  CKD is in the setting of  DM, HTN, obesity, nephrolithiasis, and age 69y/o.  He follows with Dr. Hernandez in this regard and can continue to to so upon discharge.     With the creatinine rise, for now agree with hydration, maintaining euvolemia.  Avoid nephrotoxins (NSAIDs, aminoglycosides, contrast, etc.).  Dose medications for level of renal function. Discussed with Dr Casiano where the Bactrim and ACE inhibitor are being discontinued. Follow I/O, blood chemistry.     3)  HYPERTENSION  He was having a period of stage 2 level blood pressures, but notes was having more discomfort at the time.  BP much better controlled, having stage 2 spikes and even had dips like 104/61.  Continue to monitor, especially with the ACEI stopped and adjust medications as needed.  Noted low dose propranolol started in setting of tremor-seen by neurology.

## 2019-01-24 NOTE — PROGRESS NOTES
No cardiac complaints  BP now OK  Discussed with Dr. Casiano and agree with hold no ACE-ARB rx now based upon renal status.  Troponin 0.04 pre-op and 0.05 this a.m.  Creatinine noted at 2.9  Suspect troponin sl. Increased due to decreased renal clearance.  To recheck EKG  Patient brought up with me that he is concerned about recent tremors  No known neurological history  He is requesting neurolog eval here.  Discussed with Dr. Casiano and he agrees.    Will follow    [facility]

## 2019-01-24 NOTE — CONSULTS
Neurology Consult Note    Subjective     HISTORY:  Nathan Alonzo is a 68 y.o. male who was admitted for nephrolithiasis.  He has bilateral renal calculus treated with stone basket extraction, left ESWL with stent placement 10/18/18..  He notes action tremor when waking in the morning for the past few months.  It bothers him when he is eating or drinking, and has worsening of handwriting.  He denies rest tremor, or rigidity.  No family history of tremor.  He does have significant stress.  Tremors tend to go away after the morning.      Review of Systems  All other systems reviewed and negative except as noted in the HPI.    Allergies: Oxycodone; Amlodipine; Cephalexin monohydrate; and Latex    Current Facility-Administered Medications   Medication Dose Route Frequency Provider Last Rate Last Dose   • acetaminophen (TYLENOL) tablet 650 mg  650 mg oral q4h PRN Ethel Patton CRNP       • amiodarone (PACERONE) tablet 200 mg  200 mg oral Daily Maxim Casiano DO   200 mg at 01/24/19 0831   • atorvastatin (LIPITOR) tablet 40 mg  40 mg oral Daily (6p) Ethel Patton CRNP   40 mg at 01/23/19 1705   • aztreonam (AZACTAM) IVPB 1 g in 100 mL NSS vial in bag  1 g intravenous q12h INT Sera Jones CRNP   Stopped at 01/24/19 1556   • diphenhydrAMINE (BENADRYL) injection 25 mg  25 mg intravenous q6h PRN Ethel Patton CRNP       • hydrALAZINE (APRESOLINE) injection 20 mg  20 mg intravenous q8h PRN Joaquin Valencia DO   6 mg at 01/23/19 1150   • hydrALAZINE (APRESOLINE) tablet 50 mg  50 mg oral q8h THADDEUS Maxim Casiano DO   50 mg at 01/24/19 1415   • insulin aspart U-100 (NovoLOG) pen 2-10 Units  2-10 Units subcutaneous With meals & nightly Maxim Casiano DO   4 Units at 01/24/19 0832   • insulin aspart U-100 (NovoLOG) pen 7 Units  7 Units subcutaneous TID with meals Maxim Casiano DO   7 Units at 01/24/19 1248   • insulin glargine (LANTUS SOLOSTAR) pen 18 Units  18 Units subcutaneous Daily  Peggy Mcclelland MD   18 Units at 01/24/19 1218   • lamoTRIgine (LaMICtal) tablet 150 mg  150 mg oral Daily Ethel Patton CRNP   150 mg at 01/24/19 0830   • levothyroxine (SYNTHROID) tablet 100 mcg  100 mcg oral Daily (6:30a) Ethel Patton CRNP   100 mcg at 01/24/19 0625   • morphine injection 3 mg  3 mg intravenous q4h PRN Maxim Casiano DO   3 mg at 01/24/19 1601   • pantoprazole (PROTONIX) tablet,delayed release (DR/EC) 40 mg  40 mg oral Daily Ethel Patton CRNP   40 mg at 01/24/19 0831   • sodium chloride 0.9 % infusion   intravenous Continuous HemMaxmi lopez DO 60 mL/hr at 01/24/19 1008         Medical History:   Past Medical History:   Diagnosis Date   • A-fib (CMS/HCC) (HCC)    • Abnormal ECG     a fib   • Aortic valve disorder    • BPH (benign prostatic hyperplasia)    • BPH (benign prostatic hyperplasia)    • Coronary artery disease    • Disease of thyroid gland    • Hypertension    • Hypothyroidism    • Kidney stones    • Nocturia    • Renal calculi     right   • Renal cancer (CMS/HCC) (HCC)     pt denies   • Right bundle branch block    • Sleep apnea     uses cpap   • Type 2 diabetes mellitus (CMS/HCC) (HCC)        Surgical History:   Past Surgical History:   Procedure Laterality Date   • COLONOSCOPY  2016   • CORONARY ARTERY BYPASS GRAFT  2017    x3   • KIDNEY STONE SURGERY Right 2018   • NEPHRECTOMY Right 2016    PARTIAL    • TONSILLECTOMY     • UVULECTOMY  2005       Social History:   Social History     Social History   • Marital status:      Spouse name: N/A   • Number of children: N/A   • Years of education: N/A     Social History Main Topics   • Smoking status: Never Smoker   • Smokeless tobacco: Never Used   • Alcohol use No   • Drug use: No   • Sexual activity: Defer     Other Topics Concern   • None     Social History Narrative   • None      History   Smoking Status   • Never Smoker   Smokeless Tobacco   • Never Used      History   Alcohol Use No       Family  History:   Family History   Problem Relation Age of Onset   • Lung cancer Mother    • Lung cancer Father    • Early death Father    • Diabetes Sister        Objective     Physical Exam  Temp:  [36.3 °C (97.4 °F)-36.8 °C (98.2 °F)] 36.6 °C (97.8 °F)  Heart Rate:  [55-77] 70  Resp:  [14-20] 18  BP: (104-164)/(61-86) 163/86    General appearance: alert, appears stated age and cooperative  Head: normocephalic, without obvious abnormality, atraumatic  Eyes: conjunctivae clear. PERRL, EOM's intact.  Neck: no carotid bruit and supple, symmetrical, trachea midline  Lungs: clear to auscultation bilaterally  Heart: regular rate and rhythm, S1, S2 normal, no murmur, click, rub or gallop  Extremities: extremities normal, warm and well-perfused; no cyanosis, clubbing, or edema  Psych: affect: normal  Neurologic:   Mental status: Alert, oriented, thought content appropriate, alertness: alert, orientation: time, person, place,   Cranial nerves: Pupils equal round reactive to light and accommodation, extraocular movements intact, visual fields full to confrontation, facial strength and sensation intact.  Tongue/palate midline, sternocleidomastoids 5/5 bilaterally.  Sensory: normal to all modalities  Motor: No drift.  Bilateral arms and legs are 5/5.  Reflexes: 2+ and symmetric.  Toes equivocal.  Coordination: normal, no tremor or asterixis.  Finger to nose intact.  No cogwheeling.  Rapid alternating movements are intact.  gait: Deferred    X-ray Abdomen 1 View    Result Date: 1/23/2019  Narrative: CLINICAL HISTORY: PCN placement. COMMENT: STUDY: Single view abdomen COMPARISON: Abdomen 10/18/2018 AP view of the abdomen obtained.  Large bore nephroureterostomy tube present overlying the left kidney and expected course of the left ureter. There is air present in minimally distended stomach, small bowel loops and colon compatible with a mild ileus.  Sternotomy wires are present.  Cardiac monitor wires and leads overlie the chest.      Impression: IMPRESSION: Left nephroureterostomy tube in expected position.    Ir Percutaneous Nephrostomy, Ultrasound Guided Needle Placement    Result Date: 1/22/2019  Narrative: CLINICAL HISTORY:  68-year-old male with left-sided nephrolithiasis. COMMENT: Procedure: Antegrade pyelogram and placement of a 24 [place] Malecot nephroureteral tube using ultrasound and fluoroscopic guidance. Anesthesia:  Moderate sedation for a total of 60 minutes. Conscious sedation was administered by the nursing staff and supervised by the physician. Fluoro Time:  11.2 minutes Contrast:  60 mL Omnipaque 300. Medications:  1 mg of Versed and 250 mcg fentanyl IV.  Ciprofloxacin 400 mg IV. Description of procedure: Informed consent was obtained and placed on the patient's chart.  The patient was brought to the procedure suite and placed in the prone position.  Using real-time ultrasound guidance, a 21-gauge trocar needle was utilized to access a posterior calyx.  Antegrade pyelogram was performed.  Once the collecting system was opacified, the needle exchanged for a 6 [place] AccuStick sheath. Through this a 4 Tanzanian glide Cobra catheter and Glidewire utilized navigate beyond the ureteral stone into the distal ureter and into the bladder ultimately and repeat contrast injection confirmed appropriate position of the catheter into the bladder.  Over a Amplatz wire, the tract was dilated with serial dilation and a 8mm x 8 cm balloon.  Next, a 24 [place] Malecot tube was placed and formed in the kidney.  Post placement nephrostogram demonstrates appropriate positioning.  The catheter was secured to the skin with 2-0 Ethilon suture and connected to gravity drainage. The patient tolerated the procedure well, leaving the department in stable condition.  No complications were encountered. FINDINGS:     Impression: IMPRESSION: Successful placement of a 24-T[place] Malecot left nephroureteral tube.     Fl Fluoroscopy Technical Assistance, X-ray  Abdomen 1 View    Result Date: 1/23/2019  Narrative: CLINICAL HISTORY: Procedure guidance. TECHNIQUE: Three intraoperative fluoroscopic images for procedure localization. COMPARISON: Comparison with previous abdominal film from 1/22/2019. COMMENT: Images show image guidance for percutaneous nephrolithotomy with removal of Malecot drain, stone removal, and replacement of Malecot drain.  A small amount of extravasation is seen along the tract. PERFORMANCE MET: PQRS MEASURE 145 Fluoroscopy Time:  1.5 seconds Reference Air Kerma: 18.6 mGy Images: 3     Impression: IMPRESSION: Fluoroscopic images for procedure guidance as described above.    Assessment   Tremor   Assessment & Plan    Action tremor, most consistent with essential tremor.  Not currently seen on exam, but ongoing for the past few months and has been somewhat bothersome.  There are no findings to suggest Parkinson's disease.  Check thyroid function testing.  Trial of low-dose propranolol, 10 mg daily.  Discussed at length with patient.  He may follow-up in the office for further titration and evaluation.        Bipolar 1 disorder (CMS/HCC) (HCC)   Assessment & Plan    On lamotrigine for mood stabilization, no history of seizures.        Hypothyroid   Assessment & Plan    On replacement, update TSH.                 counseling time more than 50% of visit: 20 minutes    Mauri Irizarry MD  1/24/2019  4:42 PM

## 2019-01-24 NOTE — UM PHYSICIAN REVIEW NOTE
Utilization Secondary Review Note      Patient Name: Nathan Alonzo      MRN: [id number]  Insurance: MEDICARE  Admission date: 1/22/2019  Initial order:    Inpatient  Planned admission: No  Post Discharge Review: No            Inpatient services are appropriate for this 68 y.o. year old male who underwent percutaneous nephrostomy by IR 1/22 with left perc nephrostolithotomy on 1/23/19. Patient now with rising creatinine, diuretics on hold, nephrology following. Is on NSS. Patient has required > 2 MN hospital level care.     Cristy Ruiz, DO  1/24/2019

## 2019-01-25 ENCOUNTER — APPOINTMENT (INPATIENT)
Dept: CARDIOLOGY | Facility: HOSPITAL | Age: 69
DRG: 660 | End: 2019-01-25
Attending: NURSE PRACTITIONER
Payer: MEDICARE

## 2019-01-25 LAB
ANION GAP SERPL CALC-SCNC: 13 MEQ/L (ref 3–15)
BACTERIA #/AREA URNS HPF: ABNORMAL /HPF
BACTERIA URNS QL MICRO: ABNORMAL /HPF
BASOPHILS # BLD: 0.09 K/UL (ref 0.01–0.1)
BASOPHILS NFR BLD: 0.5 %
BILIRUB UR QL STRIP.AUTO: ABNORMAL MG/DL
BILIRUB UR QL STRIP.AUTO: NEGATIVE MG/DL
BUN SERPL-MCNC: 48 MG/DL (ref 8–20)
CALCIUM SERPL-MCNC: 9 MG/DL (ref 8.9–10.3)
CHLORIDE SERPL-SCNC: 101 MEQ/L (ref 98–109)
CLARITY UR REFRACT.AUTO: ABNORMAL
CLARITY UR REFRACT.AUTO: ABNORMAL
CO2 SERPL-SCNC: 19 MEQ/L (ref 22–32)
COLOR UR AUTO: ABNORMAL
COLOR UR AUTO: YELLOW
CREAT SERPL-MCNC: 3 MG/DL
DIFFERENTIAL METHOD BLD: ABNORMAL
EOSINOPHIL # BLD: 0.55 K/UL (ref 0.04–0.54)
EOSINOPHIL NFR BLD: 2.8 %
ERYTHROCYTE [DISTWIDTH] IN BLOOD BY AUTOMATED COUNT: 14.4 % (ref 11.6–14.4)
EST. AVERAGE GLUCOSE BLD GHB EST-MCNC: 223 MG/DL
GFR SERPL CREATININE-BSD FRML MDRD: 20.8 ML/MIN/1.73M*2
GLUCOSE BLD-MCNC: 105 MG/DL (ref 70–99)
GLUCOSE BLD-MCNC: 107 MG/DL (ref 70–99)
GLUCOSE BLD-MCNC: 166 MG/DL (ref 70–99)
GLUCOSE BLD-MCNC: 220 MG/DL (ref 70–99)
GLUCOSE SERPL-MCNC: 200 MG/DL (ref 70–99)
GLUCOSE UR STRIP.AUTO-MCNC: ABNORMAL MG/DL
GLUCOSE UR STRIP.AUTO-MCNC: ABNORMAL MG/DL
GRAN CASTS #/AREA URNS LPF: ABNORMAL /LPF
HBA1C MFR BLD HPLC: 9.4 %
HCT VFR BLDCO AUTO: 44.3 %
HGB BLD-MCNC: 15.1 G/DL
HGB UR QL STRIP.AUTO: 1
HGB UR QL STRIP.AUTO: 3
HYALINE CASTS #/AREA URNS LPF: ABNORMAL /LPF
HYALINE CASTS #/AREA URNS LPF: ABNORMAL /LPF
IMM GRANULOCYTES # BLD AUTO: 0.13 K/UL (ref 0–0.08)
IMM GRANULOCYTES NFR BLD AUTO: 0.7 %
KETONES UR STRIP.AUTO-MCNC: ABNORMAL MG/DL
KETONES UR STRIP.AUTO-MCNC: NEGATIVE MG/DL
LEUKOCYTE ESTERASE UR QL STRIP.AUTO: 1
LEUKOCYTE ESTERASE UR QL STRIP.AUTO: NEGATIVE
LYMPHOCYTES # BLD: 1.14 K/UL (ref 1.2–3.5)
LYMPHOCYTES NFR BLD: 5.7 %
MAGNESIUM SERPL-MCNC: 2.1 MG/DL (ref 1.8–2.5)
MCH RBC QN AUTO: 29.7 PG (ref 28–33.2)
MCHC RBC AUTO-ENTMCNC: 34.1 G/DL (ref 32.2–36.5)
MCV RBC AUTO: 87 FL (ref 83–98)
MONOCYTES # BLD: 1.23 K/UL (ref 0.3–1)
MONOCYTES NFR BLD: 6.2 %
NEUTROPHILS # BLD: 16.79 K/UL (ref 1.7–7)
NEUTS SEG NFR BLD: 84.1 %
NITRITE UR QL STRIP.AUTO: ABNORMAL
NITRITE UR QL STRIP.AUTO: NEGATIVE
NRBC BLD-RTO: 0 %
PDW BLD AUTO: 11.1 FL (ref 9.4–12.4)
PH UR STRIP.AUTO: 5.5 [PH]
PH UR STRIP.AUTO: ABNORMAL [PH]
PLATELET # BLD AUTO: 218 K/UL
POCT TEST: ABNORMAL
POTASSIUM SERPL-SCNC: 3.9 MEQ/L (ref 3.6–5.1)
PROT UR QL STRIP.AUTO: 2
PROT UR QL STRIP.AUTO: ABNORMAL
RBC # BLD AUTO: 5.09 M/UL (ref 4.5–5.8)
RBC #/AREA URNS HPF: ABNORMAL /HPF
RBC #/AREA URNS HPF: ABNORMAL /HPF
SODIUM SERPL-SCNC: 133 MEQ/L (ref 136–144)
SP GR UR REFRACT.AUTO: 1.01
SP GR UR REFRACT.AUTO: 1.02
SQUAMOUS #/AREA URNS HPF: ABNORMAL /HPF
SQUAMOUS URNS QL MICRO: 1 /HPF
TSH SERPL DL<=0.05 MIU/L-ACNC: 4.01 MIU/L (ref 0.34–5.6)
UROBILINOGEN UR STRIP-ACNC: 0.2 EU/DL
UROBILINOGEN UR STRIP-ACNC: ABNORMAL EU/DL
WBC # BLD AUTO: 19.93 K/UL
WBC #/AREA URNS HPF: ABNORMAL /HPF
WBC #/AREA URNS HPF: ABNORMAL /HPF

## 2019-01-25 PROCEDURE — C1769 GUIDE WIRE: HCPCS

## 2019-01-25 PROCEDURE — 36415 COLL VENOUS BLD VENIPUNCTURE: CPT | Performed by: NURSE PRACTITIONER

## 2019-01-25 PROCEDURE — 87086 URINE CULTURE/COLONY COUNT: CPT | Performed by: NURSE PRACTITIONER

## 2019-01-25 PROCEDURE — C1729 CATH, DRAINAGE: HCPCS

## 2019-01-25 PROCEDURE — 63700000 HC SELF-ADMINISTRABLE DRUG: Performed by: HOSPITALIST

## 2019-01-25 PROCEDURE — 63700000 HC SELF-ADMINISTRABLE DRUG: Performed by: PSYCHIATRY & NEUROLOGY

## 2019-01-25 PROCEDURE — 75984 XRAY CONTROL CATHETER CHANGE: CPT

## 2019-01-25 PROCEDURE — 85025 COMPLETE CBC W/AUTO DIFF WBC: CPT | Performed by: HOSPITALIST

## 2019-01-25 PROCEDURE — 80048 BASIC METABOLIC PNL TOTAL CA: CPT | Performed by: HOSPITALIST

## 2019-01-25 PROCEDURE — 84443 ASSAY THYROID STIM HORMONE: CPT | Performed by: PSYCHIATRY & NEUROLOGY

## 2019-01-25 PROCEDURE — 63600000 HC DRUGS/DETAIL CODE: Performed by: NURSE PRACTITIONER

## 2019-01-25 PROCEDURE — 99231 SBSQ HOSP IP/OBS SF/LOW 25: CPT | Performed by: INTERNAL MEDICINE

## 2019-01-25 PROCEDURE — 87040 BLOOD CULTURE FOR BACTERIA: CPT | Performed by: NURSE PRACTITIONER

## 2019-01-25 PROCEDURE — 81001 URINALYSIS AUTO W/SCOPE: CPT | Performed by: NURSE PRACTITIONER

## 2019-01-25 PROCEDURE — 36100360 IR PCNU TUBE CHANGE

## 2019-01-25 PROCEDURE — 63700000 HC SELF-ADMINISTRABLE DRUG: Performed by: NURSE PRACTITIONER

## 2019-01-25 PROCEDURE — 83036 HEMOGLOBIN GLYCOSYLATED A1C: CPT | Performed by: INTERNAL MEDICINE

## 2019-01-25 PROCEDURE — BT121ZZ FLUOROSCOPY OF LEFT KIDNEY USING LOW OSMOLAR CONTRAST: ICD-10-PCS | Performed by: RADIOLOGY

## 2019-01-25 PROCEDURE — 63700000 HC SELF-ADMINISTRABLE DRUG: Performed by: INTERNAL MEDICINE

## 2019-01-25 PROCEDURE — 63600000 HC DRUGS/DETAIL CODE: Performed by: INTERNAL MEDICINE

## 2019-01-25 PROCEDURE — 83735 ASSAY OF MAGNESIUM: CPT | Performed by: HOSPITALIST

## 2019-01-25 PROCEDURE — 0T25X0Z CHANGE DRAINAGE DEVICE IN KIDNEY, EXTERNAL APPROACH: ICD-10-PCS | Performed by: RADIOLOGY

## 2019-01-25 PROCEDURE — 99233 SBSQ HOSP IP/OBS HIGH 50: CPT | Performed by: HOSPITALIST

## 2019-01-25 PROCEDURE — 27200000 HC STERILE SUPPLY

## 2019-01-25 PROCEDURE — C1887 CATHETER, GUIDING: HCPCS

## 2019-01-25 PROCEDURE — 63600000 HC DRUGS/DETAIL CODE: Performed by: RADIOLOGY

## 2019-01-25 PROCEDURE — 63600000 HC DRUGS/DETAIL CODE: Performed by: HOSPITALIST

## 2019-01-25 PROCEDURE — 63600105 HC IODINE BASED CONTRAST: Mod: JW | Performed by: UROLOGY

## 2019-01-25 PROCEDURE — 20600000 HC ROOM AND CARE INTERMEDIATE/TELEMETRY

## 2019-01-25 PROCEDURE — 25000000 HC PHARMACY GENERAL: Performed by: NURSE PRACTITIONER

## 2019-01-25 RX ORDER — FENTANYL CITRATE 50 UG/ML
INJECTION, SOLUTION INTRAMUSCULAR; INTRAVENOUS
Status: COMPLETED | OUTPATIENT
Start: 2019-01-25 | End: 2019-01-25

## 2019-01-25 RX ORDER — INSULIN GLARGINE 100 [IU]/ML
24 INJECTION, SOLUTION SUBCUTANEOUS DAILY
Status: DISCONTINUED | OUTPATIENT
Start: 2019-01-25 | End: 2019-01-29 | Stop reason: HOSPADM

## 2019-01-25 RX ORDER — PROPRANOLOL HYDROCHLORIDE 10 MG/1
20 TABLET ORAL DAILY
Status: DISCONTINUED | OUTPATIENT
Start: 2019-01-26 | End: 2019-01-26

## 2019-01-25 RX ORDER — POLYETHYLENE GLYCOL 3350 17 G/17G
17 POWDER, FOR SOLUTION ORAL DAILY
Status: DISCONTINUED | OUTPATIENT
Start: 2019-01-25 | End: 2019-01-29 | Stop reason: HOSPADM

## 2019-01-25 RX ORDER — CLONIDINE HYDROCHLORIDE 0.2 MG/1
0.2 TABLET ORAL 2 TIMES DAILY
Status: DISCONTINUED | OUTPATIENT
Start: 2019-01-25 | End: 2019-01-29 | Stop reason: HOSPADM

## 2019-01-25 RX ORDER — HYDROMORPHONE HYDROCHLORIDE 1 MG/ML
1 INJECTION, SOLUTION INTRAMUSCULAR; INTRAVENOUS; SUBCUTANEOUS
Status: DISCONTINUED | OUTPATIENT
Start: 2019-01-25 | End: 2019-01-29 | Stop reason: HOSPADM

## 2019-01-25 RX ORDER — INSULIN ASPART 100 [IU]/ML
7 INJECTION, SOLUTION INTRAVENOUS; SUBCUTANEOUS ONCE
Status: COMPLETED | OUTPATIENT
Start: 2019-01-25 | End: 2019-01-25

## 2019-01-25 RX ADMIN — HYDROMORPHONE HYDROCHLORIDE 1 MG: 1 INJECTION, SOLUTION INTRAMUSCULAR; INTRAVENOUS; SUBCUTANEOUS at 19:02

## 2019-01-25 RX ADMIN — AZTREONAM 1 G: 1 INJECTION, POWDER, LYOPHILIZED, FOR SOLUTION INTRAMUSCULAR; INTRAVENOUS at 12:58

## 2019-01-25 RX ADMIN — FENTANYL CITRATE 25 MCG: 50 INJECTION, SOLUTION INTRAMUSCULAR; INTRAVENOUS at 11:38

## 2019-01-25 RX ADMIN — INSULIN ASPART 7 UNITS: 100 INJECTION, SOLUTION INTRAVENOUS; SUBCUTANEOUS at 09:44

## 2019-01-25 RX ADMIN — PANTOPRAZOLE SODIUM 40 MG: 40 TABLET, DELAYED RELEASE ORAL at 09:44

## 2019-01-25 RX ADMIN — HYDRALAZINE HYDROCHLORIDE 50 MG: 25 TABLET, FILM COATED ORAL at 13:09

## 2019-01-25 RX ADMIN — INSULIN ASPART 7 UNITS: 100 INJECTION, SOLUTION INTRAVENOUS; SUBCUTANEOUS at 12:58

## 2019-01-25 RX ADMIN — PROPRANOLOL HYDROCHLORIDE 10 MG: 10 TABLET ORAL at 09:44

## 2019-01-25 RX ADMIN — HYDRALAZINE HYDROCHLORIDE 20 MG: 20 INJECTION INTRAMUSCULAR; INTRAVENOUS at 02:34

## 2019-01-25 RX ADMIN — DIPHENHYDRAMINE HYDROCHLORIDE 25 MG: 50 INJECTION INTRAMUSCULAR; INTRAVENOUS at 02:34

## 2019-01-25 RX ADMIN — HYDROMORPHONE HYDROCHLORIDE 1 MG: 1 INJECTION, SOLUTION INTRAMUSCULAR; INTRAVENOUS; SUBCUTANEOUS at 09:46

## 2019-01-25 RX ADMIN — CLONIDINE HYDROCHLORIDE 0.2 MG: 0.2 TABLET ORAL at 15:44

## 2019-01-25 RX ADMIN — POLYETHYLENE GLYCOL 3350 17 G: 17 POWDER, FOR SOLUTION ORAL at 09:45

## 2019-01-25 RX ADMIN — INSULIN ASPART 4 UNITS: 100 INJECTION, SOLUTION INTRAVENOUS; SUBCUTANEOUS at 17:51

## 2019-01-25 RX ADMIN — HYDRALAZINE HYDROCHLORIDE 50 MG: 25 TABLET, FILM COATED ORAL at 20:58

## 2019-01-25 RX ADMIN — IOHEXOL 15 ML: 300 INJECTION, SOLUTION INTRAVENOUS at 12:11

## 2019-01-25 RX ADMIN — HYDROMORPHONE HYDROCHLORIDE 1 MG: 1 INJECTION, SOLUTION INTRAMUSCULAR; INTRAVENOUS; SUBCUTANEOUS at 06:15

## 2019-01-25 RX ADMIN — LAMOTRIGINE 150 MG: 25 TABLET ORAL at 09:43

## 2019-01-25 RX ADMIN — AZTREONAM 1 G: 1 INJECTION, POWDER, LYOPHILIZED, FOR SOLUTION INTRAMUSCULAR; INTRAVENOUS at 02:09

## 2019-01-25 RX ADMIN — HYDRALAZINE HYDROCHLORIDE 50 MG: 25 TABLET, FILM COATED ORAL at 06:10

## 2019-01-25 RX ADMIN — HYDROMORPHONE HYDROCHLORIDE 1 MG: 1 INJECTION, SOLUTION INTRAMUSCULAR; INTRAVENOUS; SUBCUTANEOUS at 02:41

## 2019-01-25 RX ADMIN — ATORVASTATIN CALCIUM 40 MG: 40 TABLET, FILM COATED ORAL at 17:36

## 2019-01-25 RX ADMIN — AMIODARONE HYDROCHLORIDE 200 MG: 200 TABLET ORAL at 09:44

## 2019-01-25 RX ADMIN — HYDROMORPHONE HYDROCHLORIDE 1 MG: 1 INJECTION, SOLUTION INTRAMUSCULAR; INTRAVENOUS; SUBCUTANEOUS at 12:59

## 2019-01-25 RX ADMIN — LEVOTHYROXINE SODIUM 100 MCG: 100 TABLET ORAL at 06:11

## 2019-01-25 RX ADMIN — INSULIN GLARGINE 24 UNITS: 100 INJECTION, SOLUTION SUBCUTANEOUS at 09:46

## 2019-01-25 RX ADMIN — INSULIN ASPART 2 UNITS: 100 INJECTION, SOLUTION INTRAVENOUS; SUBCUTANEOUS at 12:58

## 2019-01-25 RX ADMIN — DIPHENHYDRAMINE HYDROCHLORIDE 25 MG: 50 INJECTION INTRAMUSCULAR; INTRAVENOUS at 06:11

## 2019-01-25 RX ADMIN — DIPHENHYDRAMINE HYDROCHLORIDE 25 MG: 50 INJECTION INTRAMUSCULAR; INTRAVENOUS at 20:58

## 2019-01-25 NOTE — POST-PROCEDURE NOTE
Interventional Radiology Brief Postprocedure Note    Nathan Alonzo     Attending: Francisco    Diagnosis: S/p left sided PCNL    Description of procedure: Exchange of left PCNL tube to PCN.    Contrast: 15 ml Omnipaque 300.     Anesthesia:  Local    Medications: 25 mcg fentanyl IV.     Complications: None      Estimated Blood Loss: Minimal.    Anticoagulation: Hold for 4 hours.    Specimens: none    Findings: Technically successful exchange of left PCNL tube to PCN.    1/25/2019 12:10 PM

## 2019-01-25 NOTE — PROGRESS NOTES
Date/Time  Temp  Heart Rate (from SpO2)  Pulse  Resp  BP  SpO2  Oxygen Therapy  O2 Delivery Method  O2 Flow Rate (L/min)   01/25/19 0251  36.6 (97.8)  --  72  14   158/78  96           Wbc 19.93  Hem 15.1  Pl 218  Creat 3.0    Had issues overnight with itching at NT site  Given Benadryl  Was voiding after madsen removed   This morning only voided small amt  Bladder scan >550ml  Madsen placed, urine clear drained 600ml clear yellow urine  Pain in left flank, pain med switched to Dilaudid  Feels Dilaudid is more effective  Urine via NT pink  No BM since Monday  Will give Miralax      Assessment  Large left renal calculi  Left PCNL POD #2  Rising white count   Urinary retention  UA w/o evidence of infection     Plan  Maintain madsen  Monitor labs and VS  May have NT downsized today  Monitor renal function  Discussed with Dr Kent  Will downsize NT

## 2019-01-25 NOTE — PLAN OF CARE
Problem: Patient Care Overview  Goal: Plan of Care Review  Outcome: Ongoing (interventions implemented as appropriate)   01/24/19 3002   Coping/Psychosocial   Plan Of Care Reviewed With patient   Plan of Care Review   Progress progress toward functional goals is gradual     Goal: Individualization & Mutuality  Outcome: Ongoing (interventions implemented as appropriate)

## 2019-01-25 NOTE — PROGRESS NOTES
Patient: Nathan Alonzo  Location: [facility]10  MRN: [id number]  Today's date: 1/25/2019    Attempted to see patient for therapy. Unable due to patient at test or procedure (Patient at IR).

## 2019-01-25 NOTE — PROGRESS NOTES
Infectious Disease Progress Note    Patient Name: Nathan Alonzo  MR#: [id number]  : 1950  Admission Date: 2019  Date: 19   Time: 5:30 PM   Author: MARIA ELENA Mccormick    Major Events:     Antibiotics:    Anti-infectives     Start     Dose/Rate Route Frequency Ordered Stop    19 1330  aztreonam (AZACTAM) IVPB 1 g in 100 mL NSS vial in bag      1 g  200 mL/hr over 30 Minutes intravenous Every 12 hours interval 19 1237            Subjective   Patient states he is not having any pain, no diarrhea, states the itching is getting better, no vomiting     Review of Systems    Pertinent items are noted in HPI.    Objective     Vital Signs:    Patient Vitals for the past 72 hrs:   BP Temp Temp src Pulse Resp SpO2 Weight   19 1544 (!) 175/95 - - - - - -   19 1520 (!) 159/83 37.2 °C (98.9 °F) Oral 75 16 99 % -   19 1400 (!) 166/84 36.9 °C (98.5 °F) Oral 74 16 97 % -   19 1300 (!) 165/82 36.9 °C (98.4 °F) Oral 70 18 96 % -   19 1116 (!) 166/82 37.2 °C (98.9 °F) Oral 69 16 96 % -   19 1057 (!) 174/80 - - 65 16 - -   19 1000 (!) 152/87 36.9 °C (98.4 °F) Oral (!) 102 16 95 % -   19 0943 (!) 184/152 - - - - - -   19 0251 (!) 158/78 36.6 °C (97.8 °F) Oral 72 14 96 % -   19 0234 (!) 222/115 - - 64 - - -   19 0000 (!) 198/99 37.2 °C (99 °F) Oral 65 18 97 % -   19 2000 (!) 187/99 - - 67 16 95 % -   19 1535 (!) 163/86 36.6 °C (97.8 °F) Oral 70 18 100 % -   19 1400 135/69 36.8 °C (98.2 °F) Oral 67 18 95 % -   19 1125 (!) 164/72 36.6 °C (97.8 °F) Oral 62 18 97 % -   19 0831 (!) 148/82 - - 65 - - -   19 0700 - - - - 14 99 % -   19 0600 - - - - 16 99 % -   19 0500 - - - - 19 100 % -   19 0400 114/66 36.3 °C (97.4 °F) Oral (!) 55 16 99 % -   19 0300 - - - - 14 99 % -   19 0200 104/61 - - (!) 59 20 100 % -   19 0100 - - - - 19 99 % -   19 0000 105/69 36.7 °C (98  "°F) Oral 67 18 99 % -   19 2200 (!) 149/73 - - 66 18 99 % -   19 2100 - - - - 18 99 % -   19 - - - - - 100 % -   19 1931 132/76 36.5 °C (97.7 °F) - 71 20 98 % -   19 1800 136/73 36.8 °C (98.2 °F) Oral 77 18 97 % -   19 1700 126/71 - - 71 15 97 % -   19 1600 127/72 37.1 °C (98.7 °F) Oral 76 18 98 % -   19 1530 (!) 143/80 36.7 °C (98 °F) Oral 76 15 97 % -   19 1500 121/70 - - 74 14 96 % -   19 1430 130/76 - - 76 12 - -   19 1400 128/77 36.5 °C (97.7 °F) Oral 78 18 96 % -   19 1345 (!) 148/82 - - 72 16 96 % -   19 1325 137/81 36.5 °C (97.7 °F) Oral 72 18 95 % -   19 1300 124/85 - - 71 17 94 % -   19 1250 131/84 - - 74 16 95 % -   19 1240 115/76 - - 75 14 96 % -   19 1230 123/82 - - 70 (!) 6 98 % -   19 1220 124/78 - - 76 14 97 % -   19 1200 - - - - - 97 % -   19 1155 (!) 149/81 36.2 °C (97.1 °F) Temporal 79 - 96 % -   19 0833 (!) 189/91 36.9 °C (98.5 °F) Temporal 75 (!) 22 97 % -   19 0755 (!) 193/100 36.5 °C (97.7 °F) Oral 60 16 96 % -   19 0600 (!) 178/92 - - (!) 59 18 95 % -   19 0400 (!) 180/96 36.3 °C (97.3 °F) Oral (!) 57 16 97 % -   19 0200 (!) 179/90 - - (!) 58 16 96 % -   19 0000 (!) 145/83 36.4 °C (97.6 °F) Oral 61 14 96 % 119 kg (263 lb 4.8 oz)   19 2200 (!) 159/85 - - 65 16 96 % -   19 2000 (!) 216/110 36.4 °C (97.5 °F) Oral 62 16 100 % -   19 1900 (!) 206/106 - - - - - -   19 1840 (!) 208/94 - - - - - -   19 1820 (!) 221/115 - - - - - -       Temp (72hrs), Av.7 °C (98 °F), Min:36.2 °C (97.1 °F), Max:37.2 °C (99 °F)      Physical Exam:    BP (!) 175/95   Pulse 75   Temp 37.2 °C (98.9 °F) (Oral)   Resp 16   Ht 1.88 m (6' 2\")   Wt 119 kg (263 lb 4.8 oz)   SpO2 99%   BMI 33.81 kg/m²   General appearance: alert, appears stated age, cooperative and no distress  Lungs: nonlabored, no cough  Abdomen: soft, ND, " obese, left PCN with clear red urine  Skin: decreased erythema of the chest and arms, mild erythema of the face  Neurologic: Mental status: Alert, oriented, thought content appropriate   - madsen with clear urine    Lines, Drains, Airways, Wounds:  Nephrostomy Left flank (Active)   Number of days: 2       Nephrostomy Left flank (Active)   Number of days: 0       Pruritus other (see comments) (Active)   Number of days:        Surgical Incision Flank Left (Active)   Number of days: 2       Labs:    CBC Results       01/25/19 01/24/19 01/23/19                    0451 0500 0550         WBC 19.93 (H) 18.80 (H) 13.07 (H)         RBC 5.09 4.80 5.12         HGB 15.1 14.2 14.8         HCT 44.3 42.2 43.6         MCV 87.0 87.9 85.2         MCH 29.7 29.6 28.9         MCHC 34.1 33.6 33.9          203 181                   BMP Results       01/25/19 01/24/19 01/23/19                    0451 0500 0551          (L) 133 (L) 133 (L)         K 3.9 4.7 4.0         Cl 101 101 99         CO2 19 (L) 21 (L) 24         Glucose 200 (H) 274 (H) 271 (H)         BUN 48 (H) 43 (H) 29 (H)         Creatinine 3.0 (H) 2.9 (H) 2.1 (H)         Calcium 9.0 9.2 8.7 (L)         Anion Gap 13 11 10         EGFR 20.8 (L) 21.6 (L) 31.4 (L)                   PT/PTT Results       01/15/19 10/04/18 07/05/18                    1510 1231 1945         PT 15.0 (H) 17.1 (H) 13.9         INR 1.2 1.4 1.1         PTT 61 (H) 54 (H) -         Comment for INR at 1510 on 01/15/19:    Moderate Intensity Anticoagulation = 2.0 to 3.0, High Intensity = 2.5 to 3.5    Comment for INR at 1231 on 10/04/18:    Moderate Intensity Anticoagulation = 2.0 to 3.0, High Intensity = 2.5 to 3.5    Comment for INR at 1945 on 07/05/18:    INR has no defined significance when PT is within Reference Range.    Comment for PTT at 1510 on 01/15/19:    The Standard Therapeutic Range for Heparin is 68 to 101 seconds.    Comment for PTT at 1231 on 10/04/18:    The Standard Therapeutic  Range for Heparin is 68 to 101 seconds.      UA Results       01/25/19                          0747           Color Yellow           Clarity Cloudy (A)           Glucose 100 (SMALL) (A)           Bilirubin Negative           Ketones Negative           Sp Grav 1.017           Blood +1 (A)           Ph 5.5           Protein +2 (A)           Urobilinogen 0.2           Nitrite Negative           Leuk Est Negative           WBC 0 TO 3           RBC 10 TO 19 (A)           Bacteria None Seen           Comment for Blood at 0747 on 01/25/19:    The sensitivity of the occult blood test is equivalent to approximately 4 intact RBC/HPF.    Comment for Leuk Est at 0747 on 01/25/19:    Results can be falsely negative due to high specific gravity, some antibiotics, glucose >3 g/dl, or WBC other than neutrophils.      Lactate Results     No lab values to display.          Microbiology Results     Procedure Component Value Units Date/Time    Urine culture (clean catch) [[id number]]  (Normal) Collected:  01/15/19 1510    Specimen:  Urine from Urine, Clean Catch Updated:  01/16/19 1408     Urine Culture No Growth (Limit of detection 1000 cfu/mL)          Pathology Results     ** No results found for the last 720 hours. **          Echo:         Imaging:    Radiology Imaging   XR ABDOMEN 1 VW    Narrative CLINICAL HISTORY: PCN placement.    COMMENT:    STUDY: Single view abdomen    COMPARISON: Abdomen 10/18/2018    AP view of the abdomen obtained.  Large bore nephroureterostomy tube present  overlying the left kidney and expected course of the left ureter.    There is air present in minimally distended stomach, small bowel loops and colon  compatible with a mild ileus.  Sternotomy wires are present.  Cardiac monitor  wires and leads overlie the chest.      Impression IMPRESSION:    Left nephroureterostomy tube in expected position.       Assessment     Renal calculi s/p PCN on 1/22/19 and nephrostolithotomy on 1/23/19  - s/p downsizing  of the PCN tube today  - urology following  - afebrile  - no active cultures, preop urine c/s negative on 1/15/19  Urinary retention- madsen replaced, UA without pyuria today  - afebrile  Leukocytosis- afebrile but increasing, ? Related to infectious process versus recent instrumentation  Rash- improving,? Allergic to medication, received bactrim, genamycin, vanco and cipro this admission so far, has taken courses of bactrim and cipro in the past so not likely to be the culprit, unclear if reaction from antibiotic or another medication this hospitalization, no respiratory symptoms  Allergy to cephalosporins- reaction was a rash, wife not sure if patient had PCN in the past                Plan     Continue aztreonam and c/s analysis ordered, follow CBC

## 2019-01-25 NOTE — PROGRESS NOTES
Discussed patient during AM progression care rounds.  Urology had to reinsert the madsen catheter due to urinary retention.  Patient in IR today to replace NT.   BP continues to be up and down, doctors are following.  Patient may benefit from HC upon discharge.  CC team will continue to follow and assist with discharge planning.

## 2019-01-25 NOTE — NURSING NOTE
Patient having increased pain and itching after medication administered. Left flank nephrostomy site swollen and tender. Call placed to urology service to give update.  0520-Dr Yeager called to unit and given update. Ordered to give 25mg iv benadryl. No further orders. Patient currently sleeping. Will continue to monitor and assess.

## 2019-01-25 NOTE — PROGRESS NOTES
Neurology Progress Note    Subjective     Interval History: Persistent tremor in the mornings, but not seen at this time.  Blood pressures remain elevated.      Objective     Physical Exam:      Vital signs in last 24 hours:  Temp:  [36.6 °C (97.8 °F)-37.2 °C (99 °F)] 37.2 °C (98.9 °F)  Heart Rate:  [] 75  Resp:  [14-18] 16  BP: (152-222)/() 175/95    General appearance: alert, appears stated age and cooperative  Head: normocephalic, without obvious abnormality, atraumatic  Eyes: conjunctivae clear. PERRL, EOM's intact.  Neck: no carotid bruit and supple, symmetrical, trachea midline  Lungs: clear to auscultation bilaterally  Heart: regular rate and rhythm, S1, S2 normal, no murmur, click, rub or gallop  Extremities: extremities normal, warm and well-perfused; no cyanosis, clubbing, or edema  Psych: affect: normal  Neurologic:   Mental status: Alert, oriented, thought content appropriate, alertness: alert, orientation: time, person, place,   Cranial nerves: Pupils equal round reactive to light and accommodation, extraocular movements intact, visual fields full to confrontation, facial strength and sensation intact.  Tongue/palate midline, sternocleidomastoids 5/5 bilaterally.  Sensory: normal to all modalities  Motor: No drift.  Bilateral arms and legs are 5/5.  Reflexes: 2+ and symmetric.  Toes equivocal.  Coordination: normal, no tremor or asterixis.  Finger to nose intact.  No cogwheeling.  Rapid alternating movements are intact.  gait: Deferred    Assessment   Tremor   Assessment & Plan    Action tremor, most consistent with essential tremor.  Not currently seen on exam, but ongoing for the past few months and has been somewhat bothersome.  There are no findings to suggest Parkinson's disease.  Check thyroid function testing.  Trial of low-dose propranolol, increase to 20 mg daily with persistently elevated blood pressures and no improvement in symptoms thus far.  Discussed at length with  patient.  He may follow-up in the office for further titration and evaluation.        Bipolar 1 disorder (CMS/HCC) (HCC)   Assessment & Plan    On lamotrigine for mood stabilization, no history of seizures.        Hypothyroid   Assessment & Plan    On replacement, TSH normal                     Mauri Irizarry MD  1/25/2019  6:10 PM

## 2019-01-25 NOTE — NURSING NOTE
Patients  at dinner time. MD called with result per request. Verbal order to only give 4 units of the 7 units ordered with meals. Will continue to monitor.

## 2019-01-25 NOTE — PROGRESS NOTES
Hospital Medicine Service -  Daily Progress Note       SUBJECTIVE   Patient seen and examined.   Had uncontrolled pain overnight and had madsen replaced  BP was elevated while having pain  Currently he feels much better. BP controlled this am  Patient denies CP, SOB, palpitations, fever, cough, abd pain, vomiting, diarrhea, BRBPR, melena, hematemesis, dysuria, hematuria, headache, vision change, vertigo, numbness/tingling/focal weakness, light headedness, or additional complaint.       OBJECTIVE      Vital signs in last 24 hours:  Temp:  [36.6 °C (97.8 °F)-37.2 °C (99 °F)] 36.6 °C (97.8 °F)  Heart Rate:  [62-72] 72  Resp:  [14-18] 14  BP: (135-222)/() 184/152    PHYSICAL EXAMINATION        General: nontoxic appearing, no acute distress  HEENT:  PERRL, anicteric sclera   Neck: supple, no JVD  Cardiac: RRR, +S1/S2, no murmur, no rub   Lungs: clear bilaterally, no wheezing/rales/rhonchi  Abdomen: soft, NT/ND, +BS, no rebound/guarding   Extremities: no edema, distal perfusion intact  : hematuria in madsen bag is clearing  Neuro: AAOx3, nonfocal. CN's intact grossly. No focal motor deficit. No sensory deficit.  Skin: no rash. Clean, dry, intact.   Psych: cooperative      LABS / IMAGING / TELE      Labs  I have reviewed the patient's current laboratory data.    ECG/Telemetry  Telemetry reviewed    ASSESSMENT AND PLAN      Hypertensive urgency   Assessment & Plan    Patient was admitted after IR percutaneous nephrostomy tube placement  On the med/surg unit, patient's BP was 212/90. Asymptomatic.   Patient has a known history of difficult to control HTN and has a history of known accelerated HTN in the past after procedures.  He was in significant pain from the nephrostomy tube which is likely driving his HTN.  HTN improved with pain control.   BP controlled when pain is controlled.  Cardiology consult appreciated. HTN management per cardiology. ACE and Diuretics on hold given renal function and  nephrolithiasis/procedures.  He has a history of intolerance to calcium channel blockers.  On propranolol and hydralazine.   Continue telemetry  Continue to monitor and adjust meds prn          Kidney stone   Assessment & Plan    S/p percutaneous nephrostomy per IR 1/22  S/p removal of L stent in office 1/22  further intervention on the stone done in OR 1/23  Antibiotics and DVT prophylaxis per primary urology team.  On SCD's (hematuria post procedure)  QT prolongation and cephalosporin allergy noted. Would avoid Bactrim given his rise in Cr.   Antibiotics per ID.  On Aztreonam.  ASA was stopped 5 days prior to the procedure and Eliquis was stopped this past Saturday per the direction of his primary cardiologist. Resume when ok with urology        Bipolar 1 disorder (CMS/[facility]) ([facility])   Assessment & Plan    Continue lamictal per outpatient regimen        CKD (chronic kidney disease) stage 3, GFR 30-59 ml/min (CMS/[facility]) ([facility])   Assessment & Plan    Baseline Cr appears to be in range of 1.6 to 2.0 on review of past records  Cr 2.1 increased to 2.9 post op, today 3.0, reaching plateau  Making urine. Madsen functioning  Ongoing stone and madsen management per urology  Diuretics on hold for now. Hold ACE  Changed bactrim to alternate antibiotic choice.  Gently hydrated.   Monitor BMP and volume status  Nephrology following, discussed with Dr. Ladinski          Bradycardia   Assessment & Plan    Sinus bradycardia on clonidine with known RBBB  No symptoms  Cardiology following; HTN management per cardiology recs  Monitor on telemetry        GERD (gastroesophageal reflux disease)   Assessment & Plan    Continue Protonix        Type 2 diabetes mellitus (CMS/[facility]) ([facility])   Assessment & Plan    Continue to monitor accuchecks  Mealtime novolog with correction insulin  Dietary intake has been variable and he has been intermittently NPO for procedures  Adjust regimen prn  Consulted endocrinology for management.           Hypothyroid    Assessment & Plan    Continue levothyroxine        Hypercholesteremia   Assessment & Plan    statin        Atrial fibrillation (CMS/HCC)   Assessment & Plan    History of afib on amiodarone and Eliquis as outpatient  Currently in sinus rhythm  Monitor on telemetry  Cardiology consulted  Patient is on aspirin and Eliquis which was held-resume medication per urology and cardiology                  Maxim Casiano,   1/25/2019  9:47 AM

## 2019-01-25 NOTE — PROGRESS NOTES
Patient seen this a.m.  Bps up during night  Discussed with nursing  They note urinary retention and pain  BP now OK  Discussed with Urology - may need madsen reinserted.  Will monitor Bps here and go with his usual o.p. BP meds as needed and as appropriate.

## 2019-01-25 NOTE — PROGRESS NOTES
NEPHROLOGY PROGRESS NOTE      Subjective     Interval History: The patient denies nausea, SOB, LH.  Had some urine retention and madsen was replaced.  Is nonoliguric with madsen.  Has generalized itching.  +constipation    Objective     Vital signs in last 24 hours:  Temp:  [36.6 °C (97.8 °F)-37.2 °C (99 °F)] 36.6 °C (97.8 °F)  Heart Rate:  [62-72] 72  Resp:  [14-18] 14  BP: (135-222)/() 184/152           Intake/Output Summary (Last 24 hours) at 01/25/19 1011  Last data filed at 01/25/19 0715   Gross per 24 hour   Intake              200 ml   Output              810 ml   Net             -610 ml       Physical Exam  General:  Alert, NAD   HEENT:  nonicteric  Chest:  CTAB  Cardiovascular:  S1, S2 normal, RRR, no rub  Abdomen:  Soft, +BS  Musculoskeletal:  Extremities no edema    Labs  BUN   Date Value Ref Range Status   01/25/2019 48 (H) 8 - 20 mg/dL Final     Creatinine   Date Value Ref Range Status   01/25/2019 3.0 (H) 0.8 - 1.3 mg/dL Final     Potassium   Date Value Ref Range Status   01/25/2019 3.9 3.6 - 5.1 mEQ/L Final     Hemoglobin   Date Value Ref Range Status   01/25/2019 15.1 13.7 - 17.5 g/dL Final     Sodium   Date Value Ref Range Status   01/25/2019 133 (L) 136 - 144 mEQ/L Final         Assessment/Plan:    1)  NEPHROLITHIASIS  Patient has bilateral stones where on CT 10/18/18 the left stones of 1.1cm and 1.6cm were seen.  He is now s/p  left percutaneous nephrostolithotomy and stent placement.  The stones have been sent for analysis.  Agree with hydration avoiding obstruction from blood clots and to maintain euvolemia in patient with reduced appetite.  Management per urology. Patient had retention and continues with madsen.  Dose medications for level of renal function.  Infectious diseases on the case.     2)  CHRONIC KIDNEY DISEASE STAGE 3/ACUTE RENAL FAILURE  The patient has had some variability in creatinine, but mainly between 1.6-2.1.  He was 1.9 before the surgery and has creatinine rising from  2.1 to 2.9.  His creatinine rise declined strongly with creatinine 3.0 today when his last recent doses of ACEI and Bactrim were the evening of 1/23/19; recommend remaining off these medications for now.  Urinalysis consistent with being post-surgical.  Avoid nephrotoxins.  Follow blood chemistry, I/O.  CKD is in the setting of  DM, HTN, obesity, nephrolithiasis, and age 67y/o.  He follows with Dr. Hernandez in this regard and can continue to to so upon discharge.       3)  HYPERTENSION  BP still with variablility.  Patient getting pain medication (possible source of the pruritus?).  Continue to monitor, especially with the ACEI stopped and adjust medications as needed.  TSH normal range at 4.01.  Noted low dose propranolol started in setting of tremor-seen by neurology.

## 2019-01-25 NOTE — PROGRESS NOTES
Subjective      Patient ID: Nathan Alonzo is a 68 y.o. male.    Had a rough night with pain OOC he says. BG jumped in concert with that from 155----> 220.    BP high overnight.  Méndez had to go back in he says.    HPI     The following have been reviewed and updated as appropriate in this visit:  Allergies  Meds  Problems       Review of Systems  Pain at op site.  Not at all hungry.  Not really eating well.  He is itchy over red rash.    Objective       Vital signs in last 24 hours:  Temp:  [36.6 °C (97.8 °F)-37.2 °C (99 °F)] 36.6 °C (97.8 °F)  Heart Rate:  [62-72] 72  Resp:  [14-18] 14  BP: (135-222)/() 184/152      Vitals:    01/25/19 0000 01/25/19 0234 01/25/19 0251 01/25/19 0943   BP: (!) 198/99 (!) 222/115 (!) 158/78 (!) 184/152   BP Location: Right upper arm  Left upper arm    Patient Position: Lying  Lying    Pulse: 65 64 72    Resp: 18  14    Temp: 37.2 °C (99 °F)  36.6 °C (97.8 °F)    TempSrc: Oral  Oral    SpO2: 97%  96%    Weight:       Height:         Body mass index is 33.81 kg/m².    Physical Exam  General:     WDWN, in NAD; appearing stated age.  He is very pleasant.    The erythema of his face is lighter than yesterday.     HEENT:      Facies symmetric; Anicteric, without gaze preference                       COR:           Normal S1, S2; no murmurs gallops or rubs  LUNGS:                            Lungs clear ant, decreased BS at bases post  ABDOMEN: NABS: Nontender, nondistended; no HSM or masses.   EXT:            No cyanosis, clubbing or edema or tremor.                      There is a tiny abrasion on the dorsal aspect of the right third toe first joint.  There is a resolving subungual hematoma at the left first toe from trauma.  No other deformities.  T                   PSYCH:      Normal insight and judgment. Alert and O x 3.    DIET:       Dietary Orders            Start     Ordered    01/24/19 1009  Adult Diet RD/LDN may adjust order. Diet orders written by PA/Christopher may not be  adjusted by RD/LDNs.; Regular; No Concentrated Sweets  Diet effective now     Question Answer Comment   Delegation of Authority. Diet orders written by PA/CRNPs may not be adjusted by RD/LDNs. RD/LDN may adjust order. Diet orders written by PA/CRNPs may not be adjusted by RD/LDNs.    Diet Texture Regular    Other Restriction(s): No Concentrated Sweets        01/24/19 1009        Labs  Glucose Results       03/05/18                          1358           HBG A1C 7.9 (H)           Comment for HBG A1C at 1358 on 03/05/18:           Pre-diabetes: 5.7 - 6.4           Diabetes: >6.4           Glycemic control for adults with diabetes: <7.0        Spent 20 minutes face to face with the patient, and >75% of this time was in counseling and coordination of care.              Results from last 7 days  Lab Units 01/25/19  0451 01/24/19  0500 01/23/19  0551   SODIUM mEQ/L 133* 133* 133*   POTASSIUM mEQ/L 3.9 4.7 4.0   CHLORIDE mEQ/L 101 101 99   CO2 mEQ/L 19* 21* 24   BUN mg/dL 48* 43* 29*   CREATININE mg/dL 3.0* 2.9* 2.1*   GLUCOSE mg/dL 200* 274* 271*   CALCIUM mg/dL 9.0 9.2 8.7*       Results from last 7 days  Lab Units 01/25/19  0451 01/24/19  0500 01/23/19  0550   WBC K/uL 19.93* 18.80* 13.07*   HEMOGLOBIN g/dL 15.1 14.2 14.8   HEMATOCRIT % 44.3 42.2 43.6   PLATELETS K/uL 218 203 181     Sugars  1 24 19  274  226 NL 7 +4  171 NL 7 clers                              Lantus 18 units  199 NL 7 + 2  155 Nl2    1 25 19  200  220 NL 7                            Lantus 24      Assessment/Plan      Type 2 DM  Not eating this am.  Sugars higher this am but pain a real issue. Rash still itchy.  WBC 19  Renal function stable but Creat 3  Will increase his Lantus to 24 units (this is only 0.2 units/kg/day.)  7 units correction dose now.  He is not planning to eat and still w pain.    Continue to monitor.  Dr. Malloy cover in the am.      CKD  Creat 3.0.  Watch for hypoglycemia. Discussed.    Electronically signed by: Peggy MAC  MD Krystin 1/25/2019

## 2019-01-25 NOTE — PLAN OF CARE
Problem: Patient Care Overview  Goal: Plan of Care Review  Outcome: Ongoing (interventions implemented as appropriate)   01/24/19 1907 01/25/19 1100   Coping/Psychosocial   Plan Of Care Reviewed With --  patient   Plan of Care Review   Progress progress toward functional goals is gradual --      Goal: Individualization & Mutuality  Outcome: Ongoing (interventions implemented as appropriate)    Goal: Discharge Needs Assessment  Outcome: Ongoing (interventions implemented as appropriate)   01/23/19 1400 01/25/19 1433   DC Needs Assessment   Concerns To Be Addressed --  denies needs/concerns at this time   Readmission Within The Last 30 Days no previous admission in last 30 days --    Anticipated Discharge Disposition home without services;home with home health services --      Goal: Interprofessional Rounds/Family Conf  Outcome: Ongoing (interventions implemented as appropriate)      Problem: Fall Risk (Adult)  Goal: Absence of Falls  Outcome: Ongoing (interventions implemented as appropriate)   01/25/19 1433   Fall Risk (Adult)   Absence of Falls making progress toward outcome       Problem: Pain, Acute (Adult)  Goal: Identify Related Risk Factors and Signs and Symptoms  Outcome: Ongoing (interventions implemented as appropriate)   01/23/19 0653   Pain, Acute   Related Risk Factors (Acute Pain) disease process;persistent pain;procedure/treatment   Signs and Symptoms (Acute Pain) verbalization of pain descriptors     Goal: Acceptable Pain Control/Comfort Level  Outcome: Ongoing (interventions implemented as appropriate)   01/25/19 1433   Pain, Acute (Adult)   Acceptable Pain Control/Comfort Level making progress toward outcome

## 2019-01-26 ENCOUNTER — APPOINTMENT (INPATIENT)
Dept: PHYSICAL THERAPY | Facility: HOSPITAL | Age: 69
DRG: 660 | End: 2019-01-26
Attending: HOSPITALIST
Payer: MEDICARE

## 2019-01-26 LAB
ANION GAP SERPL CALC-SCNC: 11 MEQ/L (ref 3–15)
BACTERIA UR CULT: NORMAL
BACTERIA UR CULT: NORMAL
BASOPHILS # BLD: 0.06 K/UL (ref 0.01–0.1)
BASOPHILS NFR BLD: 0.6 %
BUN SERPL-MCNC: 50 MG/DL (ref 8–20)
CALCIUM SERPL-MCNC: 8.9 MG/DL (ref 8.9–10.3)
CHLORIDE SERPL-SCNC: 104 MEQ/L (ref 98–109)
CO2 SERPL-SCNC: 20 MEQ/L (ref 22–32)
COMPN STONE: NORMAL
COMPN STONE: NORMAL
CREAT SERPL-MCNC: 2.5 MG/DL
DIFFERENTIAL METHOD BLD: ABNORMAL
EOSINOPHIL # BLD: 0.46 K/UL (ref 0.04–0.54)
EOSINOPHIL NFR BLD: 4.3 %
ERYTHROCYTE [DISTWIDTH] IN BLOOD BY AUTOMATED COUNT: 14.4 % (ref 11.6–14.4)
GFR SERPL CREATININE-BSD FRML MDRD: 25.7 ML/MIN/1.73M*2
GLUCOSE BLD-MCNC: 135 MG/DL (ref 70–99)
GLUCOSE BLD-MCNC: 177 MG/DL (ref 70–99)
GLUCOSE BLD-MCNC: 237 MG/DL (ref 70–99)
GLUCOSE BLD-MCNC: 79 MG/DL (ref 70–99)
GLUCOSE SERPL-MCNC: 138 MG/DL (ref 70–99)
HCT VFR BLDCO AUTO: 41.5 %
HGB BLD-MCNC: 13.8 G/DL
IMM GRANULOCYTES # BLD AUTO: 0.09 K/UL (ref 0–0.08)
IMM GRANULOCYTES NFR BLD AUTO: 0.8 %
LYMPHOCYTES # BLD: 0.99 K/UL (ref 1.2–3.5)
LYMPHOCYTES NFR BLD: 9.2 %
MAGNESIUM SERPL-MCNC: 2.1 MG/DL (ref 1.8–2.5)
MCH RBC QN AUTO: 29.6 PG (ref 28–33.2)
MCHC RBC AUTO-ENTMCNC: 33.3 G/DL (ref 32.2–36.5)
MCV RBC AUTO: 89.1 FL (ref 83–98)
MONOCYTES # BLD: 1.07 K/UL (ref 0.3–1)
MONOCYTES NFR BLD: 9.9 %
NEUTROPHILS # BLD: 8.14 K/UL (ref 1.7–7)
NEUTS SEG NFR BLD: 75.2 %
NIDUS STONE QL: NORMAL
NRBC BLD-RTO: 0 %
ORIGIN STONE: NORMAL
PDW BLD AUTO: 11.6 FL (ref 9.4–12.4)
PLAT MORPH BLD: NORMAL
PLATELET # BLD AUTO: 158 K/UL
PLATELET # BLD EST: ABNORMAL 10*3/UL
PLATELET CLUMP BLD QL SMEAR: PRESENT
POCT TEST: ABNORMAL
POCT TEST: NORMAL
POTASSIUM SERPL-SCNC: 3.8 MEQ/L (ref 3.6–5.1)
RBC # BLD AUTO: 4.66 M/UL (ref 4.5–5.8)
SODIUM SERPL-SCNC: 135 MEQ/L (ref 136–144)
WBC # BLD AUTO: 10.81 K/UL
WT STONE: 0.71 G

## 2019-01-26 PROCEDURE — 63700000 HC SELF-ADMINISTRABLE DRUG: Performed by: INTERNAL MEDICINE

## 2019-01-26 PROCEDURE — 63600000 HC DRUGS/DETAIL CODE: Performed by: INTERNAL MEDICINE

## 2019-01-26 PROCEDURE — 83735 ASSAY OF MAGNESIUM: CPT | Performed by: HOSPITALIST

## 2019-01-26 PROCEDURE — 97162 PT EVAL MOD COMPLEX 30 MIN: CPT | Mod: GP

## 2019-01-26 PROCEDURE — 63700000 HC SELF-ADMINISTRABLE DRUG: Performed by: HOSPITALIST

## 2019-01-26 PROCEDURE — 63700000 HC SELF-ADMINISTRABLE DRUG: Performed by: PSYCHIATRY & NEUROLOGY

## 2019-01-26 PROCEDURE — 63700000 HC SELF-ADMINISTRABLE DRUG: Performed by: NURSE PRACTITIONER

## 2019-01-26 PROCEDURE — 63600000 HC DRUGS/DETAIL CODE: Performed by: HOSPITALIST

## 2019-01-26 PROCEDURE — 85025 COMPLETE CBC W/AUTO DIFF WBC: CPT | Performed by: NURSE PRACTITIONER

## 2019-01-26 PROCEDURE — 99233 SBSQ HOSP IP/OBS HIGH 50: CPT | Performed by: HOSPITALIST

## 2019-01-26 PROCEDURE — 20600000 HC ROOM AND CARE INTERMEDIATE/TELEMETRY

## 2019-01-26 PROCEDURE — 80048 BASIC METABOLIC PNL TOTAL CA: CPT | Performed by: HOSPITALIST

## 2019-01-26 PROCEDURE — 25000000 HC PHARMACY GENERAL: Performed by: NURSE PRACTITIONER

## 2019-01-26 RX ORDER — BISACODYL 10 MG/1
10 SUPPOSITORY RECTAL DAILY PRN
Status: DISCONTINUED | OUTPATIENT
Start: 2019-01-26 | End: 2019-01-29 | Stop reason: HOSPADM

## 2019-01-26 RX ORDER — ASPIRIN 81 MG/1
81 TABLET ORAL DAILY
Status: DISCONTINUED | OUTPATIENT
Start: 2019-01-26 | End: 2019-01-29 | Stop reason: HOSPADM

## 2019-01-26 RX ORDER — PROPRANOLOL HYDROCHLORIDE 10 MG/1
20 TABLET ORAL 2 TIMES DAILY
Status: DISCONTINUED | OUTPATIENT
Start: 2019-01-26 | End: 2019-01-29 | Stop reason: HOSPADM

## 2019-01-26 RX ADMIN — ATORVASTATIN CALCIUM 40 MG: 40 TABLET, FILM COATED ORAL at 17:30

## 2019-01-26 RX ADMIN — PROPRANOLOL HYDROCHLORIDE 20 MG: 10 TABLET ORAL at 16:26

## 2019-01-26 RX ADMIN — HYDRALAZINE HYDROCHLORIDE 50 MG: 25 TABLET, FILM COATED ORAL at 06:59

## 2019-01-26 RX ADMIN — HYDRALAZINE HYDROCHLORIDE 50 MG: 25 TABLET, FILM COATED ORAL at 22:52

## 2019-01-26 RX ADMIN — HYDROMORPHONE HYDROCHLORIDE 1 MG: 1 INJECTION, SOLUTION INTRAMUSCULAR; INTRAVENOUS; SUBCUTANEOUS at 01:54

## 2019-01-26 RX ADMIN — LAMOTRIGINE 150 MG: 25 TABLET ORAL at 08:09

## 2019-01-26 RX ADMIN — INSULIN ASPART 4 UNITS: 100 INJECTION, SOLUTION INTRAVENOUS; SUBCUTANEOUS at 17:30

## 2019-01-26 RX ADMIN — POLYETHYLENE GLYCOL 3350 17 G: 17 POWDER, FOR SOLUTION ORAL at 08:10

## 2019-01-26 RX ADMIN — AMIODARONE HYDROCHLORIDE 200 MG: 200 TABLET ORAL at 08:10

## 2019-01-26 RX ADMIN — HYDROMORPHONE HYDROCHLORIDE 1 MG: 1 INJECTION, SOLUTION INTRAMUSCULAR; INTRAVENOUS; SUBCUTANEOUS at 12:23

## 2019-01-26 RX ADMIN — INSULIN ASPART 2 UNITS: 100 INJECTION, SOLUTION INTRAVENOUS; SUBCUTANEOUS at 12:24

## 2019-01-26 RX ADMIN — HYDRALAZINE HYDROCHLORIDE 20 MG: 20 INJECTION INTRAMUSCULAR; INTRAVENOUS at 08:10

## 2019-01-26 RX ADMIN — PANTOPRAZOLE SODIUM 40 MG: 40 TABLET, DELAYED RELEASE ORAL at 08:10

## 2019-01-26 RX ADMIN — HYDRALAZINE HYDROCHLORIDE 50 MG: 25 TABLET, FILM COATED ORAL at 13:58

## 2019-01-26 RX ADMIN — INSULIN ASPART 7 UNITS: 100 INJECTION, SOLUTION INTRAVENOUS; SUBCUTANEOUS at 17:30

## 2019-01-26 RX ADMIN — INSULIN GLARGINE 24 UNITS: 100 INJECTION, SOLUTION SUBCUTANEOUS at 08:10

## 2019-01-26 RX ADMIN — INSULIN ASPART 7 UNITS: 100 INJECTION, SOLUTION INTRAVENOUS; SUBCUTANEOUS at 12:23

## 2019-01-26 RX ADMIN — PROPRANOLOL HYDROCHLORIDE 20 MG: 10 TABLET ORAL at 08:09

## 2019-01-26 RX ADMIN — CLONIDINE HYDROCHLORIDE 0.2 MG: 0.2 TABLET ORAL at 08:09

## 2019-01-26 RX ADMIN — ASPIRIN 81 MG: 81 TABLET, COATED ORAL at 12:23

## 2019-01-26 RX ADMIN — CLONIDINE HYDROCHLORIDE 0.2 MG: 0.2 TABLET ORAL at 21:02

## 2019-01-26 RX ADMIN — AZTREONAM 1 G: 1 INJECTION, POWDER, LYOPHILIZED, FOR SOLUTION INTRAMUSCULAR; INTRAVENOUS at 01:54

## 2019-01-26 RX ADMIN — LEVOTHYROXINE SODIUM 100 MCG: 100 TABLET ORAL at 10:12

## 2019-01-26 RX ADMIN — INSULIN ASPART 7 UNITS: 100 INJECTION, SOLUTION INTRAVENOUS; SUBCUTANEOUS at 08:11

## 2019-01-26 ASSESSMENT — COGNITIVE AND FUNCTIONAL STATUS - GENERAL
MOVING TO AND FROM BED TO CHAIR: 4 - NONE
WALKING IN HOSPITAL ROOM: 3 - A LITTLE
STANDING UP FROM CHAIR USING ARMS: 4 - NONE
CLIMB 3 TO 5 STEPS WITH RAILING: 3 - A LITTLE

## 2019-01-26 NOTE — ASSESSMENT & PLAN NOTE
On clonidine 0.2 mg bid, hydralazine 50 mg Q8hr.  Holding ace / diuretics due to libertad.    Started on propranolol for tremors, uptitrated to 20 bid yesterday - HR seems to be tolerating.  Pain contributing to HTN at times, but still suboptimally controlled at rest.  Still elevated despite uptitration in hydralazine yesterday. Would resume ACE today if OK with renal.

## 2019-01-26 NOTE — ASSESSMENT & PLAN NOTE
The patient has no new complaints   He continues on Lantus 24 units at night and NovoLog 7 units with meals.  Impression: Stable glycemic control.  Recommendations: I made no changes in the patient's insulin regimen.  Patient is for discharge tomorrow his glucoses trended up a bit today.

## 2019-01-26 NOTE — ASSESSMENT & PLAN NOTE
Maintaining SR on amiodarone.  Was previously on apixiban - okay to cont holding for now given maintenance of SR,  But would resume apixaban 5 mg BID on discharge as long as no objection from urology.

## 2019-01-26 NOTE — PROGRESS NOTES
Afebrile , bp ^  No hematuria , madsen and NT draining well   Good u/o  Some leaking around NT , no concern  Discussed w/ pt. Due to down sizing  Cr 2.5 , improved, wbc  10.8 better  Overall improving  Keep all the same  Keep following

## 2019-01-26 NOTE — ASSESSMENT & PLAN NOTE
No anginal symptoms.  Continue atorvastatin, statin, low dose aspirin.  Started on propranolol for tremor, but seems to be tolerating.  Historically not on beta blocker due to sinus bradycardia

## 2019-01-26 NOTE — ASSESSMENT & PLAN NOTE
S/p percutaneous nephrostomy 1/15/2019.  Urology following.  For possible nephrosotmy removal today with IR.   Pain control per urology.

## 2019-01-26 NOTE — PROGRESS NOTES
Daily Progress Note    Subjective    Pt alert, oral intake and appetite increased, no fever no respiratory distress  No abdominal or back pain     Objective    Vital signs in last 24 hours:  Temp:  [36.6 °C (97.8 °F)-37.2 °C (98.9 °F)] 36.7 °C (98.1 °F)  Heart Rate:  [54-83] 83  Resp:  [16-20] 20  BP: (155-202)/() 155/83      Intake/Output Summary (Last 24 hours) at 01/26/19 1050  Last data filed at 01/26/19 0600   Gross per 24 hour   Intake                0 ml   Output              850 ml   Net             -850 ml       Physical Exam:  Anicteric  Facial rubor resolved  No stridor or meningismus  Oropharynx w/o exudate or injection  abd no distention no guarding  Nephrostomy and bladder catheter intact  Iv access intact  Skin: rubor resolved  Neuro no tremor appreciated    Labs  reviewed    Imaging  I have independently reviewed the pertinent imaging from the last 24 hrs. and        Assessment & Plan          /p complex urologic procedure  -c/s sterile    leukocytosis secondary to intervention  -resolving     abx mgmt:  abx therapy  discontinued        Discussed clinical presentation with pt    Please alert ID service of changes in clinical presentation

## 2019-01-26 NOTE — PLAN OF CARE
Problem: Patient Care Overview  Goal: Plan of Care Review  Outcome: Ongoing (interventions implemented as appropriate)   01/25/19 2300 01/26/19 1202   Coping/Psychosocial   Plan Of Care Reviewed With patient --    Plan of Care Review   Outcome Summary --  Patient ambulated in [place] with nurse. Pain is minimal.     Goal: Individualization & Mutuality  Outcome: Ongoing (interventions implemented as appropriate)    Goal: Discharge Needs Assessment  Outcome: Ongoing (interventions implemented as appropriate)   01/23/19 1400 01/25/19 1433   DC Needs Assessment   Concerns To Be Addressed --  denies needs/concerns at this time   Readmission Within The Last 30 Days no previous admission in last 30 days --        Problem: Fall Risk (Adult)  Goal: Absence of Falls  Outcome: Ongoing (interventions implemented as appropriate)   01/26/19 1202   Fall Risk (Adult)   Absence of Falls making progress toward outcome       Problem: Pain, Acute (Adult)  Goal: Identify Related Risk Factors and Signs and Symptoms  Outcome: Ongoing (interventions implemented as appropriate)   01/23/19 0653   Pain, Acute   Signs and Symptoms (Acute Pain) verbalization of pain descriptors     Goal: Acceptable Pain Control/Comfort Level  Outcome: Ongoing (interventions implemented as appropriate)   01/26/19 1202   Pain, Acute (Adult)   Acceptable Pain Control/Comfort Level making progress toward outcome

## 2019-01-26 NOTE — PROGRESS NOTES
Subjective   Appears the patient's appetite is fair  Interval History: Patient about the same. No new issues.     Objective     166 107   105  135 177  POCT         DIET:       Dietary Orders            Start     Ordered    01/24/19 1009  Adult Diet RD/LDN may adjust order. Diet orders written by PA/CRNPs may not be adjusted by RD/LDNs.; Regular; No Concentrated Sweets  Diet effective now     Question Answer Comment   Delegation of Authority. Diet orders written by PA/CRNPs may not be adjusted by RD/LDNs. RD/LDN may adjust order. Diet orders written by PA/CRNPs may not be adjusted by RD/LDNs.    Diet Texture Regular    Other Restriction(s): No Concentrated Sweets        01/24/19 1009          Vital signs in last 24 hours:  Temp:  [36.6 °C (97.8 °F)-37.2 °C (98.9 °F)] 37.2 °C (98.9 °F)  Heart Rate:  [54-83] 60  Resp:  [16-20] 20  BP: (149-202)/() 149/80    Labs  Glucose Results       01/25/19                          0451           HBG A1C 9.4 (H)           EST AVG GLUCOSE 223           Comment for HBG A1C at 0451 on 01/25/19:    In the absence of an established diagnosis of Diabetes Mellitus, HbA1c levels between 5.7% and 6.4% are indicative of increased risk for developing Diabetes(Pre-Diabetes). Levels of 6.5% or greater are diagnostic for Diabetes Mellitus.    Comment for EST AVG GLUCOSE at 0451 on 01/25/19:    Estimate of average glucose concentration continuously over 24 hours for previous 2 to 3 months(Per ADA Recommendation).            Physical Exam:  General :      Unchanged   Skin:             Unchanged  Extremities:  Unchanged  Neurologic:   Unchanged    Impression:  Type 2 diabetes mellitus (CMS/HCC) ([facility])   Assessment & Plan    The patient has no new complaints nursing reported he ate poorly this morning the patient felt that he ate okay.  He continues on Lantus 24 units at night and NovoLog 7 units with meals.  Impression: Stable glycemic control.  Recommendations: Made no changes in the  patient's insulin regimen.              Lincoln Malloy MD  1/26/2019 2:10 PM

## 2019-01-26 NOTE — PROGRESS NOTES
Hospital Medicine Service -  Daily Progress Note       SUBJECTIVE   Patient seen and examined.   Feeling better overall  Appetite still marginal  Ambulating in hallway this am  Discussed with Dr. Romo  Patient denies CP, SOB, palpitations, fever, cough, abd pain, vomiting, diarrhea, BRBPR, melena, hematemesis, dysuria, hematuria, headache, vision change, vertigo, numbness/tingling/focal weakness, light headedness, or additional complaint.       OBJECTIVE      Vital signs in last 24 hours:  Temp:  [36.6 °C (97.8 °F)-37.2 °C (98.9 °F)] 36.7 °C (98 °F)  Heart Rate:  [] 59  Resp:  [16-20] 20  BP: (152-202)/() 202/101    PHYSICAL EXAMINATION        General: nontoxic appearing, no acute distress  HEENT:  PERRL, anicteric sclera   Neck: supple, no JVD  Cardiac: RRR, +S1/S2, no murmur, no rub   Lungs: clear bilaterally, no wheezing/rales/rhonchi  Abdomen: soft, NT/ND, +BS, no rebound/guarding   Extremities: no edema, distal perfusion intact  : hematuria in madsen bag is clearing  Neuro: AAOx3, nonfocal. CN's intact grossly. No focal motor deficit. No sensory deficit.  Skin: no rash. Clean, dry, intact.   Psych: cooperative      LABS / IMAGING / TELE      Labs  I have reviewed the patient's current laboratory data.    Imaging  I have independently reviewed the pertinent imaging from the last 24 hrs.    ECG/Telemetry  Telemetry reviewed    ASSESSMENT AND PLAN      Hypertensive urgency   Assessment & Plan    Patient was admitted after IR percutaneous nephrostomy tube placement  On the med/surg unit, patient's BP was 212/90. Asymptomatic.   Patient has a known history of difficult to control HTN and has a history of known accelerated HTN in the past after procedures.  He was in significant pain from the nephrostomy tube which is likely driving his HTN.  HTN improved with pain control.   BP controlled when pain is controlled.  Cardiology consult appreciated. HTN management per cardiology. ACE and Diuretics on  hold given renal function and nephrolithiasis/procedures.  He has a history of intolerance to calcium channel blockers.  On propranolol, clonidine, and hydralazine.   Continue telemetry  Continue to monitor and adjust meds prn          Kidney stone   Assessment & Plan    S/p percutaneous nephrostomy per IR 1/22  S/p removal of L stent in office 1/22  further intervention on the stone done in OR 1/23  Antibiotics and DVT prophylaxis per primary urology team.  On SCD's (hematuria post procedure)  QT prolongation and cephalosporin allergy noted. Would avoid Bactrim given his rise in Cr.   Antibiotics per ID.  On Aztreonam.  ASA was stopped 5 days prior to the procedure and Eliquis was stopped this past Saturday per the direction of his primary cardiologist. Resume when ok with urology        Bipolar 1 disorder (CMS/[facility]) ([facility])   Assessment & Plan    Continue lamictal per outpatient regimen        CKD (chronic kidney disease) stage 3, GFR 30-59 ml/min (CMS/[facility]) ([facility])   Assessment & Plan    Baseline Cr appears to be in range of 1.6 to 2.0 on review of past records  Cr 2.1 increased to 3.0 post op, now trending down.   Making urine. Madsen functioning  Ongoing stone and madsen management per urology  Diuretics on hold for now. Hold ACE  Changed bactrim to alternate antibiotic choice.  Gently hydrated.   Monitor BMP and volume status  Nephrology following          Bradycardia   Assessment & Plan    known RBBB  No symptoms  Cardiology following; HTN management per cardiology recs  Monitor on telemetry        GERD (gastroesophageal reflux disease)   Assessment & Plan    Continue Protonix        Type 2 diabetes mellitus (CMS/[facility]) ([facility])   Assessment & Plan    Continue to monitor accuchecks  Mealtime novolog with correction insulin  Dietary intake has been variable and he has been intermittently NPO for procedures  Adjust regimen prn  Consulted endocrinology for management.           Hypothyroid   Assessment & Plan    Continue  levothyroxine        Hypercholesteremia   Assessment & Plan    statin        Atrial fibrillation (CMS/HCC)   Assessment & Plan    History of afib on amiodarone and Eliquis as outpatient  Currently in sinus rhythm  Monitor on telemetry  Cardiology consulted  Patient is on aspirin and Eliquis which was held-resume medication per urology and cardiology                  Maxim Casiano,   1/26/2019  9:33 AM

## 2019-01-26 NOTE — PLAN OF CARE
Problem: Patient Care Overview  Goal: Plan of Care Review  Outcome: Ongoing (interventions implemented as appropriate)   01/26/19 1342   Coping/Psychosocial   Plan Of Care Reviewed With patient   Plan of Care Review   Progress progress toward functional goals as expected   Outcome Summary close supervision amb with goals of indep       Problem: Mobility, Physical Impaired (Adult)  Goal: Identify Related Risk Factors and Signs and Symptoms  Outcome: Ongoing (interventions implemented as appropriate)      Problem: Acute Therapy Services Goal & Intervention Plan  Goal: Gait Training Goal  Outcome: Ongoing (interventions implemented as appropriate)    Goal: Stairs Goal  Outcome: Ongoing (interventions implemented as appropriate)

## 2019-01-26 NOTE — PROGRESS NOTES
Cardiology Daily Progress Note    Subjective   Nathan Alonzo is a 68 y.o. male who was admitted for left percutaneous nephrostomy for treatment of persistent nephrolithiasis unresponsive to lithotripsy.  Post-procedural hypertension in this patient with h/o resistant and difficult to control hypertenson    INTERVAL HISTORY:   Pain is improving.  BP remains elevated even at rest. Patient reports mild HA this morning, but no CP or dyspnea.    ALLERGIES:  Oxycodone; Amlodipine; Cephalexin monohydrate; and Latex      Current Facility-Administered Medications:   •  acetaminophen (TYLENOL) tablet 650 mg, 650 mg, oral, q4h PRN, Ethel Patton CRNP  •  amiodarone (PACERONE) tablet 200 mg, 200 mg, oral, Daily, Maxim Casiano DO, 200 mg at 01/26/19 0810  •  atorvastatin (LIPITOR) tablet 40 mg, 40 mg, oral, Daily (6p), Ethel Patton CRNP, 40 mg at 01/25/19 1736  •  aztreonam (AZACTAM) IVPB 1 g in 100 mL NSS vial in bag, 1 g, intravenous, q12h INT, Sera Jones CRNP, Last Rate: 200 mL/hr at 01/26/19 0154, 1 g at 01/26/19 0154  •  cloNIDine (CATAPRES) tablet 0.2 mg, 0.2 mg, oral, BID, EMILY Polo MD, 0.2 mg at 01/26/19 0809  •  diphenhydrAMINE (BENADRYL) injection 25 mg, 25 mg, intravenous, q6h PRN, Ethel Patton CRNP, 25 mg at 01/25/19 2058  •  hydrALAZINE (APRESOLINE) injection 20 mg, 20 mg, intravenous, q8h PRN, Joaquin Valencia DO, 20 mg at 01/26/19 0810  •  hydrALAZINE (APRESOLINE) tablet 50 mg, 50 mg, oral, q8h THADDEUS, Maxim Casiano DO, 50 mg at 01/26/19 0659  •  HYDROmorphone (DILAUDID) 1 mg/mL injection 1 mg, 1 mg, intravenous, q3h PRN, Jay Pelayo MD, 1 mg at 01/26/19 0154  •  insulin aspart U-100 (NovoLOG) pen 2-10 Units, 2-10 Units, subcutaneous, With meals & nightly, Peggy Mcclelland MD, 2 Units at 01/25/19 1258  •  insulin aspart U-100 (NovoLOG) pen 7 Units, 7 Units, subcutaneous, TID with meals, [place], Maxim TRINH DO, 7 Units at 01/26/19 0811  •  insulin glargine (LANTUS  "SOLOSTAR) pen 24 Units, 24 Units, subcutaneous, Daily, Peggy Mcclelland MD, 24 Units at 01/26/19 0810  •  lamoTRIgine (LaMICtal) tablet 150 mg, 150 mg, oral, Daily, Ethel Patton CRNP, 150 mg at 01/26/19 0809  •  levothyroxine (SYNTHROID) tablet 100 mcg, 100 mcg, oral, Daily (6:30a), Ethel Patton CRNP, 100 mcg at 01/25/19 0611  •  pantoprazole (PROTONIX) tablet,delayed release (DR/EC) 40 mg, 40 mg, oral, Daily, Ethel Patton CRNP, 40 mg at 01/26/19 0810  •  polyethylene glycol (MIRALAX) 17 gram packet 17 g, 17 g, oral, Daily, Ethel Patton CRNP, 17 g at 01/26/19 0810  •  propranolol (INDERAL) tablet 20 mg, 20 mg, oral, Daily, Mauri Irizarry MD, 20 mg at 01/26/19 0809      Objective     LABS  Laboratory results reviewed.  Creat 3.0->2.5    Vital signs in last 24 hours:  Temp:  [36.6 °C (97.8 °F)-37.2 °C (98.9 °F)] 36.7 °C (98 °F)  Heart Rate:  [] 59  Resp:  [16-20] 20  BP: (152-202)/() 202/101      Intake/Output Summary (Last 24 hours) at 01/26/19 0951  Last data filed at 01/26/19 0600   Gross per 24 hour   Intake                0 ml   Output              850 ml   Net             -850 ml       TELEMETRY  SR, HR >55    Physical Exam:  BP (!) 155/83 (BP Location: Right upper arm, Patient Position: Lying)   Pulse 83   Temp 36.7 °C (98.1 °F)   Resp 20   Ht 1.88 m (6' 2\")   Wt 119 kg (263 lb 4.8 oz)   SpO2 97%   BMI 33.81 kg/m²     General Appearance:  Alert, no distress   Head:  Normocephalic, without obvious abnormality, atraumatic   Eyes:  Conjunctiva/corneas clear, EOM's intact   Endocrine: No thyroid enlargement    Lungs:   Clear to auscultation bilaterally, respirations unlabored, no rales, no wheezing   Heart:  Regular rhythm, S1 and S2 normal, no murmur, rub or gallop   Abdomen:   Soft, non-tender, no masses, no organomegaly. L PCN tube, dressing intact / clean / dry   Vascular: Pulses 2+ and symmetric all extremities, no carotid bruit or jugular vein distention "   Musculoskeletal:  Skin: No injury or deformity  Skin color, texture, turgor normal, no rashes or lesions, no cyanosis or edema   Extremities: Extremities normal, atraumatic   Behavior/Emotional: Appropriate, cooperative          Subjective/Objective:    Assessment/Plan   Kidney stone   Assessment & Plan    S/p percutaneous nephrostomy 1/15/2019.  Urology following.  Méndez re-inserted yesterday due to urinary retention.  Pain control per urology.        Hypertensive urgency   Assessment & Plan    BP elevated francesca-procedurally.  On clonidine 0.2 mg bid, hydralazine 50 mg Q8hr.  Holding ace / diuretics due to libertad.  Tolerating propranolol - will increase to BID which may help BP as well.  Continue to monitor for worsening bradycardia.  Some contribution of pain to elevated BP.  Hoping if renal function returns to baseline levels we can resume ACE at least.  Renal recommending to continue to hold for now.        Hypercholesteremia   Assessment & Plan    On atorvastatin 40 mg daily        CAD (coronary artery disease) of artery bypass graft   Assessment & Plan    No anginal symptoms.  Will resume low dose aspirin.  Continue atorvastatin, statin.  Started on propranoll for tremor, but seems to be tolerating.  Historically not on beta blocker due to sinus bradycardia        Atrial fibrillation (CMS/[facility])   Assessment & Plan    Maintaining SR on amiodarone.  Was previously on apixiban - okay to cont holding for now given maintenance of SR,  But would resume apixaban 5 mg BID on discharge as long as no objection from urology.        Kidney disease   Assessment & Plan    CKD with baseline creatinine ~1.8  Creat peaked this admission at 3.0, down to 2.5 mg/dL today.  Holding lisinopril, eplerenone, aldactazide due to LIBERTAD.                       Roseline Gonzalez MD  1/26/2019

## 2019-01-26 NOTE — PROGRESS NOTES
Patient: Nathan Alonzo  Location: [facility] Unit 2510  MRN: [id number]  Today's date: 1/26/2019    Pt in bed with call bell and all needs in reach, nurse notified.        Therapy Pain/Vitals - 01/26/19 1210        Pain/Comfort/Sleep    Pain Charting Type Pain Assessment    Presence of Pain denies    Pain Rating (0-10): Rest 0    Pain Rating (0-10): Activity 0       Vital Signs    Pulse 60    SpO2 97 %    Patient Activity Walking    Oxygen Therapy None (Room air)          Prior Living Environment      Most Recent Value   Living Arrangements  house   Equipment Currently Used at Home  none          Prior Level of Function      Most Recent Value   Ambulation  independent   Transferring  independent   Toileting  independent   Bathing  independent   Dressing  independent   Eating  independent   Communication  understands/communicates without difficulty   Swallowing  swallows foods/liquids without difficulty   Equipment Currently Used at Home  none                PT Evaluation - 01/26/19 1210        Session Details    Document Type initial evaluation    Mode of Treatment individual therapy;physical therapy    Patient/Family Observations pt in bed, agreeable to PT eval       Time Calculation    Start Time 1150    Stop Time 1204    Time Calculation (min) 14 min       General Information    Patient Profile Reviewed? yes    Onset of Illness/Injury or Date of Surgery 01/23/19    Referring Physician Elena    Pertinent History of Current Functional Problem percutaneous nephrostomy, stone removal, h/o renal cell carcinoma, bradycardia, AF, hypertensive urgency; CKDIII, GERD, DMII       Coping Strategies    Trust Relationship/Rapport care explained;thoughts/feelings acknowledged       Orientation Log    City 3-->spontaneous/free recall    Kind of Place 3-->spontaneous/free recall    Name of Hospital 3-->spontaneous/free recall    Month 3-->spontaneous/free recall    Date 3-->spontaneous/free recall     Year 3-->spontaneous/free recall    Day of Week 3-->spontaneous/free recall    Clock Time 3-->spontaneous/free recall    Etiology/Event 3-->spontaneous/free recall    Pathology Deficits 3-->spontaneous/free recall    Total Score 30       Cognition/Psychosocial    Safety Awareness intact       Bed Mobility    [place], Supine to Sit independent    [place], Sit to Supine independent       Sit to Stand Transfer    [place], Sit to Stand Transfer supervision       Stand to Sit Transfer    [place], Stand to Sit Transfer supervision       Gait Training    [place], Gait close supervision    Distance in Feet 200 feet    Gait Pattern Utilized step-through    Comment mildy unsteady with path deviation       Stairs Training    Comment TBA in therapy gym       AM-PAC (TM) - Mobility (Current Function)    Turning from your back to your side while in a flat bed without using bedrails? 4 - None    Moving from lying on your back to sitting on the side of a flat bed without using bedrails? 4 - None    Moving to and from a bed to a chair? 4 - None    Standing up from a chair using your arms? 4 - None    To walk in a hospital room? 3 - A Little    Climbing 3-5 steps with a railing? 3 - A Little    AM-PAC (TM) Mobility Score 22       PT Clinical Impression    Patient's Goals For Discharge return to all previous roles/activities    Plan For Care Reviewed: Physical Therapy PT plan for care discussed with patient;patient voices agreement with PT plan for care    System Pathology/Pathophysiology Noted musculoskeletal    Impairments Found (PT Eval) aerobic capacity/endurance;gait, locomotion, and balance    Functional Limitations in Following Categories (PT Eval) self-care;home management    Disability: Inability to Perform Actions/Activities of Required Roles community/leisure;work    Rehab Potential/Prognosis good, to achieve stated therapy goals    PT Frequency of Treatment 3-5 times per week    Estimated Length of  Stay 1 week    Problem List impaired balance    Activity Limitations Related to Problem List ambulation not performed safely    Anticipated Equipment Needs at Discharge none    Daily Outcome Statement indep txfrs, close supervision amb due to illness/immobility related debility          Pain Assessment/Intervention  Pain Charting Type: Pain Assessment             Education provided this session. See the Patient Education summary report for full details.    PT Care Plan Goals      Most Recent Value   Stair Goal, PT   PT STG: Stairs  independent   PT STG: Number of Stairs  4   PT STG Duration: Stairs  3 days or less   PT STG Outcome: Stairs  goal ongoing      PT Care Plan Goals      Most Recent Value   Gait Goal   Time to Achieve Goal: Gait Training  by discharge   Level of [place]  independent   Distance Goal: Gait Training (feet)  120 feet   Goal Outcome: Gait Training  goal ongoing

## 2019-01-26 NOTE — PROGRESS NOTES
NEPHROLOGY PROGRESS NOTE      Subjective     Interval History: The patient denies nausea, SOB, LH, CP, poor appetite.  Has been ambulating.  Nonoliguric with madsen.    Objective     Vital signs in last 24 hours:  Temp:  [36.6 °C (97.8 °F)-37.2 °C (98.9 °F)] 37.2 °C (98.9 °F)  Heart Rate:  [54-83] 66  Resp:  [16-20] 16  BP: (149-202)/() 155/77           Intake/Output Summary (Last 24 hours) at 01/26/19 1653  Last data filed at 01/26/19 0600   Gross per 24 hour   Intake                0 ml   Output              850 ml   Net             -850 ml       Physical Exam  General:  Alert, NAD   HEENT:  nonicteric  Chest:  CTAB  Cardiovascular:  S1, S2 normal, RRR, no rub  Abdomen:  Soft  Musculoskeletal:  Extremities no edema    Labs  BUN   Date Value Ref Range Status   01/26/2019 50 (H) 8 - 20 mg/dL Final     Creatinine   Date Value Ref Range Status   01/26/2019 2.5 (H) 0.8 - 1.3 mg/dL Final     Potassium   Date Value Ref Range Status   01/26/2019 3.8 3.6 - 5.1 mEQ/L Final     Hemoglobin   Date Value Ref Range Status   01/26/2019 13.8 13.7 - 17.5 g/dL Final     Sodium   Date Value Ref Range Status   01/26/2019 135 (L) 136 - 144 mEQ/L Final         Assessment/Plan:  1)  NEPHROLITHIASIS  Patient has bilateral stones where on CT 10/18/18 the left stones of 1.1cm and 1.6cm were seen.  He is now s/p  left percutaneous nephrostolithotomy and stent placement.  The stones have been sent for analysis. .  Management per urology. Patient had retention and continues with madsen.  He is off IVF.  Dose medications for level of renal function.  Infectious diseases on the case.     2)  CHRONIC KIDNEY DISEASE STAGE 3/ACUTE RENAL FAILURE  The patient has had some variability in creatinine, but mainly between 1.6-2.1.  He was 1.9 before the surgery and has creatinine rising from 2.1 to 2.9.  His creatinine risedropped from 3.0 yesterday to 2.5 today when his last recent doses of ACEI and Bactrim were the evening of 1/23/19; recommend  remaining off these medications for now.  Urinalysis consistent with being post-surgical.  Avoid nephrotoxins.  Follow blood chemistry, I/O.  CKD is in the setting of  DM, HTN, obesity, nephrolithiasis, and age 69y/o.  He follows with Dr. Hernandez in this regard and can continue to to so upon discharge.       3)  HYPERTENSION  BP still with variablility.  Patient getting pain medication (possible source of the pruritus?).  Continue to monitor, especially with the ACEI stopped and adjust medications as needed.  TSH normal range at 4.01.  Noted increase in propranolol dose in setting of tremor-seen by neurology.

## 2019-01-26 NOTE — PROGRESS NOTES
Neurology Progress Note    Subjective     Interval History: no tremors today      Objective     Physical Exam:      Vital signs in last 24 hours:  Temp:  [36.6 °C (97.8 °F)-37.2 °C (98.9 °F)] 37.2 °C (98.9 °F)  Heart Rate:  [54-83] 79  Resp:  [16-20] 20  BP: (149-202)/() 149/80    General appearance: alert, appears stated age and cooperative  Head: normocephalic, without obvious abnormality, atraumatic  Eyes: conjunctivae clear. PERRL, EOM's intact.  Neck: no carotid bruit and supple, symmetrical, trachea midline  Lungs: clear to auscultation bilaterally  Heart: regular rate and rhythm, S1, S2 normal, no murmur, click, rub or gallop  Extremities: extremities normal, warm and well-perfused; no cyanosis, clubbing, or edema  Psych: affect: normal  Neurologic:   Alert, oriented, thought content appropriate, alertness: alert, orientation: time, person, place,   Cranial nerves: Pupils equal round reactive to light and accommodation, extraocular movements intact, visual fields full to confrontation, facial strength and sensation intact.  Tongue/palate midline, sternocleidomastoids 5/5 bilaterally.  Sensory: normal to all modalities  Motor: No drift.  Bilateral arms and legs are 5/5.  Reflexes: 2+ and symmetric.  Toes equivocal.  Coordination: normal, no tremor or asterixis.  Finger to nose intact.  No cogwheeling.  Rapid alternating movements are intact.  gait: Deferred    Assessment   Tremor   Assessment & Plan    Action tremor, most consistent with essential tremor.  Not currently seen on exam, but ongoing for the past few months and has been somewhat bothersome.  There are no findings to suggest Parkinson's disease.  Check thyroid function testing.  Trial of low-dose propranolol, increase to 20 mg daily with persistently elevated blood pressures and no improvement in symptoms thus far.  Discussed at length with patient.  He may follow-up in the office for further titration and evaluation.        Bipolar 1 disorder  (CMS/HCC) (HCC)   Assessment & Plan    On lamotrigine for mood stabilization, no history of seizures.        Hypothyroid   Assessment & Plan    On replacement, TSH normal                     Ruslan Richards MD  1/26/2019  12:47 PM

## 2019-01-26 NOTE — ASSESSMENT & PLAN NOTE
CKD with baseline creatinine ~1.8  Creat peaked this admission at 3.0, continues to trend towards baseline, 2.2 today.  Holding lisinopril, eplerenone, aldactazide due to ASHELY.  Hope to be able to resume ACE in near future today if OK with renal.

## 2019-01-26 NOTE — NURSING NOTE
Report given to day shift nurse. Notified about patient nephrostomy possibly leaking. Call placecd to Urology. Waiting for call back.

## 2019-01-27 LAB
ANION GAP SERPL CALC-SCNC: 11 MEQ/L (ref 3–15)
BUN SERPL-MCNC: 49 MG/DL (ref 8–20)
CALCIUM SERPL-MCNC: 9.1 MG/DL (ref 8.9–10.3)
CHLORIDE SERPL-SCNC: 105 MEQ/L (ref 98–109)
CO2 SERPL-SCNC: 20 MEQ/L (ref 22–32)
CREAT SERPL-MCNC: 2.2 MG/DL
ERYTHROCYTE [DISTWIDTH] IN BLOOD BY AUTOMATED COUNT: 13.7 % (ref 11.6–14.4)
GFR SERPL CREATININE-BSD FRML MDRD: 29.7 ML/MIN/1.73M*2
GLUCOSE BLD-MCNC: 124 MG/DL (ref 70–99)
GLUCOSE BLD-MCNC: 155 MG/DL (ref 70–99)
GLUCOSE BLD-MCNC: 181 MG/DL (ref 70–99)
GLUCOSE BLD-MCNC: 214 MG/DL (ref 70–99)
GLUCOSE SERPL-MCNC: 122 MG/DL (ref 70–99)
HCT VFR BLDCO AUTO: 41.6 %
HGB BLD-MCNC: 14 G/DL
MAGNESIUM SERPL-MCNC: 2.2 MG/DL (ref 1.8–2.5)
MCH RBC QN AUTO: 29.4 PG (ref 28–33.2)
MCHC RBC AUTO-ENTMCNC: 33.7 G/DL (ref 32.2–36.5)
MCV RBC AUTO: 87.4 FL (ref 83–98)
PDW BLD AUTO: 11 FL (ref 9.4–12.4)
PLATELET # BLD AUTO: 173 K/UL
POCT TEST: ABNORMAL
POTASSIUM SERPL-SCNC: 3.9 MEQ/L (ref 3.6–5.1)
RBC # BLD AUTO: 4.76 M/UL (ref 4.5–5.8)
SODIUM SERPL-SCNC: 136 MEQ/L (ref 136–144)
WBC # BLD AUTO: 11.47 K/UL

## 2019-01-27 PROCEDURE — 63700000 HC SELF-ADMINISTRABLE DRUG: Performed by: HOSPITALIST

## 2019-01-27 PROCEDURE — 63700000 HC SELF-ADMINISTRABLE DRUG: Performed by: INTERNAL MEDICINE

## 2019-01-27 PROCEDURE — 36415 COLL VENOUS BLD VENIPUNCTURE: CPT | Performed by: HOSPITALIST

## 2019-01-27 PROCEDURE — 99233 SBSQ HOSP IP/OBS HIGH 50: CPT | Performed by: HOSPITALIST

## 2019-01-27 PROCEDURE — 85027 COMPLETE CBC AUTOMATED: CPT | Performed by: HOSPITALIST

## 2019-01-27 PROCEDURE — 83735 ASSAY OF MAGNESIUM: CPT | Performed by: HOSPITALIST

## 2019-01-27 PROCEDURE — 20600000 HC ROOM AND CARE INTERMEDIATE/TELEMETRY

## 2019-01-27 PROCEDURE — 63700000 HC SELF-ADMINISTRABLE DRUG: Performed by: NURSE PRACTITIONER

## 2019-01-27 PROCEDURE — 80048 BASIC METABOLIC PNL TOTAL CA: CPT | Performed by: HOSPITALIST

## 2019-01-27 RX ORDER — HYDRALAZINE HYDROCHLORIDE 25 MG/1
75 TABLET, FILM COATED ORAL EVERY 8 HOURS
Status: DISCONTINUED | OUTPATIENT
Start: 2019-01-27 | End: 2019-01-29 | Stop reason: HOSPADM

## 2019-01-27 RX ORDER — SENNOSIDES 8.6 MG/1
1 TABLET ORAL DAILY
Status: DISCONTINUED | OUTPATIENT
Start: 2019-01-27 | End: 2019-01-29 | Stop reason: HOSPADM

## 2019-01-27 RX ORDER — ADHESIVE BANDAGE
30 BANDAGE TOPICAL DAILY PRN
Status: DISCONTINUED | OUTPATIENT
Start: 2019-01-27 | End: 2019-01-29 | Stop reason: HOSPADM

## 2019-01-27 RX ORDER — DOCUSATE SODIUM 100 MG/1
100 CAPSULE, LIQUID FILLED ORAL 2 TIMES DAILY
Status: DISCONTINUED | OUTPATIENT
Start: 2019-01-27 | End: 2019-01-29 | Stop reason: HOSPADM

## 2019-01-27 RX ADMIN — PANTOPRAZOLE SODIUM 40 MG: 40 TABLET, DELAYED RELEASE ORAL at 08:24

## 2019-01-27 RX ADMIN — INSULIN ASPART 2 UNITS: 100 INJECTION, SOLUTION INTRAVENOUS; SUBCUTANEOUS at 12:37

## 2019-01-27 RX ADMIN — POLYETHYLENE GLYCOL 3350 17 G: 17 POWDER, FOR SOLUTION ORAL at 08:26

## 2019-01-27 RX ADMIN — INSULIN ASPART 7 UNITS: 100 INJECTION, SOLUTION INTRAVENOUS; SUBCUTANEOUS at 12:36

## 2019-01-27 RX ADMIN — HYDRALAZINE HYDROCHLORIDE 75 MG: 25 TABLET, FILM COATED ORAL at 13:48

## 2019-01-27 RX ADMIN — AMIODARONE HYDROCHLORIDE 200 MG: 200 TABLET ORAL at 08:24

## 2019-01-27 RX ADMIN — LEVOTHYROXINE SODIUM 100 MCG: 100 TABLET ORAL at 05:52

## 2019-01-27 RX ADMIN — DOCUSATE SODIUM 100 MG: 100 CAPSULE, LIQUID FILLED ORAL at 20:22

## 2019-01-27 RX ADMIN — HYDRALAZINE HYDROCHLORIDE 50 MG: 25 TABLET, FILM COATED ORAL at 05:52

## 2019-01-27 RX ADMIN — INSULIN ASPART 7 UNITS: 100 INJECTION, SOLUTION INTRAVENOUS; SUBCUTANEOUS at 08:37

## 2019-01-27 RX ADMIN — CLONIDINE HYDROCHLORIDE 0.2 MG: 0.2 TABLET ORAL at 20:22

## 2019-01-27 RX ADMIN — INSULIN ASPART 4 UNITS: 100 INJECTION, SOLUTION INTRAVENOUS; SUBCUTANEOUS at 21:58

## 2019-01-27 RX ADMIN — INSULIN ASPART 2 UNITS: 100 INJECTION, SOLUTION INTRAVENOUS; SUBCUTANEOUS at 17:24

## 2019-01-27 RX ADMIN — PROPRANOLOL HYDROCHLORIDE 20 MG: 10 TABLET ORAL at 08:25

## 2019-01-27 RX ADMIN — SENNOSIDES 1 TABLET: 8.6 TABLET, FILM COATED ORAL at 10:17

## 2019-01-27 RX ADMIN — ASPIRIN 81 MG: 81 TABLET, COATED ORAL at 08:24

## 2019-01-27 RX ADMIN — ATORVASTATIN CALCIUM 40 MG: 40 TABLET, FILM COATED ORAL at 17:22

## 2019-01-27 RX ADMIN — PROPRANOLOL HYDROCHLORIDE 20 MG: 10 TABLET ORAL at 17:22

## 2019-01-27 RX ADMIN — LAMOTRIGINE 150 MG: 25 TABLET ORAL at 08:24

## 2019-01-27 RX ADMIN — CLONIDINE HYDROCHLORIDE 0.2 MG: 0.2 TABLET ORAL at 08:24

## 2019-01-27 RX ADMIN — HYDRALAZINE HYDROCHLORIDE 75 MG: 25 TABLET, FILM COATED ORAL at 22:00

## 2019-01-27 RX ADMIN — DOCUSATE SODIUM 100 MG: 100 CAPSULE, LIQUID FILLED ORAL at 10:17

## 2019-01-27 RX ADMIN — INSULIN GLARGINE 24 UNITS: 100 INJECTION, SOLUTION SUBCUTANEOUS at 09:19

## 2019-01-27 RX ADMIN — INSULIN ASPART 7 UNITS: 100 INJECTION, SOLUTION INTRAVENOUS; SUBCUTANEOUS at 17:24

## 2019-01-27 NOTE — PROGRESS NOTES
Neurology Progress Note    Subjective     Interval History: no tremors today      Objective     Physical Exam:      Vital signs in last 24 hours:  Temp:  [36.5 °C (97.7 °F)-37.2 °C (98.9 °F)] 36.8 °C (98.2 °F)  Heart Rate:  [49-66] 54  Resp:  [16-18] 16  BP: (149-195)/() 149/79    General appearance: alert, appears stated age and cooperative  Head: normocephalic, without obvious abnormality, atraumatic  Eyes: conjunctivae clear. PERRL, EOM's intact.  Neck: no carotid bruit and supple, symmetrical, trachea midline  Lungs: clear to auscultation bilaterally  Heart: regular rate and rhythm, S1, S2 normal, no murmur, click, rub or gallop  Extremities: extremities normal, warm and well-perfused; no cyanosis, clubbing, or edema  Psych: affect: normal  Neurologic:   Alert, oriented, thought content appropriate, alertness: alert, orientation: time, person, place,   Cranial nerves: Pupils equal round reactive to light and accommodation, extraocular movements intact, visual fields full to confrontation, facial strength and sensation intact.  Tongue/palate midline, sternocleidomastoids 5/5 bilaterally.  Sensory: normal to all modalities  Motor: No drift.  Bilateral arms and legs are 5/5.  Reflexes: 2+ and symmetric.  Toes equivocal.  Coordination: normal, no tremor or asterixis.  Finger to nose intact.  No cogwheeling.  Rapid alternating movements are intact.  gait: Deferred    Assessment   Tremor   Assessment & Plan    Action tremor, most consistent with essential tremor.  Not currently seen on exam, but ongoing for the past few months and has been somewhat bothersome.  There are no findings to suggest Parkinson's disease.  Check thyroid function testing.  Trial of low-dose propranolol, increase to 20 mg daily with persistently elevated blood pressures and no improvement in symptoms thus far.  Discussed at length with patient.  He may follow-up in the office for further titration and evaluation.        Bipolar 1 disorder  (CMS/HCC) (HCC)   Assessment & Plan    On lamotrigine for mood stabilization, no history of seizures.        Hypothyroid   Assessment & Plan    On replacement, TSH normal                     Ruslan Richards MD  1/27/2019  12:06 PM

## 2019-01-27 NOTE — PROGRESS NOTES
Hospital Medicine Service -  Daily Progress Note       SUBJECTIVE   Patient seen and examined.   Feeling better daily  In good spirits today  Ambulating in hallway yesterday  Patient denies CP, SOB, palpitations, fever, cough, abd pain, vomiting, diarrhea, BRBPR, melena, hematemesis, dysuria, hematuria, headache, vision change, vertigo, numbness/tingling/focal weakness, light headedness, or additional complaint.       OBJECTIVE      Vital signs in last 24 hours:  Temp:  [36.6 °C (97.8 °F)-37.2 °C (98.9 °F)] 36.8 °C (98.2 °F)  Heart Rate:  [49-83] 58  Resp:  [16-20] 18  BP: (149-195)/() 190/98    PHYSICAL EXAMINATION        General: nontoxic appearing, no acute distress  HEENT:  PERRL, anicteric sclera   Neck: supple, no JVD  Cardiac: RRR, +S1/S2, no murmur, no rub   Lungs: clear bilaterally, no wheezing/rales/rhonchi  Abdomen: soft, NT/ND, +BS, no rebound/guarding   Extremities: no edema, distal perfusion intact  : hematuria in madsen bag is clearing  Neuro: AAOx3, nonfocal. CN's intact grossly. No focal motor deficit. No sensory deficit.  Skin: no rash. Clean, dry, intact.   Psych: cooperative      LABS / IMAGING / TELE      Labs  I have reviewed the patient's current laboratory data.    ECG/Telemetry  Telemetry reviewed    ASSESSMENT AND PLAN      Hypertensive urgency   Assessment & Plan    Patient was admitted after IR percutaneous nephrostomy tube placement  On the med/surg unit, patient's BP was 212/90. Asymptomatic.   Patient has a known history of difficult to control HTN and has a history of known accelerated HTN in the past after procedures.  He was in significant pain from the nephrostomy tube which is likely driving his HTN.  HTN improved with pain control.   BP controlled when pain is controlled.  Cardiology consult appreciated. HTN management per cardiology. ACE and Diuretics on hold given renal function and nephrolithiasis/procedures.  He has a history of intolerance to calcium channel  blockers.  On propranolol, clonidine, and hydralazine.   Continue telemetry  Continue to monitor and adjust meds prn          Kidney stone   Assessment & Plan    S/p percutaneous nephrostomy per IR 1/22  S/p removal of L stent in office 1/22  further intervention on the stone done in OR 1/23  Antibiotics and DVT prophylaxis per primary urology team.  On SCD's (hematuria post procedure)  QT prolongation and cephalosporin allergy noted. Would avoid Bactrim given his rise in Cr.   Antibiotics per ID.  On Aztreonam.  Tube management per urology/IR  ASA was stopped 5 days prior to the procedure and Eliquis was stopped this past Saturday per the direction of his primary cardiologist. Resume when ok with urology        Bipolar 1 disorder (CMS/[facility]) ([facility])   Assessment & Plan    Continue lamictal per outpatient regimen        CKD (chronic kidney disease) stage 3, GFR 30-59 ml/min (CMS/[facility]) ([facility])   Assessment & Plan    Baseline Cr appears to be in range of 1.6 to 2.0 on review of past records  Cr 2.1 increased to 3.0 post op, now trending down.   Making urine. Madsen functioning  Ongoing stone and madsen management per urology  Diuretics on hold for now. Hold ACE  Changed bactrim to alternate antibiotic choice.  Gently hydrated.   Monitor BMP and volume status  Nephrology following          Bradycardia   Assessment & Plan    known RBBB  No symptoms  Cardiology following; HTN management per cardiology recs  Monitor on telemetry        GERD (gastroesophageal reflux disease)   Assessment & Plan    Continue Protonix        Type 2 diabetes mellitus (CMS/[facility]) ([facility])   Assessment & Plan    Continue to monitor accuchecks  Mealtime novolog with correction insulin  Dietary intake has been variable and he has been intermittently NPO for procedures  Adjust regimen prn  Consulted endocrinology for management.           Hypothyroid   Assessment & Plan    Continue levothyroxine        Hypercholesteremia   Assessment & Plan    statin         Atrial fibrillation (CMS/HCC)   Assessment & Plan    History of afib on amiodarone and Eliquis as outpatient  Currently in sinus rhythm  Monitor on telemetry  Cardiology consulted  Patient is on aspirin and Eliquis which was held-resume medication per urology and cardiology             Discussed with urology     Maxim Casiano DO  1/27/2019  9:27 AM

## 2019-01-27 NOTE — PROGRESS NOTES
CM received call from nurse in PCU from patient's wife.  She had some concerns regarding discharge planning and HC.  CM met with patient in his room.  CM talked with patient about HC.  Patient was concerned about the cost of HC nurses.  CM stated that a referral could be placed for HC and they would check the cost to the patient.  He is agreeable to HC.  Choices provided to patient.   CM asked patient if CM could call his wife and talk with her.  He stated it would be okay for CM to call his wife.  CM placed T/C to Susan, phone#[phone].  Discharge disposition discussed with wife.  She is also in agreement with discharge plan.  CM noticed that patient lives in [place], [place].  [facility] is not able to go to [place].  CM talked with wife about HC.  Wife did not that patient has used [facility] in the past.  CM placed T/C to [facility], [phone], to initiate a referral for HC.  CM spoke with Rowdy to initiate referral.  Faxed clinical information to [facility] Fax #[phone].

## 2019-01-27 NOTE — PROGRESS NOTES
Afebrile, vss , bp ^  Cr. 2.2 better, wbc 11.4 stable  Feels better  Main problem constipation  Less leakage from NT  All tubes draining clear urine  No new  issues   Stay the course.

## 2019-01-27 NOTE — PLAN OF CARE
Problem: Patient Care Overview  Goal: Plan of Care Review  Outcome: Ongoing (interventions implemented as appropriate)   01/26/19 1342 01/27/19 0800   Coping/Psychosocial   Plan Of Care Reviewed With --  patient   Plan of Care Review   Progress progress toward functional goals as expected --    Outcome Summary close supervision amb with goals of indep --      Goal: Individualization & Mutuality  Outcome: Ongoing (interventions implemented as appropriate)   01/23/19 0653 01/24/19 0631 01/24/19 1907   Individualization   Patient Specific Preferences Patient likes quiet at night and room darkened --  --    Patient Specific Goals --  Controlled pain, wants to ambulate  --    Patient Specific Interventions --  --  pt enjoys company and talking to staff members     Goal: Discharge Needs Assessment  Outcome: Ongoing (interventions implemented as appropriate)   01/23/19 1400 01/25/19 1433   DC Needs Assessment   Concerns To Be Addressed --  denies needs/concerns at this time   Readmission Within The Last 30 Days no previous admission in last 30 days --    Provider Choice List(s) Given no --    Anticipated Discharge Disposition home without services;home with home health services --    Equipment Needed After Discharge none --    Discharge Planning Comments Discharge discussed with patient, home with home health VS home without services --    Current Health   Anticipated Changes Related to Illness none --      Goal: Interprofessional Rounds/Family Conf  Outcome: Ongoing (interventions implemented as appropriate)   01/27/19 1815   Interdisciplinary Rounds/Family Conf   Participants ;nursing;social work/services       Problem: Fall Risk (Adult)  Goal: Absence of Falls  Outcome: Ongoing (interventions implemented as appropriate)   01/26/19 1202   Fall Risk (Adult)   Absence of Falls making progress toward outcome       Problem: Pain, Acute (Adult)  Goal: Identify Related Risk Factors and Signs and Symptoms  Outcome:  Ongoing (interventions implemented as appropriate)   01/27/19 1815   Pain, Acute   Related Risk Factors (Acute Pain) patient perception   Signs and Symptoms (Acute Pain) fatigue/weakness     Goal: Acceptable Pain Control/Comfort Level  Outcome: Ongoing (interventions implemented as appropriate)   01/27/19 1815   Pain, Acute (Adult)   Acceptable Pain Control/Comfort Level making progress toward outcome

## 2019-01-27 NOTE — PROGRESS NOTES
Cardiology Daily Progress Note    Subjective   Nathan Alonzo is a 68 y.o. male who was admitted for .    INTERVAL HISTORY:     ALLERGIES:  Oxycodone; Amlodipine; Cephalexin monohydrate; and Latex      Current Facility-Administered Medications:   •  acetaminophen (TYLENOL) tablet 650 mg, 650 mg, oral, q4h PRN, Ethel Patton CRNP  •  amiodarone (PACERONE) tablet 200 mg, 200 mg, oral, Daily, Maxim Casiano DO, 200 mg at 01/27/19 0824  •  aspirin enteric coated tablet 81 mg, 81 mg, oral, Daily, Roseline Gonzalez MD, 81 mg at 01/27/19 0824  •  atorvastatin (LIPITOR) tablet 40 mg, 40 mg, oral, Daily (6p), Ethel Patton CRNP, 40 mg at 01/26/19 1730  •  bisacodyl (DULCOLAX) 10 mg suppository 10 mg, 10 mg, rectal, Daily PRN, Jay Pelayo MD  •  cloNIDine (CATAPRES) tablet 0.2 mg, 0.2 mg, oral, BID, EMILY Polo MD, 0.2 mg at 01/27/19 0824  •  diphenhydrAMINE (BENADRYL) injection 25 mg, 25 mg, intravenous, q6h PRN, Ethel Patton CRNP, 25 mg at 01/25/19 2058  •  docusate sodium (COLACE) capsule 100 mg, 100 mg, oral, BID, Maxim Casiano DO, 100 mg at 01/27/19 1017  •  hydrALAZINE (APRESOLINE) injection 20 mg, 20 mg, intravenous, q8h PRN, Joaquin Valencia DO, 20 mg at 01/26/19 0810  •  hydrALAZINE (APRESOLINE) tablet 50 mg, 50 mg, oral, q8h THADDEUS, Maxim Casiano DO, 50 mg at 01/27/19 0552  •  HYDROmorphone (DILAUDID) 1 mg/mL injection 1 mg, 1 mg, intravenous, q3h PRN, Jay Pelayo MD, 1 mg at 01/26/19 1223  •  insulin aspart U-100 (NovoLOG) pen 2-10 Units, 2-10 Units, subcutaneous, With meals & nightly, Peggy Mcclelland MD, 4 Units at 01/26/19 1730  •  insulin aspart U-100 (NovoLOG) pen 7 Units, 7 Units, subcutaneous, TID with meals, Hemperly, Maxim TRINH DO, 7 Units at 01/27/19 0837  •  insulin glargine (LANTUS SOLOSTAR) pen 24 Units, 24 Units, subcutaneous, Daily, Peggy Mcclelland MD, 24 Units at 01/27/19 0919  •  lamoTRIgine (LaMICtal) tablet 150 mg, 150 mg, oral, Daily,  "Ethel Patton CRNP, 150 mg at 01/27/19 0824  •  levothyroxine (SYNTHROID) tablet 100 mcg, 100 mcg, oral, Daily (6:30a), Ethel Patton CRNP, 100 mcg at 01/27/19 0552  •  magnesium hydroxide (M.O.M.) 400 mg/5 mL suspension 30 mL, 30 mL, oral, Daily PRN, Maxim Casiano DO  •  pantoprazole (PROTONIX) tablet,delayed release (DR/EC) 40 mg, 40 mg, oral, Daily, Ethel Patton CRNP, 40 mg at 01/27/19 0824  •  polyethylene glycol (MIRALAX) 17 gram packet 17 g, 17 g, oral, Daily, Ethel Patton CRNP, 17 g at 01/27/19 0826  •  propranolol (INDERAL) tablet 20 mg, 20 mg, oral, BID, Roseline Gonzalez MD, 20 mg at 01/27/19 0825  •  senna (SENOKOT) tablet 1 tablet, 1 tablet, oral, Daily, Maxim Casiano DO, 1 tablet at 01/27/19 1017      Objective     LABS  Laboratory results reviewed.  No significant change. Creat 2.2 today (cont to improve)    Vital signs in last 24 hours:  Temp:  [36.5 °C (97.7 °F)-37.2 °C (98.9 °F)] 36.8 °C (98.2 °F)  Heart Rate:  [49-66] 54  Resp:  [16-18] 16  BP: (149-195)/() 149/79      Intake/Output Summary (Last 24 hours) at 01/27/19 1204  Last data filed at 01/27/19 0500   Gross per 24 hour   Intake                0 ml   Output             2275 ml   Net            -2275 ml       TELEMETRY  SR / SB. No pauses    Physical Exam:  BP (!) 149/79 (BP Location: Right upper arm, Patient Position: Lying)   Pulse (!) 54   Temp 36.8 °C (98.2 °F) (Oral)   Resp 16   Ht 1.88 m (6' 2\")   Wt 122 kg (269 lb 6.4 oz)   SpO2 100%   BMI 34.59 kg/m²     General Appearance:  Alert, no distress   Head:  Normocephalic, without obvious abnormality, atraumatic   Eyes:  Conjunctiva/corneas clear, EOM's intact   Endocrine: No thyroid enlargement    Lungs:   Clear to auscultation bilaterally, respirations unlabored, no rales, no wheezing   Heart:  Regular rhythm, S1 and S2 normal, no murmur, rub or gallop   Abdomen:   Soft, non-tender, no masses, no organomegaly   Vascular: Pulses 2+ and " symmetric all extremities, no carotid bruit or jugular vein distention   Musculoskeletal:  Skin: No injury or deformity  Skin color, texture, turgor normal, no rashes or lesions, no cyanosis or edema   Extremities: Extremities normal, atraumatic   Behavior/Emotional: Appropriate, cooperative          Subjective/Objective:    Assessment/Plan   Kidney disease   Assessment & Plan    CKD with baseline creatinine ~1.8  Creat peaked this admission at 3.0, continues to trend towards baseline, 2.2 today.  Holding lisinopril, eplerenone, aldactazide due to ASHELY.  Hope to be able to resume ACE in near future.          Atrial fibrillation (CMS/[facility])   Assessment & Plan    Maintaining SR on amiodarone.  Was previously on apixiban - okay to cont holding for now given maintenance of SR,  But would resume apixaban 5 mg BID on discharge as long as no objection from urology.        Kidney stone   Assessment & Plan    S/p percutaneous nephrostomy 1/15/2019.  Urology following.  Méndez re-inserted yesterday due to urinary retention.  Pain control per urology.        Hypertensive urgency   Assessment & Plan    On clonidine 0.2 mg bid, hydralazine 50 mg Q8hr.  Holding ace / diuretics due to ashely.  Hope to resume at least ACE in future.  Started on propranolol for tremors, uptitrated to 20 bid yesterday - HR seems to be tolerating.  Pain contributing to HTN at times, but still suboptimally controlled at rest.  Uptitrate hydralazine to 75 mg TID.        Hypercholesteremia   Assessment & Plan    On atorvastatin 40 mg daily        CAD (coronary artery disease) of artery bypass graft   Assessment & Plan    No anginal symptoms.  Continue atorvastatin, statin, low dose aspirin.  Started on propranolol for tremor, but seems to be tolerating.  Historically not on beta blocker due to sinus bradycardia                     Roseline Gonzalez MD  1/27/2019

## 2019-01-27 NOTE — PROGRESS NOTES
Subjective     Interval History: Patient about the same. No new issues.     Objective   [phone] 79 124  155  POCT              DIET:       Dietary Orders            Start     Ordered    01/24/19 1009  Adult Diet RD/LDN may adjust order. Diet orders written by PA/CRNPs may not be adjusted by RD/LDNs.; Regular; No Concentrated Sweets  Diet effective now     Question Answer Comment   Delegation of Authority. Diet orders written by PA/CRNPs may not be adjusted by RD/LDNs. RD/LDN may adjust order. Diet orders written by PA/CRNPs may not be adjusted by RD/LDNs.    Diet Texture Regular    Other Restriction(s): No Concentrated Sweets        01/24/19 1009          Vital signs in last 24 hours:  Temp:  [36.5 °C (97.7 °F)-37.2 °C (98.9 °F)] 36.8 °C (98.2 °F)  Heart Rate:  [49-66] 54  Resp:  [16-18] 16  BP: (149-195)/() 149/79    Labs  Glucose Results       01/25/19                          0451           HBG A1C 9.4 (H)           EST AVG GLUCOSE 223           Comment for HBG A1C at 0451 on 01/25/19:    In the absence of an established diagnosis of Diabetes Mellitus, HbA1c levels between 5.7% and 6.4% are indicative of increased risk for developing Diabetes(Pre-Diabetes). Levels of 6.5% or greater are diagnostic for Diabetes Mellitus.    Comment for EST AVG GLUCOSE at 0451 on 01/25/19:    Estimate of average glucose concentration continuously over 24 hours for previous 2 to 3 months(Per ADA Recommendation).            Physical Exam:  General :      Unchanged   Skin:             Unchanged  Extremities:  Unchanged  Neurologic:   Unchanged    Impression:  Type 2 diabetes mellitus (CMS/HCC) ([facility])   Assessment & Plan    The patient has no new complaints   He continues on Lantus 24 units at night and NovoLog 7 units with meals.  Impression: Stable glycemic control.  Recommendations: I made no changes in the patient's insulin regimen.              Lincoln Malloy MD  1/27/2019 1:43 PM

## 2019-01-27 NOTE — NURSING NOTE
"01/26/2019  2215   Pt uncomfortable   C/o abdominal discomfort rated \"5/10\"    Hasn't had BM since 01/21/2019    Requesting dilaudid   Discussed side effect of dilaudid is constipation   Pt assisted with ambulation in hallway   Assisted to BR   Unable to move bowels   Discussed suppository vs fleets enema  Pt prefers suppository    Spoke with Dr Pierce Goode suppository ordered  "

## 2019-01-28 ENCOUNTER — APPOINTMENT (INPATIENT)
Dept: CARDIOLOGY | Facility: HOSPITAL | Age: 69
DRG: 660 | End: 2019-01-28
Attending: NURSE PRACTITIONER
Payer: MEDICARE

## 2019-01-28 LAB
ANION GAP SERPL CALC-SCNC: 8 MEQ/L (ref 3–15)
BUN SERPL-MCNC: 51 MG/DL (ref 8–20)
CALCIUM SERPL-MCNC: 9.3 MG/DL (ref 8.9–10.3)
CHLORIDE SERPL-SCNC: 104 MEQ/L (ref 98–109)
CO2 SERPL-SCNC: 25 MEQ/L (ref 22–32)
CREAT SERPL-MCNC: 2.2 MG/DL
ERYTHROCYTE [DISTWIDTH] IN BLOOD BY AUTOMATED COUNT: 13.7 % (ref 11.6–14.4)
GFR SERPL CREATININE-BSD FRML MDRD: 29.7 ML/MIN/1.73M*2
GLUCOSE BLD-MCNC: 144 MG/DL (ref 70–99)
GLUCOSE BLD-MCNC: 166 MG/DL (ref 70–99)
GLUCOSE BLD-MCNC: 172 MG/DL (ref 70–99)
GLUCOSE BLD-MCNC: 197 MG/DL (ref 70–99)
GLUCOSE SERPL-MCNC: 162 MG/DL (ref 70–99)
HCT VFR BLDCO AUTO: 42 %
HGB BLD-MCNC: 14.3 G/DL
MAGNESIUM SERPL-MCNC: 2.3 MG/DL (ref 1.8–2.5)
MCH RBC QN AUTO: 29.7 PG (ref 28–33.2)
MCHC RBC AUTO-ENTMCNC: 34 G/DL (ref 32.2–36.5)
MCV RBC AUTO: 87.1 FL (ref 83–98)
PDW BLD AUTO: 11.3 FL (ref 9.4–12.4)
PLATELET # BLD AUTO: 191 K/UL
POCT TEST: ABNORMAL
POTASSIUM SERPL-SCNC: 4.4 MEQ/L (ref 3.6–5.1)
RBC # BLD AUTO: 4.82 M/UL (ref 4.5–5.8)
SODIUM SERPL-SCNC: 137 MEQ/L (ref 136–144)
WBC # BLD AUTO: 9.93 K/UL

## 2019-01-28 PROCEDURE — 63700000 HC SELF-ADMINISTRABLE DRUG: Performed by: NURSE PRACTITIONER

## 2019-01-28 PROCEDURE — 63700000 HC SELF-ADMINISTRABLE DRUG: Performed by: INTERNAL MEDICINE

## 2019-01-28 PROCEDURE — 80048 BASIC METABOLIC PNL TOTAL CA: CPT | Performed by: HOSPITALIST

## 2019-01-28 PROCEDURE — 63700000 HC SELF-ADMINISTRABLE DRUG: Performed by: HOSPITALIST

## 2019-01-28 PROCEDURE — BT121ZZ FLUOROSCOPY OF LEFT KIDNEY USING LOW OSMOLAR CONTRAST: ICD-10-PCS | Performed by: RADIOLOGY

## 2019-01-28 PROCEDURE — 83735 ASSAY OF MAGNESIUM: CPT | Performed by: HOSPITALIST

## 2019-01-28 PROCEDURE — 76080 X-RAY EXAM OF FISTULA: CPT

## 2019-01-28 PROCEDURE — 99232 SBSQ HOSP IP/OBS MODERATE 35: CPT | Performed by: HOSPITALIST

## 2019-01-28 PROCEDURE — 36415 COLL VENOUS BLD VENIPUNCTURE: CPT | Performed by: HOSPITALIST

## 2019-01-28 PROCEDURE — 20600000 HC ROOM AND CARE INTERMEDIATE/TELEMETRY

## 2019-01-28 PROCEDURE — 85027 COMPLETE CBC AUTOMATED: CPT | Performed by: HOSPITALIST

## 2019-01-28 PROCEDURE — 63600105 HC IODINE BASED CONTRAST: Mod: JW | Performed by: NURSE PRACTITIONER

## 2019-01-28 RX ORDER — LISINOPRIL 20 MG/1
20 TABLET ORAL DAILY
Status: DISCONTINUED | OUTPATIENT
Start: 2019-01-28 | End: 2019-01-29 | Stop reason: HOSPADM

## 2019-01-28 RX ADMIN — PANTOPRAZOLE SODIUM 40 MG: 40 TABLET, DELAYED RELEASE ORAL at 08:51

## 2019-01-28 RX ADMIN — DOCUSATE SODIUM 100 MG: 100 CAPSULE, LIQUID FILLED ORAL at 20:52

## 2019-01-28 RX ADMIN — INSULIN ASPART 2 UNITS: 100 INJECTION, SOLUTION INTRAVENOUS; SUBCUTANEOUS at 12:25

## 2019-01-28 RX ADMIN — MAGNESIUM HYDROXIDE 30 ML: 400 SUSPENSION ORAL at 11:36

## 2019-01-28 RX ADMIN — INSULIN ASPART 7 UNITS: 100 INJECTION, SOLUTION INTRAVENOUS; SUBCUTANEOUS at 09:09

## 2019-01-28 RX ADMIN — HYDRALAZINE HYDROCHLORIDE 75 MG: 25 TABLET, FILM COATED ORAL at 05:26

## 2019-01-28 RX ADMIN — LAMOTRIGINE 150 MG: 25 TABLET ORAL at 08:54

## 2019-01-28 RX ADMIN — SENNOSIDES 1 TABLET: 8.6 TABLET, FILM COATED ORAL at 08:54

## 2019-01-28 RX ADMIN — ASPIRIN 81 MG: 81 TABLET, COATED ORAL at 08:54

## 2019-01-28 RX ADMIN — POLYETHYLENE GLYCOL 3350 17 G: 17 POWDER, FOR SOLUTION ORAL at 08:55

## 2019-01-28 RX ADMIN — CLONIDINE HYDROCHLORIDE 0.2 MG: 0.2 TABLET ORAL at 20:52

## 2019-01-28 RX ADMIN — AMIODARONE HYDROCHLORIDE 200 MG: 200 TABLET ORAL at 08:55

## 2019-01-28 RX ADMIN — LEVOTHYROXINE SODIUM 100 MCG: 100 TABLET ORAL at 07:03

## 2019-01-28 RX ADMIN — INSULIN ASPART 2 UNITS: 100 INJECTION, SOLUTION INTRAVENOUS; SUBCUTANEOUS at 17:14

## 2019-01-28 RX ADMIN — IOHEXOL 5 ML: 300 INJECTION, SOLUTION INTRAVENOUS at 14:49

## 2019-01-28 RX ADMIN — ATORVASTATIN CALCIUM 40 MG: 40 TABLET, FILM COATED ORAL at 17:12

## 2019-01-28 RX ADMIN — Medication 10 MG: at 08:51

## 2019-01-28 RX ADMIN — PROPRANOLOL HYDROCHLORIDE 20 MG: 10 TABLET ORAL at 17:06

## 2019-01-28 RX ADMIN — INSULIN ASPART 7 UNITS: 100 INJECTION, SOLUTION INTRAVENOUS; SUBCUTANEOUS at 17:15

## 2019-01-28 RX ADMIN — INSULIN ASPART 7 UNITS: 100 INJECTION, SOLUTION INTRAVENOUS; SUBCUTANEOUS at 12:26

## 2019-01-28 RX ADMIN — DOCUSATE SODIUM 100 MG: 100 CAPSULE, LIQUID FILLED ORAL at 08:51

## 2019-01-28 RX ADMIN — HYDRALAZINE HYDROCHLORIDE 75 MG: 25 TABLET, FILM COATED ORAL at 20:51

## 2019-01-28 RX ADMIN — HYDRALAZINE HYDROCHLORIDE 75 MG: 25 TABLET, FILM COATED ORAL at 13:43

## 2019-01-28 RX ADMIN — INSULIN GLARGINE 24 UNITS: 100 INJECTION, SOLUTION SUBCUTANEOUS at 09:10

## 2019-01-28 RX ADMIN — LISINOPRIL 20 MG: 20 TABLET ORAL at 11:37

## 2019-01-28 RX ADMIN — CLONIDINE HYDROCHLORIDE 0.2 MG: 0.2 TABLET ORAL at 09:05

## 2019-01-28 RX ADMIN — PROPRANOLOL HYDROCHLORIDE 20 MG: 10 TABLET ORAL at 09:05

## 2019-01-28 NOTE — PROGRESS NOTES
Hospital Medicine Service -  Daily Progress Note       SUBJECTIVE   Interval History: feels ok  Denies any pain  No fevers  No SOB/CP  awaiting BM today, had a supp this am        OBJECTIVE      Vital signs in last 24 hours:  Temp:  [36.3 °C (97.4 °F)-37.2 °C (98.9 °F)] 36.8 °C (98.3 °F)  Heart Rate:  [43-51] 45  Resp:  [16-20] 18  BP: (139-198)/() 168/92    Intake/Output Summary (Last 24 hours) at 01/28/19 1314  Last data filed at 01/28/19 0400   Gross per 24 hour   Intake                0 ml   Output             1325 ml   Net            -1325 ml       PHYSICAL EXAMINATION      Physical Exam   General: nontoxic appearing, no acute distress  HEENT:  PERRL, anicteric sclera   Neck: supple, no JVD  Cardiac: RRR, +S1/S2, no murmur, no rub   Lungs: clear bilaterally, no wheezing/rales/rhonchi  Abdomen: soft, NT/ND, +BS, no rebound/guarding   Extremities: no edema, distal perfusion intact  : pinkish urine in madsen/bag  Neuro: AAOx3, nonfocal. CN's intact grossly. No focal motor deficit. No sensory deficit.  Skin: no rash. Clean, dry, intact.   Psych: cooperative    LINES, CATHETERS, DRAINS, AIRWAYS, AND WOUNDS   Lines, Drains, Airways, Wounds:  Nephrostomy Left flank (Active)   Number of days: 3       Urethral Catheter Non-latex 16 Fr (Active)   Number of days: 3       Pruritus other (see comments) (Active)   Number of days:        Surgical Incision Flank Left (Active)   Number of days: 5       Comments:      LABS / IMAGING / TELE      Labs  reviewed    Imaging  reviewed      ASSESSMENT AND PLAN      Hypertensive urgency   Assessment & Plan    Patient was admitted after IR percutaneous nephrostomy tube placement  On the med/surg unit, patient's BP was 212/90. Asymptomatic.   Patient has a known history of difficult to control HTN and has a history of known accelerated HTN in the past after procedures.  He was in significant pain from the nephrostomy tube which is likely driving his HTN.  HTN improved with pain  control.   BP controlled when pain is controlled.  Cardiology consult appreciated. HTN management per cardiology. ACE and Diuretics on hold given renal function and nephrolithiasis/procedures.  He has a history of intolerance to calcium channel blockers.  On propranolol, clonidine, and hydralazine.   Continue telemetry  Continue to monitor and adjust meds prn          * Kidney stone   Assessment & Plan    S/p percutaneous nephrostomy per IR 1/22  S/p removal of L stent in office 1/22  further intervention on the stone done in OR 1/23  DVT prophylaxis per primary urology team.  On SCD's (hematuria post procedure)  Off abx  Tube management per urology/IR - possible nephrostomy removal today by IR   ASA was stopped 5 days prior to the procedure and Eliquis was stopped this past Saturday per the direction of his primary cardiologist. Resume when ok with urology        Tremor   Assessment & Plan    On propranolol         Bipolar 1 disorder (CMS/[facility]) ([facility])   Assessment & Plan    Continue lamictal per outpatient regimen        CKD (chronic kidney disease) stage 3, GFR 30-59 ml/min (CMS/[facility]) ([facility])   Assessment & Plan    Baseline Cr appears to be in range of 1.6 to 2.0 on review of past records  Cr 2.1 increased to 3.0 post op, now trending down.   Making urine. Madsen functioning  Ongoing stone and madsen management per urology  Diuretics on hold for now. Hold ACE  Monitor BMP and volume status  Nephrology following          Bradycardia   Assessment & Plan    known RBBB  No symptoms  Cardiology following; HTN management per cardiology recs  Monitor on telemetry        GERD (gastroesophageal reflux disease)   Assessment & Plan    Continue Protonix        Type 2 diabetes mellitus (CMS/[facility]) ([facility])   Assessment & Plan    Continue to monitor accuchecks  Mealtime novolog with correction insulin  Dietary intake has been variable and he has been intermittently NPO for procedures  Adjust regimen prn  Consulted endocrinology for  management.           Hypothyroid   Assessment & Plan    Continue levothyroxine        Hypercholesteremia   Assessment & Plan    statin        CAD (coronary artery disease) of artery bypass graft   Assessment & Plan    No anginal symptoms.  low dose aspirin.  Continue atorvastatin, statin.        Atrial fibrillation (CMS/HCC)   Assessment & Plan    History of afib on amiodarone and Eliquis as outpatient  Currently in sinus rhythm  Monitor on telemetry  Cardiology consulted  Patient is on aspirin and Eliquis which was held-resume medication per urology and cardiology             VTE Assessment: [place]    VTE Prophylaxis Plan: SCDs Code Status: Full Code  Estimated Discharge Date: 1/29/2019  Disposition Planning: Home         Carolynn Barroso MD  1/28/2019

## 2019-01-28 NOTE — PROGRESS NOTES
"Nephrology Follow Up Note    Subjective  tried, tremors better on increased dose of propranolol. To IR today to assess his left PCNL    Patient denies CP, SOB, ab pain, n/v, pain controlled    Objective     Physical Exam  BP (!) 160/80 (BP Location: Right upper arm, Patient Position: Lying)   Pulse (!) 50   Temp 36.6 °C (97.9 °F) (Oral)   Resp 18   Ht 1.88 m (6' 2\")   Wt 123 kg (271 lb)   SpO2 97%   BMI 34.79 kg/m²     Intake/Output Summary (Last 24 hours) at 01/28/19 1025  Last data filed at 01/28/19 0400   Gross per 24 hour   Intake                0 ml   Output             1575 ml   Net            -1575 ml       General Appearance:  Alert, NAD   Head:  Normocephalic, atraumatic   Eyes:  No scleral icterus           Throat: MMM       Lungs:   Clear to auscultation bilaterally, respirations unlabored   Heart:  RRR, S1 and S2 normal   Abdomen:   Soft, non-distended, bowel sounds active   Genitourinary:  No suprapubic tenderness or fullness   Extremities: Lower extremities without cyanosis or edema   Skin: Skin without rashes or lesions   Neurologic:  Behavior/   AAO x 3  Appropriate, cooperative, following commands     Labs   Recent Results (from the past 24 hour(s))   POCT Glucose    Collection Time: 01/27/19 11:24 AM   Result Value Ref Range    POCT Bedside Glucose 155 (H) 70 - 99 mg/dL    POC Test POC    POCT Glucose    Collection Time: 01/27/19  3:58 PM   Result Value Ref Range    POCT Bedside Glucose 181 (H) 70 - 99 mg/dL    POC Test POC    POCT Glucose    Collection Time: 01/27/19  9:57 PM   Result Value Ref Range    POCT Bedside Glucose 214 (H) 70 - 99 mg/dL    POC Test POC    POCT Glucose    Collection Time: 01/28/19  7:40 AM   Result Value Ref Range    POCT Bedside Glucose 144 (H) 70 - 99 mg/dL    POC Test POC    CBC    Collection Time: 01/28/19  7:57 AM   Result Value Ref Range    WBC 9.93 3.80 - 10.50 K/uL    RBC 4.82 4.50 - 5.80 M/uL    Hemoglobin 14.3 13.7 - 17.5 g/dL    Hematocrit 42.0 40.1 - " 51.0 %    MCV 87.1 83.0 - 98.0 fL    MCH 29.7 28.0 - 33.2 pg    MCHC 34.0 32.2 - 36.5 g/dL    RDW 13.7 11.6 - 14.4 %    Platelets 191 150 - 350 K/uL    MPV 11.3 9.4 - 12.4 fL   Basic metabolic panel    Collection Time: 01/28/19  7:57 AM   Result Value Ref Range    Sodium 137 136 - 144 mEQ/L    Potassium 4.4 3.6 - 5.1 mEQ/L    Chloride 104 98 - 109 mEQ/L    CO2 25 22 - 32 mEQ/L    BUN 51 (H) 8 - 20 mg/dL    Creatinine 2.2 (H) 0.8 - 1.3 mg/dL    Glucose 162 (H) 70 - 99 mg/dL    Calcium 9.3 8.9 - 10.3 mg/dL    eGFR 29.7 (L) >=60.0 mL/min/1.73m*2    Anion Gap 8 3 - 15 mEQ/L   Magnesium    Collection Time: 01/28/19  7:57 AM   Result Value Ref Range    Magnesium 2.3 1.8 - 2.5 mg/dL     Current Labs/Diagnostic tests were reviewed.      Current Medications were reviewed.    Assessment   68 y.o. male being consulted for ASHELY on CKD        Plan     ASHELY on CKD: baseline scr of 1.6-2.1mg/dl increased to 3mg/dl on admission and now down to 2.2mg/dl likely due to ACEi/bactrim/urinary retention (PCN tube placed on 1/22) and stone removal/stent on removed on 1/22. Basically back to high end of his baseline.   -cont madsen per urology  -defer to urology/IR for PCN removal  -daily BMP  -no contrast, no jose angel, no nephrotoxins  -ok to restart ACEi today at 20mg and trend renal function   -hold diuretics (eplerenone and aldactone)    BP: labile from 140s to 190s. Ok with restarting lisinopril at 20mg will order today. Trend BP and Scr/K.     Heme: no issues, hgb of 14    Acidosis: at goal of >22 (at 25).      Ca/phos: ca ok, no recent phos will add on for tomorrow    Jessica Leon DO   [facility] [phone] (office)

## 2019-01-28 NOTE — ASSESSMENT & PLAN NOTE
Sinus with PACs. Rate trending around 50 BPM. He is able to ambulate in the hallways without dizziness/lightheadedness/palpitations/shortness of breath.     Would not increase propanolol dosing given bradycardia. Overall stable.

## 2019-01-28 NOTE — PLAN OF CARE
Problem: Patient Care Overview  Goal: Plan of Care Review  Outcome: Ongoing (interventions implemented as appropriate)   01/28/19 7100   Coping/Psychosocial   Plan Of Care Reviewed With patient   Plan of Care Review   Progress progress toward functional goals as expected   Outcome Summary Ambulating well in hallway NO complaint of pain some discomfort @ nephrostomyt tube site madsen draining cloudy urine with sediment nephrostomy drainage yellow dressing dry Heart rate 40's with brief episode of heart rate in 30's during the night     Goal: Individualization & Mutuality  Outcome: Ongoing (interventions implemented as appropriate)      Problem: Fall Risk (Adult)  Goal: Absence of Falls  Outcome: Ongoing (interventions implemented as appropriate)      Problem: Pain, Acute (Adult)  Goal: Identify Related Risk Factors and Signs and Symptoms  Outcome: Ongoing (interventions implemented as appropriate)    Goal: Acceptable Pain Control/Comfort Level  Outcome: Ongoing (interventions implemented as appropriate)      Problem: Mobility, Physical Impaired (Adult)  Goal: Identify Related Risk Factors and Signs and Symptoms  Outcome: Outcome(s) Achieved Date Met: 01/28/19

## 2019-01-28 NOTE — PLAN OF CARE
Problem: Patient Care Overview  Goal: Plan of Care Review  Outcome: Ongoing (interventions implemented as appropriate)    Goal: Individualization & Mutuality  Outcome: Ongoing (interventions implemented as appropriate)    Goal: Discharge Needs Assessment  Outcome: Ongoing (interventions implemented as appropriate)    Goal: Interprofessional Rounds/Family Conf  Outcome: Ongoing (interventions implemented as appropriate)      Problem: Fall Risk (Adult)  Goal: Absence of Falls  Outcome: Ongoing (interventions implemented as appropriate)      Problem: Pain, Acute (Adult)  Goal: Identify Related Risk Factors and Signs and Symptoms  Outcome: Outcome(s) Achieved Date Met: 01/28/19    Goal: Acceptable Pain Control/Comfort Level  Outcome: Ongoing (interventions implemented as appropriate)

## 2019-01-28 NOTE — POST-PROCEDURE NOTE
Interventional Radiology Brief Postprocedure Note    Nathan Alonzo     Attending: Rick    Diagnosis: L PCN in place    Description of procedure: L Nephrostogram    Contrast: 10cc     Anesthesia:  None    Medications: None     Complications: None      Estimated Blood Loss: Estimated Blood Loss: None    Findings: Good flow down L ureter, no hydro, drain capped    1/28/2019 5:23 PM

## 2019-01-28 NOTE — PROGRESS NOTES
Discussed in Rounds:Pt for IR today for removal of nephrostomy tube and stone., Pt has AV block, Heart rates in 30's, madsen catheter.      1230: Received call from Flora at [facility]- [phone] requesting information re: discharge and services requested.  Informed that pt is not written for discharge yet and CM will fax information when pt is discharged.  Services will be for RN and PT.  Fax to [phone].

## 2019-01-28 NOTE — PROGRESS NOTES
Date/Time  Temp  Heart Rate (from SpO2)  Pulse  Resp  BP  SpO2  Oxygen Therapy   01/28/19 0600  36.6 (97.9)  --   43  16   160/98  99       Urine cloudy yellow via madsen  Clear yellow urine via NT  Still no BM, + flatus  Abdomen soft  Having some leakage around NT which is expected      Assessment  Large left renal calculi  Left PCNL POD #5  White count and creat improved  Urinary retention  UA w/o evidence of infection     Plan  Maintain madsen until moving bowels  Monitor labs and VS  Am labs pending  Will have IR evaluate for tube removal today  Discussed with Dr Yoli Goode supp this morning

## 2019-01-28 NOTE — NURSING NOTE
Patient awake alert and oriented.  Denies pain.  Cm-1st degree block with bradycardia.  Maxim serrano notified of block and bradycardia, medications reviewed.  No medication changes at this time.  Lungs clear.  Room air.  Complains of constipation.  Medicated with miralax, colace, MOM, Ducalox suppository.  BM x 1.  Méndez gravity.  Ok to d/c in am.  Left nephrostomy tube capped with drainage.  Dressing changed.  Appetite good.  Gait steady.  Ambulating in room and in [place].  Would like to be discharged.  Patient states he has no driving restrictions prior to admission; however, wife prefer provided transport for discharge.  Uses call bell appropriately.  Bed alarm on.

## 2019-01-28 NOTE — PROGRESS NOTES
Cardiology Daily Progress Note    Subjective   Nathan Alonzo is a 68 y.o. male who was admitted for Kidney stone [N20.0]  Kidney stone [N20.0]  Kidney stone [N20.0].    Interval History:   Feeling tired. Pain is well controlled. Creatinine stable. Maintaining sinus rhythm, heart rate trending around 50 BPM    Allergies  Oxycodone; Amlodipine; Cephalexin monohydrate; and Latex      Current Facility-Administered Medications:   •  acetaminophen (TYLENOL) tablet 650 mg, 650 mg, oral, q4h PRN, Ethel Patton CRNP  •  amiodarone (PACERONE) tablet 200 mg, 200 mg, oral, Daily, Maxim Casiano DO, 200 mg at 01/28/19 0855  •  aspirin enteric coated tablet 81 mg, 81 mg, oral, Daily, Roseline Gonzalez MD, 81 mg at 01/28/19 0854  •  atorvastatin (LIPITOR) tablet 40 mg, 40 mg, oral, Daily (6p), Ethel Patton CRNP, 40 mg at 01/27/19 1722  •  bisacodyl (DULCOLAX) 10 mg suppository 10 mg, 10 mg, rectal, Daily PRN, Jay Pelayo MD, 10 mg at 01/28/19 0851  •  cloNIDine (CATAPRES) tablet 0.2 mg, 0.2 mg, oral, BID, EMILY Polo MD, 0.2 mg at 01/28/19 0905  •  diphenhydrAMINE (BENADRYL) injection 25 mg, 25 mg, intravenous, q6h PRN, Ethel Patton CRNP, 25 mg at 01/25/19 2058  •  docusate sodium (COLACE) capsule 100 mg, 100 mg, oral, BID, Maxim Casiano DO, 100 mg at 01/28/19 0851  •  hydrALAZINE (APRESOLINE) injection 20 mg, 20 mg, intravenous, q8h PRN, Joaquin Valencia DO, 20 mg at 01/26/19 0810  •  hydrALAZINE (APRESOLINE) tablet 75 mg, 75 mg, oral, q8h THADDEUS, Roseline Gonzalez MD, 75 mg at 01/28/19 0526  •  HYDROmorphone (DILAUDID) 1 mg/mL injection 1 mg, 1 mg, intravenous, q3h PRN, Jay Pelayo MD, 1 mg at 01/26/19 1223  •  insulin aspart U-100 (NovoLOG) pen 2-10 Units, 2-10 Units, subcutaneous, With meals & nightly, Peggy Mcclelland MD, 4 Units at 01/27/19 2155  •  insulin aspart U-100 (NovoLOG) pen 7 Units, 7 Units, subcutaneous, TID with meals, Oh, Maxim TRINH DO, 7 Units at 01/28/19  "0909  •  insulin glargine (LANTUS SOLOSTAR) pen 24 Units, 24 Units, subcutaneous, Daily, Peggy Mcclelland MD, 24 Units at 01/28/19 0910  •  lamoTRIgine (LaMICtal) tablet 150 mg, 150 mg, oral, Daily, Ethel Patton CRNP, 150 mg at 01/28/19 0854  •  levothyroxine (SYNTHROID) tablet 100 mcg, 100 mcg, oral, Daily (6:30a), Ethel Patton CRNP, 100 mcg at 01/28/19 0703  •  magnesium hydroxide (M.O.M.) 400 mg/5 mL suspension 30 mL, 30 mL, oral, Daily PRN, Maxim Casiano DO  •  pantoprazole (PROTONIX) tablet,delayed release (DR/EC) 40 mg, 40 mg, oral, Daily, Ethel Patton CRNP, 40 mg at 01/28/19 0851  •  polyethylene glycol (MIRALAX) 17 gram packet 17 g, 17 g, oral, Daily, Ethel Patton CRNP, 17 g at 01/28/19 0855  •  propranolol (INDERAL) tablet 20 mg, 20 mg, oral, BID, Roseline Gonzalez MD, 20 mg at 01/28/19 0905  •  senna (SENOKOT) tablet 1 tablet, 1 tablet, oral, Daily, Maxim Casiano DO, 1 tablet at 01/28/19 0854      Objective     Vital signs in last 24 hours:  Temp:  [36.3 °C (97.4 °F)-37.2 °C (98.9 °F)] 36.6 °C (97.9 °F)  Heart Rate:  [43-56] 50  Resp:  [16-20] 18  BP: (139-198)/() 160/80      Intake/Output Summary (Last 24 hours) at 01/28/19 0918  Last data filed at 01/28/19 0400   Gross per 24 hour   Intake                0 ml   Output             1575 ml   Net            -1575 ml     Intake/Output this shift:  No intake/output data recorded.    Labs  Creatinine 2.2-->2.2    Imaging  No new studies    ECG   No new ECGs    Telemetry  Sinus rhythm, rate trending around 50 BPM, as low as the high 30s overnight without significant pauses. He does have occasional PACs      Physical Exam:  BP (!) 160/80 (BP Location: Right upper arm, Patient Position: Lying)   Pulse (!) 50   Temp 36.6 °C (97.9 °F) (Oral)   Resp 18   Ht 1.88 m (6' 2\")   Wt 123 kg (271 lb)   SpO2 97%   BMI 34.79 kg/m²     General Appearance:  Alert, no distress   Head:  Normocephalic, without obvious " abnormality, atraumatic   Eyes:  Conjunctiva/corneas clear, EOM's intact   Endocrine: No thyroid enlargement    Lungs:   Clear to auscultation bilaterally, respirations unlabored, no rales, no wheezing   Heart:  Regular rhythm, S1 and S2 normal, no murmur, rub or gallop   Abdomen:   Soft, non-tender, no masses, no organomegaly   Vascular: Pulses 2+ and symmetric all extremities, no carotid bruit or jugular vein distention   Musculoskeletal:  Skin: No injury or deformity  Skin color, texture, turgor normal, no rashes or lesions, no cyanosis or edema   Extremities: Extremities normal, atraumatic   Behavior/Emotional: Appropriate, cooperative          Subjective/Objective:  No new subjective & objective note has been filed under this hospital service since the last note was generated.      Assessment/Plan   Hypertensive urgency   Assessment & Plan    On clonidine 0.2 mg bid, hydralazine 50 mg Q8hr.  Holding ace / diuretics due to libertad.    Started on propranolol for tremors, uptitrated to 20 bid yesterday - HR seems to be tolerating.  Pain contributing to HTN at times, but still suboptimally controlled at rest.  Still elevated despite uptitration in hydralazine yesterday. Would resume ACE today if OK with renal.         Bradycardia   Assessment & Plan    Sinus with PACs. Rate trending around 50 BPM. He is able to ambulate in the hallways without dizziness/lightheadedness/palpitations/shortness of breath.     Would not increase propanolol dosing given bradycardia. Overall stable.         Kidney stone   Assessment & Plan    S/p percutaneous nephrostomy 1/15/2019.  Urology following.  For possible nephrosotmy removal today with IR.   Pain control per urology.        Hypercholesteremia   Assessment & Plan    On atorvastatin 40 mg daily        CAD (coronary artery disease) of artery bypass graft   Assessment & Plan    No anginal symptoms.  Continue atorvastatin, statin, low dose aspirin.  Started on propranolol for tremor,  but seems to be tolerating.  Historically not on beta blocker due to sinus bradycardia        Atrial fibrillation (CMS/[facility])   Assessment & Plan    Maintaining SR on amiodarone.  Was previously on apixiban - okay to cont holding for now given maintenance of SR,  But would resume apixaban 5 mg BID on discharge as long as no objection from urology.        Kidney disease   Assessment & Plan    CKD with baseline creatinine ~1.8  Creat peaked this admission at 3.0, continues to trend towards baseline, 2.2 today.  Holding lisinopril, eplerenone, aldactazide due to ASHELY.  Hope to be able to resume ACE in near future today if OK with renal.                        XANDER Jackson C  1/28/2019

## 2019-01-28 NOTE — PROGRESS NOTES
Home Care - Referral received via [facility]. Noted patient lives in [place]; [facility] does not service this area. Rosa Gomes CM (not working today), indicates in her note that she realized patient lives in [place], and referred patient to [facility], [place]. Will sign off. Viktoria Cespedes RN, .

## 2019-01-28 NOTE — PROGRESS NOTES
Patient: Nathan Alonzo  Location: [facility]  MRN: [id number]  Today's date: 1/28/2019    Attempted to see patient for therapy. Unable due to patient at test or procedure.

## 2019-01-28 NOTE — PROGRESS NOTES
Neurology Progress Note    Subjective     Interval History: no tremors today      Objective     Physical Exam:      Vital signs in last 24 hours:  Temp:  [36.3 °C (97.4 °F)-37.2 °C (98.9 °F)] 36.6 °C (97.9 °F)  Heart Rate:  [43-56] 43  Resp:  [16-20] 16  BP: (139-198)/() 160/98    General appearance: alert, appears stated age and cooperative  Head: normocephalic, without obvious abnormality, atraumatic  Eyes: conjunctivae clear. PERRL, EOM's intact.  Neck: no carotid bruit and supple, symmetrical, trachea midline  Lungs: clear to auscultation bilaterally  Heart: regular rate and rhythm, S1, S2 normal, no murmur, click, rub or gallop  Extremities: extremities normal, warm and well-perfused; no cyanosis, clubbing, or edema  Psych: affect: normal  Neurologic:   Alert, oriented, thought content appropriate, alertness: alert, orientation: time, person, place,   Cranial nerves: Pupils equal round reactive to light and accommodation, extraocular movements intact, visual fields full to confrontation, facial strength and sensation intact.  Tongue/palate midline, sternocleidomastoids 5/5 bilaterally.  Sensory: normal to all modalities  Motor: No drift.  Bilateral arms and legs are 5/5.  Reflexes: 2+ and symmetric.  Toes equivocal.  Coordination: normal, no tremor or asterixis.  Finger to nose intact.  No cogwheeling.  Rapid alternating movements are intact.  gait: Deferred    Assessment   Tremor   Assessment & Plan    Action tremor, most consistent with essential tremor.  Not currently seen on exam, but ongoing for the past few months and has been somewhat bothersome.  There are no findings to suggest Parkinson's disease.    Trial of low-dose propranolol  20 mg BID   He may follow-up in the office for further titration and evaluation.        Bipolar 1 disorder (CMS/HCC) ([facility])   Assessment & Plan    On lamotrigine for mood stabilization, no history of seizures.        Hypothyroid   Assessment & Plan    On replacement,  TSH normal                     Alex Basilio,   1/28/2019  8:17 AM

## 2019-01-29 ENCOUNTER — APPOINTMENT (INPATIENT)
Dept: CARDIOLOGY | Facility: HOSPITAL | Age: 69
DRG: 660 | End: 2019-01-29
Attending: NURSE PRACTITIONER
Payer: MEDICARE

## 2019-01-29 VITALS
DIASTOLIC BLOOD PRESSURE: 90 MMHG | TEMPERATURE: 97.7 F | HEIGHT: 74 IN | RESPIRATION RATE: 16 BRPM | HEART RATE: 51 BPM | OXYGEN SATURATION: 99 % | SYSTOLIC BLOOD PRESSURE: 173 MMHG | BODY MASS INDEX: 33.8 KG/M2 | WEIGHT: 263.4 LBS

## 2019-01-29 LAB
GLUCOSE BLD-MCNC: 173 MG/DL (ref 70–99)
GLUCOSE BLD-MCNC: 183 MG/DL (ref 70–99)
POCT TEST: ABNORMAL
POCT TEST: ABNORMAL

## 2019-01-29 PROCEDURE — 0TP53CZ REMOVAL OF EXTRALUMINAL DEVICE FROM KIDNEY, PERCUTANEOUS APPROACH: ICD-10-PCS | Performed by: RADIOLOGY

## 2019-01-29 PROCEDURE — 27200000 HC STERILE SUPPLY

## 2019-01-29 PROCEDURE — 74425 UROGRAPHY ANTEGRADE RS&I: CPT

## 2019-01-29 PROCEDURE — 63600105 HC IODINE BASED CONTRAST: Performed by: RADIOLOGY

## 2019-01-29 PROCEDURE — 36100360 IR NEPHROSTOGRAM

## 2019-01-29 PROCEDURE — 63700000 HC SELF-ADMINISTRABLE DRUG: Performed by: INTERNAL MEDICINE

## 2019-01-29 PROCEDURE — 25000000 HC PHARMACY GENERAL: Performed by: NURSE PRACTITIONER

## 2019-01-29 PROCEDURE — 99232 SBSQ HOSP IP/OBS MODERATE 35: CPT | Performed by: HOSPITALIST

## 2019-01-29 PROCEDURE — 63700000 HC SELF-ADMINISTRABLE DRUG: Performed by: HOSPITALIST

## 2019-01-29 PROCEDURE — 63700000 HC SELF-ADMINISTRABLE DRUG: Performed by: NURSE PRACTITIONER

## 2019-01-29 RX ORDER — HYDRALAZINE HYDROCHLORIDE 25 MG/1
75 TABLET, FILM COATED ORAL EVERY 8 HOURS
Qty: 270 TABLET | Refills: 0 | Status: ON HOLD | OUTPATIENT
Start: 2019-01-29 | End: 2019-03-28

## 2019-01-29 RX ORDER — POLYETHYLENE GLYCOL 3350 17 G/17G
17 POWDER, FOR SOLUTION ORAL DAILY
Qty: 30 PACKET | Refills: 0 | Status: SHIPPED | OUTPATIENT
Start: 2019-01-30 | End: 2019-03-25 | Stop reason: ENTERED-IN-ERROR

## 2019-01-29 RX ORDER — LISINOPRIL 40 MG/1
20 TABLET ORAL 2 TIMES DAILY
Qty: 30 TABLET | Refills: 0 | Status: SHIPPED | OUTPATIENT
Start: 2019-01-29 | End: 2019-03-28 | Stop reason: HOSPADM

## 2019-01-29 RX ORDER — PROPRANOLOL HYDROCHLORIDE 20 MG/1
20 TABLET ORAL 2 TIMES DAILY
Qty: 60 TABLET | Refills: 0 | Status: SHIPPED | OUTPATIENT
Start: 2019-01-29 | End: 2019-05-28 | Stop reason: ALTCHOICE

## 2019-01-29 RX ORDER — LAMOTRIGINE 150 MG/1
150 TABLET ORAL DAILY
Qty: 30 TABLET | Refills: 0 | Status: SHIPPED | OUTPATIENT
Start: 2019-01-30 | End: 2023-05-17

## 2019-01-29 RX ADMIN — HYDRALAZINE HYDROCHLORIDE 75 MG: 25 TABLET, FILM COATED ORAL at 06:42

## 2019-01-29 RX ADMIN — LAMOTRIGINE 150 MG: 25 TABLET ORAL at 08:16

## 2019-01-29 RX ADMIN — INSULIN ASPART 2 UNITS: 100 INJECTION, SOLUTION INTRAVENOUS; SUBCUTANEOUS at 08:18

## 2019-01-29 RX ADMIN — IOHEXOL 5 ML: 300 INJECTION, SOLUTION INTRAVENOUS at 10:02

## 2019-01-29 RX ADMIN — LISINOPRIL 20 MG: 20 TABLET ORAL at 08:16

## 2019-01-29 RX ADMIN — AZTREONAM 1 G: 1 INJECTION, POWDER, LYOPHILIZED, FOR SOLUTION INTRAMUSCULAR; INTRAVENOUS at 08:31

## 2019-01-29 RX ADMIN — SENNOSIDES 1 TABLET: 8.6 TABLET, FILM COATED ORAL at 08:17

## 2019-01-29 RX ADMIN — PANTOPRAZOLE SODIUM 40 MG: 40 TABLET, DELAYED RELEASE ORAL at 08:17

## 2019-01-29 RX ADMIN — INSULIN ASPART 2 UNITS: 100 INJECTION, SOLUTION INTRAVENOUS; SUBCUTANEOUS at 12:28

## 2019-01-29 RX ADMIN — DOCUSATE SODIUM 100 MG: 100 CAPSULE, LIQUID FILLED ORAL at 08:17

## 2019-01-29 RX ADMIN — CLONIDINE HYDROCHLORIDE 0.2 MG: 0.2 TABLET ORAL at 08:17

## 2019-01-29 RX ADMIN — POLYETHYLENE GLYCOL 3350 17 G: 17 POWDER, FOR SOLUTION ORAL at 08:17

## 2019-01-29 RX ADMIN — ASPIRIN 81 MG: 81 TABLET, COATED ORAL at 08:17

## 2019-01-29 RX ADMIN — INSULIN ASPART 7 UNITS: 100 INJECTION, SOLUTION INTRAVENOUS; SUBCUTANEOUS at 08:17

## 2019-01-29 RX ADMIN — INSULIN GLARGINE 24 UNITS: 100 INJECTION, SOLUTION SUBCUTANEOUS at 08:18

## 2019-01-29 RX ADMIN — AMIODARONE HYDROCHLORIDE 200 MG: 200 TABLET ORAL at 08:16

## 2019-01-29 RX ADMIN — HYDRALAZINE HYDROCHLORIDE 75 MG: 25 TABLET, FILM COATED ORAL at 14:31

## 2019-01-29 RX ADMIN — INSULIN ASPART 7 UNITS: 100 INJECTION, SOLUTION INTRAVENOUS; SUBCUTANEOUS at 12:28

## 2019-01-29 RX ADMIN — LEVOTHYROXINE SODIUM 100 MCG: 100 TABLET ORAL at 06:42

## 2019-01-29 NOTE — DISCHARGE INSTRUCTIONS
Bradycardia, Adult  Bradycardia is a slower-than-normal heartbeat. A normal resting heart rate for an adult ranges from 60 to 100 beats per minute. With bradycardia, the resting heart rate is less than 60 beats per minute.  Bradycardia can prevent enough oxygen from reaching certain areas of your body when you are active. It can be serious if it keeps enough oxygen from reaching your brain and other parts of your body. Bradycardia is not a problem for everyone. For some healthy adults, a slow resting heart rate is normal.  What are the causes?  This condition may be caused by:  · A problem with the heart, including:  ¨ A problem with the heart's electrical system, such as a heart block.  ¨ A problem with the heart's natural pacemaker (sinus node).  ¨ Heart disease.  ¨ A heart attack.  ¨ Heart damage.  ¨ A heart infection.  ¨ A heart condition that is present at birth (congenital heart defect).  · Certain medicines that treat heart conditions.  · Certain conditions, such as hypothyroidism and obstructive sleep apnea.  · Problems with the balance of chemicals and other substances, like potassium, in the blood.  What increases the risk?  This condition is more likely to develop in adults who:  · Are age 65 or older.  · Have high blood pressure (hypertension), high cholesterol (hyperlipidemia), or diabetes.  · Drink heavily, use tobacco or nicotine products, or use drugs.  · Are stressed.  What are the signs or symptoms?  Symptoms of this condition include:  · Light-headedness.  · Feeling faint or fainting.  · Fatigue and weakness.  · Shortness of breath.  · Chest pain (angina).  · Drowsiness.  · Confusion.  · Dizziness.  How is this diagnosed?  This condition may be diagnosed based on:  · Your symptoms.  · Your medical history.  · A physical exam.  During the exam, your health care provider will listen to your heartbeat and check your pulse. To confirm the diagnosis, your health care provider may order tests, such  as:  · Blood tests.  · An electrocardiogram (ECG). This test records the heart's electrical activity. The test can show how fast your heart is beating and whether the heartbeat is steady.  · A test in which you wear a portable device (event recorder or Holter monitor) to record your heart's electrical activity while you go about your day.  · An exercise test.  How is this treated?  Treatment for this condition depends on the cause of the condition and how severe your symptoms are. Treatment may involve:  · Treatment of the underlying condition.  · Changing your medicines or how much medicine you take.  · Having a small, battery-operated device called a pacemaker implanted under the skin. When bradycardia occurs, this device can be used to increase your heart rate and help your heart to beat in a regular rhythm.  Follow these instructions at home:  Lifestyle    · Manage any health conditions that contribute to bradycardia as told by your health care provider.  · Follow a heart-healthy diet. A nutrition specialist (dietitian) can help to educate you about healthy food options and changes.  · Follow an exercise program that is approved by your health care provider.  · Maintain a healthy weight.  · Try to reduce or manage your stress, such as with yoga or meditation. If you need help reducing stress, ask your health care provider.  · Do not use use any products that contain nicotine or tobacco, such as cigarettes and e-cigarettes. If you need help quitting, ask your health care provider.  · Do not use illegal drugs.  · Limit alcohol intake to no more than 1 drink per day for nonpregnant women and 2 drinks per day for men. One drink equals 12 oz of beer, 5 oz of wine, or 1½ oz of hard liquor.  General instructions  · Take over-the-counter and prescription medicines only as told by your health care provider.  · Keep all follow-up visits as directed by your health care provider. This is important.  How is this  prevented?  In some cases, bradycardia may be prevented by:  · Treating underlying medical problems.  · Stopping behaviors or medicines that can trigger the condition.  Contact a health care provider if:  · You feel light-headed or dizzy.  · You almost faint.  · You feel weak or are easily fatigued during physical activity.  · You experience confusion or have memory problems.  Get help right away if:  · You faint.  · You have an irregular heartbeat (palpitations).  · You have chest pain.  · You have trouble breathing.  This information is not intended to replace advice given to you by your health care provider. Make sure you discuss any questions you have with your health care provider.  Document Released: 09/09/2003 Document Revised: 08/15/2017 Document Reviewed: 06/08/2017  [employer] Interactive Patient Education © 2017 [employer]      Percutaneous Nephrostomy, Care After  This sheet gives you information about how to care for yourself after your procedure. Your health care provider may also give you more specific instructions. If you have problems or questions, contact your health care provider.  What can I expect after the procedure?  After the procedure, it is common to have:  · Some soreness where the nephrostomy tube was inserted (tube insertion site).  Follow these instructions at home:  Activity  · Return to your normal activities as told by your health care provider. Ask your health care provider what activities are safe for you.  · Do not take baths, swim, or use a hot tub until your health care provider approves. Ask your health care provider if you can take showers. Cover the nephrostomy tube site with a watertight covering when you take a shower.  · Do not drive for 24 hours if you were given a medicine to help you relax (sedative).  Care of the tube insertion site  · Follow instructions from your health care provider about how to take care of your tube insertion site. Make sure you:  ¨ Wash your  hands with soap and water before you change your bandage (dressing). If soap and water are not available, use hand .  ¨ Change your dressing as told by your health care provider.  ¨ When you change the dressing, wash the skin, rinse well, and pat the skin dry.  · Check the tube insertion area every day for signs of infection. Check for:  ¨ More redness, swelling, or pain.  ¨ More fluid or blood.  ¨ Warmth.  ¨ Pus or a bad smell.  General instructions  · Take over-the-counter and prescription medicines only as told by your health care provider.  · Keep all follow-up visits as told by your health care provider. This is important.  Contact a health care provider if:  · You have persistent pain or soreness in your back.  · You have more redness, swelling, or pain around insertion site.  · Your tube insertion site feels warm to the touch.  · You have pus or a bad smell coming from your tube insertion site.  · You have increased urine output or you feel burning when urinating.  Get help right away if:  · You have pain in your abdomen during the first week.  · You have chest pain or have trouble breathing.  · You have a new appearance of blood in your urine.  · You have a fever or chills.  · You have back pain that is not relieved by your medicine.  · You have decreased urine output.  This information is not intended to replace advice given to you by your health care provider. Make sure you discuss any questions you have with your health care provider.  Document Released: 08/10/2005 Document Revised: 09/29/2017 Document Reviewed: 09/29/2017  [employer] Interactive Patient Education © 2018 [employer]    Make appt to see Dr Kent next week, call [phone]  Restart Eliquis and Aspirin tomorrow  Avoid constipation  Take Tylenol for pain  Follow up with PCP and your cardiologist in 1 week

## 2019-01-29 NOTE — NURSING NOTE
Patient post nephrostomy tube removal. Catheter D/Mark at 06:00 this morning. Patient spontaneously voiding. Bladder scan showed 0 mls in patients bladder. Will continue to monitor.

## 2019-01-29 NOTE — POST-PROCEDURE NOTE
Interventional Radiology Brief Postprocedure Note    Nathan Alonzo     Attending: Whit    Assistant: none    Diagnosis: s/p lithotomy    Description of procedure: Tube check / pull    Contrast: [id number]     Anesthesia:  None    Medications: none     Complications: None      Estimated Blood Loss: Estimated Blood Loss: None    Anticoagulation: N/A    Specimens: none    Findings: Overnight leaking around tube. Tube rechecked. No antegrade obstruction. PCN removed.    1/29/2019 10:01 AM

## 2019-01-29 NOTE — PROGRESS NOTES
Hospital Medicine Service -  Daily Progress Note       SUBJECTIVE   Interval History: No events overnight.  Pt s+e, lying in bed.  Denies pain or dyspnea.  Wants an update on his med list.  All questions answered.  All other ROS-.  Case d/w RN and Urology.     OBJECTIVE      Vital signs in last 24 hours:  Temp:  [36.3 °C (97.4 °F)-36.6 °C (97.8 °F)] 36.5 °C (97.7 °F)  Heart Rate:  [41-56] 51  Resp:  [14-18] 16  BP: (145-200)/() 173/90  No intake or output data in the 24 hours ending 01/29/19 1434    PHYSICAL EXAMINATION      Physical Exam   Constitutional: He is oriented to person, place, and time. He appears well-developed and well-nourished. No distress.   HENT:   Head: Normocephalic and atraumatic.   Eyes: No scleral icterus.   Neck: Neck supple.   Abdominal: Soft. Bowel sounds are normal. He exhibits no distension. There is no tenderness.   Neurological: He is alert and oriented to person, place, and time.   Skin: Skin is warm and dry.   L nephrostomy tube with yellow urine   Psychiatric: He has a normal mood and affect. His speech is normal and behavior is normal. Judgment and thought content normal. Cognition and memory are normal.   Nursing note and vitals reviewed.     LINES, CATHETERS, DRAINS, AIRWAYS, AND WOUNDS   Lines, Drains, Airways, Wounds:  Pruritus other (see comments) (Active)   Number of days:        Surgical Incision Flank Left (Active)   Number of days: 6       Comments:      LABS / IMAGING / TELE          ASSESSMENT AND PLAN      * Kidney stone   Assessment & Plan    S/p percutaneous nephrostomy per IR 1/22  S/p removal of L stent in office 1/22  further intervention on the stone done in OR 1/23  DVT prophylaxis per primary urology team.  On SCD's (hematuria post procedure)  Off abx  Tube management per urology/IR - nephrostomy tube removed today  For dc home        Tremor   Assessment & Plan    On propranolol         Bipolar 1 disorder (CMS/HCC) ([facility])   Assessment & Plan    Continue  lamictal per outpatient regimen        CKD (chronic kidney disease) stage 3, GFR 30-59 ml/min (CMS/[facility]) ([facility])   Assessment & Plan    Baseline Cr appears to be in range of 1.6 to 2.0 on review of past records  Cr 2.1 increased to 3.0 post op, now trending down.   Making urine. Madsen functioning  Ongoing stone and madsen management per urology  Diuretics on hold for now. Hold ACE  Monitor BMP and volume status  Nephrology following          Bradycardia   Assessment & Plan    known RBBB  No symptoms  Cardiology following; HTN management per cardiology recs  Monitor on telemetry        Hypertensive urgency   Assessment & Plan      On propranolol, clonidine, and hydralazine.   Continue to monitor and adjust meds prn          GERD (gastroesophageal reflux disease)   Assessment & Plan    Continue Protonix        Type 2 diabetes mellitus (CMS/[facility]) ([facility])   Assessment & Plan    Continue to monitor accuchecks  Mealtime novolog with correction insulin  Dietary intake has been variable and he has been intermittently NPO for procedures  Adjust regimen prn  Consulted endocrinology for management.           Hypothyroid   Assessment & Plan    Continue levothyroxine        Hypercholesteremia   Assessment & Plan    statin        CAD (coronary artery disease) of artery bypass graft   Assessment & Plan    No anginal symptoms.  low dose aspirin.  Continue atorvastatin, statin.        Atrial fibrillation (CMS/[facility])   Assessment & Plan    History of afib on amiodarone and Eliquis as outpatient  Currently in sinus rhythm  Monitor on telemetry  Cardiology consulted  Patient is on aspirin and Eliquis which was held-resume medication per urology and cardiology             VTE Assessment: Padua    VTE Prophylaxis Plan: SCDs  Code Status: Full Code  Estimated Discharge Date: 1/29/2019  Disposition Planning: home     Marjan STACKEvert Medina, DO  1/29/2019

## 2019-01-29 NOTE — PLAN OF CARE
Problem: Fall Risk (Adult)  Goal: Absence of Falls  Outcome: Ongoing (interventions implemented as appropriate)      Problem: Acute Therapy Services Goal & Intervention Plan  Goal: Stairs Goal  Outcome: Ongoing (interventions implemented as appropriate)

## 2019-01-29 NOTE — PROGRESS NOTES
"Nephrology Follow Up Note    Subjective feeling ok, Nephrostomy tube and madsen out this morning. Pain controlled. No s/s of infection. Likely will leave today.     Patient denies CP, SOB, N/V/D, no f/c    Objective     Physical Exam  BP (!) 155/75   Pulse (!) 52   Temp 36.3 °C (97.4 °F) (Oral)   Resp 14   Ht 1.88 m (6' 2\")   Wt 119 kg (263 lb 6.4 oz)   SpO2 97%   BMI 33.82 kg/m²     Intake/Output Summary (Last 24 hours) at 01/29/19 1000  Last data filed at 01/28/19 1330   Gross per 24 hour   Intake                0 ml   Output              600 ml   Net             -600 ml       General Appearance:  Alert, NAD   Head:  Normocephalic, atraumatic   Eyes:  No scleral icterus           Throat: MMM       Lungs:   Clear to auscultation bilaterally, respirations unlabored   Heart:  RRR, S1 and S2 normal   Abdomen:   Soft, non-distended, bowel sounds active   Genitourinary:  No suprapubic tenderness or fullness   Extremities: Lower extremities without cyanosis or edema   Skin: Skin without rashes or lesions   Neurologic:  Behavior/   AAO x 3  Appropriate, cooperative, following commands     Labs   Recent Results (from the past 24 hour(s))   POCT Glucose    Collection Time: 01/28/19 11:27 AM   Result Value Ref Range    POCT Bedside Glucose 197 (H) 70 - 99 mg/dL    POC Test POC    POCT Glucose    Collection Time: 01/28/19  4:17 PM   Result Value Ref Range    POCT Bedside Glucose 172 (H) 70 - 99 mg/dL    POC Test POC    POCT Glucose    Collection Time: 01/28/19  8:45 PM   Result Value Ref Range    POCT Bedside Glucose 166 (H) 70 - 99 mg/dL    POC Test POC    POCT Glucose    Collection Time: 01/29/19  7:35 AM   Result Value Ref Range    POCT Bedside Glucose 183 (H) 70 - 99 mg/dL    POC Test POC      Current Labs/Diagnostic tests were reviewed.      Current Medications were reviewed.    Assessment   68 y.o. male being consulted for ASHELY on CKD        Plan     ASHELY on CKD: baseline scr of 1.6-2.1mg/dl increased to 3mg/dl on " admission and now down to 2.2mg/dl likely due to ACEi/bactrim/urinary retention (PCN tube placed on 1/22) and stone removal/stent on removed on 1/22. Basically back to high end of his baseline as of labs yesterday. No new labs today. Nephrostomy tube removed today  Will need BMP within the week.   -pt of Dr Hernandez in [place] talked to him about seeing him in 2-3 weeks and scheduling an appt once he leaves today. Will need to get labs done and go over BPs  -no contrast, no jose angel, no nephrotoxins  -hold diuretics (eplerenone and aldactone)  -can check weights daily if gaining 3lbs/day or 5lbs/week please call PCP or renal may need to restart his diuretics     BP: labile from 150s to 200s this am and back down again with morning meds.  Restarted on lisinopril at 20mg  Yesterday will likely need to go up to 40mg if BP remains elevated--can f/u outpatient with renal and PCP.      Heme: no issues, hgb of 14     Acidosis: at goal of >22 (at 25) on labs from 1/28     Ca/phos: ca ok, does not need renal diet     Jessica Leon, DO   [facility] [phone] (office)

## 2019-01-29 NOTE — ASSESSMENT & PLAN NOTE
S/p percutaneous nephrostomy per IR 1/22  S/p removal of L stent in office 1/22  further intervention on the stone done in OR 1/23  DVT prophylaxis per primary urology team.  On SCD's (hematuria post procedure)  Off abx  Tube management per urology/IR - nephrostomy tube removed today  For dc home

## 2019-01-29 NOTE — PROGRESS NOTES
CARDIOLOGY DAILY PROGRESS NOTE    Nathan Alonzo is a 68 y.o. male who was admitted for Kidney stone [N20.0]  Kidney stone [N20.0]  Kidney stone [N20.0].    INTERVAL HISTORY:  S/p ureteral tube removal earlier today.  No more resting tremor.  No LH, DICKERSON< dizzyness, or worsened fatigue.  Preparing to be discharged.    CURRENT IP MEDS:    amiodarone 200 mg oral Daily   aspirin 81 mg oral Daily   atorvastatin 40 mg oral Daily (6p)   cloNIDine 0.2 mg oral BID   docusate sodium 100 mg oral BID   hydrALAZINE 75 mg oral q8h THADDEUS   insulin aspart U-100 2-10 Units subcutaneous With meals & nightly   insulin aspart U-100 7 Units subcutaneous TID with meals   insulin glargine 24 Units subcutaneous Daily   lamoTRIgine 150 mg oral Daily   levothyroxine 100 mcg oral Daily (6:30a)   lisinopril 20 mg oral Daily   pantoprazole 40 mg oral Daily   polyethylene glycol 17 g oral Daily   propranolol 20 mg oral BID   senna 1 tablet oral Daily       Objective     Vital signs in last 24 hours:  Temp:  [36.3 °C (97.4 °F)-36.8 °C (98.3 °F)] 36.3 °C (97.4 °F)  Heart Rate:  [41-56] 52  Resp:  [14-18] 14  BP: (145-200)/() 155/75      Intake/Output Summary (Last 24 hours) at 01/29/19 1102  Last data filed at 01/28/19 1330   Gross per 24 hour   Intake                0 ml   Output              600 ml   Net             -600 ml     Intake/Output this shift:  No intake/output data recorded.    PHYSICAL EXAM  Physical Exam  General Appearance:  Alert, no distress   Head:  Normocephalic, without obvious abnormality, atraumatic   Eyes:  Conjunctiva/corneas clear, EOM's intact   Endocrine: No thyroid enlargement    Lungs:   Clear to auscultation bilaterally, respirations unlabored, no rales, no wheezing   Heart:  Regular rhythm, S1 and S2 normal, no murmur, rub or gallop   Abdomen:   Soft, non-tender, no masses, no organomegaly   Vascular: Pulses 2+ and symmetric all extremities, no carotid bruit or jugular vein distention   Musculoskeletal:  Skin:  No injury or deformity  Skin color, texture, turgor normal, no rashes or lesions, no cyanosis or edema   Extremities: Extremities normal, atraumatic   Behavior/Emotional: Appropriate, cooperative       LABS   CMP Results       01/28/19 01/27/19 01/26/19                    0757 0727 0632          136 135 (L)         K 4.4 3.9 3.8         Cl 104 105 104         CO2 25 20 (L) 20 (L)         Glucose 162 (H) 122 (H) 138 (H)         BUN 51 (H) 49 (H) 50 (H)         Creatinine 2.2 (H) 2.2 (H) 2.5 (H)         Calcium 9.3 9.1 8.9         Anion Gap 8 11 11         EGFR 29.7 (L) 29.7 (L) 25.7 (L)                     CBC Results       01/28/19 01/27/19 01/26/19                    0757 0727 0632         WBC 9.93 11.47 (H) 10.81 (H)         RBC 4.82 4.76 4.66         HGB 14.3 14.0 13.8         HCT 42.0 41.6 41.5         MCV 87.1 87.4 89.1         MCH 29.7 29.4 29.6         MCHC 34.0 33.7 33.3          173 158                     Troponin I Results       01/24/19 01/23/19 07/03/18                    0500 0804 1127         Troponin I 0.05 (H) 0.04 0.03                     PT/PTT Results       01/15/19 10/04/18 07/05/18                    1510 1231 1945         PT 15.0 (H) 17.1 (H) 13.9         INR 1.2 1.4 1.1         PTT 61 (H) 54 (H) -         Comment for INR at 1510 on 01/15/19:    Moderate Intensity Anticoagulation = 2.0 to 3.0, High Intensity = 2.5 to 3.5    Comment for INR at 1231 on 10/04/18:    Moderate Intensity Anticoagulation = 2.0 to 3.0, High Intensity = 2.5 to 3.5    Comment for INR at 1945 on 07/05/18:    INR has no defined significance when PT is within Reference Range.    Comment for PTT at 1510 on 01/15/19:    The Standard Therapeutic Range for Heparin is 68 to 101 seconds.    Comment for PTT at 1231 on 10/04/18:    The Standard Therapeutic Range for Heparin is 68 to 101 seconds.        Lab Results   Component Value Date    CHOL 93 02/10/2018    TRIG 144 02/10/2018    HDL 31 (L) 02/10/2018    LDLCALC  33 02/10/2018    TSH 4.01 01/25/2019    HGBA1C 9.4 (H) 01/25/2019         Imaging  X-ray Abdomen 1 View    Result Date: 1/23/2019  IMPRESSION: Fluoroscopic images for procedure guidance as described above.    X-ray Abdomen 1 View    Result Date: 1/23/2019  IMPRESSION: Left nephroureterostomy tube in expected position.    Ir Percutaneous Nephrostomy    Result Date: 1/22/2019  IMPRESSION: Successful placement of a 24-Mongolian Malecot left nephroureteral tube.     Ir Nephrostogram    Result Date: 1/29/2019  IMPRESSION: No hydronephrosis.  Patent ureter into the bladder.  Nephrostomy catheter removed. COMMENT: The patient is referred to as following overnight capping for possible removal of a left percutaneous nephrostomy catheter.  The patient has had a large amount of leaking around the catheter.  It was elected to study the catheter to rule out obstruction.  Consent was obtained.  A  radiograph reveals the catheter to be in appropriate position.  Contrast is injected. There is no hydronephrosis.  Contrast is seen returning along the catheter tract which is significantly larger than the percutaneous nephrostomy catheter, however antegrade passage via the ureter into the bladder is demonstrated. Given these findings it was elected to remove the nephrostomy catheter.  This was achieved under fluoroscopic guidance.  The patient tolerated the procedure well. REFERENCE AIR KERMA: Eight mGy.     Ir Pcnu Tube Change    Result Date: 1/25/2019  IMPRESSION: Successful downsizing of left-sided PCNL tube to a 10-Mongolian nephrostomy tube.     Ir Fistulagram / Tube Check    Result Date: 1/28/2019  IMPRESSION: Technically successful injection of existing left-sided nephrostomy showing good flow of contrast down to the bladder.  Left nephrostomy was capped.    Ultrasound Guided Needle Placement    Result Date: 1/22/2019  IMPRESSION: Successful placement of a 24-Mongolian Malecot left nephroureteral tube.     Fl Fluoroscopy Technical  Assistance    Result Date: 1/23/2019  IMPRESSION: Fluoroscopic images for procedure guidance as described above.      No new imaging study.    ECG   No new ECG.    TELEMETRY  Sinus gurmeet 40-50    Assessment/Plan   Bradycardia   Assessment & Plan    Sinus with PACs. Rate trending around 50 BPM. He is able to ambulate in the hallways without dizziness/lightheadedness/palpitations/shortness of breath.     Would not increase propanolol dosing given bradycardia. Overall stable.         Hypertensive urgency   Assessment & Plan    On clonidine 0.2 mg bid, hydralazine 75 mg Q8hr..    Started on propranolol by neuro for tremors, uptitrated to 20 bid yesterday - HR seems to be tolerating.  Would keep antihypertensive therapy as it is now.    Outpatient f/u with his cardiologist/PCP in [place].        Hypercholesteremia   Assessment & Plan    On atorvastatin 40 mg daily        CAD (coronary artery disease) of artery bypass graft   Assessment & Plan    No anginal symptoms.  Continue atorvastatin, statin, low dose aspirin.  Started on propranolol for tremor, but seems to be tolerating.  Historically not on beta blocker due to sinus bradycardia        Atrial fibrillation (CMS/[facility])   Assessment & Plan    Maintaining SR on amiodarone.  Resume apixaban 5 mg BID on discharge as long as no objection from urology.        * Kidney stone   Assessment & Plan    S/p percutaneous nephrostomy 1/15/2019.  Urology following.  s/p nephrosotmy removal today with IR.   Pain control per urology.                   Krysten Bonilla DO  1/29/2019

## 2019-01-29 NOTE — PROGRESS NOTES
Date/Time  Temp  Heart Rate (from SpO2)  Pulse  Resp  BP  SpO2  Oxygen Therapy   01/29/19 0403  36.6 (97.8)  --   42  16   155/72  99  None (Room air)     Feels well  NT capped yesterday  Leaking around tube  Incontinence bag in place  Urine clear yellow  Denies fever or chills  Had large BM yesterday  Méndez removed this morning    Assessment  Large left renal calculi  Left PCNL POD #6  White count and creat improved  Urinary retention  UA w/o evidence of infection     Plan  Void trial in progress  Monitor labs and VS  Will have IR evaluate for tube removal today

## 2019-01-29 NOTE — PROGRESS NOTES
Subjective     Interval History: Patient about the same. No new issues.     Objective       124  155 181 214 144  197   172  POCT         DIET:       Dietary Orders            Start     Ordered    01/24/19 1009  Adult Diet RD/LDN may adjust order. Diet orders written by PA/CRNPs may not be adjusted by RD/LDNs.; Regular; No Concentrated Sweets  Diet effective now     Question Answer Comment   Delegation of Authority. Diet orders written by PA/CRNPs may not be adjusted by RD/LDNs. RD/LDN may adjust order. Diet orders written by PA/CRNPs may not be adjusted by RD/LDNs.    Diet Texture Regular    Other Restriction(s): No Concentrated Sweets        01/24/19 1009          Vital signs in last 24 hours:  Temp:  [36.3 °C (97.4 °F)-36.8 °C (98.3 °F)] 36.6 °C (97.8 °F)  Heart Rate:  [43-54] 48  Resp:  [16-20] 18  BP: (139-198)/() 172/90    Labs  Glucose Results       01/25/19                          0451           HBG A1C 9.4 (H)           EST AVG GLUCOSE 223           Comment for HBG A1C at 0451 on 01/25/19:    In the absence of an established diagnosis of Diabetes Mellitus, HbA1c levels between 5.7% and 6.4% are indicative of increased risk for developing Diabetes(Pre-Diabetes). Levels of 6.5% or greater are diagnostic for Diabetes Mellitus.    Comment for EST AVG GLUCOSE at 0451 on 01/25/19:    Estimate of average glucose concentration continuously over 24 hours for previous 2 to 3 months(Per ADA Recommendation).            Physical Exam:  General :      Unchanged   Skin:             Unchanged  Extremities:  Unchanged  Neurologic:   Unchanged    Impression:  Type 2 diabetes mellitus (CMS/HCC) ([facility])   Assessment & Plan    The patient has no new complaints   He continues on Lantus 24 units at night and NovoLog 7 units with meals.  Impression: Stable glycemic control.  Recommendations: I made no changes in the patient's insulin regimen.  Patient is for discharge tomorrow his glucoses trended up a bit today.          Hypothyroid   Assessment & Plan    The patient is stable on 100 mcg of levothyroxine daily.  I would discharge the patient on this dose.              Lincoln Malloy MD  1/28/2019 7:50 PM

## 2019-01-29 NOTE — PROGRESS NOTES
Nephrostomy tube removed  Having drainage from NT site which is expected  Has been voiding since madsen removed  PVR <20nl  Discharge meds reviewed with Jose Eduardo Fagan of Cardiology  Needs outpt follow up with PCP and Cardiology in 1 week

## 2019-01-29 NOTE — PLAN OF CARE
Problem: Patient Care Overview  Goal: Plan of Care Review  Outcome: Ongoing (interventions implemented as appropriate)   01/29/19 1008   Coping/Psychosocial   Plan Of Care Reviewed With patient   Plan of Care Review   Progress improving     Goal: Individualization & Mutuality  Outcome: Ongoing (interventions implemented as appropriate)   01/28/19 0407   Individualization   Patient Specific Preferences Enjoys ambulating in hallway a few times each shift     Goal: Discharge Needs Assessment  Outcome: Ongoing (interventions implemented as appropriate)   01/23/19 1400 01/29/19 1008   DC Needs Assessment   Concerns To Be Addressed --  basic needs concerns   Concerns Comments --  dressing changes   Readmission Within The Last 30 Days no previous admission in last 30 days --    Discharge Planning Comments Discharge discussed with patient, home with home health VS home without services --      Goal: Interprofessional Rounds/Family Conf  Outcome: Ongoing (interventions implemented as appropriate)   01/29/19 1008   Interdisciplinary Rounds/Family Conf   Participants nursing;patient;physician       Problem: Fall Risk (Adult)  Goal: Absence of Falls  Outcome: Ongoing (interventions implemented as appropriate)   01/29/19 1008   Fall Risk (Adult)   Absence of Falls making progress toward outcome       Problem: Pain, Acute (Adult)  Goal: Acceptable Pain Control/Comfort Level  Outcome: Ongoing (interventions implemented as appropriate)   01/29/19 1008   Pain, Acute (Adult)   Acceptable Pain Control/Comfort Level achieves outcome

## 2019-01-29 NOTE — ASSESSMENT & PLAN NOTE
Continue to monitor accuchecks  Mealtime novolog with correction insulin  Dietary intake has been variable and he has been intermittently NPO for procedures  Adjust regimen prn  Consulted endocrinology for management.

## 2019-01-29 NOTE — NURSING NOTE
After previous nurse changed dressing/pad @ nephrostomy site X2/3, patient c/o saturated dressing @ this time. Dressing removed & bag placed over nephrostomy tube L flank to collect fluid.

## 2019-01-29 NOTE — PROGRESS NOTES
Neurology Progress Note    Subjective     Interval History: no tremors today      Objective     Physical Exam:      Vital signs in last 24 hours:  Temp:  [36.3 °C (97.4 °F)-36.8 °C (98.3 °F)] 36.3 °C (97.4 °F)  Heart Rate:  [41-56] 44  Resp:  [16-18] 16  BP: (145-200)/() 200/106    General appearance: alert, appears stated age and cooperative  Head: normocephalic, without obvious abnormality, atraumatic  Eyes: conjunctivae clear. PERRL, EOM's intact.  Neck: no carotid bruit and supple, symmetrical, trachea midline  Lungs: clear to auscultation bilaterally  Heart: regular rate and rhythm, S1, S2 normal, no murmur, click, rub or gallop  Extremities: extremities normal, warm and well-perfused; no cyanosis, clubbing, or edema  Psych: affect: normal  Neurologic:   Alert, oriented, thought content appropriate, alertness: alert, orientation: time, person, place,   Cranial nerves: Pupils equal round reactive to light and accommodation, extraocular movements intact, visual fields full to confrontation, facial strength and sensation intact.  Tongue/palate midline, sternocleidomastoids 5/5 bilaterally.  Sensory: normal to all modalities  Motor: No drift.  Bilateral arms and legs are 5/5.  Reflexes: 2+ and symmetric.  Toes equivocal.  Coordination: normal, no tremor or asterixis.  Finger to nose intact.  No cogwheeling.  Rapid alternating movements are intact.  gait: Deferred    Assessment   Tremor   Assessment & Plan    Action tremor, most consistent with essential tremor.  Not currently seen on exam, but ongoing for the past few months and has been somewhat bothersome.  There are no findings to suggest Parkinson's disease.    Trial of low-dose propranolol  20 mg BID; consider Inderal LA 60mg HS   He may follow-up in the office for further titration and evaluation.        Bipolar 1 disorder (CMS/HCC) ([facility])   Assessment & Plan    On lamotrigine for mood stabilization, no history of seizures.        Hypothyroid    Assessment & Plan    On replacement, TSH normal                     Alex Basilio, DO  1/29/2019  7:32 AM

## 2019-01-29 NOTE — PROGRESS NOTES
Discussed patient during AM progressive care rounds.  Advised during rounds that patient is for discharge later today.  ANTONIO placed T/C to [facility], phone #[phone], to update that patient is being discharged today to home.  Patient will only need SN.  ANTONIO spoke with Flora at [facility].  Flora asked if discharge instructions and summary could be faxed when finalized.  Fax #[phone] is the number requested by Flora to fax when discharge instructions are final.  ANTONIO also advised Flora that patient has fecal management bag on nephrostomy site to ensure urine drainage is not sitting on the patient's back.  CM asked flora if that is something that can be supplied by their aganecy.  Flora advised that management bag can be supplied but would be helpful if nursing could send patient home with some extra supplies since it may take time for them to obtain the supplies needed.  ANTONIO updated nurse and asked for additional supplies to be sent home with patient for home care agency.  ANTONIO also informed Flora that wife is home with an antibody insufficiency issue.  She has visiting nurses for herself with infusions.  Wife has some requests for the visiting nurses who come into her house to visit her .  CM met with patient in the room.  Patient updated about discharge plan.  Patient in agreement with plan.  ANTONIO also placed T/C to Susan wife, [phone] to provide her an update of the plan as well.  [facility] so that she can follow up about her antibody insufficiency.  Susan can voice her concerns and requests directly to the agency.

## 2019-01-29 NOTE — ASSESSMENT & PLAN NOTE
The patient is stable on 100 mcg of levothyroxine daily.  I would discharge the patient on this dose.

## 2019-01-30 ENCOUNTER — PATIENT OUTREACH (OUTPATIENT)
Dept: CASE MANAGEMENT | Facility: CLINIC | Age: 69
End: 2019-01-30

## 2019-01-30 ENCOUNTER — TELEPHONE (OUTPATIENT)
Dept: CASE MANAGEMENT | Facility: CLINIC | Age: 69
End: 2019-01-30

## 2019-01-30 LAB
BACTERIA BLD CULT: NORMAL
BACTERIA BLD CULT: NORMAL

## 2019-01-30 NOTE — TELEPHONE ENCOUNTER
CM called pt for TCM call, however pt declined to complete with CM, preferred to discuss med changes and concerns with Dr Zavaleta. TCM scheduled for 1/31 11:45 .  TCM note sent to Dr Zavaleta for review, multiple med changes, pt had concerns. ANTONIO sent message to Dr Zavaleta so he is aware if there are any concerns for this patient he will be aware before appt tomorrow. If for any reason Dr Zavaleta felt the need to call him today due to med changes and pt concerns.

## 2019-01-30 NOTE — PROGRESS NOTES
Pt called left vm he needs to cancel tomorrow appt. He does need to be seen, CM sent message to Damaris Hidalgo to assist patient in appt on Friday with Dr Zavaleta, per pt request.

## 2019-01-30 NOTE — PROGRESS NOTES
NAME: Nathan Alonzo    MRN: [id number]    YOB: 1950    EVENT DETAILS    Discharging Facility: [facility]  Date of Admission: 01/22/19  Date of Discharge: 01/29/19  Discharge Instructions Reviewed?: No  Please Explain: Pt declined to review requested to come see DR Zavaleta in person tomorrow and review with him      Reason for Admission: Kidney Stones (2 per pt )  Per Hospitalist Note:       Hypertensive urgency   Assessment & Plan     Patient was admitted after IR percutaneous nephrostomy tube placement  On the med/surg unit, patient's BP is 212/90.  Asymptomatic. No HA, vision change, numbness/tingling/weakness/vertigo, CP, SOB, or additional complaint.   Patient has a known history of difficult to control HTN and has a history of known accelerated HTN in the past after procedures.  He is in significant pain from the nephrostomy tube which is likely driving his HTN.  Giving IV morphine now for improved pain control.  The patient took his lisinopril and clonidine doses this morning but did not take his diuretics. He has a history of intolerance to calcium channel blockers and he has been taken off of his carvedilol(presumably due to resting bradycardia)  Give his evening dose of lisinopril now.  He is due for clonidine this evening.   Spoke to the patient's wife who states that po hydralazine had been successful in controlling his HTN during past hospitalizations.   Diuretics have been on hold given his NPO status today for nephrostomy tube and he will be NPO tonight for possible stone extraction tomorrow.   Transfer to PCU for close monitoring.   Cardiology consult for HTN management as well as preoperative cardiac clearance.              Kidney stone   Assessment & Plan     S/p percutaneous nephrostomy per IR today  S/p removal of L stent in office today  Urology is planning on further intervention on the stone in am  Antibiotics and DVT prophylaxis per primary urology team.  Cardiology  consulted for preoperative clearance  Will need improved HTN management prior to proceeding to the OR  ASA was stopped 5 days ago and Eliquis was stopped this past Saturday per the direction of his primary cardiologist          Bipolar 1 disorder (CMS/[facility]) ([facility])   Assessment & Plan     Continue lamictal per outpatient regimen          CKD (chronic kidney disease) stage 3, GFR 30-59 ml/min (CMS/[facility]) ([facility])   Assessment & Plan     Baseline Cr appears to be in range of 1.6 to 2.0 on review of past records  Cr 1.9 today  Making urine and PCN is draining  Ongoing stone management per urology  Diuretics on hold for now  Monitor BMP and volume status  Continue ACE              Bradycardia   Assessment & Plan     Sinus bradycardia with known RBBB  No symptoms  Hold parameters on clonidine  Cardiology evaluation  Monitor on telemetry          GERD (gastroesophageal reflux disease)   Assessment & Plan     Continue Protonix          Type 2 diabetes mellitus (CMS/[facility]) ([facility])   Assessment & Plan     Continue to monitor accuchecks  Mealtime novolog with correction insulin  Adjust regimen prn  Patient will be NPO at midnight for possible OR tomorrow             Hypothyroid   Assessment & Plan     Continue levothyroxine          Hypercholesteremia   Assessment & Plan     statin          Hypertension   Assessment & Plan     Monitor on telemetry  Continue lisinopril/clonidine  Monitor with pain management  Follow cardiology recommendation          Atrial fibrillation (CMS/[facility])   Assessment & Plan     History of afib on amiodarone and Eliquis as outpatient  Currently in sinus rhythm  Monitor on telemetry  Cardiology consulted  Patient is on aspirin and Eliquis which was held-resume medication per urology and cardiology                        Maxim Casiano, DO  1/22/2019  7:03 PM          HPI:  CM called patient for TCM med rec and review of hospital admission. Patient declined to review over the phone, preferred to review at PCP  office visit and requested an appt tomorrow.    There was no brief summary of hospital stay, CM pasted summary of admission to hospital post surgery above for your reference.    Pt denied CP/SOB, reports mild lightheadedness, wooziness, Denies N/V/D fever or chills.     Tolerating PO intake and drinking fluids, Has home visiting Nursing ordered through [facility].     He declined review of medications,    DC instructions :     STOP INSPRA and Aldactazide.    START taking: Apresoline, Lamictal, Miralax and Inderal,    CHANGE how you take Prinivil     I think he is concerned over the changes and preferred to discuss in person with Dr Zavaleta.     Pt is IND otherwise, stated feels better. Having no pain.     TCM appt scheduled by  Damaris Hidalgo 11:45 am 1/31 patient preference.         MEDICATION REVIEW:    Reported by:: Patient                                  Medication review none    Additional Comments:  PLEASE SEE DC summary for changes in medications    FOLLOW-UP TESTS/PROCEDURES:    [facility] for wound care    TCM 1/31     PCP 3/7 Office Visit previously scheduled           HOME MANAGEMENT            HOME CARE SERVICES                   DURABLE MEDICAL EQUIPMENT    Durable Medical Equipment: None  Oxygen Use: No            BARRIERS TO CARE    SELF-CARE            SOCIOECONOMIC    Financial Problems?: No     Transportation Issues?: No            INTERVENTION/CARE COORDINATION    Interventions/ Care Coordination: Assisted patient in the scheduling of an appointment    PLAN OF CARE:    Reviewed signs/symptoms of worsening condition or complication that necessitate a call to the Physician's office.  Educated patient on access to care.  RN phone number given for future care management needs.       Lizzeth Juarez CM  [phone]

## 2019-01-31 ENCOUNTER — TELEPHONE (OUTPATIENT)
Dept: PRIMARY CARE | Facility: CLINIC | Age: 69
End: 2019-01-31

## 2019-01-31 NOTE — TELEPHONE ENCOUNTER
Pt had an appt schedule for today but he cancelled it yesterday. Now he wants that appt again and I don't see any availability and he wants to come in today for HFU and go over meds. He told me to open a spot for him. Please advise. Pt phone # [phone]. Please see other notes.

## 2019-01-31 NOTE — TELEPHONE ENCOUNTER
ALEXI Willams from [facility] phone # [phone]. Pt had a left kidney stone removed. Pt also had a nephrostomy tube put in and it was removed before he went home. Blood sugar drop to 60. Nurse did eval only.

## 2019-02-01 ENCOUNTER — OFFICE VISIT (OUTPATIENT)
Dept: PRIMARY CARE | Facility: CLINIC | Age: 69
End: 2019-02-01
Payer: MEDICARE

## 2019-02-01 VITALS
HEART RATE: 50 BPM | BODY MASS INDEX: 33.96 KG/M2 | SYSTOLIC BLOOD PRESSURE: 122 MMHG | RESPIRATION RATE: 18 BRPM | DIASTOLIC BLOOD PRESSURE: 78 MMHG | HEIGHT: 74 IN | OXYGEN SATURATION: 95 % | WEIGHT: 264.6 LBS

## 2019-02-01 DIAGNOSIS — N20.0 KIDNEY STONE: Primary | ICD-10-CM

## 2019-02-01 DIAGNOSIS — E11.22 TYPE 2 DIABETES MELLITUS WITH CHRONIC KIDNEY DISEASE, WITH LONG-TERM CURRENT USE OF INSULIN, UNSPECIFIED CKD STAGE (CMS/HCC): ICD-10-CM

## 2019-02-01 DIAGNOSIS — Z79.4 TYPE 2 DIABETES MELLITUS WITH CHRONIC KIDNEY DISEASE, WITH LONG-TERM CURRENT USE OF INSULIN, UNSPECIFIED CKD STAGE (CMS/HCC): ICD-10-CM

## 2019-02-01 DIAGNOSIS — I48.91 ATRIAL FIBRILLATION, UNSPECIFIED TYPE (CMS/HCC): ICD-10-CM

## 2019-02-01 PROCEDURE — 99496 TRANSJ CARE MGMT HIGH F2F 7D: CPT | Performed by: FAMILY MEDICINE

## 2019-02-01 ASSESSMENT — ENCOUNTER SYMPTOMS
DIZZINESS: 1
ARTHRALGIAS: 1
WEAKNESS: 1
GASTROINTESTINAL NEGATIVE: 1
DIFFICULTY URINATING: 1
ALLERGIC/IMMUNOLOGIC NEGATIVE: 1
CARDIOVASCULAR NEGATIVE: 1
FREQUENCY: 1
SLEEP DISTURBANCE: 1
FLANK PAIN: 1
HEADACHES: 1
FATIGUE: 1
NUMBNESS: 1
EYES NEGATIVE: 1
SHORTNESS OF BREATH: 1
DYSURIA: 1
APPETITE CHANGE: 1
ENDOCRINE NEGATIVE: 1
ACTIVITY CHANGE: 1
HEMATURIA: 0

## 2019-02-01 NOTE — PROGRESS NOTES
Daily Progress Note      Subjective      Patient ID: Nathan Alonzo is a 68 y.o. male.    HPI  S/p hosp, for f/u  Weak but better  No pain, issues, etc  Some diff w/ urinary stream  Seeing uro next week    The following have been reviewed and updated as appropriate in this visit:       Review of Systems   Constitutional: Positive for activity change, appetite change and fatigue.   HENT: Negative.    Eyes: Negative.    Respiratory: Positive for shortness of breath.    Cardiovascular: Negative.    Gastrointestinal: Negative.    Endocrine: Negative.    Genitourinary: Positive for decreased urine volume, difficulty urinating, dysuria, flank pain and frequency. Negative for discharge, enuresis, genital sores, hematuria, penile pain, penile swelling, scrotal swelling, testicular pain and urgency.   Musculoskeletal: Positive for arthralgias and gait problem.   Skin: Negative.    Allergic/Immunologic: Negative.    Neurological: Positive for dizziness, weakness, numbness and headaches.   Psychiatric/Behavioral: Positive for sleep disturbance.       Current Outpatient Prescriptions   Medication Sig Dispense Refill   • amiodarone (PACERONE) 200 mg tablet Take 200 mg by mouth daily.     • apixaban (ELIQUIS) 5 mg tablet Take 5 mg by mouth 2 (two) times a day. PT TO HOLD ELIQUIS 3 DAYS PRIOR TO SX AS PER HIS CARDIOLOGIST      • aspirin 81 mg enteric coated tablet Take 81 mg by mouth daily. PT TO STOP 5 DAYS PRIOR TO SX AS PER HIS CARDIOLOGIST      • atorvastatin (LIPITOR) 40 mg tablet Take 40 mg by mouth daily.       • coenzyme Q10 (CO Q-10) 400 mg capsule Take 400 mg by mouth daily.       • finasteride (PROSCAR) 5 mg tablet Take 5 mg by mouth daily.     • hydrALAZINE (APRESOLINE) 25 mg tablet Take 3 tablets (75 mg total) by mouth every 8 (eight) hours. 270 tablet 0   • insulin asp prt-insulin aspart (novoLOG MIX 70-30) 100 unit/mL (70-30) subcutaneous pen Inject 15 Units under the skin daily with breakfast.     • lamoTRIgine  (LaMICtal) 150 mg tablet Take 1 tablet (150 mg total) by mouth daily. 30 tablet 0   • levothyroxine (SYNTHROID) 100 mcg tablet Take 100 mcg by mouth daily.     • lisinopril (PRINIVIL) 40 mg tablet Take 0.5 tablets (20 mg total) by mouth 2 (two) times a day. 30 tablet 0   • omeprazole (PriLOSEC) 40 mg capsule Take 40 mg by mouth daily before breakfast.     • polyethylene glycol (MIRALAX) 17 gram packet Take 17 g by mouth daily. 30 packet 0   • propranolol (INDERAL) 20 mg tablet Take 1 tablet (20 mg total) by mouth 2 (two) times a day. 60 tablet 0   • insulin asp prt-insulin aspart (novoLOG MIX 70-30) 100 unit/mL (70-30) subcutaneous pen Inject 10 Units under the skin daily with lunch.     • insulin NPH and regular human (NovoLIN 70-30) 100 unit/mL (70-30) injection Inject 15 Units under the skin nightly.       Current Facility-Administered Medications   Medication Dose Route Frequency Provider Last Rate Last Dose   • cloNIDine (CATAPRES) tablet 0.2 mg  0.2 mg oral BID Areli Gallo CRNP         Past Medical History:   Diagnosis Date   • A-fib (CMS/HCC) (HCC)    • Abnormal ECG     a fib   • Aortic valve disorder    • BPH (benign prostatic hyperplasia)    • BPH (benign prostatic hyperplasia)    • Coronary artery disease    • Disease of thyroid gland    • Hypertension    • Hypothyroidism    • Kidney stones    • Nocturia    • Renal calculi     right   • Renal cancer (CMS/HCC) (HCC)     pt denies   • Right bundle branch block    • Sleep apnea     uses cpap   • Type 2 diabetes mellitus (CMS/HCC) (HCC)      Family History   Problem Relation Age of Onset   • Lung cancer Mother    • Lung cancer Father    • Early death Father    • Diabetes Sister      Past Surgical History:   Procedure Laterality Date   • COLONOSCOPY  2016   • CORONARY ARTERY BYPASS GRAFT  2017    x3   • KIDNEY STONE SURGERY Right 2018   • NEPHRECTOMY Right 2016    PARTIAL    • TONSILLECTOMY     • UVULECTOMY  2005     Social History     Social History   •  Marital status:      Spouse name: N/A   • Number of children: N/A   • Years of education: N/A     Occupational History   • Not on file.     Social History Main Topics   • Smoking status: Never Smoker   • Smokeless tobacco: Never Used   • Alcohol use No   • Drug use: No   • Sexual activity: Defer     Other Topics Concern   • Not on file     Social History Narrative   • No narrative on file     Allergies   Allergen Reactions   • Oxycodone Other (see comments)      Hives     • Amlodipine Other (see comments)     ANKLE  & FEET  SWELLING    • Cephalexin Monohydrate Other (see comments)     PT CAN'T REMEMBER  REACTION   • Latex Rash       Objective   I have reviewed the patient's pertinent labs. Pertinent labs are within normal limits.    Physical Exam   Cardiovascular: Normal heart sounds and normal pulses.  An irregularly irregular rhythm present.   Pulmonary/Chest: Effort normal and breath sounds normal.   Abdominal: There is no tenderness. Hernia confirmed negative in the right inguinal area and confirmed negative in the left inguinal area.       Assessment/Plan     Problem List Items Addressed This Visit     Atrial fibrillation (CMS/HCC)    Relevant Orders    Basic metabolic panel    Type 2 diabetes mellitus (CMS/HCC) ([facility])    Relevant Orders    Basic metabolic panel    Kidney stone - Primary    Relevant Orders    Basic metabolic panel        Orders Placed This Encounter   Procedures   • Basic metabolic panel     Standing Status:   Future     Number of Occurrences:   1     Standing Expiration Date:   2/1/2020     Hops reviewed  Procedures reviewed  See uro  Check labs  Reviewed, discussed meds  Reviewed, discussed rx, dx, tx  Will re-check 3 m    Serafin Zavaleta DO  2/1/2019

## 2019-03-25 ENCOUNTER — APPOINTMENT (EMERGENCY)
Dept: RADIOLOGY | Facility: HOSPITAL | Age: 69
DRG: 660 | End: 2019-03-25
Attending: EMERGENCY MEDICINE
Payer: MEDICARE

## 2019-03-25 ENCOUNTER — HOSPITAL ENCOUNTER (INPATIENT)
Facility: HOSPITAL | Age: 69
LOS: 3 days | Discharge: HOME | DRG: 660 | End: 2019-03-28
Attending: EMERGENCY MEDICINE | Admitting: HOSPITALIST
Payer: MEDICARE

## 2019-03-25 ENCOUNTER — APPOINTMENT (INPATIENT)
Dept: RADIOLOGY | Facility: HOSPITAL | Age: 69
DRG: 660 | End: 2019-03-25
Attending: NURSE PRACTITIONER
Payer: MEDICARE

## 2019-03-25 DIAGNOSIS — I48.91 ATRIAL FIBRILLATION, UNSPECIFIED TYPE (CMS/HCC): ICD-10-CM

## 2019-03-25 DIAGNOSIS — N17.9 ACUTE RENAL FAILURE SUPERIMPOSED ON STAGE 3 CHRONIC KIDNEY DISEASE, UNSPECIFIED ACUTE RENAL FAILURE TYPE: ICD-10-CM

## 2019-03-25 DIAGNOSIS — N23 RENAL COLIC ON RIGHT SIDE: Primary | ICD-10-CM

## 2019-03-25 DIAGNOSIS — I10 ESSENTIAL HYPERTENSION: ICD-10-CM

## 2019-03-25 DIAGNOSIS — N18.3 ACUTE RENAL FAILURE SUPERIMPOSED ON STAGE 3 CHRONIC KIDNEY DISEASE, UNSPECIFIED ACUTE RENAL FAILURE TYPE: ICD-10-CM

## 2019-03-25 PROBLEM — E11.8 TYPE 2 DIABETES MELLITUS WITH COMPLICATION, WITH LONG-TERM CURRENT USE OF INSULIN (CMS/HCC): Status: ACTIVE | Noted: 2018-03-16

## 2019-03-25 PROBLEM — Z79.4 TYPE 2 DIABETES MELLITUS WITH COMPLICATION, WITH LONG-TERM CURRENT USE OF INSULIN (CMS/HCC): Status: ACTIVE | Noted: 2018-03-16

## 2019-03-25 LAB
ALBUMIN SERPL-MCNC: 4.4 G/DL (ref 3.4–5)
ALP SERPL-CCNC: 106 IU/L (ref 35–126)
ALT SERPL-CCNC: 26 IU/L (ref 16–63)
ANION GAP SERPL CALC-SCNC: 11 MEQ/L (ref 3–15)
AST SERPL-CCNC: 19 IU/L (ref 15–41)
ATRIAL RATE: 49
BACTERIA URNS QL MICRO: ABNORMAL /HPF
BASOPHILS # BLD: 0.07 K/UL (ref 0.01–0.1)
BASOPHILS NFR BLD: 0.5 %
BILIRUB SERPL-MCNC: 1 MG/DL (ref 0.3–1.2)
BILIRUB UR QL STRIP.AUTO: NEGATIVE MG/DL
BUN SERPL-MCNC: 23 MG/DL (ref 8–20)
CALCIUM SERPL-MCNC: 9.2 MG/DL (ref 8.9–10.3)
CHLORIDE SERPL-SCNC: 101 MEQ/L (ref 98–109)
CLARITY UR REFRACT.AUTO: CLEAR
CO2 SERPL-SCNC: 23 MEQ/L (ref 22–32)
COLOR UR AUTO: YELLOW
CREAT SERPL-MCNC: 1.9 MG/DL
DIFFERENTIAL METHOD BLD: ABNORMAL
EOSINOPHIL # BLD: 0.3 K/UL (ref 0.04–0.54)
EOSINOPHIL NFR BLD: 2.1 %
ERYTHROCYTE [DISTWIDTH] IN BLOOD BY AUTOMATED COUNT: 14.3 % (ref 11.6–14.4)
GFR SERPL CREATININE-BSD FRML MDRD: 35.2 ML/MIN/1.73M*2
GLUCOSE BLD-MCNC: 160 MG/DL (ref 70–99)
GLUCOSE BLD-MCNC: 169 MG/DL (ref 70–99)
GLUCOSE BLD-MCNC: 209 MG/DL (ref 70–99)
GLUCOSE BLD-MCNC: 216 MG/DL (ref 70–99)
GLUCOSE SERPL-MCNC: 271 MG/DL (ref 70–99)
GLUCOSE UR STRIP.AUTO-MCNC: ABNORMAL MG/DL
HCT VFR BLDCO AUTO: 45.4 %
HGB BLD-MCNC: 15.5 G/DL
HGB UR QL STRIP.AUTO: ABNORMAL
HYALINE CASTS #/AREA URNS LPF: ABNORMAL /LPF
IMM GRANULOCYTES # BLD AUTO: 0.1 K/UL (ref 0–0.08)
IMM GRANULOCYTES NFR BLD AUTO: 0.7 %
KETONES UR STRIP.AUTO-MCNC: NEGATIVE MG/DL
LEUKOCYTE ESTERASE UR QL STRIP.AUTO: NEGATIVE
LIPASE SERPL-CCNC: 50 U/L (ref 20–51)
LYMPHOCYTES # BLD: 0.71 K/UL (ref 1.2–3.5)
LYMPHOCYTES NFR BLD: 5 %
MCH RBC QN AUTO: 29.9 PG (ref 28–33.2)
MCHC RBC AUTO-ENTMCNC: 34.1 G/DL (ref 32.2–36.5)
MCV RBC AUTO: 87.6 FL (ref 83–98)
MONOCYTES # BLD: 0.75 K/UL (ref 0.3–1)
MONOCYTES NFR BLD: 5.3 %
NEUTROPHILS # BLD: 12.13 K/UL (ref 1.7–7)
NEUTS SEG NFR BLD: 86.4 %
NITRITE UR QL STRIP.AUTO: NEGATIVE
NRBC BLD-RTO: 0 %
P AXIS: 71
PDW BLD AUTO: 11.3 FL (ref 9.4–12.4)
PH UR STRIP.AUTO: 7 [PH]
PLATELET # BLD AUTO: 190 K/UL
POCT TEST: ABNORMAL
POTASSIUM SERPL-SCNC: 3.4 MEQ/L (ref 3.6–5.1)
PR INTERVAL: 196
PROT SERPL-MCNC: 7.4 G/DL (ref 6–8.2)
PROT UR QL STRIP.AUTO: 2
QRS DURATION: 166
QT INTERVAL: 582
QTC CALCULATION(BAZETT): 525
R AXIS: -76
RBC # BLD AUTO: 5.18 M/UL (ref 4.5–5.8)
RBC #/AREA URNS HPF: ABNORMAL /HPF
SODIUM SERPL-SCNC: 135 MEQ/L (ref 136–144)
SP GR UR REFRACT.AUTO: 1.01
SQUAMOUS URNS QL MICRO: ABNORMAL /HPF
T WAVE AXIS: -58
UROBILINOGEN UR STRIP-ACNC: 0.2 EU/DL
VENTRICULAR RATE: 49
WBC # BLD AUTO: 14.06 K/UL
WBC #/AREA URNS HPF: ABNORMAL /HPF

## 2019-03-25 PROCEDURE — 63700000 HC SELF-ADMINISTRABLE DRUG: Performed by: HOSPITALIST

## 2019-03-25 PROCEDURE — 25800000 HC PHARMACY IV SOLUTIONS: Performed by: HOSPITALIST

## 2019-03-25 PROCEDURE — 81001 URINALYSIS AUTO W/SCOPE: CPT | Performed by: EMERGENCY MEDICINE

## 2019-03-25 PROCEDURE — 99223 1ST HOSP IP/OBS HIGH 75: CPT | Performed by: HOSPITALIST

## 2019-03-25 PROCEDURE — 96374 THER/PROPH/DIAG INJ IV PUSH: CPT

## 2019-03-25 PROCEDURE — 80053 COMPREHEN METABOLIC PANEL: CPT | Performed by: EMERGENCY MEDICINE

## 2019-03-25 PROCEDURE — 74176 CT ABD & PELVIS W/O CONTRAST: CPT

## 2019-03-25 PROCEDURE — 83690 ASSAY OF LIPASE: CPT | Performed by: EMERGENCY MEDICINE

## 2019-03-25 PROCEDURE — 63600000 HC DRUGS/DETAIL CODE: Performed by: HOSPITALIST

## 2019-03-25 PROCEDURE — 99285 EMERGENCY DEPT VISIT HI MDM: CPT | Mod: 25

## 2019-03-25 PROCEDURE — 25800000 HC PHARMACY IV SOLUTIONS: Performed by: EMERGENCY MEDICINE

## 2019-03-25 PROCEDURE — 36415 COLL VENOUS BLD VENIPUNCTURE: CPT | Performed by: EMERGENCY MEDICINE

## 2019-03-25 PROCEDURE — 74018 RADEX ABDOMEN 1 VIEW: CPT

## 2019-03-25 PROCEDURE — 93005 ELECTROCARDIOGRAM TRACING: CPT | Performed by: HOSPITALIST

## 2019-03-25 PROCEDURE — 96361 HYDRATE IV INFUSION ADD-ON: CPT

## 2019-03-25 PROCEDURE — 96372 THER/PROPH/DIAG INJ SC/IM: CPT | Mod: 59

## 2019-03-25 PROCEDURE — 85025 COMPLETE CBC W/AUTO DIFF WBC: CPT | Performed by: EMERGENCY MEDICINE

## 2019-03-25 PROCEDURE — 63600000 HC DRUGS/DETAIL CODE: Performed by: EMERGENCY MEDICINE

## 2019-03-25 PROCEDURE — 96375 TX/PRO/DX INJ NEW DRUG ADDON: CPT | Mod: 59

## 2019-03-25 PROCEDURE — 12000000 HC ROOM AND CARE MED/SURG

## 2019-03-25 RX ORDER — DEXTROSE 50 % IN WATER (D50W) INTRAVENOUS SYRINGE
25 AS NEEDED
Status: DISCONTINUED | OUTPATIENT
Start: 2019-03-25 | End: 2019-03-28 | Stop reason: HOSPADM

## 2019-03-25 RX ORDER — KETOROLAC TROMETHAMINE 15 MG/ML
15 INJECTION, SOLUTION INTRAMUSCULAR; INTRAVENOUS ONCE
Status: COMPLETED | OUTPATIENT
Start: 2019-03-25 | End: 2019-03-25

## 2019-03-25 RX ORDER — INSULIN ASPART 100 [IU]/ML
6-10 INJECTION, SOLUTION INTRAVENOUS; SUBCUTANEOUS
Status: DISCONTINUED | OUTPATIENT
Start: 2019-03-25 | End: 2019-03-28 | Stop reason: HOSPADM

## 2019-03-25 RX ORDER — ONDANSETRON HYDROCHLORIDE 2 MG/ML
4 INJECTION, SOLUTION INTRAVENOUS ONCE
Status: COMPLETED | OUTPATIENT
Start: 2019-03-25 | End: 2019-03-25

## 2019-03-25 RX ORDER — FINASTERIDE 5 MG/1
5 TABLET, FILM COATED ORAL DAILY
Status: DISCONTINUED | OUTPATIENT
Start: 2019-03-25 | End: 2019-03-28 | Stop reason: HOSPADM

## 2019-03-25 RX ORDER — PROPRANOLOL HYDROCHLORIDE 20 MG/1
20 TABLET ORAL 2 TIMES DAILY
Status: DISCONTINUED | OUTPATIENT
Start: 2019-03-25 | End: 2019-03-28 | Stop reason: HOSPADM

## 2019-03-25 RX ORDER — DEXTROSE 40 %
15-30 GEL (GRAM) ORAL AS NEEDED
Status: DISCONTINUED | OUTPATIENT
Start: 2019-03-25 | End: 2019-03-28 | Stop reason: HOSPADM

## 2019-03-25 RX ORDER — POTASSIUM CHLORIDE 20 MEQ/1
20 TABLET, EXTENDED RELEASE ORAL ONCE
Status: COMPLETED | OUTPATIENT
Start: 2019-03-25 | End: 2019-03-25

## 2019-03-25 RX ORDER — ATORVASTATIN CALCIUM 40 MG/1
40 TABLET, FILM COATED ORAL NIGHTLY
Status: DISCONTINUED | OUTPATIENT
Start: 2019-03-25 | End: 2019-03-28 | Stop reason: HOSPADM

## 2019-03-25 RX ORDER — HYDRALAZINE HYDROCHLORIDE 25 MG/1
75 TABLET, FILM COATED ORAL EVERY 8 HOURS
Status: DISCONTINUED | OUTPATIENT
Start: 2019-03-25 | End: 2019-03-25

## 2019-03-25 RX ORDER — AMIODARONE HYDROCHLORIDE 200 MG/1
200 TABLET ORAL DAILY
Status: DISCONTINUED | OUTPATIENT
Start: 2019-03-25 | End: 2019-03-28 | Stop reason: HOSPADM

## 2019-03-25 RX ORDER — CLONIDINE HYDROCHLORIDE 0.2 MG/1
0.2 TABLET ORAL 2 TIMES DAILY
Status: DISCONTINUED | OUTPATIENT
Start: 2019-03-25 | End: 2019-03-28 | Stop reason: HOSPADM

## 2019-03-25 RX ORDER — BUMETANIDE 1 MG/1
1 TABLET ORAL
Status: DISCONTINUED | OUTPATIENT
Start: 2019-03-25 | End: 2019-03-26

## 2019-03-25 RX ORDER — HYDRALAZINE HYDROCHLORIDE 25 MG/1
100 TABLET, FILM COATED ORAL EVERY 8 HOURS
Status: DISCONTINUED | OUTPATIENT
Start: 2019-03-25 | End: 2019-03-28 | Stop reason: HOSPADM

## 2019-03-25 RX ORDER — ACETAMINOPHEN 325 MG/1
650 TABLET ORAL EVERY 4 HOURS PRN
Status: DISCONTINUED | OUTPATIENT
Start: 2019-03-25 | End: 2019-03-28 | Stop reason: HOSPADM

## 2019-03-25 RX ORDER — CIPROFLOXACIN 500 MG/1
500 TABLET ORAL EVERY 12 HOURS
Status: DISCONTINUED | OUTPATIENT
Start: 2019-03-25 | End: 2019-03-25

## 2019-03-25 RX ORDER — LISINOPRIL 20 MG/1
20 TABLET ORAL 2 TIMES DAILY
Status: DISCONTINUED | OUTPATIENT
Start: 2019-03-25 | End: 2019-03-26

## 2019-03-25 RX ORDER — LEVOTHYROXINE SODIUM 100 UG/1
100 TABLET ORAL
Status: DISCONTINUED | OUTPATIENT
Start: 2019-03-25 | End: 2019-03-28 | Stop reason: HOSPADM

## 2019-03-25 RX ORDER — ACETAMINOPHEN 325 MG/1
650 TABLET ORAL EVERY 4 HOURS PRN
Status: DISCONTINUED | OUTPATIENT
Start: 2019-03-25 | End: 2019-03-25

## 2019-03-25 RX ORDER — IBUPROFEN 200 MG
16-32 TABLET ORAL AS NEEDED
Status: DISCONTINUED | OUTPATIENT
Start: 2019-03-25 | End: 2019-03-28 | Stop reason: HOSPADM

## 2019-03-25 RX ORDER — SODIUM CHLORIDE 9 MG/ML
INJECTION, SOLUTION INTRAVENOUS CONTINUOUS
Status: DISCONTINUED | OUTPATIENT
Start: 2019-03-25 | End: 2019-03-25

## 2019-03-25 RX ORDER — PANTOPRAZOLE SODIUM 40 MG/1
40 TABLET, DELAYED RELEASE ORAL DAILY
Status: DISCONTINUED | OUTPATIENT
Start: 2019-03-25 | End: 2019-03-28 | Stop reason: HOSPADM

## 2019-03-25 RX ORDER — BUMETANIDE 1 MG/1
1 TABLET ORAL 2 TIMES DAILY
COMMUNITY
Start: 2019-03-15 | End: 2019-03-28 | Stop reason: HOSPADM

## 2019-03-25 RX ORDER — MORPHINE SULFATE 2 MG/ML
2 INJECTION, SOLUTION INTRAMUSCULAR; INTRAVENOUS ONCE
Status: COMPLETED | OUTPATIENT
Start: 2019-03-25 | End: 2019-03-25

## 2019-03-25 RX ORDER — ISOSORBIDE MONONITRATE 30 MG/1
30 TABLET, EXTENDED RELEASE ORAL DAILY
Status: DISCONTINUED | OUTPATIENT
Start: 2019-03-25 | End: 2019-03-28

## 2019-03-25 RX ORDER — MORPHINE SULFATE 2 MG/ML
2 INJECTION, SOLUTION INTRAMUSCULAR; INTRAVENOUS
Status: DISCONTINUED | OUTPATIENT
Start: 2019-03-25 | End: 2019-03-28

## 2019-03-25 RX ADMIN — ATORVASTATIN CALCIUM 40 MG: 40 TABLET, FILM COATED ORAL at 21:01

## 2019-03-25 RX ADMIN — AMIODARONE HYDROCHLORIDE 200 MG: 200 TABLET ORAL at 10:46

## 2019-03-25 RX ADMIN — KETOROLAC TROMETHAMINE 15 MG: 15 INJECTION INTRAMUSCULAR; INTRAVENOUS at 04:55

## 2019-03-25 RX ADMIN — CLONIDINE HYDROCHLORIDE 0.2 MG: 0.2 TABLET ORAL at 10:47

## 2019-03-25 RX ADMIN — BUMETANIDE 1 MG: 1 TABLET ORAL at 10:46

## 2019-03-25 RX ADMIN — HYDRALAZINE HYDROCHLORIDE 75 MG: 25 TABLET, FILM COATED ORAL at 13:52

## 2019-03-25 RX ADMIN — HYDRALAZINE HYDROCHLORIDE 100 MG: 25 TABLET ORAL at 21:01

## 2019-03-25 RX ADMIN — MORPHINE SULFATE 2 MG: 2 INJECTION, SOLUTION INTRAMUSCULAR; INTRAVENOUS at 17:48

## 2019-03-25 RX ADMIN — MORPHINE SULFATE 2 MG: 2 INJECTION, SOLUTION INTRAMUSCULAR; INTRAVENOUS at 04:56

## 2019-03-25 RX ADMIN — PROPRANOLOL HYDROCHLORIDE 20 MG: 20 TABLET ORAL at 21:00

## 2019-03-25 RX ADMIN — SODIUM CHLORIDE: 9 INJECTION, SOLUTION INTRAVENOUS at 10:54

## 2019-03-25 RX ADMIN — ISOSORBIDE MONONITRATE 30 MG: 30 TABLET, EXTENDED RELEASE ORAL at 16:14

## 2019-03-25 RX ADMIN — LAMOTRIGINE 150 MG: 100 TABLET ORAL at 09:01

## 2019-03-25 RX ADMIN — HYDRALAZINE HYDROCHLORIDE 75 MG: 25 TABLET, FILM COATED ORAL at 08:25

## 2019-03-25 RX ADMIN — INSULIN ASPART 6 UNITS: 100 INJECTION, SOLUTION INTRAVENOUS; SUBCUTANEOUS at 09:00

## 2019-03-25 RX ADMIN — LISINOPRIL 20 MG: 20 TABLET ORAL at 21:00

## 2019-03-25 RX ADMIN — INSULIN ASPART 6 UNITS: 100 INJECTION, SOLUTION INTRAVENOUS; SUBCUTANEOUS at 21:03

## 2019-03-25 RX ADMIN — POTASSIUM CHLORIDE 20 MEQ: 1500 TABLET, EXTENDED RELEASE ORAL at 13:52

## 2019-03-25 RX ADMIN — ONDANSETRON HYDROCHLORIDE 4 MG: 2 SOLUTION INTRAMUSCULAR; INTRAVENOUS at 04:56

## 2019-03-25 RX ADMIN — CLONIDINE HYDROCHLORIDE 0.2 MG: 0.2 TABLET ORAL at 21:00

## 2019-03-25 RX ADMIN — BUMETANIDE 1 MG: 1 TABLET ORAL at 16:14

## 2019-03-25 RX ADMIN — PANTOPRAZOLE SODIUM 40 MG: 40 TABLET, DELAYED RELEASE ORAL at 10:46

## 2019-03-25 RX ADMIN — SODIUM CHLORIDE 1000 ML: 9 INJECTION, SOLUTION INTRAVENOUS at 04:56

## 2019-03-25 RX ADMIN — LISINOPRIL 20 MG: 20 TABLET ORAL at 10:47

## 2019-03-25 RX ADMIN — FINASTERIDE 5 MG: 5 TABLET, FILM COATED ORAL at 09:01

## 2019-03-25 ASSESSMENT — COGNITIVE AND FUNCTIONAL STATUS - GENERAL
MOVING TO AND FROM BED TO CHAIR: 4 - NONE
TOILETING: 4 - NONE
EATING MEALS: 4 - NONE
DRESSING REGULAR UPPER BODY CLOTHING: 4 - NONE
HELP NEEDED FOR PERSONAL GROOMING: 4 - NONE
HELP NEEDED FOR BATHING: 4 - NONE
WALKING IN HOSPITAL ROOM: 4 - NONE
DRESSING REGULAR LOWER BODY CLOTHING: 4 - NONE
CLIMB 3 TO 5 STEPS WITH RAILING: 4 - NONE
STANDING UP FROM CHAIR USING ARMS: 4 - NONE

## 2019-03-25 ASSESSMENT — ENCOUNTER SYMPTOMS
ANOREXIA: 0
VOMITING: 0
DIARRHEA: 0
FEVER: 0
CONSTIPATION: 0
RHINORRHEA: 0
PALPITATIONS: 0
NUMBNESS: 0
CHEST TIGHTNESS: 0
EYE REDNESS: 0
COUGH: 0
FREQUENCY: 0
SORE THROAT: 0
BACK PAIN: 0
HEMATURIA: 0
FATIGUE: 0
ABDOMINAL PAIN: 1
WEAKNESS: 0
CHILLS: 0
BRUISES/BLEEDS EASILY: 0
SHORTNESS OF BREATH: 0
TROUBLE SWALLOWING: 0
COLOR CHANGE: 0
NAUSEA: 1
DYSURIA: 0

## 2019-03-25 NOTE — ASSESSMENT & PLAN NOTE
On ASA 81mg Q24, BB/statin    - hold ASA for OR  - cont BB (on propranolol more for tremor) and statin

## 2019-03-25 NOTE — H&P
Hospital Medicine Service -  History & Physical        CHIEF COMPLAINT   Right flank pain     HISTORY OF PRESENT ILLNESS      Nathan Alonzo is a 68 y.o. male with a past medical history of renal stones, HTN/HL, IDDM, CAD/CABG, afib on Eliquis who presents with acute onset right flank pain.  Pt w/ hx of renal stones, had left percutaneous nephrostomy and stone removal at that time.  He has been feeling fine recently but started to have some right-sided discomfort on Saturday which progressed to pain early Monday morning and he drove himself to the ER.  He denies fevers.  He has not had trouble urinating.  He had mild nausea early this AM but no vomiting.      In the ER, pt had CT which showed 7mm obstructing stone at R UPJ w/ mild R hydro.  Pt's labs remarkable only for mild leukocytosis.  Pt's pain much improved after IV morphine x 1.      PAST MEDICAL AND SURGICAL HISTORY      Past Medical History:   Diagnosis Date   • A-fib (CMS/HCC) (HCC)    • Abnormal ECG     a fib   • Aortic valve disorder    • BPH (benign prostatic hyperplasia)    • BPH (benign prostatic hyperplasia)    • Coronary artery disease    • Disease of thyroid gland    • Hypertension    • Hypothyroidism    • Kidney stones    • Nocturia    • Renal calculi     right   • Renal cancer (CMS/HCC) (HCC)     pt denies   • Right bundle branch block    • Sleep apnea     uses cpap   • Type 2 diabetes mellitus (CMS/HCC) (HCC)        Past Surgical History:   Procedure Laterality Date   • COLONOSCOPY  2016   • CORONARY ARTERY BYPASS GRAFT  2017    x3   • KIDNEY STONE SURGERY Right 2018   • NEPHRECTOMY Right 2016    PARTIAL    • TONSILLECTOMY     • UVULECTOMY  2005       MEDICATIONS      Prior to Admission medications    Medication Sig Start Date End Date Taking? Authorizing Provider   amiodarone (PACERONE) 200 mg tablet Take 200 mg by mouth daily.   Yes Provider, MD Damian   apixaban (ELIQUIS) 5 mg tablet Take 5 mg by mouth 2 (two) times a day. PT TO HOLD  ELIQUIS 3 DAYS PRIOR TO SX AS PER HIS CARDIOLOGIST    Yes Damian An MD   aspirin 81 mg enteric coated tablet Take 81 mg by mouth daily. PT TO STOP 5 DAYS PRIOR TO SX AS PER HIS CARDIOLOGIST    Yes Damian An MD   atorvastatin (LIPITOR) 40 mg tablet Take 40 mg by mouth daily.   3/25/16  Yes Damian nA MD   coenzyme Q10 (CO Q-10) 400 mg capsule Take 400 mg by mouth daily.   3/25/16  Yes Damian An MD   finasteride (PROSCAR) 5 mg tablet Take 5 mg by mouth daily.   Yes Damian An MD   hydrALAZINE (APRESOLINE) 25 mg tablet Take 3 tablets (75 mg total) by mouth every 8 (eight) hours. 1/29/19 3/25/19 Yes Ethel Patton CRNP   lamoTRIgine (LaMICtal) 150 mg tablet Take 1 tablet (150 mg total) by mouth daily. 1/30/19 3/25/19 Yes Ethel Patton CRNP   levothyroxine (SYNTHROID) 100 mcg tablet Take 100 mcg by mouth daily.   Yes Damian An MD   lisinopril (PRINIVIL) 40 mg tablet Take 0.5 tablets (20 mg total) by mouth 2 (two) times a day. 1/29/19 3/25/19 Yes Ethel Patton CRNP   omeprazole (PriLOSEC) 40 mg capsule Take 40 mg by mouth daily before breakfast.   Yes Damian An MD   propranolol (INDERAL) 20 mg tablet Take 1 tablet (20 mg total) by mouth 2 (two) times a day. 1/29/19 3/25/19 Yes Ethel Patton CRNP   bumetanide (BUMEX) 1 mg tablet Take 1 mg by mouth 2 (two) times a day. 3/15/19   Damian An MD   insulin asp prt-insulin aspart (novoLOG MIX 70-30) 100 unit/mL (70-30) subcutaneous pen Inject 20 Units under the skin 2 (two) times a day before breakfast and dinner.      Damian An MD                               ALLERGIES      Oxycodone; Amlodipine; Cephalexin monohydrate; and Latex    FAMILY HISTORY      Family History   Problem Relation Age of Onset   • Lung cancer Mother    • Lung cancer Father    • Early death Father    • Diabetes Sister        SOCIAL HISTORY      Social History     Social History   •  Marital status:      Spouse name: N/A   • Number of children: N/A   • Years of education: N/A     Social History Main Topics   • Smoking status: Never Smoker   • Smokeless tobacco: Never Used   • Alcohol use No   • Drug use:  Alcohol: none No   • Sexual activity: Defer       Other Topics Concern   • None     Social History Narrative   • None       REVIEW OF SYSTEMS      All other systems reviewed and negative except as noted in HPI    PHYSICAL EXAMINATION      Temp:  [36.4 °C (97.6 °F)] 36.4 °C (97.6 °F)  Heart Rate:  [45-49] 49  Resp:  [16] 16  BP: (182-192)/(104-108) 182/104  Body mass index is 34.02 kg/m².    General: WM, appropriate, cooperative, comfortable  HENT: normocephalic, atraumatic, MMM, no oral lesions  Eyes: anicteric sclera  Neck: supple, normal ROM  Resp: CTA B/L, no wheezes/rhonchi/rales  Cardiac: RRR, no murmurs/rubs/gallop  Abdomen: soft, nontender, normal BS, no significant CVA or R flank tenderness  Extremities: mild pitting LE edema L>R  Neuro: nonfocal, awake, alert  Behavior: cooperative, calm  Lymph: no adenopathy noted  Skin: clean, dry, intact; no rashes or lesions noted       LABS / IMAGING / EKG        Labs  WBC 14k    Imaging  CT abdomen/pelvis noted    Discussed care with:  Ethel Patton    ASSESSMENT AND PLAN           * Renal colic on right side   Assessment & Plan    Hx of renal stones  Had perc nephrostomy tube in 1/2019 and removal w/ stone treatment  Presents to ER w/ severe acute onset right renal colic and nausea    - afebrile, mild leukocytosis  - UA appears clean so hold off on Abx  - IV morphine PRN pain  - IVF  - no plans for OR at this time but awaiting official  opinion        Essential hypertension   Assessment & Plan    Home meds: hydralazine 75mg BID, lisinopril 20mg BID, Bumex 1mg BID, clonidine 0.2mg BID  Follows w/ cardiologist in [place]  BPs high in ER likely 2/2 pain and need for AM meds    - cont home meds but ACEI and diuretic on hold in  anticipation of possible OR  - monitor        Tremor   Assessment & Plan    Cont home propranolol 20mg BID        Bipolar 1 disorder (CMS/[facility]) ([facility])   Assessment & Plan    Cont home Lamictal 150mg Q24        CKD (chronic kidney disease) stage 3, GFR 30-59 ml/min (CMS/[facility]) ([facility])   Assessment & Plan    Baseline Cr 1.5-2 and higher at some times    - Cr currently at baseline despite mild R hydro  - monitor        GERD (gastroesophageal reflux disease)   Assessment & Plan    Cont PPI        Type 2 diabetes mellitus with complication, with long-term current use of insulin (CMS/[facility])   Assessment & Plan    Home regimen: Novolog 70-30 - 20 units BID    - home insulin on hold while NPO  - ISS/accuchecks        Acquired hypothyroidism   Assessment & Plan    Cont home Synthroid        Hypercholesteremia   Assessment & Plan    Cont home statin        CAD (coronary artery disease) of artery bypass graft   Assessment & Plan    On ASA 81mg Q24, BB/statin    - hold anticoagulation until final decision re OR  - cont BB (on propranolol more for tremor) and statin        Atrial fibrillation (CMS/[facility])   Assessment & Plan    Has had resting bradycardia; on propranolol for tremor but not other BB  On home ASA 81mg Q24 and Eliquis 5mg BID    - holding anticoagulation in case of OR  - cont home BB             VTE Assessment: Padua VTE Score: 5  Plan for VTE Prophylaxis: on Eliquis - awaiting word on OR    Code Status: Full Code  Discussion re Code Status: discussed w/ pt on admission    Estimated discharge date: 3/27/2019     Eleanor Jiang MD  3/25/2019

## 2019-03-25 NOTE — PLAN OF CARE
Problem:Patient Care Overview  Goal: Plan of Care Review and Discharge Needs Assessment  Outcome: Ongoing (interventions implemented as appropriate)  Case Management Notes:  Pt discussed in Care Progression Rounds. Met with patient at bedside to discuss discharge planning. Explained role of Care Coordination Team. Verified , ID ,   PCP ( Dr Serafin Zavaleta)  and demographic information.Pt concerned re finding a physician that is covered under his insurance plan. Informed he will need to call insurance and speak with representative to check insurance benefit re coverage. Pt receptive.     Living Arrangements: Pt lives with spouse in a 2 story private townhouse. Pt states there is 1 step into the  Home then 10-12 steps to bedroom.      DME/Oxygen: none      Home Care/ Current Needs: [facility]  Pharmacy:[facility] in [place], [phone]  Transportation Home: Son will be able to transport patient home upon discharge.    Anticipated Discharge Needs/Disposition:Plan is still on going. Plan is to return home without services at this time. CC will continue to follow and assist with any discharge planning needs.           Signed by:Nadege Raphael RN BSN,CC x [phone]     Problem: Patient Care Overview  Goal: Discharge Needs Assessment   19 1202   DC Needs Assessment   Concerns To Be Addressed denies needs/concerns at this time;financial/insurance concerns   Readmission Within The Last 30 Days no previous admission in last 30 days   Anticipated Discharge Disposition home without services   Equipment Needed After Discharge none   Current Health   Anticipated Changes Related to Illness none   Activity/Self Care ROS   Equipment Currently Used at Home none

## 2019-03-25 NOTE — ASSESSMENT & PLAN NOTE
Baseline Cr 1.5-2 and higher at some times    - Cr now at 2.5  - s/p surgical intervention for pt's obstructing stone/mild hydro  - renal consulted  - hold ACEI/Bumex  - monitor

## 2019-03-25 NOTE — ED PROVIDER NOTES
HPI     Chief Complaint   Patient presents with   • Abdominal Pain       Patient is a 68-year-old male with a past medical history of renal colic secondary to kidney stones patient also has a history of right renal cancer status post resection.  Patient presents with sudden onset of right flank pain.  Patient states pain began Saturday less intensely alleviated quickly.  3 hours ago at 2 AM pain started suddenly awoke from sleep gradually getting worse associated with some nausea no vomiting colicky in nature similar to kidney stones that he is in the past.        History provided by:  Patient   used: No    Abdominal Pain   Pain location:  R flank  Pain quality: aching and cramping    Pain radiates to:  Groin  Pain severity:  Moderate  Onset quality:  Sudden  Duration:  3 hours  Timing:  Constant  Progression:  Worsening  Chronicity:  Recurrent  Context: awakening from sleep and previous surgery    Context: not alcohol use, not diet changes, not eating, not recent illness, not recent travel, not suspicious food intake and not trauma    Relieved by:  Nothing  Worsened by:  Nothing  Ineffective treatments:  None tried  Associated symptoms: nausea    Associated symptoms: no anorexia, no chest pain, no chills, no constipation, no cough, no diarrhea, no dysuria, no fatigue, no fever, no hematuria, no shortness of breath, no sore throat and no vomiting         Patient History     Past Medical History:   Diagnosis Date   • A-fib (CMS/HCC) (HCC)    • Abnormal ECG     a fib   • Aortic valve disorder    • BPH (benign prostatic hyperplasia)    • BPH (benign prostatic hyperplasia)    • Coronary artery disease    • Disease of thyroid gland    • Hypertension    • Hypothyroidism    • Kidney stones    • Nocturia    • Renal calculi     right   • Renal cancer (CMS/HCC) (HCC)     pt denies   • Right bundle branch block    • Sleep apnea     uses cpap   • Type 2 diabetes mellitus (CMS/HCC) (HCC)        Past Surgical  "History:   Procedure Laterality Date   • COLONOSCOPY  2016   • CORONARY ARTERY BYPASS GRAFT  2017    x3   • KIDNEY STONE SURGERY Right 2018   • NEPHRECTOMY Right 2016    PARTIAL    • TONSILLECTOMY     • UVULECTOMY  2005       Family History   Problem Relation Age of Onset   • Lung cancer Mother    • Lung cancer Father    • Early death Father    • Diabetes Sister        Social History   Substance Use Topics   • Smoking status: Never Smoker   • Smokeless tobacco: Never Used   • Alcohol use No       Systems Reviewed from Nursing Triage:          Review of Systems     Review of Systems   Constitutional: Negative for chills, fatigue and fever.   HENT: Negative for ear pain, rhinorrhea, sore throat and trouble swallowing.    Eyes: Negative for redness and visual disturbance.   Respiratory: Negative for cough, chest tightness and shortness of breath.    Cardiovascular: Negative for chest pain and palpitations.   Gastrointestinal: Positive for abdominal pain and nausea. Negative for anorexia, constipation, diarrhea and vomiting.   Genitourinary: Negative for dysuria, frequency, hematuria and urgency.   Musculoskeletal: Negative for back pain and gait problem.   Skin: Negative for color change and rash.   Neurological: Negative for syncope, weakness and numbness.   Hematological: Does not bruise/bleed easily.        Physical Exam     ED Triage Vitals [03/25/19 0441]   Temp Heart Rate Resp BP SpO2   36.4 °C (97.6 °F) (!) 45 16 (!) 184/104 97 %      Temp Source Heart Rate Source Patient Position BP Location FiO2 (%) (Set)   Oral -- Lying Right upper arm --                     Patient Vitals for the past 24 hrs:   BP Temp Temp src Pulse Resp SpO2 Height Weight   03/25/19 0609 (!) 192/108 - - - 16 96 % - -   03/25/19 0441 (!) 184/104 36.4 °C (97.6 °F) Oral (!) 45 16 97 % 1.88 m (6' 2\") 120 kg (265 lb)           Physical Exam   Constitutional: He appears well-developed and well-nourished. No distress.   HENT:   Head: " Normocephalic and atraumatic.   Eyes: Conjunctivae and EOM are normal.   Neck: Neck supple.   Cardiovascular: Normal rate, regular rhythm and normal heart sounds.    No murmur heard.  Pulmonary/Chest: Effort normal and breath sounds normal. No respiratory distress.   Abdominal: Soft. There is no tenderness. There is no rebound and no guarding.   Musculoskeletal: Normal range of motion. He exhibits no edema.   Neurological: He is alert. He has normal strength. GCS eye subscore is 4. GCS verbal subscore is 5. GCS motor subscore is 6.   Skin: Skin is warm and dry. No rash noted.   Psychiatric: He has a normal mood and affect.   Nursing note and vitals reviewed.           Procedures    ED Course & MDM     Labs Reviewed   COMPREHENSIVE METABOLIC PANEL - Abnormal        Result Value    Sodium 135 (*)     Potassium 3.4 (*)     Chloride 101      CO2 23      BUN 23 (*)     Creatinine 1.9 (*)     Glucose 271 (*)     Calcium 9.2      AST (SGOT) 19      ALT (SGPT) 26      Alkaline Phosphatase 106      Total Protein 7.4      Albumin 4.4      Bilirubin, Total 1.0      eGFR 35.2 (*)     Anion Gap 11     CBC - Abnormal     WBC 14.06 (*)     RBC 5.18      Hemoglobin 15.5      Hematocrit 45.4      MCV 87.6      MCH 29.9      MCHC 34.1      RDW 14.3      Platelets 190      MPV 11.3     DIFF COUNT - Abnormal     Differential Type Auto      nRBC 0.0      Immature Granulocytes 0.7      Neutrophils 86.4      Lymphocytes 5.0      Monocytes 5.3      Eosinophils 2.1      Basophils 0.5      Immature Granulocytes, Absolute 0.10 (*)     Neutrophils, Absolute 12.13 (*)     Lymphocytes, Absolute 0.71 (*)     Monocytes, Absolute 0.75      Eosinophils, Absolute 0.30      Basophils, Absolute 0.07     UA REFLEX CULTURE (MACROSCOPIC) - Abnormal     Color, Urine Yellow      Clarity, Urine Clear      Specific Gravity, Urine 1.013      pH, Urine 7.0      Leukocyte Esterase Negative      Nitrite, Urine Negative      Protein, Urine +2 (*)     Glucose,  Urine 500 (LARGE) (*)     Ketones, Urine Negative      Urobilinogen, Urine 0.2      Bilirubin, Urine Negative      Blood, Urine Trace (*)    UA MICROSCOPIC - Abnormal     RBC, Urine 0 TO 4      WBC, Urine 0 TO 3      Squamous Epithelial None Seen      Hyaline Cast 0 TO 2 (*)     Bacteria, Urine None Seen     LIPASE - Normal    Lipase 50     CBC AND DIFFERENTIAL    Narrative:     The following orders were created for panel order CBC and differential.  Procedure                               Abnormality         Status                     ---------                               -----------         ------                     CBC[[id number]]                           Abnormal            Final result               Diff Count[[id number]]                    Abnormal            Final result                 Please view results for these tests on the individual orders.   URINALYSIS REFLEX CULTURE    Narrative:     The following orders were created for panel order Urinalysis w/ reflex culture.  Procedure                               Abnormality         Status                     ---------                               -----------         ------                     UA Reflex to Culture (Mac...[[id number]]  Abnormal            Final result               UA Microscopic[[id number]]                Abnormal            Final result                 Please view results for these tests on the individual orders.       CT ABDOMEN PELVIS WITHOUT IV CONTRAST   ED Interpretation   [facility]   Teleradiology Cases          This communication is confidential. The information contained herein is protected from unauthorized use by privilege or law. Copying, distribution, disclosure, or other use of this information is prohibited. You are required to destroy this communication if you have received it in error.                Patient Name: Nathan Dillon   Exam(s): a/p stone CT    MR#: [id number]          History: Patient is a 68-year-old  male with a past medical history of renal colic secondary to kidney stones patient also has a history of right renal cancer status post resection. Patient presents with sudden onset of right flank pain. Patient states pain began Saturday less intensely alleviated quickly. 3 hours ago at 2 AM pain started suddenly awoke from sleep gradually getting worse associated with some nausea no vomiting colicky in nature similar to kidney stones that he is in the past.      Preliminary Results:       8 mm stone in the right UVJ, causing right hydronephrosis. Nonobstructing stones in both kidneys.       Right pleural effusion      Interpreted by: August Currie MD, Mar 25, 2019 06:29 AM             Nathan Dillon [id number], Mar 25, 2019                  MDM  Number of Diagnoses or Management Options  Renal colic on right side:   Diagnosis management comments: 68-year-old male presents with right flank pain.  Patient has history of kidney stones.  CT scan shows large 8 mm proximal right ureter ureteral stone with hydronephrosis.  Case discussed with Dr. Craig Landau.  Given size of stone and location unlikely to pass independently.  Admit to hospital for pain management we will discuss surgical options with patient today.       Amount and/or Complexity of Data Reviewed  Clinical lab tests: reviewed  Independent visualization of images, tracings, or specimens: yes             Clinical Impressions as of Mar 25 0640   Renal colic on right side        SARBJIT Celaya MD  03/25/19 0634

## 2019-03-25 NOTE — ASSESSMENT & PLAN NOTE
Hx of renal stones, stone analysis mostly uric acid stones; serum uric acid normal  Had perc nephrostomy tube in 1/2019 and removal w/ stone treatment  Presents to ER w/ severe acute onset right renal colic and nausea    - afebrile, mild leukocytosis  - UA appears clean so hold off on Abx  - elevated Cr above baseline so s/p OR with  3/27 for ureteral stent placement  - Cr improving

## 2019-03-25 NOTE — ASSESSMENT & PLAN NOTE
Has had resting bradycardia; on propranolol for tremor but not other BB  On home ASA 81mg Q24 and Eliquis 5mg BID    - holding anticoagulation francesca-operatively  - cont home BB

## 2019-03-25 NOTE — ASSESSMENT & PLAN NOTE
Home meds: hydralazine 75mg BID, lisinopril 20mg BID, Bumex 1mg BID, clonidine 0.2mg BID  Follows w/ cardiologist in [place]; pt's wife requests he not see a cardiologist from [facility] due to insurance issues  BPs high in ER likely 2/2 pain and need for AM meds    - cont home meds except ACEI and diuretic on hold for ASHELY  - added Imdur 30mg Q24, amlodipine 5mg Q24, and increased hydralazine to 100mg Q8 for persistently elevated BPs  - pt has had blood pressures issues associated w/ anesthesia in the past; consult placed to cardiology for assistance w/ francesca-operative management  - also spoke w/ pt's cardiologist Dr. Mercado and discussed case with him; he is comfortable with him undergoing the procedure and recommends low-dose amlodipine for francesca-operative BP management in the setting of holding his ACEI/diuretic; he was also in agreement with the low-dose Imdur  - per-op EKG sinus gurmeet, RBBB, TWIs noted - have been present on prior tracings  - monitor

## 2019-03-25 NOTE — PLAN OF CARE
Problem: Patient Care Overview  Goal: Plan of Care Review  Outcome: Ongoing (interventions implemented as appropriate)   03/25/19 6366   Coping/Psychosocial   Plan Of Care Reviewed With patient   Plan of Care Review   Progress improving   Outcome Summary Pt admitted to unit from ER. Denies pain at this time. Tolerating diet. Voiding without complication. EKG done as ordered, Dr. Jiang saw results. BP high, Dr. Jiang aware, see MAR for new medication given to pt. Ambualting to and from bathroom. IV fluids were stoppped per Dr. Jiang. K replaced orally. Call bell in reach will continue to monitor.        Problem: Pain, Acute (Adult)  Goal: Acceptable Pain Control/Comfort Level  Outcome: Ongoing (interventions implemented as appropriate)

## 2019-03-25 NOTE — Clinical Note
Admitting Physician: MELISSA CHARLES [[id number]]   Diagnosis: Renal colic on right side [547614]   Future Attending Provider: MELISSA CHARLES [[id number]]   Send to the following Provider Care Team:: [facility] TEAM 1 [[id number]]   Level of Care: Med-Surg [13]   Anticipated Total Length of Stay (midnights): >= 2   Estimated discharge date:: 3/27/2019

## 2019-03-25 NOTE — CONSULTS
Urology Consultation    Nathan Alonzo is a 68 y.o. male who was admitted for Renal colic on right side [N23]Patient seen for same at the request of Dr Jiang. Pt has h/o nephrolithiasis who underwent left PCNL in January. He was scheduled for follow up appt on Wednesday.  Admitted with right flank pain      Subjective     I feel much better    Severity: moderate  Onset quality:sudden  Duration: 3 days  Timing: intermittent  Progression: improved  Chronicity:acute  Relieved by:meds  Worsened by:pain nothing  Associated symptoms: none      Medical History:   Past Medical History:   Diagnosis Date   • A-fib (CMS/HCC) (HCC)    • Abnormal ECG     a fib   • Aortic valve disorder    • BPH (benign prostatic hyperplasia)    • BPH (benign prostatic hyperplasia)    • Coronary artery disease    • Disease of thyroid gland    • Hypertension    • Hypothyroidism    • Kidney stones    • Nocturia    • Renal calculi     right   • Renal cancer (CMS/HCC) (HCC)     pt denies   • Right bundle branch block    • Sleep apnea     uses cpap   • Type 2 diabetes mellitus (CMS/HCC) (HCC)        Surgical History:   Past Surgical History:   Procedure Laterality Date   • COLONOSCOPY  2016   • CORONARY ARTERY BYPASS GRAFT  2017    x3   • KIDNEY STONE SURGERY Right 2018   • NEPHRECTOMY Right 2016    PARTIAL    • TONSILLECTOMY     • UVULECTOMY  2005       Social History: , employed, nonsmoker, no ETOH    Family History:   Family History   Problem Relation Age of Onset   • Lung cancer Mother    • Lung cancer Father    • Early death Father    • Diabetes Sister        Allergies: Oxycodone; Amlodipine; Cephalexin monohydrate; and Latex    Current Facility-Administered Medications   Medication Dose Route Frequency Provider Last Rate Last Dose   • acetaminophen (TYLENOL) tablet 650 mg  650 mg oral q4h PRN Eleanor Jiang MD       • amiodarone (PACERONE) tablet 200 mg  200 mg oral Daily Eleanor Jiang MD       • atorvastatin (LIPITOR) tablet 40 mg  40 mg  oral Nightly Eleanor Jiang MD       • cloNIDine (CATAPRES) tablet 0.2 mg  0.2 mg oral BID Areli Gallo CRNP       • cloNIDine (CATAPRES) tablet 0.2 mg  0.2 mg oral BID Eleanor Jiang MD       • glucose chewable tablet 16-32 g of dextrose  16-32 g of dextrose oral PRN Eleanor Jiang MD        Or   • dextrose 40 % oral gel 15-30 g of dextrose  15-30 g of dextrose oral PRN Eleanor Jiang MD        Or   • glucagon (GLUCAGEN) injection 1 mg  1 mg intramuscular PRN Eleanor Jiang MD        Or   • dextrose in water injection 12.5 g  25 mL intravenous PRN Eleanor iJang MD       • finasteride (PROSCAR) tablet 5 mg  5 mg oral Daily Eleanor Jiang MD       • hydrALAZINE (APRESOLINE) tablet 75 mg  75 mg oral q8h THADDEUS Eleanor Jiang MD   75 mg at 03/25/19 0825   • insulin aspart U-100 (NovoLOG) pen 6-10 Units  6-10 Units subcutaneous With meals & nightly Eleanor Jiang MD       • lamoTRIgine (LaMICtal) tablet 150 mg  150 mg oral Daily Eleanor Jiang MD       • levothyroxine (SYNTHROID) tablet 100 mcg  100 mcg oral Daily (6:30a) Eleanor Jiang MD       • morphine injection 2 mg  2 mg intravenous q3h PRN Eleanor Jiang MD       • pantoprazole (PROTONIX) tablet,delayed release (DR/EC) 40 mg  40 mg oral Daily Eleanor Jiang MD       • propranolol (INDERAL) tablet 20 mg  20 mg oral BID Eleanor Jiang MD       • sodium chloride 0.9 % infusion   intravenous Continuous Eleanor Jiang MD         Current Outpatient Prescriptions   Medication Sig Dispense Refill   • amiodarone (PACERONE) 200 mg tablet Take 200 mg by mouth daily.     • apixaban (ELIQUIS) 5 mg tablet Take 5 mg by mouth 2 (two) times a day. PT TO HOLD ELIQUIS 3 DAYS PRIOR TO SX AS PER HIS CARDIOLOGIST      • aspirin 81 mg enteric coated tablet Take 81 mg by mouth daily. PT TO STOP 5 DAYS PRIOR TO SX AS PER HIS CARDIOLOGIST      • atorvastatin (LIPITOR) 40 mg tablet Take 40 mg by mouth daily.       • coenzyme Q10 (CO Q-10) 400 mg capsule Take 400 mg by mouth daily.       • finasteride  (PROSCAR) 5 mg tablet Take 5 mg by mouth daily.     • hydrALAZINE (APRESOLINE) 25 mg tablet Take 3 tablets (75 mg total) by mouth every 8 (eight) hours. 270 tablet 0   • lamoTRIgine (LaMICtal) 150 mg tablet Take 1 tablet (150 mg total) by mouth daily. 30 tablet 0   • levothyroxine (SYNTHROID) 100 mcg tablet Take 100 mcg by mouth daily.     • lisinopril (PRINIVIL) 40 mg tablet Take 0.5 tablets (20 mg total) by mouth 2 (two) times a day. 30 tablet 0   • omeprazole (PriLOSEC) 40 mg capsule Take 40 mg by mouth daily before breakfast.     • propranolol (INDERAL) 20 mg tablet Take 1 tablet (20 mg total) by mouth 2 (two) times a day. 60 tablet 0   • bumetanide (BUMEX) 1 mg tablet Take 1 mg by mouth 2 (two) times a day.     • insulin asp prt-insulin aspart (novoLOG MIX 70-30) 100 unit/mL (70-30) subcutaneous pen Inject 20 Units under the skin 2 (two) times a day before breakfast and dinner.       • polyethylene glycol (MIRALAX) 17 gram packet Take 17 g by mouth daily. 30 packet 0     Review of Systems  Pertinent items are noted in HPI.        Labs  CBC Results       03/25/19 01/28/19 01/27/19                    0455 0757 0727         WBC 14.06 (H) 9.93 11.47 (H)         RBC 5.18 4.82 4.76         HGB 15.5 14.3 14.0         HCT 45.4 42.0 41.6         MCV 87.6 87.1 87.4         MCH 29.9 29.7 29.4         MCHC 34.1 34.0 33.7          191 173                   BMP Results       03/25/19 01/28/19 01/27/19                    0454 0757 0727          (L) 137 136         K 3.4 (L) 4.4 3.9         Cl 101 104 105         CO2 23 25 20 (L)         Glucose 271 (H) 162 (H) 122 (H)         BUN 23 (H) 51 (H) 49 (H)         Creatinine 1.9 (H) 2.2 (H) 2.2 (H)         Calcium 9.2 9.3 9.1         Anion Gap 11 8 11         EGFR 35.2 (L) 29.7 (L) 29.7 (L)         Comment for K at 0454 on 03/25/19:    Results obtained on plasma. Plasma Potassium values may be up to 0.4 mEQ/L less than serum values. The differences may be greater for  patients with high platelet or white cell counts.      PT/PTT Results       01/15/19 10/04/18 07/05/18                    1510 1231 1945         PT 15.0 (H) 17.1 (H) 13.9         INR 1.2 1.4 1.1         PTT 61 (H) 54 (H) -         Comment for INR at 1510 on 01/15/19:    Moderate Intensity Anticoagulation = 2.0 to 3.0, High Intensity = 2.5 to 3.5    Comment for INR at 1231 on 10/04/18:    Moderate Intensity Anticoagulation = 2.0 to 3.0, High Intensity = 2.5 to 3.5    Comment for INR at 1945 on 07/05/18:    INR has no defined significance when PT is within Reference Range.    Comment for PTT at 1510 on 01/15/19:    The Standard Therapeutic Range for Heparin is 68 to 101 seconds.    Comment for PTT at 1231 on 10/04/18:    The Standard Therapeutic Range for Heparin is 68 to 101 seconds.      UA Results       03/25/19                          0549           Color Yellow           Clarity Clear           Glucose 500 (LARGE) (A)           Bilirubin Negative           Ketones Negative           Sp Grav 1.013           Blood Trace (A)           Ph 7.0           Protein +2 (A)           Urobilinogen 0.2           Nitrite Negative           Leuk Est Negative           WBC 0 TO 3           RBC 0 TO 4           Bacteria None Seen           Comment for Blood at 0549 on 03/25/19:    The sensitivity of the occult blood test is equivalent to approximately 4 intact RBC/HPF.    Comment for Leuk Est at 0549 on 03/25/19:    Results can be falsely negative due to high specific gravity, some antibiotics, glucose >3 g/dl, or WBC other than neutrophils.      Lactate Results     No lab values to display.        No results found for: PSA              Imaging  CT a/p without contrast-  KIDNEYS: There is mild right hydronephrosis.  There is 7 x 3 mm right  ureteropelvic junction calculus.  Postsurgical changes in the right kidney.  There is a bilobed 6 x 4 mm nonobstructing right lower pole renal calculus and 3  mm left interpolar renal  "calculus.  Left lower pole see    IMPRESSION:    Right-sided the ureteropelvic junction calculus with mild right hydronephrosis.  Bilateral nonobstructing renal calculi.  Post operative changes right kidney.  Small right pleural effusion.        Physicial Exam  BP (!) 182/104 (BP Location: Left upper arm, Patient Position: Lying)   Pulse (!) 49   Temp 36.4 °C (97.6 °F) (Oral)   Resp 16   Ht 1.88 m (6' 2\")   Wt 120 kg (265 lb)   SpO2 97%   BMI 34.02 kg/m²   General appearance: alert, appears stated age and cooperative  Head: normocephalic, without obvious abnormality, atraumatic  Neck: supple, symmetrical, trachea midline  Back: minimal right CVA tenderness at present  Chest wall: respirations nonlabored  Abdomen: normal findings: soft, non-tender  Rectal: deferred  Extremities: no LE edema  Skin: temperature normal        Assessment    Right UPJ stone with mild hydro  Bilateral nonobstructing stones  Urine not infected, afebrile, mildly elevated wbc  Pain well controlled at present  Chronic kidney disease, creat at baseline  On Eliquis for at fib       Plan    Discussed with Dr Romo and Dr Jiang  Will allow pt to eat  Check KUB  Follow labs, clinical course  Hopefully can get pain controlled, discharge and set up for out pt ESWL        MARIA ELENA Moore  3/25/2019  8:55 AM    "

## 2019-03-26 ENCOUNTER — APPOINTMENT (INPATIENT)
Dept: RADIOLOGY | Facility: HOSPITAL | Age: 69
DRG: 660 | End: 2019-03-26
Attending: UROLOGY
Payer: MEDICARE

## 2019-03-26 ENCOUNTER — ANESTHESIA EVENT (INPATIENT)
Dept: OPERATING ROOM | Facility: HOSPITAL | Age: 69
DRG: 660 | End: 2019-03-26
Payer: MEDICARE

## 2019-03-26 PROBLEM — N17.9 ACUTE RENAL FAILURE SUPERIMPOSED ON STAGE 3 CHRONIC KIDNEY DISEASE (CMS/HCC): Status: ACTIVE | Noted: 2019-01-22

## 2019-03-26 LAB
ANION GAP SERPL CALC-SCNC: 11 MEQ/L (ref 3–15)
APTT PPP: 50 SEC (ref 23–35)
BUN SERPL-MCNC: 29 MG/DL (ref 8–20)
CALCIUM SERPL-MCNC: 8.6 MG/DL (ref 8.9–10.3)
CHLORIDE SERPL-SCNC: 104 MEQ/L (ref 98–109)
CO2 SERPL-SCNC: 25 MEQ/L (ref 22–32)
CREAT SERPL-MCNC: 2.9 MG/DL
ERYTHROCYTE [DISTWIDTH] IN BLOOD BY AUTOMATED COUNT: 14.6 % (ref 11.6–14.4)
GFR SERPL CREATININE-BSD FRML MDRD: 21.6 ML/MIN/1.73M*2
GLUCOSE BLD-MCNC: 160 MG/DL (ref 70–99)
GLUCOSE BLD-MCNC: 184 MG/DL (ref 70–99)
GLUCOSE BLD-MCNC: 194 MG/DL (ref 70–99)
GLUCOSE BLD-MCNC: 216 MG/DL (ref 70–99)
GLUCOSE SERPL-MCNC: 177 MG/DL (ref 70–99)
HCT VFR BLDCO AUTO: 38.9 %
HGB BLD-MCNC: 12.8 G/DL
INR PPP: 1.2 INR
MAGNESIUM SERPL-MCNC: 1.8 MG/DL (ref 1.8–2.5)
MCH RBC QN AUTO: 29.6 PG (ref 28–33.2)
MCHC RBC AUTO-ENTMCNC: 32.9 G/DL (ref 32.2–36.5)
MCV RBC AUTO: 90 FL (ref 83–98)
PDW BLD AUTO: 11.6 FL (ref 9.4–12.4)
PLATELET # BLD AUTO: 148 K/UL
POCT TEST: ABNORMAL
POTASSIUM SERPL-SCNC: 3.9 MEQ/L (ref 3.6–5.1)
PROTHROMBIN TIME: 14.3 SEC (ref 12.2–14.5)
RBC # BLD AUTO: 4.32 M/UL (ref 4.5–5.8)
SODIUM SERPL-SCNC: 140 MEQ/L (ref 136–144)
URATE SERPL-MCNC: 6 MG/DL (ref 4.8–8.2)
WBC # BLD AUTO: 9.59 K/UL

## 2019-03-26 PROCEDURE — 63700000 HC SELF-ADMINISTRABLE DRUG: Performed by: HOSPITALIST

## 2019-03-26 PROCEDURE — 76775 US EXAM ABDO BACK WALL LIM: CPT

## 2019-03-26 PROCEDURE — 85730 THROMBOPLASTIN TIME PARTIAL: CPT | Performed by: HOSPITALIST

## 2019-03-26 PROCEDURE — 99233 SBSQ HOSP IP/OBS HIGH 50: CPT | Performed by: HOSPITALIST

## 2019-03-26 PROCEDURE — 12000000 HC ROOM AND CARE MED/SURG

## 2019-03-26 PROCEDURE — 36415 COLL VENOUS BLD VENIPUNCTURE: CPT | Performed by: HOSPITALIST

## 2019-03-26 PROCEDURE — 84550 ASSAY OF BLOOD/URIC ACID: CPT | Performed by: INTERNAL MEDICINE

## 2019-03-26 PROCEDURE — 80048 BASIC METABOLIC PNL TOTAL CA: CPT | Performed by: HOSPITALIST

## 2019-03-26 PROCEDURE — 63600000 HC DRUGS/DETAIL CODE: Performed by: HOSPITALIST

## 2019-03-26 PROCEDURE — 25800000 HC PHARMACY IV SOLUTIONS: Performed by: INTERNAL MEDICINE

## 2019-03-26 PROCEDURE — 83735 ASSAY OF MAGNESIUM: CPT | Performed by: HOSPITALIST

## 2019-03-26 PROCEDURE — 85027 COMPLETE CBC AUTOMATED: CPT | Performed by: HOSPITALIST

## 2019-03-26 PROCEDURE — 85610 PROTHROMBIN TIME: CPT | Performed by: HOSPITALIST

## 2019-03-26 RX ORDER — POLYETHYLENE GLYCOL 3350 17 G/17G
17 POWDER, FOR SOLUTION ORAL DAILY
Status: DISCONTINUED | OUTPATIENT
Start: 2019-03-26 | End: 2019-03-28 | Stop reason: HOSPADM

## 2019-03-26 RX ORDER — SODIUM CHLORIDE 9 MG/ML
INJECTION, SOLUTION INTRAVENOUS CONTINUOUS
Status: DISCONTINUED | OUTPATIENT
Start: 2019-03-26 | End: 2019-03-28

## 2019-03-26 RX ORDER — AMLODIPINE BESYLATE 5 MG/1
5 TABLET ORAL DAILY
Status: DISCONTINUED | OUTPATIENT
Start: 2019-03-26 | End: 2019-03-28 | Stop reason: HOSPADM

## 2019-03-26 RX ADMIN — AMIODARONE HYDROCHLORIDE 200 MG: 200 TABLET ORAL at 08:23

## 2019-03-26 RX ADMIN — LISINOPRIL 20 MG: 20 TABLET ORAL at 08:21

## 2019-03-26 RX ADMIN — PROPRANOLOL HYDROCHLORIDE 20 MG: 20 TABLET ORAL at 20:34

## 2019-03-26 RX ADMIN — BUMETANIDE 1 MG: 1 TABLET ORAL at 08:19

## 2019-03-26 RX ADMIN — PANTOPRAZOLE SODIUM 40 MG: 40 TABLET, DELAYED RELEASE ORAL at 08:21

## 2019-03-26 RX ADMIN — ISOSORBIDE MONONITRATE 30 MG: 30 TABLET, EXTENDED RELEASE ORAL at 08:22

## 2019-03-26 RX ADMIN — PROPRANOLOL HYDROCHLORIDE 20 MG: 20 TABLET ORAL at 08:21

## 2019-03-26 RX ADMIN — LEVOTHYROXINE SODIUM 100 MCG: 100 TABLET ORAL at 06:06

## 2019-03-26 RX ADMIN — AMLODIPINE BESYLATE 5 MG: 5 TABLET ORAL at 14:25

## 2019-03-26 RX ADMIN — CLONIDINE HYDROCHLORIDE 0.2 MG: 0.2 TABLET ORAL at 08:21

## 2019-03-26 RX ADMIN — HYDRALAZINE HYDROCHLORIDE 100 MG: 25 TABLET ORAL at 06:06

## 2019-03-26 RX ADMIN — INSULIN ASPART 6 UNITS: 100 INJECTION, SOLUTION INTRAVENOUS; SUBCUTANEOUS at 08:18

## 2019-03-26 RX ADMIN — FINASTERIDE 5 MG: 5 TABLET, FILM COATED ORAL at 08:23

## 2019-03-26 RX ADMIN — ATORVASTATIN CALCIUM 40 MG: 40 TABLET, FILM COATED ORAL at 22:10

## 2019-03-26 RX ADMIN — INSULIN ASPART 6 UNITS: 100 INJECTION, SOLUTION INTRAVENOUS; SUBCUTANEOUS at 12:52

## 2019-03-26 RX ADMIN — LAMOTRIGINE 150 MG: 100 TABLET ORAL at 08:23

## 2019-03-26 RX ADMIN — HYDRALAZINE HYDROCHLORIDE 100 MG: 25 TABLET ORAL at 16:06

## 2019-03-26 RX ADMIN — POLYETHYLENE GLYCOL 3350 17 G: 17 POWDER, FOR SOLUTION ORAL at 18:47

## 2019-03-26 RX ADMIN — SODIUM CHLORIDE: 9 INJECTION, SOLUTION INTRAVENOUS at 10:23

## 2019-03-26 RX ADMIN — CLONIDINE HYDROCHLORIDE 0.2 MG: 0.2 TABLET ORAL at 20:34

## 2019-03-26 RX ADMIN — INSULIN ASPART 6 UNITS: 100 INJECTION, SOLUTION INTRAVENOUS; SUBCUTANEOUS at 22:09

## 2019-03-26 RX ADMIN — HYDRALAZINE HYDROCHLORIDE 100 MG: 25 TABLET ORAL at 22:10

## 2019-03-26 ASSESSMENT — ENCOUNTER SYMPTOMS: DYSRHYTHMIAS: 1

## 2019-03-26 NOTE — ASSESSMENT & PLAN NOTE
CHADSVasc 4. The patient is chronically anticoagulated on Eliquis as an outpatient, at appropriate dose of 5mg BID for age < 80, weight > 130 lbs. He does have chronic renal impairment.     OK to hold Eliquis for now in advance of urologic procedure. Should be resumed when OK with urology. Clinically in sinus rhythm. Continue amiodarone.     Maintining SR.  On Amiodarone.  Resume Eliquis when ok with urology.

## 2019-03-26 NOTE — ASSESSMENT & PLAN NOTE
Status post CABG x 3 in 2017.      He has no symptoms to suggest angina. Ecg is without ischemic changes and is unchanged from prior.     On propanolol. Hold ACE with ASHELY. Imdur added by primary service; agree with this addition. Continue statin.     Remains angina free.

## 2019-03-26 NOTE — ANESTHESIA PREPROCEDURE EVALUATION
Anesthesia ROS/MED HX      Pulmonary    Sleep apnea  Neuro/Psych    Anxiety  Cardiovascular   CAD   hypertension  Dysrhythmias and atrial fibrillation (HX of, currently in NSR on amiodarone )   ECG reviewed and cardiac clearance reviewed  Abnormal ECG  GI/Hepatic   GERD  Endo/Other   Diabetes   Hypothyroidism  ROS/MED HX Comments:    ECG: Sinus bradycardia, 48 BPM, stable chronic right bundle branch and left anterior fascicular blocks as well as left ventricular hypertrophy repolarization abnormalities     .  Patient Active Problem List   Diagnosis   • Atrial fibrillation (CMS/HCC)   • CAD (coronary artery disease) of artery bypass graft   • Essential hypertension   • Hypercholesteremia   • Acquired hypothyroidism   • Type 2 diabetes mellitus with complication, with long-term current use of insulin (CMS/HCC)   • Anxiety   • GERD (gastroesophageal reflux disease)   • OCD (obsessive compulsive disorder)   • Sleep apnea   • Hypertensive urgency   • Hepatomegaly   • History of melanoma   • Mitral valve prolapse   • Renal cancer (CMS/HCC) (HCC)   • Kidney stone   • Bradycardia   • Acute renal failure superimposed on stage 3 chronic kidney disease (CMS/HCC)   • Bipolar 1 disorder (CMS/HCC) (HCC)   • Tremor   • Renal colic on right side       Current Facility-Administered Medications   Medication Dose Route Frequency   • acetaminophen  650 mg oral q4h PRN   • amiodarone  200 mg oral Daily   • amLODIPine  5 mg oral Daily   • atorvastatin  40 mg oral Nightly   • cloNIDine  0.2 mg oral BID   • glucose  16-32 g of dextrose oral PRN    Or   • dextrose  15-30 g of dextrose oral PRN    Or   • glucagon  1 mg intramuscular PRN    Or   • dextrose in water  25 mL intravenous PRN   • finasteride  5 mg oral Daily   • hydrALAZINE  100 mg oral q8h THADDEUS   • insulin aspart U-100  6-10 Units subcutaneous With meals & nightly   • isosorbide mononitrate  30 mg oral Daily   • lamoTRIgine  150 mg oral Daily   • levothyroxine  100 mcg oral Daily  (6:30a)   • morphine  2 mg intravenous q3h PRN   • pantoprazole  40 mg oral Daily   • propranolol  20 mg oral BID   • sodium chloride 0.9 %   intravenous Continuous       Prior to Admission medications    Medication Sig Start Date End Date Taking? Authorizing Provider   amiodarone (PACERONE) 200 mg tablet Take 200 mg by mouth daily.   Yes Damian An MD   apixaban (ELIQUIS) 5 mg tablet Take 5 mg by mouth 2 (two) times a day. PT TO HOLD ELIQUIS 3 DAYS PRIOR TO SX AS PER HIS CARDIOLOGIST    Yes Damian An MD   aspirin 81 mg enteric coated tablet Take 81 mg by mouth daily. PT TO STOP 5 DAYS PRIOR TO SX AS PER HIS CARDIOLOGIST    Yes Damian An MD   atorvastatin (LIPITOR) 40 mg tablet Take 40 mg by mouth daily.   3/25/16  Yes Damian An MD   coenzyme Q10 (CO Q-10) 400 mg capsule Take 400 mg by mouth daily.   3/25/16  Yes Damian An MD   finasteride (PROSCAR) 5 mg tablet Take 5 mg by mouth daily.   Yes Damian An MD   hydrALAZINE (APRESOLINE) 25 mg tablet Take 3 tablets (75 mg total) by mouth every 8 (eight) hours. 1/29/19 3/25/19 Yes Ethel Patton CRNP   lamoTRIgine (LaMICtal) 150 mg tablet Take 1 tablet (150 mg total) by mouth daily. 1/30/19 3/25/19 Yes Ethel Patton CRNP   levothyroxine (SYNTHROID) 100 mcg tablet Take 100 mcg by mouth daily.   Yes Damian An MD   lisinopril (PRINIVIL) 40 mg tablet Take 0.5 tablets (20 mg total) by mouth 2 (two) times a day. 1/29/19 3/25/19 Yes Ethel Patton CRNP   omeprazole (PriLOSEC) 40 mg capsule Take 40 mg by mouth daily before breakfast.   Yes Damian An MD   propranolol (INDERAL) 20 mg tablet Take 1 tablet (20 mg total) by mouth 2 (two) times a day. 1/29/19 3/25/19 Yes Ethel Patton CRNP   bumetanide (BUMEX) 1 mg tablet Take 1 mg by mouth 2 (two) times a day. 3/15/19   Provider, MD Damian   insulin asp prt-insulin aspart (novoLOG MIX 70-30) 100 unit/mL (70-30) subcutaneous  pen Inject 20 Units under the skin 2 (two) times a day before breakfast and dinner.      Provider, MD Damian       CBC Results       03/26/19 03/25/19 01/28/19                    0337 0455 0757         WBC 9.59 14.06 (H) 9.93         RBC 4.32 (L) 5.18 4.82         HGB 12.8 (L) 15.5 14.3         HCT 38.9 (L) 45.4 42.0         MCV 90.0 87.6 87.1         MCH 29.6 29.9 29.7         MCHC 32.9 34.1 34.0          (L) 190 191         Comment for HGB at 0337 on 03/26/19:  RESULT CHECKED          BMP Results       03/26/19 03/25/19 01/28/19                    0337 0454 0757          135 (L) 137         K 3.9 3.4 (L) 4.4         Cl 104 101 104         CO2 25 23 25         Glucose 177 (H) 271 (H) 162 (H)         BUN 29 (H) 23 (H) 51 (H)         Creatinine 2.9 (H) 1.9 (H) 2.2 (H)         Calcium 8.6 (L) 9.2 9.3         Anion Gap 11 11 8         EGFR 21.6 (L) 35.2 (L) 29.7 (L)         Comment for K at 0454 on 03/25/19:    Results obtained on plasma. Plasma Potassium values may be up to 0.4 mEQ/L less than serum values. The differences may be greater for patients with high platelet or white cell counts.          No results found for: HCGPREGUR, PREGSERUM, HCG, HCGQUANT          ECG 12 lead   Final Result      X-RAY ABDOMEN 1 VIEW   Final Result   IMPRESSION: Limited study.  Right-sided proximal ureteral stone is not   definitively identified.      CT ABDOMEN PELVIS WITHOUT IV CONTRAST   ED Interpretation   [facility]   Teleradiology Cases          This communication is confidential. The information contained herein is protected from unauthorized use by privilege or law. Copying, distribution, disclosure, or other use of this information is prohibited. You are required to destroy this communication if you have received it in error.                Patient Name: Nathan Dillon   Exam(s): a/p stone CT    MR#: [id number]          History: Patient is a 68-year-old male with a past medical history of renal  colic secondary to kidney stones patient also has a history of right renal cancer status post resection. Patient presents with sudden onset of right flank pain. Patient states pain began Saturday less intensely alleviated quickly. 3 hours ago at 2 AM pain started suddenly awoke from sleep gradually getting worse associated with some nausea no vomiting colicky in nature similar to kidney stones that he is in the past.      Preliminary Results:       8 mm stone in the right UVJ, causing right hydronephrosis. Nonobstructing stones in both kidneys.       Right pleural effusion      Interpreted by: August Currie MD, Mar 25, 2019 06:29 AM             Nathan Dillon [id number], Mar 25, 2019      Final Result   IMPRESSION:      Right-sided the ureteropelvic junction calculus with mild right hydronephrosis.   Bilateral nonobstructing renal calculi.   Post operative changes right kidney.   Small right pleural effusion.         ULTRASOUND KIDNEYS    (Results Pending)       Past Surgical History:   Procedure Laterality Date   • COLONOSCOPY  2016   • CORONARY ARTERY BYPASS GRAFT  2017    x3   • KIDNEY STONE SURGERY Right 2018   • NEPHRECTOMY Right 2016    PARTIAL    • TONSILLECTOMY     • UVULECTOMY  2005       Physical Exam    Anesthesia Plan

## 2019-03-26 NOTE — ASSESSMENT & PLAN NOTE
"Known labile BP.     Agree with holding ACE and diuretics for now given ASHELY. Amlodipine and Imdur added by primary service as replacements. Please note that his \"allergy\" to amlodipine is lower extremity edema, which is a typical side effect and not a true allergy. OK to continue for now given limited choices for control of his labile BP with ASHELY. On propanolol with chronic/stable bradycardia. Monitor BP in the perioperative period.     Would not uptitrate clonidine or propanolol given his bradycardia. Can increase both amlodipine and Imdur as needed.     Very hypertensive since procedure.  Occured in past as well after anesthesia.  Agreeable to taking amlodipine for now.  Resistent for chronic therapy due to edema/swelling.  Imdur adjsted as well.    "

## 2019-03-26 NOTE — CONSULTS
Nephrology Consult Note - [facility]    Subjective     Nathan Alonzo is a 68 y.o. male who was admitted for Essential hypertension [I10]  Renal colic on right side [N23]  Atrial fibrillation, unspecified type (CMS/HCC) [I48.91]. Patient was referred by hospitalist for management recommendations. Patient has acute kidney injury. Underlying CKD stage 3B with baseline Cr high 1s, primary neph Dr Eren Hernandez in [place] [phone]. Pt reports R flank pain started in the last 2 days, severe in quality and sudden in onset. Denies any associated symptoms. Denies dysuria, hematuria, sensation of passing gravel. Denies difficulty starting stream or emptying bladder. Denies fever, chills, N/D. Reports this felt similar to kidney stone pain he has had on the past. He reports long history of stones for many years. Most recently[facility] required urologic intervention by Dr Kent with attempted stone extraction and percutaneous nephrostomy tube. His primary neph tells me that he has been admitted to hospital several times and has missed outpatient appointments, often confused about medication list. He reports they have never been able to complete a 24 hr urine for stone profile. Stone analysis from 1/2019 hospital adm showed 80% uric acid and 20% Ca Oxalate.     Emergency department and admission records reviewed.    Medical History:   Past Medical History:   Diagnosis Date   • A-fib    • Abnormal ECG    • Aortic valve disorder    • BPH (benign prostatic hyperplasia)    • Coronary artery disease    • Disease of thyroid gland    • Hypertension    • Hypothyroidism    • CKD stage 3B    • Nocturia    • Renal calculi    • Renal cancer s/p partial R nephrectomy    • Right bundle branch block    • Sleep apnea     uses cpap   • Type 2 diabetes mellitus         Surgical History:   Past Surgical History:   Procedure Laterality Date   • COLONOSCOPY  2016   • CORONARY ARTERY BYPASS GRAFT  2017    x3   •  KIDNEY STONE SURGERY Right 2018   • NEPHRECTOMY Right 2016    PARTIAL    • TONSILLECTOMY     • UVULECTOMY  2005       Social History     Social History   • Marital status:      Social History Main Topics   • Smoking status: Never Smoker   • Smokeless tobacco: Never Used   • Alcohol use No   • Drug use: No     Family History: denies family history of kidney disease    Allergies: Oxycodone; Amlodipine; Cephalexin monohydrate; and Latex    Current Facility-Administered Medications   Medication Dose Route Frequency Provider Last Rate Last Dose   • acetaminophen (TYLENOL) tablet 650 mg  650 mg oral q4h PRN Eleanor Jiang MD       • amiodarone (PACERONE) tablet 200 mg  200 mg oral Daily Eleanor Jiang MD   200 mg at 03/26/19 0823   • atorvastatin (LIPITOR) tablet 40 mg  40 mg oral Nightly Eleanor Jiang MD   40 mg at 03/25/19 2101   • cloNIDine (CATAPRES) tablet 0.2 mg  0.2 mg oral BID Eleanor Jiang MD   0.2 mg at 03/26/19 0821   • glucose chewable tablet 16-32 g of dextrose  16-32 g of dextrose oral PRN Eleanor Jiang MD        Or   • dextrose 40 % oral gel 15-30 g of dextrose  15-30 g of dextrose oral PRN Eleanor Jiang MD        Or   • glucagon (GLUCAGEN) injection 1 mg  1 mg intramuscular PRN Eleanor Jiang MD        Or   • dextrose in water injection 12.5 g  25 mL intravenous PRN Eleanor Jiang MD       • finasteride (PROSCAR) tablet 5 mg  5 mg oral Daily Eleanor Jiang MD   5 mg at 03/26/19 0823   • hydrALAZINE (APRESOLINE) tablet 100 mg  100 mg oral q8h THADDEUS Eleanor Jiang MD   100 mg at 03/26/19 0606   • insulin aspart U-100 (NovoLOG) pen 6-10 Units  6-10 Units subcutaneous With meals & nightly Eleanor Jiang MD   6 Units at 03/26/19 0818   • isosorbide mononitrate (IMDUR) 24 hr ER tablet 30 mg  30 mg oral Daily Eleanor Jiang MD   30 mg at 03/26/19 0822   • lamoTRIgine (LaMICtal) tablet 150 mg  150 mg oral Daily Eleanor Jiang MD   150 mg at 03/26/19 0823   • levothyroxine (SYNTHROID) tablet 100 mcg  100 mcg oral Daily  (6:30a) Eleanor Jiang MD   100 mcg at 03/26/19 0606   • morphine injection 2 mg  2 mg intravenous q3h PRN Eleanor Jiang MD   2 mg at 03/25/19 1748   • pantoprazole (PROTONIX) tablet,delayed release (DR/EC) 40 mg  40 mg oral Daily Eleanor Jiang MD   40 mg at 03/26/19 0821   • propranolol (INDERAL) tablet 20 mg  20 mg oral BID Eleanor Jiang MD   20 mg at 03/26/19 0821   • sodium chloride 0.9 % infusion   intravenous Continuous Shanita Gayle DO           Review of Systems  A 12 point review of systems reviewed and are negative except for the following:  Constitutional: no generalized weakness, fatigue, and anorexia  Eyes: no blurred vision  Ears, nose, mouth, throat, and face: no nasal congestion and sore throat  Respiratory: no cough and shortness of breath  Cardiovascular: no chest pain, lightheadedness and edema lower extremity  Gastrointestinal: no abdominal pain, nausea, vomiting, constipation, diarrhea  Genitourinary: no painful urination, frequency, bloody urine, hesitancy and difficulty starting stream or emptying bladder, +R sided flank pain  Integument: no rash and itching  Hematologic/lymphatic: no easy bruising and lymphadenopathy  Musculoskeletal: no generalized joint pain and +back pain  Neurological: no headaches, dizziness  Behavioral/Psych: no anxiety, depression    Objective   Labs   Recent Results (from the past 24 hour(s))   POCT Glucose    Collection Time: 03/25/19 11:45 AM   Result Value Ref Range    POCT Bedside Glucose 169 (H) 70 - 99 mg/dL    POC Test POC    ECG 12 lead    Collection Time: 03/25/19 11:49 AM   Result Value Ref Range    Ventricular rate 49     Atrial rate 49     AZ Interval 196     QRS duration 166     QT Interval 582     QTC Calculation(Bazett) 525     P Axis 71     R Axis -76     T Wave Axis -58    POCT Glucose    Collection Time: 03/25/19  4:05 PM   Result Value Ref Range    POCT Bedside Glucose 160 (H) 70 - 99 mg/dL    POC Test POC    POCT Glucose    Collection Time:  "03/25/19  8:56 PM   Result Value Ref Range    POCT Bedside Glucose 209 (H) 70 - 99 mg/dL    POC Test POC    Basic metabolic panel    Collection Time: 03/26/19  3:37 AM   Result Value Ref Range    Sodium 140 136 - 144 mEQ/L    Potassium 3.9 3.6 - 5.1 mEQ/L    Chloride 104 98 - 109 mEQ/L    CO2 25 22 - 32 mEQ/L    BUN 29 (H) 8 - 20 mg/dL    Creatinine 2.9 (H) 0.8 - 1.3 mg/dL    Glucose 177 (H) 70 - 99 mg/dL    Calcium 8.6 (L) 8.9 - 10.3 mg/dL    eGFR 21.6 (L) >=60.0 mL/min/1.73m*2    Anion Gap 11 3 - 15 mEQ/L   CBC    Collection Time: 03/26/19  3:37 AM   Result Value Ref Range    WBC 9.59 3.80 - 10.50 K/uL    RBC 4.32 (L) 4.50 - 5.80 M/uL    Hemoglobin 12.8 (L) 13.7 - 17.5 g/dL    Hematocrit 38.9 (L) 40.1 - 51.0 %    MCV 90.0 83.0 - 98.0 fL    MCH 29.6 28.0 - 33.2 pg    MCHC 32.9 32.2 - 36.5 g/dL    RDW 14.6 (H) 11.6 - 14.4 %    Platelets 148 (L) 150 - 350 K/uL    MPV 11.6 9.4 - 12.4 fL   Magnesium    Collection Time: 03/26/19  3:37 AM   Result Value Ref Range    Magnesium 1.8 1.8 - 2.5 mg/dL   POCT Glucose    Collection Time: 03/26/19  7:10 AM   Result Value Ref Range    POCT Bedside Glucose 184 (H) 70 - 99 mg/dL    POC Test POC      I have reviewed the pertinent patient's labs.    Imaging  I have reviewed the pertinent patient's imaging results for this admission.      Physical Exam  BP (!) 174/86 (BP Location: Right upper arm, Patient Position: Lying)   Pulse (!) 41   Temp 36.8 °C (98.2 °F) (Oral)   Resp (!) 22   Ht 1.88 m (6' 2\")   Wt 120 kg (265 lb)   SpO2 95%   BMI 34.02 kg/m²   No intake or output data in the 24 hours ending 03/26/19 0958    General Appearance:  Alert, no distress, appears stated age   Head:  Normocephalic, without obvious abnormality, atraumatic   Eyes:  Conjunctiva/corneas clear, no scleral icterus       Ears:  No external abnormalities   Throat: Mucous membranes dry, no obvious oral lesions   Neck: No JVD   Back:   No CVA tenderness   Lungs:   Clear to auscultation bilaterally, " respirations unlabored   Heart:  Regular rate and rhythm, S1 and S2 normal, no murmur, rub or gallop   Abdomen:   Soft, non-tender, non-distended, bowel sounds active   Genitourinary:  No suprapubic tenderness or fullness   Extremities:  Musculoskeletal: Extremities without cyanosis or edema  No injury or deformity   Skin: No rashes or lesions   Neurologic:  Psychologic: AAOx3  Appropriate, cooperative, following commands       Assessment   68 y.o. male being consulted for acute kidney injury         Plan     1) acute kidney injury on underlying CKD stage 3B  Baseline Cr high 1s  Primary neph Dr Eren Hernandez in [facility] [phone], I spoke with him today via phone  CKD multifactorial: HTN, DM, nephrolithiasis, ASHELY episodes  ASHELY due to obstructive nephropathy (see below), ?vol depletion on diuretic, ACEI  Cr up to 2.9 today  UA similar to prior in 1/2019  Check bladder scan for PVR, D/W Urology  Hold lisinopril and bumetanide  Start NSS drip  Monitor all urine output  Avoid NSAIDs, IV dye, other nephrotoxins  Dose meds for eGFR < 25    2) Obstructive nephropathy with hydronephrosis due to obstructing R renal calculus  CT Abd showed 7mm R UPJ stone with mild hydronephrosis  D/W Ethel NP Urology, they will plan surgical intervention  Advise NSS @ 125ml/hr (hold diuretic)  Pain control, but avoid NSAIDs  Prior stone analysis 1/2019 showed 80% uric acid and 20% Ca Oxalate  Check uric acid level  Consider 24 hr urine for stone profile as inpatient depending on length of hospital stay, primary neph tells me that they have not been able to accomplish this as outpatient  D/W Dr Jiang hospitalist    3) HTN  Elevated BP  Need to stop ACEI and diuretic due to ASHELY  Currently on clonidine 0.2mg bid, hydralazine 100mg q8 hrs, just started on imdur ER 30mg  May have difficulty titrating BP meds due to HR in 40s  Also on propranolol, but not for HTN control  Target BP < 130/80 with proteinuric CKD    4) DMT2  Target HbA1C < 7% to  prevent progression of CKD  Underlying component of DM nephropathy given proteinuria on UA  Would benefit from resuming RA[employer] Blockade when ASHELY resolves    Shanita Gayle, DO    [facility]

## 2019-03-26 NOTE — CONSULTS
"Cardiology Consult Note  Subjective     Nathan Alonzo is a 68 y.o. male who was admitted for Essential hypertension [I10]  Renal colic on right side [N23]  Atrial fibrillation, unspecified type (CMS/[facility]) [I48.91]. Nathan Alonzo was referred by primary service for \"pre-op for urologic procedure\". aNthan Alonzo is a 67 yo male, known to our practice and to me only from prior admissions.     His outpatient cardiac care is managed by Dr. Mercado in [place].     He awoke yesterday with significant pain in the right flank. It did not radiate. There was nausea but no vomiting. There was no difficult with urination or hematuria. He had no chest discomfort, shortness of breath, dizziness, lightheadedness, palpitations, near syncope or syncope.     His ECG on arrival demonstrated sinus bradycardia at 48 BPM with stable left ventricular hypertrophy repolarization abnormalities, right bundle branch and left anterior fascicular blocks as compared to prior. Creatinine was 2.9 this AM, higher than his baseline of mid 1s to low 2s. CT revealed right sided renal calculus and mild hydronephrosis.     He is to go to the OR tomorrow for uretal stent placement. He currently has no pain. BP is labile, as it usually is for this patient.     Unable to review outside records..    Past Medical History:   Diagnosis Date   • A-fib (CMS/HCC) ([facility])    •         •     • BPH (benign prostatic hyperplasia)    •     • Coronary artery disease    •     • Hypertension    • Hypothyroidism    • Kidney stones    • Nocturia    • Renal calculi     right   •         • Right bundle branch block    • Sleep apnea     uses cpap   • Type 2 diabetes mellitus (CMS/[facility]) ([facility])        Past Surgical History:   Procedure Laterality Date   • COLONOSCOPY  2016   • CORONARY ARTERY BYPASS GRAFT  2017    x3   • KIDNEY STONE SURGERY Right 2018   • NEPHRECTOMY Right 2016    PARTIAL    • TONSILLECTOMY     • UVULECTOMY  2005       Social History     Social History Narrative "   • Denies ever smoking       Family History   Problem Relation Age of Onset   • Lung cancer Mother    • Lung cancer Father    • Early death Father    • Diabetes Sister    No history of CAD in either parent < 66 yo    Oxycodone; Amlodipine; Cephalexin monohydrate; and Latex      Current Facility-Administered Medications:   •  acetaminophen (TYLENOL) tablet 650 mg, 650 mg, oral, q4h PRN, Eleanor Jiang MD  •  amiodarone (PACERONE) tablet 200 mg, 200 mg, oral, Daily, Eleanor Jiang MD, 200 mg at 03/26/19 0823  •  amLODIPine (NORVASC) tablet 5 mg, 5 mg, oral, Daily, Eleanor Jiang MD  •  atorvastatin (LIPITOR) tablet 40 mg, 40 mg, oral, Nightly, Eleanor Jiang MD, 40 mg at 03/25/19 2101  •  cloNIDine (CATAPRES) tablet 0.2 mg, 0.2 mg, oral, BID, Eleanor Jiang MD, 0.2 mg at 03/26/19 0821  •  glucose chewable tablet 16-32 g of dextrose, 16-32 g of dextrose, oral, PRN **OR** dextrose 40 % oral gel 15-30 g of dextrose, 15-30 g of dextrose, oral, PRN **OR** glucagon (GLUCAGEN) injection 1 mg, 1 mg, intramuscular, PRN **OR** dextrose in water injection 12.5 g, 25 mL, intravenous, PRN, Eleanor Jiang MD  •  finasteride (PROSCAR) tablet 5 mg, 5 mg, oral, Daily, Eleanor Jiang MD, 5 mg at 03/26/19 0823  •  hydrALAZINE (APRESOLINE) tablet 100 mg, 100 mg, oral, q8h THADDEUS, Eleanor Jiang MD, 100 mg at 03/26/19 0606  •  insulin aspart U-100 (NovoLOG) pen 6-10 Units, 6-10 Units, subcutaneous, With meals & nightly, Eleanor Jiang MD, 6 Units at 03/26/19 1252  •  isosorbide mononitrate (IMDUR) 24 hr ER tablet 30 mg, 30 mg, oral, Daily, Eleanor Jiang MD, 30 mg at 03/26/19 0822  •  lamoTRIgine (LaMICtal) tablet 150 mg, 150 mg, oral, Daily, Eleanor Jiang MD, 150 mg at 03/26/19 0823  •  levothyroxine (SYNTHROID) tablet 100 mcg, 100 mcg, oral, Daily (6:30a), Eleanor Jiang MD, 100 mcg at 03/26/19 0606  •  morphine injection 2 mg, 2 mg, intravenous, q3h PRN, Eleanor Jiang MD, 2 mg at 03/25/19 1748  •  pantoprazole (PROTONIX) tablet,delayed release  (DR/EC) 40 mg, 40 mg, oral, Daily, Eleanor Jiang MD, 40 mg at 03/26/19 0821  •  propranolol (INDERAL) tablet 20 mg, 20 mg, oral, BID, Eleanor Jiang MD, 20 mg at 03/26/19 0821  •  sodium chloride 0.9 % infusion, , intravenous, Continuous, Shanita Gayle DO, Last Rate: 125 mL/hr at 03/26/19 1023    Review of Systems  Constitutional: negative for fevers, fatigue and chills  Eyes: negative for blurred vision and visual disturbance  Ears, nose, mouth, throat, and face: negative for ringing in the ears, nose bleed and bleeding gums  Respiratory: negative for cough and shortness of breath  Cardiovascular: negative for chest pain, chest pressure/discomfort, exertional chest pressure/discomfort, irregular heart beat, near-syncope, orthopnea, palpitations, paroxysmal nocturnal dyspnea and syncope  Gastrointestinal: positive for nausea  Musculoskeletal:positive for flank pain  Neurological: negative for numbness, weakness and tingling  Behavioral/Psych: negative for suicidal ideation and homocidal ideation  Endocrine: negative for cold intolerance and heat intolerance    Objective     Labs   CMP Results       03/26/19 03/25/19 01/28/19                    0337 [id number] 0757          135 (L) 137         K 3.9 3.4 (L) 4.4         Cl 104 101 104         CO2 25 23 25         Glucose 177 (H) 271 (H) 162 (H)         BUN 29 (H) 23 (H) 51 (H)         Creatinine 2.9 (H) 1.9 (H) 2.2 (H)         Calcium 8.6 (L) 9.2 9.3         Anion Gap 11 11 8         AST - 19 -         ALT - 26 -         Albumin - 4.4 -         EGFR 21.6 (L) 35.2 (L) 29.7 (L)         Comment for K at 0454 on 03/25/19:    Results obtained on plasma. Plasma Potassium values may be up to 0.4 mEQ/L less than serum values. The differences may be greater for patients with high platelet or white cell counts.        CBC Results       03/26/19 03/25/19 01/28/19                    0337 0455 0757         WBC 9.59 14.06 (H) 9.93         RBC 4.32 (L) 5.18 4.82         HGB  "12.8 (L) 15.5 14.3         HCT 38.9 (L) 45.4 42.0         MCV 90.0 87.6 87.1         MCH 29.6 29.9 29.7         MCHC 32.9 34.1 34.0          (L) 190 191         Comment for HGB at 0337 on 03/26/19:  RESULT CHECKED        Troponin I Results       01/24/19 01/23/19 07/03/18                    0500 0804 1127         Troponin I 0.05 (H) 0.04 0.03                     PT/PTT Results       01/15/19 10/04/18 07/05/18                    1510 1231 1945         PT 15.0 (H) 17.1 (H) 13.9         INR 1.2 1.4 1.1         PTT 61 (H) 54 (H) -         Comment for INR at 1510 on 01/15/19:    Moderate Intensity Anticoagulation = 2.0 to 3.0, High Intensity = 2.5 to 3.5    Comment for INR at 1231 on 10/04/18:    Moderate Intensity Anticoagulation = 2.0 to 3.0, High Intensity = 2.5 to 3.5    Comment for INR at 1945 on 07/05/18:    INR has no defined significance when PT is within Reference Range.    Comment for PTT at 1510 on 01/15/19:    The Standard Therapeutic Range for Heparin is 68 to 101 seconds.    Comment for PTT at 1231 on 10/04/18:    The Standard Therapeutic Range for Heparin is 68 to 101 seconds.        Lab Results   Component Value Date    CHOL 93 02/10/2018    TRIG 144 02/10/2018    HDL 31 (L) 02/10/2018    LDLCALC 33 02/10/2018    TSH 4.01 01/25/2019    HGBA1C 9.4 (H) 01/25/2019       Imaging  CT abdomen/pelvis with right uteropelvic junction calculus and mild right hydronephrosis  Follow up abdominal XR without clear evidence of calculus    ECG   Sinus bradycardia, 48 BPM, stable chronic right bundle branch and left anterior fascicular blocks as well as left ventricular hypertrophy repolarization abnormalities    Telemetry  Not on telemetry    Physical Exam  BP (!) 145/79 (BP Location: Left upper arm, Patient Position: Lying)   Pulse (!) 44   Temp 36.5 °C (97.7 °F) (Oral)   Resp 20   Ht 1.88 m (6' 2\")   Wt 120 kg (265 lb)   SpO2 98%   BMI 34.02 kg/m²     General Appearance:  Alert, no distress   Head:  " Normocephalic, without obvious abnormality, atraumatic   Eyes:  Conjunctiva/corneas clear, EOM's intact   Endocrine: No thyroid enlargement    Lungs:   Clear to auscultation bilaterally, respirations unlabored, no rales, no wheezing   Heart:  Regular bradycardia, S1 and S2 normal, soft apical systolic murmur, no rub or gallop   Abdomen:   Soft, non-tender, no masses, no organomegaly   Vascular: Pulses 2+ and symmetric all extremities, no carotid bruit or jugular vein distention   Musculoskeletal:  Skin: No injury or deformity  Skin color, texture, turgor normal, no rashes or lesions, no cyanosis or edema   Extremities: Extremities normal, atraumatic   Behavior/Emotional: Appropriate, cooperative           Assessment   * Renal colic on right side   Assessment & Plan    For uretal stent placement in the OR tomorrow per discussion with primary service.     I do not have a record of an echocardiogram for this patient. He does have a soft systolic murmur. He appears euvolemic. His ECG is unchanged from prior. He has a history of CAD but no anginal symptoms with good functional status as an outpatient. He is maintaining sinus rhythm on amiodarone.     He has an obstructing renal calculus with hydronephrosis and ASHELY.     Stable to proceed to the OR for this medically necessary procedure. with risk commensurate with his age. Would not delay procedure for further cardiac workup as it is unlikely to .         Tremor   Assessment & Plan    On propanolol; started by neurology here during prior admission. Heart rate low but stable.         Acute renal failure superimposed on stage 3 chronic kidney disease (CMS/HCC)   Assessment & Plan    Creatinine 2.9 this AM. Baseline creatinine ~1.8 per prior records. Likely due to calculus. Agree with holding ACE/diuretics for now.         Acquired hypothyroidism   Assessment & Plan    As per primary service.         Essential hypertension   Assessment & Plan    Known labile  BP.     Agree with holding ACE and diuretics for now given ASHELY. Amlodipine and Imdur added by primary service as replacements. On propanolol with chronic/stable bradycardia. Monitor BP in the perioperative period.     Would not uptitrate clonidine or propanolol given his bradycardia. Can increase both amlodipine and Imdur as needed.         CAD (coronary artery disease) of artery bypass graft   Assessment & Plan    Status post CABG x 3 in 2017.      He has no symptoms to suggest angina. Ecg is without ischemic changes and is unchanged from prior.     On propanolol. Hold ACE with ASHELY. Imdur added by primary service; agree with this addition. Continue statin.         Atrial fibrillation (CMS/[facility])   Assessment & Plan    CHADSVasc 4. The patient is chronically anticoagulated on Eliquis as an outpatient, at appropriate dose of 5mg BID for age < 80, weight > 130 lbs. He does have chronic renal impairment.     OK to hold Eliquis for now in advance of urologic procedure tomorrow. Should be resumed when OK with urology. Currently in sinus rhythm. Continue amiodarone.                 XANDER Jackson  3/26/2019

## 2019-03-26 NOTE — ASSESSMENT & PLAN NOTE
Creatinine up to 3.1 this AM. Baseline creatinine ~1.8 per prior records. Likely due to calculus. Agree with holding ACE/diuretics for now.     Creat down to 2.5.  Continue to hold ACE/Diuretic

## 2019-03-26 NOTE — NURSING NOTE
Observed patient has being on the phone most o the day. Patient voiced his wife needed to speak to MISAEL Kent call placed to MISAEL Kent office.

## 2019-03-26 NOTE — PROGRESS NOTES
Temp  Heart Rate (from SpO2)  Pulse  Resp  BP  Patient Position  SpO2  Oxygen Therapy   03/26/19 0606  --97.2  --   44  --   178/98  --  --  --     Wbc 9.59  Hem 12.8  Pl 148  Creat 2.9, increased  Feels well  Had some right sided pain last night  None today  Voiding q 2 hrs  KUB-  IMPRESSION: Limited study.  Right-sided proximal ureteral stone is not  definitively identified.      Assessment       Right UPJ stone with mild hydro  Bilateral nonobstructing stones  Urine not infected, afebrile,  Wbc now normal  Pain well controlled at present  Chronic kidney disease, creat increased to 2.9  Eliquis on hold  Stone not visible on KUB, not a candidate for ESWL           Plan   Check KUB  May need stent tomorrow, has already eaten today  Follow labs, clinical course

## 2019-03-27 ENCOUNTER — APPOINTMENT (INPATIENT)
Dept: RADIOLOGY | Facility: HOSPITAL | Age: 69
DRG: 660 | End: 2019-03-27
Attending: UROLOGY
Payer: MEDICARE

## 2019-03-27 ENCOUNTER — ANESTHESIA (INPATIENT)
Dept: OPERATING ROOM | Facility: HOSPITAL | Age: 69
DRG: 660 | End: 2019-03-27
Payer: MEDICARE

## 2019-03-27 LAB
ANION GAP SERPL CALC-SCNC: 12 MEQ/L (ref 3–15)
BUN SERPL-MCNC: 34 MG/DL (ref 8–20)
CALCIUM SERPL-MCNC: 8.6 MG/DL (ref 8.9–10.3)
CHLORIDE SERPL-SCNC: 103 MEQ/L (ref 98–109)
CO2 SERPL-SCNC: 23 MEQ/L (ref 22–32)
CREAT SERPL-MCNC: 3.1 MG/DL
ERYTHROCYTE [DISTWIDTH] IN BLOOD BY AUTOMATED COUNT: 14.6 % (ref 11.6–14.4)
GFR SERPL CREATININE-BSD FRML MDRD: 20 ML/MIN/1.73M*2
GLUCOSE BLD-MCNC: 154 MG/DL (ref 70–99)
GLUCOSE BLD-MCNC: 162 MG/DL (ref 70–99)
GLUCOSE BLD-MCNC: 163 MG/DL (ref 70–99)
GLUCOSE BLD-MCNC: 174 MG/DL (ref 70–99)
GLUCOSE SERPL-MCNC: 191 MG/DL (ref 70–99)
HCT VFR BLDCO AUTO: 37.4 %
HGB BLD-MCNC: 12.2 G/DL
MCH RBC QN AUTO: 29.4 PG (ref 28–33.2)
MCHC RBC AUTO-ENTMCNC: 32.6 G/DL (ref 32.2–36.5)
MCV RBC AUTO: 90.1 FL (ref 83–98)
PDW BLD AUTO: 11.6 FL (ref 9.4–12.4)
PLATELET # BLD AUTO: 124 K/UL
POCT TEST: ABNORMAL
POTASSIUM SERPL-SCNC: 3.6 MEQ/L (ref 3.6–5.1)
RBC # BLD AUTO: 4.15 M/UL (ref 4.5–5.8)
SODIUM SERPL-SCNC: 138 MEQ/L (ref 136–144)
WBC # BLD AUTO: 8.37 K/UL

## 2019-03-27 PROCEDURE — 37000001 HC ANESTHESIA GENERAL: Performed by: UROLOGY

## 2019-03-27 PROCEDURE — C2617 STENT, NON-COR, TEM W/O DEL: HCPCS | Performed by: UROLOGY

## 2019-03-27 PROCEDURE — 36000012 HC OR LEVEL 2 EA ADDL MIN: Performed by: UROLOGY

## 2019-03-27 PROCEDURE — 25800000 HC PHARMACY IV SOLUTIONS: Performed by: INTERNAL MEDICINE

## 2019-03-27 PROCEDURE — 71000011 HC PACU PHASE 1 EA ADDL MIN: Performed by: UROLOGY

## 2019-03-27 PROCEDURE — 63600000 HC DRUGS/DETAIL CODE: Performed by: ANESTHESIOLOGY

## 2019-03-27 PROCEDURE — 63700000 HC SELF-ADMINISTRABLE DRUG: Performed by: ANESTHESIOLOGY

## 2019-03-27 PROCEDURE — 71000001 HC PACU PHASE 1 INITIAL 30MIN: Performed by: UROLOGY

## 2019-03-27 PROCEDURE — 63600105 HC IODINE BASED CONTRAST: Performed by: UROLOGY

## 2019-03-27 PROCEDURE — BT1D1ZZ FLUOROSCOPY OF RIGHT KIDNEY, URETER AND BLADDER USING LOW OSMOLAR CONTRAST: ICD-10-PCS | Performed by: UROLOGY

## 2019-03-27 PROCEDURE — 94660 CPAP INITIATION&MGMT: CPT

## 2019-03-27 PROCEDURE — 25000000 HC PHARMACY GENERAL: Performed by: NURSE ANESTHETIST, CERTIFIED REGISTERED

## 2019-03-27 PROCEDURE — 63700000 HC SELF-ADMINISTRABLE DRUG: Performed by: HOSPITALIST

## 2019-03-27 PROCEDURE — 63600000 HC DRUGS/DETAIL CODE: Performed by: NURSE ANESTHETIST, CERTIFIED REGISTERED

## 2019-03-27 PROCEDURE — 25000000 HC PHARMACY GENERAL: Performed by: ANESTHESIOLOGY

## 2019-03-27 PROCEDURE — C1769 GUIDE WIRE: HCPCS | Performed by: UROLOGY

## 2019-03-27 PROCEDURE — C1758 CATHETER, URETERAL: HCPCS | Performed by: UROLOGY

## 2019-03-27 PROCEDURE — 0T768DZ DILATION OF RIGHT URETER WITH INTRALUMINAL DEVICE, VIA NATURAL OR ARTIFICIAL OPENING ENDOSCOPIC: ICD-10-PCS | Performed by: UROLOGY

## 2019-03-27 PROCEDURE — 36415 COLL VENOUS BLD VENIPUNCTURE: CPT | Performed by: HOSPITALIST

## 2019-03-27 PROCEDURE — 80048 BASIC METABOLIC PNL TOTAL CA: CPT | Performed by: HOSPITALIST

## 2019-03-27 PROCEDURE — 20600000 HC ROOM AND CARE INTERMEDIATE/TELEMETRY

## 2019-03-27 PROCEDURE — 63600000 HC DRUGS/DETAIL CODE: Mod: JW | Performed by: NURSE ANESTHETIST, CERTIFIED REGISTERED

## 2019-03-27 PROCEDURE — 63600105 HC IODINE BASED CONTRAST: Mod: JW | Performed by: UROLOGY

## 2019-03-27 PROCEDURE — 36000002 HC OR LEVEL 2 INITIAL 30MIN: Performed by: UROLOGY

## 2019-03-27 PROCEDURE — 85027 COMPLETE CBC AUTOMATED: CPT | Performed by: HOSPITALIST

## 2019-03-27 PROCEDURE — 99232 SBSQ HOSP IP/OBS MODERATE 35: CPT | Performed by: HOSPITALIST

## 2019-03-27 DEVICE — URETERAL STENT
Type: IMPLANTABLE DEVICE | Status: FUNCTIONAL
Brand: CONTOUR™

## 2019-03-27 RX ORDER — HYDRALAZINE HYDROCHLORIDE 20 MG/ML
10 INJECTION INTRAMUSCULAR; INTRAVENOUS ONCE
Status: COMPLETED | OUTPATIENT
Start: 2019-03-27 | End: 2019-03-27

## 2019-03-27 RX ORDER — FENTANYL CITRATE 50 UG/ML
INJECTION, SOLUTION INTRAMUSCULAR; INTRAVENOUS AS NEEDED
Status: DISCONTINUED | OUTPATIENT
Start: 2019-03-27 | End: 2019-03-27 | Stop reason: SURG

## 2019-03-27 RX ORDER — LABETALOL HYDROCHLORIDE 5 MG/ML
5 INJECTION, SOLUTION INTRAVENOUS AS NEEDED
Status: DISCONTINUED | OUTPATIENT
Start: 2019-03-27 | End: 2019-03-27 | Stop reason: HOSPADM

## 2019-03-27 RX ORDER — CLONIDINE HYDROCHLORIDE 0.2 MG/1
0.2 TABLET ORAL ONCE
Status: COMPLETED | OUTPATIENT
Start: 2019-03-27 | End: 2019-03-27

## 2019-03-27 RX ORDER — ONDANSETRON HYDROCHLORIDE 2 MG/ML
4 INJECTION, SOLUTION INTRAVENOUS
Status: DISCONTINUED | OUTPATIENT
Start: 2019-03-27 | End: 2019-03-27 | Stop reason: HOSPADM

## 2019-03-27 RX ORDER — CIPROFLOXACIN 2 MG/ML
400 INJECTION, SOLUTION INTRAVENOUS ONCE
Status: DISCONTINUED | OUTPATIENT
Start: 2019-03-27 | End: 2019-03-27

## 2019-03-27 RX ORDER — LIDOCAINE HYDROCHLORIDE 10 MG/ML
INJECTION, SOLUTION EPIDURAL; INFILTRATION; INTRACAUDAL; PERINEURAL AS NEEDED
Status: DISCONTINUED | OUTPATIENT
Start: 2019-03-27 | End: 2019-03-27 | Stop reason: SURG

## 2019-03-27 RX ORDER — PROPOFOL 10 MG/ML
INJECTION, EMULSION INTRAVENOUS AS NEEDED
Status: DISCONTINUED | OUTPATIENT
Start: 2019-03-27 | End: 2019-03-27 | Stop reason: SURG

## 2019-03-27 RX ORDER — FENTANYL CITRATE 50 UG/ML
50 INJECTION, SOLUTION INTRAMUSCULAR; INTRAVENOUS
Status: DISCONTINUED | OUTPATIENT
Start: 2019-03-27 | End: 2019-03-27 | Stop reason: HOSPADM

## 2019-03-27 RX ORDER — GLYCOPYRROLATE 0.6MG/3ML
SYRINGE (ML) INTRAVENOUS AS NEEDED
Status: DISCONTINUED | OUTPATIENT
Start: 2019-03-27 | End: 2019-03-27 | Stop reason: SURG

## 2019-03-27 RX ADMIN — SODIUM CHLORIDE: 9 INJECTION, SOLUTION INTRAVENOUS at 23:38

## 2019-03-27 RX ADMIN — GLYCOPYRROLATE 0.2 MG: 0.2 INJECTION INTRAMUSCULAR; INTRAVENOUS at 13:42

## 2019-03-27 RX ADMIN — LIDOCAINE HYDROCHLORIDE 5 ML: 10 INJECTION, SOLUTION EPIDURAL; INFILTRATION; INTRACAUDAL; PERINEURAL at 13:32

## 2019-03-27 RX ADMIN — PROPRANOLOL HYDROCHLORIDE 20 MG: 20 TABLET ORAL at 09:43

## 2019-03-27 RX ADMIN — LABETALOL HYDROCHLORIDE 5 MG: 5 INJECTION INTRAVENOUS at 18:12

## 2019-03-27 RX ADMIN — AMIODARONE HYDROCHLORIDE 200 MG: 200 TABLET ORAL at 09:43

## 2019-03-27 RX ADMIN — LEVOTHYROXINE SODIUM 100 MCG: 100 TABLET ORAL at 05:48

## 2019-03-27 RX ADMIN — SODIUM CHLORIDE 125 ML: 9 INJECTION, SOLUTION INTRAVENOUS at 15:33

## 2019-03-27 RX ADMIN — SODIUM CHLORIDE: 9 INJECTION, SOLUTION INTRAVENOUS at 12:05

## 2019-03-27 RX ADMIN — HYDRALAZINE HYDROCHLORIDE 10 MG: 20 INJECTION INTRAMUSCULAR; INTRAVENOUS at 16:56

## 2019-03-27 RX ADMIN — FINASTERIDE 5 MG: 5 TABLET, FILM COATED ORAL at 09:43

## 2019-03-27 RX ADMIN — LAMOTRIGINE 150 MG: 100 TABLET ORAL at 09:44

## 2019-03-27 RX ADMIN — ATORVASTATIN CALCIUM 40 MG: 40 TABLET, FILM COATED ORAL at 21:40

## 2019-03-27 RX ADMIN — SODIUM CHLORIDE: 9 INJECTION, SOLUTION INTRAVENOUS at 03:52

## 2019-03-27 RX ADMIN — CLONIDINE HYDROCHLORIDE 0.2 MG: 0.2 TABLET ORAL at 09:44

## 2019-03-27 RX ADMIN — PANTOPRAZOLE SODIUM 40 MG: 40 TABLET, DELAYED RELEASE ORAL at 09:44

## 2019-03-27 RX ADMIN — HYDRALAZINE HYDROCHLORIDE 10 MG: 20 INJECTION INTRAMUSCULAR; INTRAVENOUS at 14:57

## 2019-03-27 RX ADMIN — ISOSORBIDE MONONITRATE 30 MG: 30 TABLET, EXTENDED RELEASE ORAL at 09:44

## 2019-03-27 RX ADMIN — PROPOFOL 100 MG: 10 INJECTION, EMULSION INTRAVENOUS at 13:32

## 2019-03-27 RX ADMIN — HYDRALAZINE HYDROCHLORIDE 100 MG: 25 TABLET ORAL at 21:41

## 2019-03-27 RX ADMIN — AMLODIPINE BESYLATE 5 MG: 5 TABLET ORAL at 09:44

## 2019-03-27 RX ADMIN — INSULIN ASPART 6 UNITS: 100 INJECTION, SOLUTION INTRAVENOUS; SUBCUTANEOUS at 21:59

## 2019-03-27 RX ADMIN — FENTANYL CITRATE 25 MCG: 50 INJECTION, SOLUTION INTRAMUSCULAR; INTRAVENOUS at 13:51

## 2019-03-27 RX ADMIN — HYDRALAZINE HYDROCHLORIDE 100 MG: 25 TABLET ORAL at 05:49

## 2019-03-27 RX ADMIN — CLONIDINE HYDROCHLORIDE 0.2 MG: 0.2 TABLET ORAL at 16:54

## 2019-03-27 RX ADMIN — IOHEXOL 30 ML: 300 INJECTION, SOLUTION INTRAVENOUS at 14:05

## 2019-03-27 ASSESSMENT — ENCOUNTER SYMPTOMS: DYSRHYTHMIAS: 1

## 2019-03-27 NOTE — PROGRESS NOTES
Cardiology Daily Progress Note    Subjective   Nathan Alonzo is a 68 y.o. male who was admitted for Essential hypertension [I10]  Renal colic on right side [N23]  Atrial fibrillation, unspecified type (CMS/HCC) [I48.91].    Interval History:   He has persistent, mild flank pain. No chest discomfort, shortness of breath. BP reasonably well controlled. For uretal stent in the OR today.     Allergies  Oxycodone; Amlodipine; Cephalexin monohydrate; and Latex      Current Facility-Administered Medications:   •  acetaminophen (TYLENOL) tablet 650 mg, 650 mg, oral, q4h PRN, Eleanor Jiang MD  •  amiodarone (PACERONE) tablet 200 mg, 200 mg, oral, Daily, Eleanor Jiang MD, 200 mg at 03/27/19 0943  •  amLODIPine (NORVASC) tablet 5 mg, 5 mg, oral, Daily, Eleanor Jiang MD, 5 mg at 03/27/19 0944  •  atorvastatin (LIPITOR) tablet 40 mg, 40 mg, oral, Nightly, Eleanor Jiang MD, 40 mg at 03/26/19 2210  •  cloNIDine (CATAPRES) tablet 0.2 mg, 0.2 mg, oral, BID, Eleanor Jiang MD, 0.2 mg at 03/27/19 0944  •  glucose chewable tablet 16-32 g of dextrose, 16-32 g of dextrose, oral, PRN **OR** dextrose 40 % oral gel 15-30 g of dextrose, 15-30 g of dextrose, oral, PRN **OR** glucagon (GLUCAGEN) injection 1 mg, 1 mg, intramuscular, PRN **OR** dextrose in water injection 12.5 g, 25 mL, intravenous, PRN, Eleanor Jiang MD  •  finasteride (PROSCAR) tablet 5 mg, 5 mg, oral, Daily, Eleanor Jiang MD, 5 mg at 03/27/19 0943  •  hydrALAZINE (APRESOLINE) tablet 100 mg, 100 mg, oral, q8h THADDEUS, Eleanor Jiang MD, 100 mg at 03/27/19 0549  •  insulin aspart U-100 (NovoLOG) pen 6-10 Units, 6-10 Units, subcutaneous, With meals & nightly, Eleanor Jiang MD, 6 Units at 03/26/19 2209  •  isosorbide mononitrate (IMDUR) 24 hr ER tablet 30 mg, 30 mg, oral, Daily, Eleanor Jiang MD, 30 mg at 03/27/19 0944  •  lamoTRIgine (LaMICtal) tablet 150 mg, 150 mg, oral, Daily, Eleanor Jiang MD, 150 mg at 03/27/19 0944  •  levothyroxine (SYNTHROID) tablet 100 mcg, 100 mcg, oral,  "Daily (6:30a), Eleanor Jiang MD, 100 mcg at 03/27/19 0548  •  morphine injection 2 mg, 2 mg, intravenous, q3h PRN, Eleanor Jiang MD, 2 mg at 03/25/19 1748  •  pantoprazole (PROTONIX) tablet,delayed release (DR/EC) 40 mg, 40 mg, oral, Daily, Eleanor Jiang MD, 40 mg at 03/27/19 0944  •  polyethylene glycol (MIRALAX) 17 gram packet 17 g, 17 g, oral, Daily, Eleanor Jiang MD, 17 g at 03/26/19 1847  •  propranolol (INDERAL) tablet 20 mg, 20 mg, oral, BID, Eleanor Jiang MD, 20 mg at 03/27/19 0943  •  sodium chloride 0.9 % infusion, , intravenous, Continuous, Shanita Gayle DO, Last Rate: 125 mL/hr at 03/27/19 0352      Objective     Vital signs in last 24 hours:  Temp:  [36.3 °C (97.3 °F)-36.7 °C (98.1 °F)] 36.4 °C (97.5 °F)  Heart Rate:  [40-44] 43  Resp:  [17-20] 18  BP: (145-187)/(62-88) 176/73      Intake/Output Summary (Last 24 hours) at 03/27/19 0958  Last data filed at 03/26/19 1200   Gross per 24 hour   Intake                0 ml   Output              175 ml   Net             -175 ml     Intake/Output this shift:  No intake/output data recorded.    Labs  Creatinine 2.9-->3.1    Imaging  No new studies    ECG   No new ECGs    Telemetry  Not on telemetry      Physical Exam:  BP (!) 176/73 (BP Location: Right upper arm, Patient Position: Lying)   Pulse (!) 43   Temp 36.4 °C (97.5 °F) (Oral)   Resp 18   Ht 1.88 m (6' 2\")   Wt 120 kg (265 lb)   SpO2 96%   BMI 34.02 kg/m²     General Appearance:  Alert, no distress   Head:  Normocephalic, without obvious abnormality, atraumatic   Eyes:  Conjunctiva/corneas clear, EOM's intact   Endocrine: No thyroid enlargement    Lungs:   Clear to auscultation bilaterally, respirations unlabored, no rales, no wheezing   Heart:  Regular bradycardia, S1 and S2 normal, soft apical systolic murmur, no rub or gallop   Abdomen:   Soft, non-tender, no masses, no organomegaly   Vascular: Pulses 2+ and symmetric all extremities, no carotid bruit or jugular vein distention " "  Musculoskeletal:  Skin: No injury or deformity  Skin color, texture, turgor normal, no rashes or lesions, no cyanosis or edema   Extremities: Extremities normal, atraumatic   Behavior/Emotional: Appropriate, cooperative          Subjective/Objective:  No new subjective & objective note has been filed under this hospital service since the last note was generated.      Assessment/Plan   * Renal colic on right side   Assessment & Plan    For uretal stent placement today. See ful consultation 3/26/2019. Remains CV stable to proceed.         Tremor   Assessment & Plan    On propanolol; started by neurology here during prior admission. Heart rate low but stable.         Acute renal failure superimposed on stage 3 chronic kidney disease (CMS/[facility])   Assessment & Plan    Creatinine up to 3.1 this AM. Baseline creatinine ~1.8 per prior records. Likely due to calculus. Agree with holding ACE/diuretics for now.         Acquired hypothyroidism   Assessment & Plan    As per primary service.         Essential hypertension   Assessment & Plan    Known labile BP.     Agree with holding ACE and diuretics for now given ASHELY. Amlodipine and Imdur added by primary service as replacements. Please note that his \"allergy\" to amlodipine is lower extremity edema, which is a typical side effect and not a true allergy. OK to continue for now given limited choices for control of his labile BP with ASHELY. On propanolol with chronic/stable bradycardia. Monitor BP in the perioperative period.     Would not uptitrate clonidine or propanolol given his bradycardia. Can increase both amlodipine and Imdur as needed.         CAD (coronary artery disease) of artery bypass graft   Assessment & Plan    Status post CABG x 3 in 2017.      He has no symptoms to suggest angina. Ecg is without ischemic changes and is unchanged from prior.     On propanolol. Hold ACE with ASHELY. Imdur added by primary service; agree with this addition. Continue statin.       "   Atrial fibrillation (CMS/HCC)   Assessment & Plan    CHADSVasc 4. The patient is chronically anticoagulated on Eliquis as an outpatient, at appropriate dose of 5mg BID for age < 80, weight > 130 lbs. He does have chronic renal impairment.     OK to hold Eliquis for now in advance of urologic procedure. Should be resumed when OK with urology. Clinically in sinus rhythm. Continue amiodarone.                      XANDER Jackson  3/27/2019

## 2019-03-27 NOTE — ANESTHESIA PREPROCEDURE EVALUATION
Anesthesia ROS/MED HX    Anesthesia History - neg  Pulmonary    Sleep apnea  Neuro/Psych - neg  Cardiovascular   CAD   hypertension  Dysrhythmias  GI/Hepatic   liver disease   GERD  Musculoskeletal- neg  Endo/Other   Diabetes   Hypothyroidism      Past Surgical History:   Procedure Laterality Date   • COLONOSCOPY  2016   • CORONARY ARTERY BYPASS GRAFT  2017    x3   • KIDNEY STONE SURGERY Right 2018   • NEPHRECTOMY Right 2016    PARTIAL    • TONSILLECTOMY     • UVULECTOMY  2005       Physical Exam    Airway   Mallampati: II   TM distance: >3 FB   Neck ROM: full  Cardiovascular - normal   Rhythm: regular   Rate: normal  Pulmonary - normal   clear to auscultation  Dental - normal        Anesthesia Plan    Plan: general    Technique: general LMA     Lines and Monitors: PIV   ASA 4  Blood Products:   Use of Blood Products Discussed: No   Anesthetic plan and risks discussed with: patient  Induction:    intravenous

## 2019-03-27 NOTE — ANESTHESIA PROCEDURE NOTES
Airway  Start Time: 3/27/2019 1:36 PM  Airway not difficult    General Information and Staff    Patient location during procedure: OR  Anesthesiologist: KEVIN REVELES  Resident/CRNA: FREDERICK REYNOLDS    Indications and Patient Condition  Indications for airway management: anesthesia and airway protection  Sedation level: moderate (conscious sedation)  Preoxygenated: yes  Mask difficulty assessment: 1 - vent by mask    Final Airway Details  Final airway type: supraglottic airway (LMA)      Successful airway: classic  Size 5    Number of attempts at approach: 1  Number of other approaches attempted: 0  Atraumatic airway insertion

## 2019-03-27 NOTE — PLAN OF CARE
Problem: Patient Care Overview  Goal: Plan of Care Review  Outcome: Ongoing (interventions implemented as appropriate)   03/27/19 0502   Coping/Psychosocial   Plan Of Care Reviewed With patient   Plan of Care Review   Progress improving   Outcome Summary Received pt in bed, alert and oriented x 3. IV fluids infusing as ordered. Pt NPO after mn for OR this AM. CHG wiped done in the evening with total bed change. Pt denies pain/discomfort. Pt ambulating to bathroom, voiding without difficulty. Bed alarm on for safety. Call bell within reach.        Problem: Pain, Acute (Adult)  Goal: Identify Related Risk Factors and Signs and Symptoms  Outcome: Ongoing (interventions implemented as appropriate)    Goal: Acceptable Pain Control/Comfort Level  Outcome: Ongoing (interventions implemented as appropriate)      Problem: Fall Risk (Adult)  Goal: Identify Related Risk Factors and Signs and Symptoms  Outcome: Ongoing (interventions implemented as appropriate)    Goal: Absence of Falls  Outcome: Ongoing (interventions implemented as appropriate)

## 2019-03-27 NOTE — NURSING NOTE
Pt sent to OR. Glasses, cell phone, luggage, wallet left at bedside- will move to new room once tele bed assigned. Pt sent to OR with wedding band in place. Report called to OR.     0541 Pt dougie Pan took all belongings.

## 2019-03-27 NOTE — PROGRESS NOTES
Hospital Medicine Service -  Daily Progress Note       SUBJECTIVE   No acute events overnight.  Some mild right-sided pain/discomfort persists intermittently.  Cr up to 3.1  To OR this afternoon with  for stent.     OBJECTIVE      Vital signs in last 24 hours:  Temp:  [36.3 °C (97.3 °F)-36.7 °C (98.1 °F)] 36.3 °C (97.3 °F)  Heart Rate:  [40-44] 43  Resp:  [17-20] 20  BP: (145-187)/(62-88) 187/88    Intake/Output Summary (Last 24 hours) at 03/27/19 0858  Last data filed at 03/26/19 1200   Gross per 24 hour   Intake                0 ml   Output              175 ml   Net             -175 ml       PHYSICAL EXAMINATION      General: WM, appropriate, cooperative, comfortable  HENT: normocephalic, atraumatic, MMM, no oral lesions  Eyes: anicteric sclera  Neck: supple, normal ROM  Resp: CTA B/L, no wheezes/rhonchi/rales  Cardiac: RRR, no murmurs/rubs/gallop  Abdomen: soft, nontender, normal BS, no significant CVA or R flank tenderness  Extremities: no significant edema  Neuro: nonfocal, awake, alert  Behavior: cooperative, calm  Lymph: no adenopathy noted  Skin: clean, dry, intact; no rashes or lesions noted   LABS / IMAGING / TELE      Labs  All new labs reviewed  Cr 3.1    Imaging  No new    Lines, Drains, Airways, Wounds:  Peripheral IV 03/25/19 Left Forearm (Active)   Number of days: 2     Discussed case with  Dr. Krysten Bonilla/Jose Eduardo Patton    ASSESSMENT AND PLAN      * Renal colic on right side   Assessment & Plan    Hx of renal stones, stone analysis mostly uric acid stones; serum uric acid normal  Had perc nephrostomy tube in 1/2019 and removal w/ stone treatment  Presents to ER w/ severe acute onset right renal colic and nausea    - afebrile, mild leukocytosis  - UA appears clean so hold off on Abx  - elevated Cr above baseline so plans for OR with  today 3/27  - IV morphine PRN pain  - IVF        Acute renal failure superimposed on stage 3 chronic kidney disease (CMS/HCC)   Assessment & Plan     Baseline Cr 1.5-2 and higher at some times    - Cr now at 3.1  - plan for surgical intervention for pt's obstructing stone/mild hydro  - renal consulted  - maintain on IVF  - hold ACEI/Bumex  - monitor        Essential hypertension   Assessment & Plan    Home meds: hydralazine 75mg BID, lisinopril 20mg BID, Bumex 1mg BID, clonidine 0.2mg BID  Follows w/ cardiologist in [place]; pt's wife requests he not see a cardiologist from [facility] due to insurance issues  BPs high in ER likely 2/2 pain and need for AM meds    - cont home meds except ACEI and diuretic on hold for ASHELY  - added Imdur 30mg Q24 and increased hydralazine to 100mg Q8 for persistently elevated BPs  - pt has had blood pressures issues associated w/ anesthesia in the past; consult placed to cardiology for assistance w/ francesca-operative management  - also spoke w/ pt's cardiologist Dr. Mercado and discussed case with him; he is comfortable with him undergoing the procedure and recommends low-dose amlodipine for francesca-operative BP management in the setting of holding his ACEI/diuretic; he was also in agreement with the low-dose Imdur  - per-op EKG sinus gurmeet, RBBB, TWIs noted - have been present on prior tracings  - monitor        Tremor   Assessment & Plan    Cont home propranolol 20mg BID        Bipolar 1 disorder (CMS/HCC) (HCC)   Assessment & Plan    Cont home Lamictal 150mg Q24        GERD (gastroesophageal reflux disease)   Assessment & Plan    Cont PPI        Type 2 diabetes mellitus with complication, with long-term current use of insulin (CMS/[facility])   Assessment & Plan    Home regimen: Novolog 70-30 - 20 units BID    - home insulin on hold   - ISS/accuchecks        Acquired hypothyroidism   Assessment & Plan    Cont home Synthroid        Hypercholesteremia   Assessment & Plan    Cont home statin        CAD (coronary artery disease) of artery bypass graft   Assessment & Plan    On ASA 81mg Q24, BB/statin    - hold ASA for OR  - cont BB (on propranolol  more for tremor) and statin        Atrial fibrillation (CMS/HCC)   Assessment & Plan    Has had resting bradycardia; on propranolol for tremor but not other BB  On home ASA 81mg Q24 and Eliquis 5mg BID    - holding anticoagulation for OR  - cont home BB             VTE Assessment: Padua VTE Score: 5  Current VTE Prophylaxis Plan: SCDs; Eliquis on hold for procedure    Code Status: Full Code    Estimated discharge date: 3/28/2019  Dispo Plan: home     Eleanor Jiang MD  3/27/2019  8:58 AM

## 2019-03-27 NOTE — OR SURGEON
Pre-Procedure patient identification:  I am the primary operating surgeon/proceduralist and I have identified the patient and confirmed laterality is right on 03/27/19 at 1:17 PM True Kent MD  Phone Number: [phone]     I reviewed the chart in detail and reviewed the correct side and the procedure with Nathan in detail.  He is undergoing right ureteral stent insertion.  He is aware that the stone is not being removed today.  The consent was reviewed and signed.  All questions were answered

## 2019-03-27 NOTE — OP NOTE
PreopDx- right ureteral calculus  PostopDx- same  Procedure - cystoscopy, right retrograde pyelogram, insertion of a right ureteral stent 6 f x 26cm  Surgeon- Yoli  Anesth- general   EBL-  Minimal  Complications- none    Operative Indications    Nathan is a 68 year old male with a history of nephrolithiasis admitted with right flank pain due to a 7mm right upj calculus.  He has associated acute on chronic renal insufficiency with a rising creatinine of 3.1.  He recently underwent a left percutaneous nephrostolithotomy for a large left renal calculus.  He is undergoing right ureteral stent insertion for drainage of the right kidney given the obstruction from the right ureteropelvic junction calculus.  After the renal function has improved back to baseline, he will then be scheduled for definitive stone treatment.  The procedure and risks were reviewed in detail with Nathan in the holding area and the consent form was reviewed and was signed.  All questions were answered.    Operative procedure    Following informed consent, he was brought to the operative suite and was placed on the operating room table in the supine position.  Pneumatic teds were placed.  He was given Rocephin 1 g intravenously.  He was placed in the lithotomy position and was sterilely prepped and draped.  A timeout was performed to confirm the patient and the procedure.    A 22 British cystoscope sheath with 30 degree lens was introduced into the urethra and pan cystourethroscopy was performed.  Urethra was without evidence of stricture.  There was moderate lateral lobe hypertrophy of the prostate.  The bladder capacity was normal.  Ureteral orifices were normal in position.  There were no bladder mucosal lesions.  A 6 British open-ended catheter was inserted into the right ureteral orifice and a right retrograde pyelogram was performed.  The ureter was normal in course and caliber.  There is a filling defect at the ureteropelvic junction  consistent with the stone.  A 0.38 guidewire was advanced up into the kidney under fluoroscopic visualization.  A 6 Lao by 26 cm double-J stent was advanced over the guidewire using a pusher under fluoroscopic and cystoscopic visualization.  Upon adequate placement, the guidewire was removed and a good curl was noted in the kidney and in the bladder.  The bladder was emptied and the cystoscope was removed.  He tolerated the procedure well.

## 2019-03-27 NOTE — ANESTHESIA POSTPROCEDURE EVALUATION
Patient: Nathan Alonzo    Procedure Summary     Date:  03/27/19 Room / Location:   OR 4 / [facility] OR    Anesthesia Start:  1324 Anesthesia Stop:  1430    Procedure:  CYSTO, RETRO, STENT (Right ) Diagnosis:  (Right Ureteral)    Surgeon:  True Kent MD Responsible Provider:  Devon Hewitt MD    Anesthesia Type:  general ASA Status:  4          Anesthesia Type: general  PACU Vitals  3/27/2019 1418 - 3/27/2019 1518      3/27/2019 1430 3/27/2019 1445 3/27/2019 1500 3/27/2019 1515    BP: (!)  186/96 (!)  201/89 (!)  194/85 (!)  235/103    Temp: (!)  36.1 °C (96.9 °F) - - -    Pulse: (!)  44 (!)  44 (!)  42 68    Resp: 20 17 17 (!)  29    SpO2: 99 % 99 % 99 % 100 %            Anesthesia Post Evaluation    Pain management: adequate  Patient location during evaluation: PACU  Patient participation: complete - patient participated  Level of consciousness: awake and alert  Cardiovascular status: acceptable  Airway Patency: adequate  Respiratory status: acceptable  Hydration status: acceptable  Anesthetic complications: no

## 2019-03-28 VITALS
TEMPERATURE: 97.4 F | SYSTOLIC BLOOD PRESSURE: 182 MMHG | DIASTOLIC BLOOD PRESSURE: 87 MMHG | BODY MASS INDEX: 35.24 KG/M2 | HEIGHT: 74 IN | WEIGHT: 274.6 LBS | HEART RATE: 44 BPM | RESPIRATION RATE: 16 BRPM | OXYGEN SATURATION: 99 %

## 2019-03-28 LAB
ANION GAP SERPL CALC-SCNC: 9 MEQ/L (ref 3–15)
BUN SERPL-MCNC: 29 MG/DL (ref 8–20)
CALCIUM SERPL-MCNC: 8.8 MG/DL (ref 8.9–10.3)
CHLORIDE SERPL-SCNC: 108 MEQ/L (ref 98–109)
CO2 SERPL-SCNC: 21 MEQ/L (ref 22–32)
CREAT SERPL-MCNC: 2.5 MG/DL
ERYTHROCYTE [DISTWIDTH] IN BLOOD BY AUTOMATED COUNT: 14.7 % (ref 11.6–14.4)
GFR SERPL CREATININE-BSD FRML MDRD: 25.7 ML/MIN/1.73M*2
GLUCOSE BLD-MCNC: 117 MG/DL (ref 70–99)
GLUCOSE BLD-MCNC: 145 MG/DL (ref 70–99)
GLUCOSE BLD-MCNC: 162 MG/DL (ref 70–99)
GLUCOSE BLD-MCNC: 234 MG/DL (ref 70–99)
GLUCOSE SERPL-MCNC: 159 MG/DL (ref 70–99)
HCT VFR BLDCO AUTO: 40.8 %
HGB BLD-MCNC: 13.4 G/DL
MCH RBC QN AUTO: 29.8 PG (ref 28–33.2)
MCHC RBC AUTO-ENTMCNC: 32.8 G/DL (ref 32.2–36.5)
MCV RBC AUTO: 90.9 FL (ref 83–98)
PDW BLD AUTO: 12 FL (ref 9.4–12.4)
PLATELET # BLD AUTO: 138 K/UL
POCT TEST: ABNORMAL
POTASSIUM SERPL-SCNC: 3.7 MEQ/L (ref 3.6–5.1)
RBC # BLD AUTO: 4.49 M/UL (ref 4.5–5.8)
SODIUM SERPL-SCNC: 138 MEQ/L (ref 136–144)
WBC # BLD AUTO: 8.39 K/UL

## 2019-03-28 PROCEDURE — 99239 HOSP IP/OBS DSCHRG MGMT >30: CPT | Performed by: HOSPITALIST

## 2019-03-28 PROCEDURE — 85027 COMPLETE CBC AUTOMATED: CPT | Performed by: HOSPITALIST

## 2019-03-28 PROCEDURE — 63700000 HC SELF-ADMINISTRABLE DRUG: Performed by: HOSPITALIST

## 2019-03-28 PROCEDURE — 63700000 HC SELF-ADMINISTRABLE DRUG: Performed by: INTERNAL MEDICINE

## 2019-03-28 PROCEDURE — 63600000 HC DRUGS/DETAIL CODE: Performed by: HOSPITALIST

## 2019-03-28 PROCEDURE — 80048 BASIC METABOLIC PNL TOTAL CA: CPT | Performed by: HOSPITALIST

## 2019-03-28 RX ORDER — HYDRALAZINE HYDROCHLORIDE 100 MG/1
100 TABLET, FILM COATED ORAL EVERY 8 HOURS
Qty: 90 TABLET | Refills: 0 | Status: SHIPPED | OUTPATIENT
Start: 2019-03-28 | End: 2019-08-13 | Stop reason: DRUGHIGH

## 2019-03-28 RX ORDER — ISOSORBIDE MONONITRATE 60 MG/1
60 TABLET, EXTENDED RELEASE ORAL DAILY
Status: DISCONTINUED | OUTPATIENT
Start: 2019-03-29 | End: 2019-03-28 | Stop reason: HOSPADM

## 2019-03-28 RX ORDER — ASPIRIN 81 MG/1
81 TABLET ORAL DAILY
Status: DISCONTINUED | OUTPATIENT
Start: 2019-03-29 | End: 2019-03-28 | Stop reason: HOSPADM

## 2019-03-28 RX ORDER — CLONIDINE HYDROCHLORIDE 0.1 MG/1
0.1 TABLET ORAL ONCE
Status: COMPLETED | OUTPATIENT
Start: 2019-03-28 | End: 2019-03-28

## 2019-03-28 RX ORDER — INSULIN ASPART 100 [IU]/ML
10 INJECTION, SUSPENSION SUBCUTANEOUS
Refills: 0
Start: 2019-03-28 | End: 2019-08-13 | Stop reason: ALTCHOICE

## 2019-03-28 RX ORDER — ISOSORBIDE MONONITRATE 60 MG/1
60 TABLET, EXTENDED RELEASE ORAL DAILY
Qty: 14 TABLET | Refills: 0 | Status: SHIPPED | OUTPATIENT
Start: 2019-03-29 | End: 2019-08-13 | Stop reason: ALTCHOICE

## 2019-03-28 RX ORDER — ISOSORBIDE MONONITRATE 60 MG/1
60 TABLET, EXTENDED RELEASE ORAL ONCE
Status: COMPLETED | OUTPATIENT
Start: 2019-03-28 | End: 2019-03-28

## 2019-03-28 RX ORDER — CLONIDINE HYDROCHLORIDE 0.2 MG/1
0.2 TABLET ORAL 2 TIMES DAILY
COMMUNITY

## 2019-03-28 RX ORDER — AMLODIPINE BESYLATE 5 MG/1
5 TABLET ORAL DAILY
Qty: 14 TABLET | Refills: 0 | Status: SHIPPED | OUTPATIENT
Start: 2019-03-29 | End: 2019-05-28 | Stop reason: ALTCHOICE

## 2019-03-28 RX ORDER — HYDRALAZINE HYDROCHLORIDE 20 MG/ML
10 INJECTION INTRAMUSCULAR; INTRAVENOUS EVERY 6 HOURS PRN
Status: DISCONTINUED | OUTPATIENT
Start: 2019-03-28 | End: 2019-03-28 | Stop reason: HOSPADM

## 2019-03-28 RX ADMIN — ISOSORBIDE MONONITRATE 60 MG: 60 TABLET, EXTENDED RELEASE ORAL at 08:35

## 2019-03-28 RX ADMIN — ACETAMINOPHEN 650 MG: 325 TABLET ORAL at 09:00

## 2019-03-28 RX ADMIN — HYDRALAZINE HYDROCHLORIDE 100 MG: 25 TABLET ORAL at 06:08

## 2019-03-28 RX ADMIN — FINASTERIDE 5 MG: 5 TABLET, FILM COATED ORAL at 08:29

## 2019-03-28 RX ADMIN — LEVOTHYROXINE SODIUM 100 MCG: 100 TABLET ORAL at 06:08

## 2019-03-28 RX ADMIN — POLYETHYLENE GLYCOL 3350 17 G: 17 POWDER, FOR SOLUTION ORAL at 08:33

## 2019-03-28 RX ADMIN — CLONIDINE HYDROCHLORIDE 0.2 MG: 0.2 TABLET ORAL at 08:29

## 2019-03-28 RX ADMIN — CLONIDINE HYDROCHLORIDE 0.1 MG: 0.1 TABLET ORAL at 04:53

## 2019-03-28 RX ADMIN — LAMOTRIGINE 150 MG: 100 TABLET ORAL at 08:29

## 2019-03-28 RX ADMIN — PANTOPRAZOLE SODIUM 40 MG: 40 TABLET, DELAYED RELEASE ORAL at 08:29

## 2019-03-28 RX ADMIN — INSULIN ASPART 6 UNITS: 100 INJECTION, SOLUTION INTRAVENOUS; SUBCUTANEOUS at 12:30

## 2019-03-28 RX ADMIN — HYDRALAZINE HYDROCHLORIDE 10 MG: 20 INJECTION, SOLUTION INTRAMUSCULAR; INTRAVENOUS at 02:50

## 2019-03-28 RX ADMIN — AMIODARONE HYDROCHLORIDE 200 MG: 200 TABLET ORAL at 08:29

## 2019-03-28 RX ADMIN — HYDRALAZINE HYDROCHLORIDE 100 MG: 25 TABLET ORAL at 13:55

## 2019-03-28 NOTE — PLAN OF CARE
Problem: Patient Care Overview  Goal: Plan of Care Review   03/28/19 1625   Plan of Care Review   Outcome Summary Discussed patient in patient care rounds, S/P ureteral stent 3/27/2019, extra dose of Inderal given to patient for elevated BP today secondary to patient's refusal of Amlodipine (pt states allergic to it) Patient ambulating to bathroom and voiding without difficulty. Pt. is for possible discharge 3/29/2019 pending BP:.

## 2019-03-28 NOTE — PROGRESS NOTES
CARDIOLOGY DAILY PROGRESS NOTE    Nathan Alonzo is a 68 y.o. male who was admitted for Essential hypertension [I10]  Renal colic on right side [N23]  Atrial fibrillation, unspecified type (CMS/HCC) [I48.91].    INTERVAL HISTORY:  S/p cystosopy, stenting.  Post anesthesia hypertension, has happened in past as well.  No CP, SOB, HA, nausea, visual changes.  Willing to take amlodipine for now for HTN.  Imdur started by PA.  Did not get propranolol.      CURRENT IP MEDS:    amiodarone 200 mg oral Daily   amLODIPine 5 mg oral Daily   atorvastatin 40 mg oral Nightly   cloNIDine 0.2 mg oral BID   finasteride 5 mg oral Daily   hydrALAZINE 100 mg oral q8h THADDEUS   insulin aspart U-100 6-10 Units subcutaneous With meals & nightly   [START ON 3/29/2019] isosorbide mononitrate 60 mg oral Daily   lamoTRIgine 150 mg oral Daily   levothyroxine 100 mcg oral Daily (6:30a)   pantoprazole 40 mg oral Daily   polyethylene glycol 17 g oral Daily   propranolol 20 mg oral BID       Objective     Vital signs in last 24 hours:  Temp:  [36.1 °C (96.9 °F)-36.8 °C (98.2 °F)] 36.8 °C (98.2 °F)  Heart Rate:  [38-68] 54  Resp:  [13-29] 18  BP: (108-235)/() 217/94  FiO2 (%) (Set):  [21 %] 21 %      Intake/Output Summary (Last 24 hours) at 03/28/19 0921  Last data filed at 03/28/19 0800   Gross per 24 hour   Intake          1010.42 ml   Output             2200 ml   Net         -1189.58 ml     Intake/Output this shift:  I/O this shift:  In: -   Out: 600 [Urine:600]    PHYSICAL EXAM  Physical Exam  General Appearance:  Alert, no distress   Head:  Normocephalic, without obvious abnormality, atraumatic   Eyes:  Conjunctiva/corneas clear, EOM's intact   Endocrine: No thyroid enlargement    Lungs:   Clear to auscultation bilaterally, respirations unlabored, no rales, no wheezing   Heart:  Regular bradycardia, S1 and S2 normal, soft apical systolic murmur, no rub or gallop   Abdomen:   Soft, non-tender, no masses, no organomegaly   Vascular: Pulses 2+  and symmetric all extremities, no carotid bruit or jugular vein distention   Musculoskeletal:  Skin: No injury or deformity  Skin color, texture, turgor normal, no rashes or lesions, no cyanosis or edema   Extremities: Extremities normal, atraumatic   Behavior/Emotional: Appropriate, cooperative       LABS   CMP Results       03/28/19 03/27/19 03/26/19                    0452 0501 0337          138 140         K 3.7 3.6 3.9         Cl 108 103 104         CO2 21 (L) 23 25         Glucose 159 (H) 191 (H) 177 (H)         BUN 29 (H) 34 (H) 29 (H)         Creatinine 2.5 (H) 3.1 (H) 2.9 (H)         Calcium 8.8 (L) 8.6 (L) 8.6 (L)         Anion Gap 9 12 11         EGFR 25.7 (L) 20.0 (L) 21.6 (L)                     CBC Results       03/28/19 03/27/19 03/26/19                    0452 0501 0337         WBC 8.39 8.37 9.59         RBC 4.49 (L) 4.15 (L) 4.32 (L)         HGB 13.4 (L) 12.2 (L) 12.8 (L)         HCT 40.8 37.4 (L) 38.9 (L)         MCV 90.9 90.1 90.0         MCH 29.8 29.4 29.6         MCHC 32.8 32.6 32.9          (L) 124 (L) 148 (L)         Comment for HGB at 0337 on 03/26/19:  RESULT CHECKED        Troponin I Results       01/24/19 01/23/19 07/03/18                    0500 0804 1127         Troponin I 0.05 (H) 0.04 0.03                     PT/PTT Results       03/26/19 01/15/19 10/04/18                    1821 1510 1231         PT 14.3 15.0 (H) 17.1 (H)         INR 1.2 1.2 1.4         PTT 50 (H) 61 (H) 54 (H)         Comment for INR at 1821 on 03/26/19:    INR has no defined significance when PT is within Reference Range.    Comment for INR at 1510 on 01/15/19:    Moderate Intensity Anticoagulation = 2.0 to 3.0, High Intensity = 2.5 to 3.5    Comment for INR at 1231 on 10/04/18:    Moderate Intensity Anticoagulation = 2.0 to 3.0, High Intensity = 2.5 to 3.5    Comment for PTT at 1821 on 03/26/19:    The Standard Therapeutic Range for Heparin is 68 to 101 seconds.    Comment for PTT at 1510 on 01/15/19:     The Standard Therapeutic Range for Heparin is 68 to 101 seconds.    Comment for PTT at 1231 on 10/04/18:    The Standard Therapeutic Range for Heparin is 68 to 101 seconds.        Lab Results   Component Value Date    CHOL 93 02/10/2018    TRIG 144 02/10/2018    HDL 31 (L) 02/10/2018    LDLCALC 33 02/10/2018    TSH 4.01 01/25/2019    HGBA1C 9.4 (H) 01/25/2019         Imaging  Ct Abdomen Pelvis Without Iv Contrast    Result Date: 3/25/2019  IMPRESSION: Right-sided the ureteropelvic junction calculus with mild right hydronephrosis. Bilateral nonobstructing renal calculi. Post operative changes right kidney. Small right pleural effusion.     X-ray Abdomen 1 View    Result Date: 3/25/2019  IMPRESSION: Limited study.  Right-sided proximal ureteral stone is not definitively identified.    Ultrasound Kidneys    Addendum Date: 3/26/2019    Ebony car [id number] was called at 4:22 pm on 3/26/2019 and advised radiology results are now available for viewing.    Result Date: 3/26/2019  IMPRESSION: Mild right hydronephrosis and obstructing right ureteropelvic junction stone. Nonobstructing stone in the right lower pole.  Left-sided renal cyst Right-sided ureteropelvic junction stone has not changed in position.  Right lower pole renal stone stable in position    Fl Fluoroscopy Technical Assistance    Result Date: 3/27/2019  IMPRESSION: Fluoroscopy was performed in the operating room during performance of urologic procedure.  Images were not obtained.      No new imaging study.    ECG   No new ECG.    TELEMETRY  Sinus brayd, 40-50 bpm    Assessment/Plan   Tremor   Assessment & Plan    On propanolol; started by neurology here during prior admission. Heart rate low but stable.         Acute renal failure superimposed on stage 3 chronic kidney disease (CMS/[facility])   Assessment & Plan    Creatinine up to 3.1 this AM. Baseline creatinine ~1.8 per prior records. Likely due to calculus. Agree with holding ACE/diuretics for now.     Creat  "down to 2.5.  Continue to hold ACE/Diuretic         Acquired hypothyroidism   Assessment & Plan    As per primary service.         Essential hypertension   Assessment & Plan    Known labile BP.     Agree with holding ACE and diuretics for now given ASHELY. Amlodipine and Imdur added by primary service as replacements. Please note that his \"allergy\" to amlodipine is lower extremity edema, which is a typical side effect and not a true allergy. OK to continue for now given limited choices for control of his labile BP with ASHELY. On propanolol with chronic/stable bradycardia. Monitor BP in the perioperative period.     Would not uptitrate clonidine or propanolol given his bradycardia. Can increase both amlodipine and Imdur as needed.     Very hypertensive since procedure.  Occured in past as well after anesthesia.  Agreeable to taking amlodipine for now.  Resistent for chronic therapy due to edema/swelling.  Imdur adjsted as well.          CAD (coronary artery disease) of artery bypass graft   Assessment & Plan    Status post CABG x 3 in 2017.      He has no symptoms to suggest angina. Ecg is without ischemic changes and is unchanged from prior.     On propanolol. Hold ACE with ASHELY. Imdur added by primary service; agree with this addition. Continue statin.     Remains angina free.          Atrial fibrillation (CMS/[facility])   Assessment & Plan    CHADSVasc 4. The patient is chronically anticoagulated on Eliquis as an outpatient, at appropriate dose of 5mg BID for age < 80, weight > 130 lbs. He does have chronic renal impairment.     OK to hold Eliquis for now in advance of urologic procedure. Should be resumed when OK with urology. Clinically in sinus rhythm. Continue amiodarone.     Maintining SR.  On Amiodarone.  Resume Eliquis when ok with urology.        * Renal colic on right side   Assessment & Plan    For uretal stent placement today. See ful consultation 3/26/2019. Remains CV stable to proceed.                    Krysten " DO Chad  3/28/2019

## 2019-03-28 NOTE — PATIENT CARE CONFERENCE
Care Progression Rounds Note  Date: 3/28/2019  Time: 11:09 AM     Patient Name: Nathan Alonzo     Medical Record Number: [id number]   YOB: 1950  Sex: Male      Room/Bed: 2503D    Admitting Diagnosis: Essential hypertension [I10]  Renal colic on right side [N23]  Atrial fibrillation, unspecified type (CMS/HCC) [I48.91]   Admit Date/Time: 3/25/2019  4:39 AM    Primary Diagnosis: Renal colic on right side  Principal Problem: Renal colic on right side    GMLOS: 2.1  Anticipated Discharge Date: 3/29/2019    AM-PAC  Mobility Score: 24    Discharge Planning:  Concerns To Be Addressed: denies needs/concerns at this time, financial/insurance concerns  Anticipated Discharge Disposition: home without services    Barriers to Discharge:  Barriers to Discharge: Medical issues not resolved  Comment: OR at 2pm stone removal, tele po    Participants:  , dietitian/nutrition services, nursing, pharmacy, social work/services

## 2019-03-28 NOTE — DISCHARGE SUMMARY
Hospital Medicine Service -  Inpatient Discharge Summary        BRIEF OVERVIEW   Admitting Provider: Eleanor Jiang MD  Attending Provider: Eleanor Jiang MD Attending phys phone: [phone]    PCP: Serafin Zavaleta [phone]    Admission Date: 3/25/2019  Discharge Date: 3/28/2019     DISCHARGE DIAGNOSES      Active Hospital Problems    Diagnosis Date Noted   • Renal colic on right side 03/25/2019     Priority: High   • Acute renal failure superimposed on stage 3 chronic kidney disease (CMS/[facility]) 01/22/2019     Priority: Medium   • Essential hypertension 03/16/2018     Priority: Medium   • Tremor 01/24/2019     Priority: Low   • Bipolar 1 disorder (CMS/[facility]) ([facility]) 01/22/2019     Priority: Low   • Atrial fibrillation (CMS/[facility]) 03/16/2018     Priority: Low   • CAD (coronary artery disease) of artery bypass graft 03/16/2018     Priority: Low   • Hypercholesteremia 03/16/2018     Priority: Low   • Acquired hypothyroidism 03/16/2018     Priority: Low   • Type 2 diabetes mellitus with complication, with long-term current use of insulin (CMS/[facility]) 03/16/2018     Priority: Low   • GERD (gastroesophageal reflux disease) 03/25/2016     Priority: Low      Resolved Hospital Problems    Diagnosis Date Noted Date Resolved   No resolved problems to display.     SUMMARY OF HOSPITALIZATION      Pt is 67 yo M w/ PMH of afib on Eliquis, HTN, hx of R partial nephrectomy for RCC, hx of BPH, DM, LILLIAM/CPAP, CAD/CABG, CKD, and renal stones admitted w/ renal colic and found to have an 8mm obstructing stone in the right ureter.  Initially, pt's kidney function was stable and pain well-controlled so he was monitored on IV fluids.  But his Cr started to rise so decision was made for procedure and he underwent stent placement with  on 3/27.  After the procedure, his Cr started to trend back downwards but was not quite at his baseline at time of discharge so his blood pressure regimen was altered.  He has labile blood pressures normally  which tend to worsen with anesthesia.  We have his ACEI/diuretic on hold and have added amlodipine/Imdur as well as increasing his hydralazine dose w/ improvement in his blood pressures.  He felt good and was discharged home w/ plans to f/u with his urologist, nephrologist, and cardiologist.  Both his cardiologist and nephrologist (who work in [place]) were updated regarding the details of his stay and the changes in his medications.  Pt also knows to get a BMP rechecked within the next 3-5 days to see if his Cr returns to his baseline.      Pt was personally seen and examined on day of discharge.      Discharge coordination including med rec, instructions, counseling, and dispo = 45 minutes.      Pertinent Imaging During Hospital Stay:  Ct Abdomen Pelvis Without Iv Contrast    Result Date: 3/25/2019  IMPRESSION: Right-sided the ureteropelvic junction calculus with mild right hydronephrosis. Bilateral nonobstructing renal calculi. Post operative changes right kidney. Small right pleural effusion.     X-ray Abdomen 1 View    Result Date: 3/25/2019  IMPRESSION: Limited study.  Right-sided proximal ureteral stone is not definitively identified.    Ultrasound Kidneys    Addendum Date: 3/26/2019    Ebony car [id number] was called at 4:22 pm on 3/26/2019 and advised radiology results are now available for viewing.    Result Date: 3/26/2019  IMPRESSION: Mild right hydronephrosis and obstructing right ureteropelvic junction stone. Nonobstructing stone in the right lower pole.  Left-sided renal cyst Right-sided ureteropelvic junction stone has not changed in position.  Right lower pole renal stone stable in position    Fl Fluoroscopy Technical Assistance    Result Date: 3/27/2019  IMPRESSION: Fluoroscopy was performed in the operating room during performance of urologic procedure.  Images were not obtained.        DISCHARGE MEDICATIONS        Medication List      START taking these medications    amLODIPine 5 mg  tablet  Commonly known as:  NORVASC  Take 1 tablet (5 mg total) by mouth daily for 14 days.  Start taking on:  3/29/2019     isosorbide mononitrate 60 mg 24 hr tablet  Commonly known as:  IMDUR  Take 1 tablet (60 mg total) by mouth daily for 14 days.  Start taking on:  3/29/2019        CHANGE how you take these medications    hydrALAZINE 100 mg tablet  Commonly known as:  APRESOLINE  Take 1 tablet (100 mg total) by mouth every 8 (eight) hours.  What changed:  · medication strength  · how much to take     insulin asp prt-insulin aspart 100 unit/mL (70-30) subcutaneous pen  Commonly known as:  novoLOG MIX 70-30  Inject 10 Units under the skin 2 (two) times a day before breakfast and dinner.  What changed:  how much to take        CONTINUE taking these medications    amiodarone 200 mg tablet  Commonly known as:  PACERONE  Take 200 mg by mouth daily.     apixaban 5 mg tablet  Commonly known as:  ELIQUIS  Take 5 mg by mouth 2 (two) times a day. PT TO HOLD ELIQUIS 3 DAYS PRIOR TO SX AS PER HIS CARDIOLOGIST     aspirin 81 mg enteric coated tablet  Take 81 mg by mouth daily. PT TO STOP 5 DAYS PRIOR TO SX AS PER HIS CARDIOLOGIST     atorvastatin 40 mg tablet  Commonly known as:  LIPITOR  Take 40 mg by mouth daily.     cloNIDine 0.2 mg tablet  Commonly known as:  CATAPRES  Take 0.2 mg by mouth 2 (two) times a day.     CO Q-10 400 mg capsule  Generic drug:  coenzyme Q10  Take 400 mg by mouth daily.     finasteride 5 mg tablet  Commonly known as:  PROSCAR  Take 5 mg by mouth daily.     lamoTRIgine 150 mg tablet  Commonly known as:  LaMICtal  Take 1 tablet (150 mg total) by mouth daily.     levothyroxine 100 mcg tablet  Commonly known as:  SYNTHROID  Take 100 mcg by mouth daily.     omeprazole 40 mg capsule  Commonly known as:  PriLOSEC  Take 40 mg by mouth daily before breakfast.     propranolol 20 mg tablet  Commonly known as:  INDERAL  Take 1 tablet (20 mg total) by mouth 2 (two) times a day.        STOP taking these  medications    bumetanide 1 mg tablet  Commonly known as:  BUMEX     lisinopril 40 mg tablet  Commonly known as:  PRINIVIL            Instructions for after discharge     Diet       Diet Type:  Regular    Follow-up with Provider:       Instructions for follow-up:  within 1 week    Follow-up with Provider:       Instructions for follow-up:  within 1-2 weeks    Other Follow-up       Instructions for follow-up:  follow up with Dr. Hernandez within 1-2 weeks    Post-Discharge Activity: Normal activity as tolerated.       Normal activity as tolerated.    Basic metabolic panel       Which Provider would you like to Cc?:   URSULA TRAN Comment - send to Dr. Eren Hernandez (nephrology)  KAROL GRAY                PENDING TESTS        None    OUTPATIENT  FOLLOW-UP        Important Issues to Address in Follow-Up  F/u with urology, nephrology, and cardiology    DISCHARGED TO:      Disposition: home

## 2019-03-28 NOTE — PROGRESS NOTES
"Nephrology Follow Up Note - [facility]    Subjective     Patient reports feeling better, denies difficulty urinating, dysuria, hematuria. Pt c/o constipation.    Objective     Physical Exam  BP (!) 168/84 (BP Location: Right upper arm, Patient Position: Lying)   Pulse 61   Temp 36.7 °C (98 °F) (Oral)   Resp 18   Ht 1.88 m (6' 2\")   Wt 125 kg (274 lb 9.6 oz)   SpO2 100%   BMI 35.26 kg/m²     Intake/Output Summary (Last 24 hours) at 03/28/19 1054  Last data filed at 03/28/19 0800   Gross per 24 hour   Intake          1010.42 ml   Output             2200 ml   Net         -1189.58 ml       General Appearance:  Alert, no distress, appears stated age   Head:  Normocephalic, without obvious abnormality, atraumatic   Eyes:  Conjunctiva/corneas clear, no scleral icterus       Ears:  No external abnormalities   Throat: Mucous membranes moist   Neck: No JVD   Lungs:   Clear to auscultation bilaterally, respirations unlabored   Heart:  Regular rate and rhythm, S1 and S2 normal, no murmur, rub or gallop   Abdomen:   Soft, non-tender, non-distended, bowel sounds decreased   Genitourinary:  No suprapubic tenderness or fullness   Extremities/MSK: Lower extremities without edema   Skin: Skin without rashes or lesions   Neurologic:  Behavior/  Emotional: AAO x 3  Appropriate, cooperative, following commands     Labs   Recent Results (from the past 24 hour(s))   POCT Glucose    Collection Time: 03/27/19 12:04 PM   Result Value Ref Range    POCT Bedside Glucose 162 (H) 70 - 99 mg/dL    POC Test POC    POCT Glucose    Collection Time: 03/27/19  2:36 PM   Result Value Ref Range    POCT Bedside Glucose 154 (H) 70 - 99 mg/dL    POC Test POC    POCT Glucose    Collection Time: 03/27/19  9:53 PM   Result Value Ref Range    POCT Bedside Glucose 174 (H) 70 - 99 mg/dL    POC Test POC    POCT Glucose    Collection Time: 03/28/19 12:18 AM   Result Value Ref Range    POCT Bedside Glucose 162 (H) 70 - 99 mg/dL    POC Test POC  "   Basic metabolic panel    Collection Time: 03/28/19  4:52 AM   Result Value Ref Range    Sodium 138 136 - 144 mEQ/L    Potassium 3.7 3.6 - 5.1 mEQ/L    Chloride 108 98 - 109 mEQ/L    CO2 21 (L) 22 - 32 mEQ/L    BUN 29 (H) 8 - 20 mg/dL    Creatinine 2.5 (H) 0.8 - 1.3 mg/dL    Glucose 159 (H) 70 - 99 mg/dL    Calcium 8.8 (L) 8.9 - 10.3 mg/dL    eGFR 25.7 (L) >=60.0 mL/min/1.73m*2    Anion Gap 9 3 - 15 mEQ/L   CBC    Collection Time: 03/28/19  4:52 AM   Result Value Ref Range    WBC 8.39 3.80 - 10.50 K/uL    RBC 4.49 (L) 4.50 - 5.80 M/uL    Hemoglobin 13.4 (L) 13.7 - 17.5 g/dL    Hematocrit 40.8 40.1 - 51.0 %    MCV 90.9 83.0 - 98.0 fL    MCH 29.8 28.0 - 33.2 pg    MCHC 32.8 32.2 - 36.5 g/dL    RDW 14.7 (H) 11.6 - 14.4 %    Platelets 138 (L) 150 - 350 K/uL    MPV 12.0 9.4 - 12.4 fL   POCT Glucose    Collection Time: 03/28/19  7:58 AM   Result Value Ref Range    POCT Bedside Glucose 145 (H) 70 - 99 mg/dL    POC Test POC      Current Labs/Diagnostic tests were reviewed.      Current Medications were reviewed.    Assessment   68 y.o. male being consulted for acute kidney injury on CKD stage 3B        Plan     1) acute kidney injury on underlying CKD stage 3B  Baseline Cr high 1s  Primary neph Dr Eren Hernandez in [facility] [phone], I spoke with him 3/26 via phone  CKD multifactorial: HTN, DM, nephrolithiasis, ASHELY episodes  ASHELY due to obstructive nephropathy (see below), ?vol depletion on diuretic, ACEI  UA similar to prior in 1/2019  Cont to hold lisinopril and bumetanide  S/p NSS drip  Cr down to 2.5 today s/p R ureteral stent placement 3/27  Monitor all urine output  Avoid NSAIDs, IV dye, other nephrotoxins  Dose meds for eGFR < 30     2) Obstructive nephropathy with hydronephrosis due to obstructing R renal calculus  CT Abd showed 7mm R UPJ stone with mild hydronephrosis  Pain control, but avoid NSAIDs  Prior stone analysis 1/2019 showed 80% uric acid and 20% Ca Oxalate  Serum uric acid level not elevated  Now off  IVF  Consider 24 hr urine for stone profile as outpatient  S/p ureteral stent placement by urology 3/27  Needs close outpatient urology f/u     3) HTN  Elevated BP  Holding ACEI and diuretic due to ASHELY  Currently on clonidine 0.2mg bid, hydralazine 100mg q8 hrs, just started on imdur ER this adm, and titrated up to 60mg now  Also just started on amlodipine 5mg/day  May have difficulty titrating BP meds due to HR in 40s  Also on propranolol, but not for HTN control  Target BP < 130/80 with proteinuric CKD     4) DMT2  Target HbA1C < 7% to prevent progression of CKD  Underlying component of DM nephropathy given proteinuria on UA  Would benefit from resuming RAAS Blockade when ASHELY resolves    Shanita Gayle,     [facility]

## 2019-03-28 NOTE — DISCHARGE INSTRUCTIONS
You were treated for a kidney stone which was obstructing your ureter.  You received a stent in the ureter to help open it up and allow your kidney to drain.  This has helped your kidney function to start to improve!    Because your kidney function is not quite as good as normal still, we have had to adjust your blood pressure medications.  FOR NOW, you will do the following changes:  - HOLD your LISINOPRIL and BUMEX (bumetanide - water pill)  - INCREASE your HYDRALAZINE to 100mg three times daily (we have given you a new prescription for 100mg tabs so you can just take one of these at a time)  - START taking AMLODIPINE 5mg once daily FOR NOW  - START taking IMDUR (isosorbide mononitrate) 60mg once daily FOR NOW    On Monday 4/1/19, please get your blood work repeated to check your kidney function.  If it is back to normal, then you should be able to resume your regular blood pressure medication regimen but make sure to touch base with Dr. Mercado or Dr. Hernandez first.    Regarding your insulin, please start your dosing at home at 10 units twice daily for now and monitor your sugars.    Follow up with the urologist within 1 week.  See your cardiologist and nephrologist within the next 2 weeks.    Take care!

## 2019-03-28 NOTE — PROGRESS NOTES
Hospital Medicine Service -  Daily Progress Note       SUBJECTIVE   No acute events overnight.  BPs have been running high since his procedure as has happened in the past.  No pain, mild HA.  Not much appetite post-op last night but feels better this AM.  Hoping to go for a walk.     OBJECTIVE      Vital signs in last 24 hours:  Temp:  [36.1 °C (96.9 °F)-36.8 °C (98.2 °F)] 36.8 °C (98.2 °F)  Heart Rate:  [38-68] 54  Resp:  [13-29] 18  BP: (108-235)/() 217/94  FiO2 (%) (Set):  [21 %] 21 %    Intake/Output Summary (Last 24 hours) at 03/28/19 0850  Last data filed at 03/28/19 0800   Gross per 24 hour   Intake          1010.42 ml   Output             2200 ml   Net         -1189.58 ml       PHYSICAL EXAMINATION      General: WM, appropriate, cooperative, comfortable  HENT: normocephalic, atraumatic, MMM, no oral lesions  Eyes: anicteric sclera  Neck: supple, normal ROM  Resp: CTA B/L, no wheezes/rhonchi/rales  Cardiac: RRR, no murmurs/rubs/gallop  Abdomen: soft, nontender, normal BS, no significant CVA or R flank tenderness  Extremities: no significant edema  Neuro: nonfocal, awake, alert  Behavior: cooperative, calm  Lymph: no adenopathy noted  Skin: clean, dry, intact; no rashes or lesions noted   LABS / IMAGING / TELE      Labs  All new labs reviewed  Cr 2.5    Imaging  No new    ECG/Telemetry  NSR    Lines, Drains, Airways, Wounds:  Peripheral IV 03/25/19 Left Forearm (Active)   Number of days: 3     Discussed case with  Dr. Krysten Patton    ASSESSMENT AND PLAN      * Renal colic on right side   Assessment & Plan    Hx of renal stones, stone analysis mostly uric acid stones; serum uric acid normal  Had perc nephrostomy tube in 1/2019 and removal w/ stone treatment  Presents to ER w/ severe acute onset right renal colic and nausea    - afebrile, mild leukocytosis  - UA appears clean so hold off on Abx  - elevated Cr above baseline so s/p OR with  3/27 for ureteral stent placement  - Cr improving         Acute renal failure superimposed on stage 3 chronic kidney disease (CMS/[facility])   Assessment & Plan    Baseline Cr 1.5-2 and higher at some times    - Cr now at 2.5  - s/p surgical intervention for pt's obstructing stone/mild hydro  - renal consulted  - hold ACEI/Bumex  - monitor        Essential hypertension   Assessment & Plan    Home meds: hydralazine 75mg BID, lisinopril 20mg BID, Bumex 1mg BID, clonidine 0.2mg BID  Follows w/ cardiologist in [place]; pt's wife requests he not see a cardiologist from [facility] due to insurance issues  BPs high in ER likely 2/2 pain and need for AM meds    - cont home meds except ACEI and diuretic on hold for ASHELY  - added Imdur 30mg Q24, amlodipine 5mg Q24, and increased hydralazine to 100mg Q8 for persistently elevated BPs  - pt has had blood pressures issues associated w/ anesthesia in the past; consult placed to cardiology for assistance w/ francesca-operative management  - also spoke w/ pt's cardiologist Dr. Mercado and discussed case with him; he is comfortable with him undergoing the procedure and recommends low-dose amlodipine for francesca-operative BP management in the setting of holding his ACEI/diuretic; he was also in agreement with the low-dose Imdur  - per-op EKG sinus gurmeet, RBBB, TWIs noted - have been present on prior tracings  - monitor        Tremor   Assessment & Plan    Cont home propranolol 20mg BID w/ hold parameters (pt has resting bradycardia)        Bipolar 1 disorder (CMS/[facility]) ([facility])   Assessment & Plan    Cont home Lamictal 150mg Q24        GERD (gastroesophageal reflux disease)   Assessment & Plan    Cont PPI        Type 2 diabetes mellitus with complication, with long-term current use of insulin (CMS/[facility])   Assessment & Plan    Home regimen: Novolog 70-30 - 20 units BID    - home insulin on hold   - ISS/accuchecks        Acquired hypothyroidism   Assessment & Plan    Cont home Synthroid        Hypercholesteremia   Assessment & Plan    Cont home statin         CAD (coronary artery disease) of artery bypass graft   Assessment & Plan    On ASA 81mg Q24, BB/statin    - hold ASA for OR  - cont BB (on propranolol more for tremor) and statin        Atrial fibrillation (CMS/HCC)   Assessment & Plan    Has had resting bradycardia; on propranolol for tremor but not other BB  On home ASA 81mg Q24 and Eliquis 5mg BID    - holding anticoagulation francesca-operatively  - cont home BB             VTE Assessment: Padua VTE Score: 5  Current VTE Prophylaxis Plan: SCDs    Code Status: Full Code    Estimated discharge date: 3/29/2019  Dispo Plan: home     Eleanor Jiang MD  3/28/2019  8:50 AM

## 2019-03-29 ENCOUNTER — PATIENT OUTREACH (OUTPATIENT)
Dept: CASE MANAGEMENT | Facility: CLINIC | Age: 69
End: 2019-03-29

## 2019-04-01 ENCOUNTER — HOSPITAL ENCOUNTER (OUTPATIENT)
Dept: RADIOLOGY | Facility: HOSPITAL | Age: 69
Discharge: HOME | End: 2019-04-01
Attending: UROLOGY
Payer: MEDICARE

## 2019-04-01 ENCOUNTER — TRANSCRIBE ORDERS (OUTPATIENT)
Dept: RADIOLOGY | Facility: HOSPITAL | Age: 69
End: 2019-04-01

## 2019-04-01 DIAGNOSIS — N20.0 CALCULUS OF KIDNEY: ICD-10-CM

## 2019-04-01 DIAGNOSIS — N18.9 CHRONIC KIDNEY DISEASE: ICD-10-CM

## 2019-04-01 DIAGNOSIS — N20.0 CALCULUS OF KIDNEY: Primary | ICD-10-CM

## 2019-04-01 PROCEDURE — 74018 RADEX ABDOMEN 1 VIEW: CPT

## 2019-04-01 NOTE — PROGRESS NOTES
NAME: Nathan Alonzo    MRN: [id number]    YOB: 1950    EVENT DETAILS    Discharging Facility: [facility]  Date of Admission: 03/25/19  Date of Discharge: 03/28/19  Discharge Instructions Reviewed?:  (Declined review has appt PCP 4/8)         Reason for Admission:  Renal Colic on right side      HPI:   CM s/w pt call attempted twice. Pt has appt on 4/8 and will discuss his Hospital discharge and medications at his visit. Declined TCM call/ review    He is doing fine, encouraged to call if any concerns or questions arise.             Lizzeth Juarez CM

## 2019-04-08 ENCOUNTER — OFFICE VISIT (OUTPATIENT)
Dept: PRIMARY CARE | Facility: CLINIC | Age: 69
End: 2019-04-08
Payer: MEDICARE

## 2019-04-08 VITALS
HEART RATE: 49 BPM | OXYGEN SATURATION: 98 % | RESPIRATION RATE: 18 BRPM | HEIGHT: 74 IN | BODY MASS INDEX: 35.01 KG/M2 | SYSTOLIC BLOOD PRESSURE: 158 MMHG | WEIGHT: 272.8 LBS | DIASTOLIC BLOOD PRESSURE: 78 MMHG

## 2019-04-08 DIAGNOSIS — N17.9 ACUTE RENAL FAILURE SUPERIMPOSED ON STAGE 3 CHRONIC KIDNEY DISEASE, UNSPECIFIED ACUTE RENAL FAILURE TYPE: Primary | ICD-10-CM

## 2019-04-08 DIAGNOSIS — Z79.4 TYPE 2 DIABETES MELLITUS WITH COMPLICATION, WITH LONG-TERM CURRENT USE OF INSULIN (CMS/HCC): ICD-10-CM

## 2019-04-08 DIAGNOSIS — E11.8 TYPE 2 DIABETES MELLITUS WITH COMPLICATION, WITH LONG-TERM CURRENT USE OF INSULIN (CMS/HCC): ICD-10-CM

## 2019-04-08 DIAGNOSIS — N18.3 ACUTE RENAL FAILURE SUPERIMPOSED ON STAGE 3 CHRONIC KIDNEY DISEASE, UNSPECIFIED ACUTE RENAL FAILURE TYPE: Primary | ICD-10-CM

## 2019-04-08 DIAGNOSIS — N20.0 KIDNEY STONE: ICD-10-CM

## 2019-04-08 PROCEDURE — 99213 OFFICE O/P EST LOW 20 MIN: CPT | Performed by: FAMILY MEDICINE

## 2019-04-08 ASSESSMENT — ENCOUNTER SYMPTOMS
HEADACHES: 0
BLOOD IN STOOL: 0
WEAKNESS: 0
ACTIVITY CHANGE: 0
BACK PAIN: 0
CHEST TIGHTNESS: 0
NAUSEA: 0
EYE PAIN: 0
TROUBLE SWALLOWING: 0
SHORTNESS OF BREATH: 0
DYSURIA: 0
NERVOUS/ANXIOUS: 0
ABDOMINAL PAIN: 0
FATIGUE: 0
ARTHRALGIAS: 0
PALPITATIONS: 0
DIARRHEA: 0
NECK PAIN: 0
FREQUENCY: 0

## 2019-04-08 NOTE — PROGRESS NOTES
Daily Progress Note      Subjective      Patient ID: Nathan Alonzo is a 68 y.o. male.    HPI  For stone retrieval in 2 d  Cleared bu cardio, f/u appt tomorrow  Wants to review, discuss  Also, for dm discussion, f/u  Also, s/p hosp reviewe, discussion    The following have been reviewed and updated as appropriate in this visit:       Review of Systems   Constitutional: Negative for activity change and fatigue.   HENT: Negative for congestion, ear pain, tinnitus and trouble swallowing.    Eyes: Negative for pain.   Respiratory: Negative for chest tightness and shortness of breath.    Cardiovascular: Negative for chest pain and palpitations.   Gastrointestinal: Negative for abdominal pain, blood in stool, diarrhea and nausea.   Genitourinary: Negative for dysuria, frequency and urgency.   Musculoskeletal: Negative for arthralgias, back pain and neck pain.   Neurological: Negative for weakness and headaches.   Psychiatric/Behavioral: The patient is not nervous/anxious.    All other systems reviewed and are negative.      Current Outpatient Prescriptions   Medication Sig Dispense Refill   • amiodarone (PACERONE) 200 mg tablet Take 200 mg by mouth daily.     • apixaban (ELIQUIS) 5 mg tablet Take 5 mg by mouth 2 (two) times a day. PT TO HOLD ELIQUIS 3 DAYS PRIOR TO SX AS PER HIS CARDIOLOGIST      • aspirin 81 mg enteric coated tablet Take 81 mg by mouth daily. PT TO STOP 5 DAYS PRIOR TO SX AS PER HIS CARDIOLOGIST      • atorvastatin (LIPITOR) 40 mg tablet Take 40 mg by mouth daily.       • cloNIDine (CATAPRES) 0.2 mg tablet Take 0.2 mg by mouth 2 (two) times a day.     • coenzyme Q10 (CO Q-10) 400 mg capsule Take 400 mg by mouth daily.       • finasteride (PROSCAR) 5 mg tablet Take 5 mg by mouth daily.     • hydrALAZINE (APRESOLINE) 100 mg tablet Take 1 tablet (100 mg total) by mouth every 8 (eight) hours. 90 tablet 0   • insulin asp prt-insulin aspart (novoLOG MIX 70-30) 100 unit/mL (70-30) subcutaneous pen Inject 10  Units under the skin 2 (two) times a day before breakfast and dinner.  0   • isosorbide mononitrate (IMDUR) 60 mg 24 hr tablet Take 1 tablet (60 mg total) by mouth daily for 14 days. 14 tablet 0   • levothyroxine (SYNTHROID) 100 mcg tablet Take 100 mcg by mouth daily.     • omeprazole (PriLOSEC) 40 mg capsule Take 40 mg by mouth daily before breakfast.     • amLODIPine (NORVASC) 5 mg tablet Take 1 tablet (5 mg total) by mouth daily for 14 days. (Patient not taking: Reported on 4/8/2019 ) 14 tablet 0   • lamoTRIgine (LaMICtal) 150 mg tablet Take 1 tablet (150 mg total) by mouth daily. 30 tablet 0   • propranolol (INDERAL) 20 mg tablet Take 1 tablet (20 mg total) by mouth 2 (two) times a day. 60 tablet 0     No current facility-administered medications for this visit.      Past Medical History:   Diagnosis Date   • A-fib (CMS/HCC) (HCC)    • Abnormal ECG     a fib   • Aortic valve disorder    • BPH (benign prostatic hyperplasia)    • BPH (benign prostatic hyperplasia)    • Coronary artery disease    • Disease of thyroid gland    • Hypertension    • Hypothyroidism    • Kidney stones    • Nocturia    • Renal calculi     right   • Renal cancer (CMS/HCC) (HCC)     pt denies   • Right bundle branch block    • Sleep apnea     uses cpap   • Type 2 diabetes mellitus (CMS/HCC) (HCC)      Family History   Problem Relation Age of Onset   • Lung cancer Mother    • Lung cancer Father    • Early death Father    • Diabetes Sister      Past Surgical History:   Procedure Laterality Date   • COLONOSCOPY  2016   • CORONARY ARTERY BYPASS GRAFT  2017    x3   • KIDNEY STONE SURGERY Right 2018   • NEPHRECTOMY Right 2016    PARTIAL    • TONSILLECTOMY     • UVULECTOMY  2005     Social History     Social History   • Marital status:      Spouse name: N/A   • Number of children: N/A   • Years of education: N/A     Occupational History   • Not on file.     Social History Main Topics   • Smoking status: Never Smoker   • Smokeless tobacco:  Never Used   • Alcohol use No   • Drug use: No   • Sexual activity: Defer     Other Topics Concern   • Not on file     Social History Narrative   • No narrative on file     Allergies   Allergen Reactions   • Oxycodone Other (see comments)      Hives     • Amlodipine Other (see comments)     ANKLE  & FEET  SWELLING    • Cephalexin Monohydrate Other (see comments)     PT CAN'T REMEMBER  REACTION   • Latex Rash       Objective   I have reviewed the patient's pertinent labs. Pertinent labs are within normal limits.    Physical Exam   Cardiovascular: Normal rate, regular rhythm, S1 normal, S2 normal, normal heart sounds and normal pulses.    No murmur heard.  Pulmonary/Chest: Effort normal and breath sounds normal.       Assessment/Plan     Problem List Items Addressed This Visit     Type 2 diabetes mellitus with complication, with long-term current use of insulin (CMS/[facility])    Relevant Orders    Hemoglobin A1c    Kidney stone    Acute renal failure superimposed on stage 3 chronic kidney disease (CMS/[facility]) - Primary        Orders Placed This Encounter   Procedures   • Hemoglobin A1c     Standing Status:   Future     Number of Occurrences:   1     Standing Expiration Date:   4/8/2020     Reviewed hosp course, labs, dx, rx, tx, consults  Reviewed ct  Will c/w present plan, reassured, reviewed  Check a1c    Serafin Zavaleta,   4/8/2019

## 2019-04-10 ENCOUNTER — APPOINTMENT (OUTPATIENT)
Dept: RADIOLOGY | Facility: HOSPITAL | Age: 69
Setting detail: HOSPITAL OUTPATIENT SURGERY
End: 2019-04-10
Attending: UROLOGY
Payer: MEDICARE

## 2019-04-10 ENCOUNTER — HOSPITAL ENCOUNTER (OUTPATIENT)
Facility: HOSPITAL | Age: 69
Setting detail: HOSPITAL OUTPATIENT SURGERY
Discharge: HOME | End: 2019-04-10
Attending: UROLOGY | Admitting: UROLOGY
Payer: MEDICARE

## 2019-04-10 ENCOUNTER — ANESTHESIA EVENT (OUTPATIENT)
Dept: OPERATING ROOM | Facility: HOSPITAL | Age: 69
Setting detail: HOSPITAL OUTPATIENT SURGERY
End: 2019-04-10
Payer: MEDICARE

## 2019-04-10 VITALS
OXYGEN SATURATION: 95 % | HEIGHT: 74 IN | TEMPERATURE: 96.9 F | WEIGHT: 271.4 LBS | SYSTOLIC BLOOD PRESSURE: 184 MMHG | DIASTOLIC BLOOD PRESSURE: 83 MMHG | HEART RATE: 49 BPM | RESPIRATION RATE: 13 BRPM | BODY MASS INDEX: 34.83 KG/M2

## 2019-04-10 DIAGNOSIS — N13.2 HYDRONEPHROSIS WITH RENAL CALCULOUS OBSTRUCTION: ICD-10-CM

## 2019-04-10 LAB
GLUCOSE BLD-MCNC: 178 MG/DL (ref 70–99)
GLUCOSE BLD-MCNC: 199 MG/DL (ref 70–99)
POCT TEST: ABNORMAL
POCT TEST: ABNORMAL

## 2019-04-10 PROCEDURE — 25000000 HC PHARMACY GENERAL: Performed by: ANESTHESIOLOGY

## 2019-04-10 PROCEDURE — 63600000 HC DRUGS/DETAIL CODE: Performed by: UROLOGY

## 2019-04-10 PROCEDURE — 0TP98DZ REMOVAL OF INTRALUMINAL DEVICE FROM URETER, VIA NATURAL OR ARTIFICIAL OPENING ENDOSCOPIC: ICD-10-PCS | Performed by: UROLOGY

## 2019-04-10 PROCEDURE — C2617 STENT, NON-COR, TEM W/O DEL: HCPCS | Performed by: UROLOGY

## 2019-04-10 PROCEDURE — 0TC08ZZ EXTIRPATION OF MATTER FROM RIGHT KIDNEY, VIA NATURAL OR ARTIFICIAL OPENING ENDOSCOPIC: ICD-10-PCS | Performed by: UROLOGY

## 2019-04-10 PROCEDURE — 25000000 HC PHARMACY GENERAL: Performed by: NURSE ANESTHETIST, CERTIFIED REGISTERED

## 2019-04-10 PROCEDURE — 74018 RADEX ABDOMEN 1 VIEW: CPT

## 2019-04-10 PROCEDURE — 37000001 HC ANESTHESIA GENERAL: Performed by: UROLOGY

## 2019-04-10 PROCEDURE — 36000013 HC OR LEVEL 3 EA ADDL MIN: Performed by: UROLOGY

## 2019-04-10 PROCEDURE — 71000012 HC PACU PHASE 2 EA ADDL MIN: Performed by: UROLOGY

## 2019-04-10 PROCEDURE — 71000011 HC PACU PHASE 1 EA ADDL MIN: Performed by: UROLOGY

## 2019-04-10 PROCEDURE — 63700000 HC SELF-ADMINISTRABLE DRUG: Performed by: ANESTHESIOLOGY

## 2019-04-10 PROCEDURE — 63600000 HC DRUGS/DETAIL CODE: Performed by: NURSE ANESTHETIST, CERTIFIED REGISTERED

## 2019-04-10 PROCEDURE — 27200000 HC STERILE SUPPLY: Performed by: UROLOGY

## 2019-04-10 PROCEDURE — 36000003 HC OR LEVEL 3 INITIAL 30MIN: Performed by: UROLOGY

## 2019-04-10 PROCEDURE — 63600000 HC DRUGS/DETAIL CODE: Mod: JW | Performed by: NURSE ANESTHETIST, CERTIFIED REGISTERED

## 2019-04-10 PROCEDURE — BT1D1ZZ FLUOROSCOPY OF RIGHT KIDNEY, URETER AND BLADDER USING LOW OSMOLAR CONTRAST: ICD-10-PCS | Performed by: UROLOGY

## 2019-04-10 PROCEDURE — 37000006 HC ANESTHESIA LOCAL: Performed by: UROLOGY

## 2019-04-10 PROCEDURE — 71000002 HC PACU PHASE 2 INITIAL 30MIN: Performed by: UROLOGY

## 2019-04-10 PROCEDURE — 63600105 HC IODINE BASED CONTRAST: Performed by: UROLOGY

## 2019-04-10 PROCEDURE — C1769 GUIDE WIRE: HCPCS | Performed by: UROLOGY

## 2019-04-10 PROCEDURE — 71000001 HC PACU PHASE 1 INITIAL 30MIN: Performed by: UROLOGY

## 2019-04-10 PROCEDURE — C1758 CATHETER, URETERAL: HCPCS | Performed by: UROLOGY

## 2019-04-10 PROCEDURE — 0T768DZ DILATION OF RIGHT URETER WITH INTRALUMINAL DEVICE, VIA NATURAL OR ARTIFICIAL OPENING ENDOSCOPIC: ICD-10-PCS | Performed by: UROLOGY

## 2019-04-10 PROCEDURE — 82365 CALCULUS SPECTROSCOPY: CPT | Performed by: UROLOGY

## 2019-04-10 DEVICE — STENT CONTOUR URETERAL: Type: IMPLANTABLE DEVICE | Site: URETER | Status: FUNCTIONAL

## 2019-04-10 RX ORDER — HYDROMORPHONE HYDROCHLORIDE 1 MG/ML
0.5 INJECTION, SOLUTION INTRAMUSCULAR; INTRAVENOUS; SUBCUTANEOUS
Status: DISCONTINUED | OUTPATIENT
Start: 2019-04-10 | End: 2019-04-10 | Stop reason: HOSPADM

## 2019-04-10 RX ORDER — ONDANSETRON HYDROCHLORIDE 2 MG/ML
4 INJECTION, SOLUTION INTRAVENOUS
Status: DISCONTINUED | OUTPATIENT
Start: 2019-04-10 | End: 2019-04-10 | Stop reason: HOSPADM

## 2019-04-10 RX ORDER — HYDRALAZINE HYDROCHLORIDE 20 MG/ML
INJECTION INTRAMUSCULAR; INTRAVENOUS AS NEEDED
Status: DISCONTINUED | OUTPATIENT
Start: 2019-04-10 | End: 2019-04-10 | Stop reason: SURG

## 2019-04-10 RX ORDER — MIDAZOLAM HYDROCHLORIDE 2 MG/2ML
INJECTION, SOLUTION INTRAMUSCULAR; INTRAVENOUS AS NEEDED
Status: DISCONTINUED | OUTPATIENT
Start: 2019-04-10 | End: 2019-04-10 | Stop reason: SURG

## 2019-04-10 RX ORDER — SODIUM CHLORIDE, SODIUM LACTATE, POTASSIUM CHLORIDE, CALCIUM CHLORIDE 600; 310; 30; 20 MG/100ML; MG/100ML; MG/100ML; MG/100ML
INJECTION, SOLUTION INTRAVENOUS CONTINUOUS
Status: DISCONTINUED | OUTPATIENT
Start: 2019-04-10 | End: 2019-04-10 | Stop reason: HOSPADM

## 2019-04-10 RX ORDER — PROPRANOLOL HYDROCHLORIDE 20 MG/1
20 TABLET ORAL 3 TIMES DAILY
COMMUNITY
End: 2019-05-28 | Stop reason: SDUPTHER

## 2019-04-10 RX ORDER — FENTANYL CITRATE 50 UG/ML
INJECTION, SOLUTION INTRAMUSCULAR; INTRAVENOUS AS NEEDED
Status: DISCONTINUED | OUTPATIENT
Start: 2019-04-10 | End: 2019-04-10 | Stop reason: SURG

## 2019-04-10 RX ORDER — SULFAMETHOXAZOLE AND TRIMETHOPRIM 400; 80 MG/1; MG/1
1 TABLET ORAL 2 TIMES DAILY
Qty: 6 TABLET | Refills: 0
Start: 2019-04-10 | End: 2019-05-28 | Stop reason: ALTCHOICE

## 2019-04-10 RX ORDER — GLYCOPYRROLATE 0.6MG/3ML
SYRINGE (ML) INTRAVENOUS AS NEEDED
Status: DISCONTINUED | OUTPATIENT
Start: 2019-04-10 | End: 2019-04-10 | Stop reason: SURG

## 2019-04-10 RX ORDER — DEXAMETHASONE SODIUM PHOSPHATE 4 MG/ML
INJECTION, SOLUTION INTRA-ARTICULAR; INTRALESIONAL; INTRAMUSCULAR; INTRAVENOUS; SOFT TISSUE AS NEEDED
Status: DISCONTINUED | OUTPATIENT
Start: 2019-04-10 | End: 2019-04-10 | Stop reason: SURG

## 2019-04-10 RX ORDER — FENTANYL CITRATE 50 UG/ML
50 INJECTION, SOLUTION INTRAMUSCULAR; INTRAVENOUS
Status: DISCONTINUED | OUTPATIENT
Start: 2019-04-10 | End: 2019-04-10 | Stop reason: HOSPADM

## 2019-04-10 RX ORDER — CIPROFLOXACIN 2 MG/ML
400 INJECTION, SOLUTION INTRAVENOUS ONCE
Status: COMPLETED | OUTPATIENT
Start: 2019-04-10 | End: 2019-04-10

## 2019-04-10 RX ORDER — ONDANSETRON HYDROCHLORIDE 2 MG/ML
INJECTION, SOLUTION INTRAVENOUS AS NEEDED
Status: DISCONTINUED | OUTPATIENT
Start: 2019-04-10 | End: 2019-04-10 | Stop reason: SURG

## 2019-04-10 RX ORDER — LABETALOL HCL 20 MG/4 ML
5 SYRINGE (ML) INTRAVENOUS
Status: DISCONTINUED | OUTPATIENT
Start: 2019-04-10 | End: 2019-04-10 | Stop reason: HOSPADM

## 2019-04-10 RX ORDER — HYDROCODONE BITARTRATE AND ACETAMINOPHEN 5; 325 MG/1; MG/1
1 TABLET ORAL EVERY 6 HOURS PRN
Status: DISCONTINUED | OUTPATIENT
Start: 2019-04-10 | End: 2019-04-10 | Stop reason: HOSPADM

## 2019-04-10 RX ORDER — PROPOFOL 10 MG/ML
INJECTION, EMULSION INTRAVENOUS AS NEEDED
Status: DISCONTINUED | OUTPATIENT
Start: 2019-04-10 | End: 2019-04-10 | Stop reason: SURG

## 2019-04-10 RX ORDER — LIDOCAINE HYDROCHLORIDE 10 MG/ML
INJECTION, SOLUTION EPIDURAL; INFILTRATION; INTRACAUDAL; PERINEURAL AS NEEDED
Status: DISCONTINUED | OUTPATIENT
Start: 2019-04-10 | End: 2019-04-10 | Stop reason: SURG

## 2019-04-10 RX ADMIN — FENTANYL CITRATE 50 MCG: 50 INJECTION, SOLUTION INTRAMUSCULAR; INTRAVENOUS at 15:01

## 2019-04-10 RX ADMIN — SODIUM CHLORIDE, POTASSIUM CHLORIDE, SODIUM LACTATE AND CALCIUM CHLORIDE: 600; 310; 30; 20 INJECTION, SOLUTION INTRAVENOUS at 10:03

## 2019-04-10 RX ADMIN — LABETALOL HYDROCHLORIDE 5 MG: 5 INJECTION, SOLUTION INTRAVENOUS at 17:40

## 2019-04-10 RX ADMIN — FENTANYL CITRATE 25 MCG: 50 INJECTION, SOLUTION INTRAMUSCULAR; INTRAVENOUS at 14:14

## 2019-04-10 RX ADMIN — DEXAMETHASONE SODIUM PHOSPHATE 4 MG: 4 INJECTION, SOLUTION INTRA-ARTICULAR; INTRALESIONAL; INTRAMUSCULAR; INTRAVENOUS; SOFT TISSUE at 14:10

## 2019-04-10 RX ADMIN — PROPOFOL 200 MG: 10 INJECTION, EMULSION INTRAVENOUS at 13:57

## 2019-04-10 RX ADMIN — HYDROCODONE BITARTRATE AND ACETAMINOPHEN 1 TABLET: 5; 325 TABLET ORAL at 16:56

## 2019-04-10 RX ADMIN — MIDAZOLAM HYDROCHLORIDE 2 MG: 1 INJECTION, SOLUTION INTRAMUSCULAR; INTRAVENOUS at 13:50

## 2019-04-10 RX ADMIN — PROPOFOL 60 MG: 10 INJECTION, EMULSION INTRAVENOUS at 14:16

## 2019-04-10 RX ADMIN — PROPOFOL 40 MG: 10 INJECTION, EMULSION INTRAVENOUS at 14:14

## 2019-04-10 RX ADMIN — GLYCOPYRROLATE 200 MCG: 0.2 INJECTION INTRAMUSCULAR; INTRAVENOUS at 14:06

## 2019-04-10 RX ADMIN — HYDRALAZINE HYDROCHLORIDE 10 MG: 20 INJECTION INTRAMUSCULAR; INTRAVENOUS at 15:38

## 2019-04-10 RX ADMIN — LIDOCAINE HYDROCHLORIDE 3 ML: 10 INJECTION, SOLUTION EPIDURAL; INFILTRATION; INTRACAUDAL; PERINEURAL at 13:57

## 2019-04-10 RX ADMIN — IOHEXOL 30 ML: 300 INJECTION, SOLUTION INTRAVENOUS at 15:36

## 2019-04-10 RX ADMIN — FENTANYL CITRATE 25 MCG: 50 INJECTION, SOLUTION INTRAMUSCULAR; INTRAVENOUS at 14:13

## 2019-04-10 RX ADMIN — ONDANSETRON 4 MG: 2 INJECTION INTRAMUSCULAR; INTRAVENOUS at 14:10

## 2019-04-10 RX ADMIN — HYDRALAZINE HYDROCHLORIDE 10 MG: 20 INJECTION INTRAMUSCULAR; INTRAVENOUS at 15:36

## 2019-04-10 RX ADMIN — LABETALOL HYDROCHLORIDE 5 MG: 5 INJECTION, SOLUTION INTRAVENOUS at 15:55

## 2019-04-10 RX ADMIN — FENTANYL CITRATE 50 MCG: 50 INJECTION, SOLUTION INTRAMUSCULAR; INTRAVENOUS at 13:57

## 2019-04-10 RX ADMIN — CIPROFLOXACIN 400 MG: 2 INJECTION, SOLUTION INTRAVENOUS at 13:46

## 2019-04-10 ASSESSMENT — ENCOUNTER SYMPTOMS: DYSRHYTHMIAS: 1

## 2019-04-10 NOTE — OR SURGEON
Pre-Procedure patient identification:  I am the primary operating surgeon/proceduralist and I have identified the patient and confirmed laterality is right on 04/10/19 at 1:25 PM True Kent MD  Phone Number: [phone]     I reviewed the chart and answered all questions.  We confirmed the right side as the correct side.  Plan right ureteroscopy laser fragmentation and stone extraction.

## 2019-04-10 NOTE — OP NOTE
Preoperative diagnosis- right ureteropelvic junction calculus  Postoperative diagnosis-same  Procedure-cystoscopy, right retrograde pyelogram, right ureteroscopy, laser fragmentation of multiple stone fragments, basket extraction of multiple stone fragments, stent exchange 6 [place] citizen by 26cm J stent  Surgeon-Dr. True Kent  Anesthesia-General  Estimated blood loss-minimal  Complications- none  Specimen- right renal calculus fragments    Operative indication    Nathan is a 68-year-old male with a history of nephrolithiasis who presented with right flank pain and was noted to have a 7 mm right ureteropelvic junction calculus with associated acute on chronic renal failure.  He underwent stent insertion.  He now presents for definitive treatment of the stone.  We discussed management options in the office including expulsive therapy, lithotripsy, and ureteroscopic fragmentation/extraction.  The procedure and risks were reviewed in detail in the office and the consent form was reviewed and was signed.  Once again, in the holding area I reviewed the procedure and answered all questions.    Operative procedure    Following informed consent, he was brought to the operative suite and was placed on the operating room table in the supine position.  He was given intravenous antibiotics.  Pneumatic teds were in place.  He was given general anesthesia and was placed in the lithotomy position and was sterilely prepped and draped.  A timeout was performed to confirm patient and the procedure.    A 22 [place] citizen cystoscope sheath with 30 degree lens was introduced into the urethra and pan cystourethroscopy was performed.  The urethra was without evidence of stricture.  There was moderate lateral lobe hypertrophy of the prostate.  The bladder capacity was normal.  There was a stent seen emanating from the right ureteral orifice with edema around the stent.  The remainder the bladder mucosa appeared normal.  The stent was grasped using  an alligator grasping forceps and was removed.  A 6 [place] open-ended catheter was inserted into the right ureteral orifice and a 0.38 sensor guidewire was advanced up into the kidney under fluoroscopic visualization.  Distal ureteral dilation was performed using a Narayan dilator.  A dual-lumen access catheter was then used to pass a second 0.38 guidewire up into the kidney.  A 11 Com[place]an/13 Comoran navigator sheath was then passed over the guidewire into the distal ureter.  Flexible ureteroscopy was performed.  There were no stones identified in the ureter.  There was some edema of the proximal ureter and ureteropelvic junction consistent with the prior history of a stone in that location.  There were multiple stones seen in the mid and lower pole including a 7 mm stone.  There also appeared to be several 5 mm stones.  The stones were fragmented using a 270 µm holmium laser fiber.  Following adequate fragmentation, a multitude of fragments were extracted on multiple passes using a 4 wire nitinol basket.  There remained very small debris within the kidney with no residual sizable fragments.  The ureteroscope was removed under direct vision and no additional fragments were identified within the ureter.  The cystoscope was backloaded over the guidewire was passed back into the bladder.  The guidewire was exchanged for an open-ended catheter and contrast was injected to define the anatomy the collecting system.  There was no extravasation.  The open-ended catheter was then exchanged for the guidewire and a 6 [place]an by 26 cm double-J stent was advanced over the guidewire using a pusher under fluoroscopic and cystoscopic visualization.  Upon adequate placement, the guidewire was removed and a good curl was noted in the kidney and in the bladder.  The bladder was emptied and the cystoscope was removed.  The dangler string was taped to the dorsal aspect of the penis.

## 2019-04-10 NOTE — ANESTHESIA POSTPROCEDURE EVALUATION
Patient: Nathan Alonzo    Procedure Summary     Date:  04/10/19 Room / Location:   OR 4 / [facility] OR    Anesthesia Start:  1352 Anesthesia Stop:  1547    Procedure:  Cysto, rt ureteroscopy laser lithotripsy, stone extract, stent change, right (Right Ureter) Diagnosis:       Hydronephrosis with renal calculous obstruction      (Hydronephrosis with renal calc)    Surgeon:  True Kent MD Responsible Provider:  Jaguar Andrews MD    Anesthesia Type:  general ASA Status:  3          Anesthesia Type: general  PACU Vitals  4/10/2019 1540 - 4/10/2019 1640      4/10/2019 1545 4/10/2019 1548 4/10/2019 1600 4/10/2019 1610    BP: (!)  225/96 - (!)  178/77 (!)  167/79    Temp: - 36.3 °C (97.4 °F) - -    Pulse: (!)  48 - (!)  44 (!)  44    Resp: 19 - 16 (!)  25    SpO2: 98 % - 98 % 99 %              4/10/2019 1620 4/10/2019 1630          BP: (!)  176/79 (!)  174/80      Temp: - -      Pulse: (!)  45 (!)  45      Resp: 18 18      SpO2: 96 % 95 %              Anesthesia Post Evaluation    Pain management: adequate  Mode of pain management: IV medication  Patient location during evaluation: PACU  Patient participation: complete - patient participated  Level of consciousness: awake and alert  Cardiovascular status: acceptable  Airway Patency: adequate  Respiratory status: acceptable  Hydration status: acceptable  Anesthetic complications: no

## 2019-04-10 NOTE — ANESTHESIA PROCEDURE NOTES
Airway  Urgency: elective    Start Time: 4/10/2019 1:58 PM    General Information and Staff    Patient location during procedure: OR  Anesthesiologist: RAMIRO MERCADO    Indications and Patient Condition  Indications for airway management: anesthesia  Sedation level: deep  Preoxygenated: yes  Patient position: sniffing  MILS not maintained throughout  Mask difficulty assessment: 1 - vent by mask    Final Airway Details  Final airway type: supraglottic airway        Number of attempts at approach: 1  Ventilation between attempts: BVM  Number of other approaches attempted: 0  Atraumatic airway insertion

## 2019-04-10 NOTE — OP NOTE
Preoperative diagnosis-left ureteropelvic junction calculus and left renal calculus  Postoperative diagnosis-same  Procedure-cystoscopy, left retrograde pyelogram, left ureteroscopy, laser fragmentation of multiple calculi, basket stone extraction of a very large volume of fragments, stent exchange 6 [place] by 26 cm  Surgeon-Dr. True Kent  Anesthesia-General  Estimated blood loss-minimal  Complications-none  Findings- 11 mm stone at the left ureteropelvic junction; multiple color calculi within the lower pole and interpolar areas    Operative indications    Juarez is a 51-year-old male with a history of nephrolithiasis and a complex renal cyst who presented to the emergency room at [facility] on 3/13/19 with left flank pain.  He was diagnosed with an 11 mm left ureteropelvic junction calculus in addition, he has multiple additional calculi within the mid and lower pole left kidney.  After discussion regarding the various treatment options, we decided to proceed with ureteroscopic fragmentation and extraction.  Of note, he previously was scheduled to undergo ureteroscopic extraction, however, upon placement of the retro-catheter, there was a large amount of debris from the kidney and therefore the procedure was aborted due to concern of infection.  A stent was placed.  The procedure and risks were reviewed in the office in detail and the consent form was reviewed and was signed.  All questions were answered.  Once again, the holding area reviewed the procedure and answered all questions.    Operative procedure    Following informed consent, he was brought to the operative suite and was placed on the operating room table in the supine position.  He received intravenous antibiotics.  Pneumatic teds were in place.  After adequate general anesthesia, he was placed in the lithotomy position and was sterilely prepped and draped.  A timeout was performed to confirm the patient and the procedure.    A 22 [place]  cystoscope sheath with 30 degree lens was introduced into the urethra and pan cystourethroscopy was performed.  There was no evidence of urethral stricture.  There was mild lateral lobe hypertrophy of the prostate.  The bladder capacity was normal.  There was a large amount of debris in the bladder.  The stent was somewhat encrusted.  There were no bladder mucosal lesions noted.  The stent was removed using alligator grasping forceps.  A 6 English open-ended catheter was inserted into the left ureteral orifice and a 0.38 guidewire was advanced up in the kidney under fluoroscopic visualization.  Distal ureteral dilation was performed using a Loring taper dilator over the guidewire.  A dual-lumen access catheter was then passed and a second 0.38 sensor guidewire was advanced up in the kidney.  A navigator 11 English/13 English sheath was then advanced over the guidewire into the distal ureter.  The flexible ureteroscope was then passed up into the kidney and the large 11 mm stone was seen within the renal pelvis.  In addition, there were multiple smaller stones seen throughout the lower pole and interpolar area.  There was a very large volume of stone debris.  The large stone was fragmented using a 270 µm holmium laser fiber.  Following fragmentation, a 4 wire zero tip nitinol basket was used to extract multiple fragments on a multitude of passes which was greater than 30 passes removal of greater than 30 fragments.  There continues to be a multitude of stones.  However, there were no sizable stones at the conclusion of the procedure.  It was clear that all of the stone debris could not be removed in one sitting and that there were no sizable pieces that needed further treatment.  Ureteroscope was removed under direct vision and there were no fragments identified within the ureter.  The navigator sheath was removed.  The cystoscope was backloaded over the guidewire and the guidewire was exchanged for an open-ended  catheter.  Contrast was injected to define the anatomy the collecting system.  There is no evidence of extravasation.  A 6 [place]ian by 26 cm double-J stent was advanced over the guidewire using a pusher under fluoroscopic and cystoscopic visualization.  Upon adequate placement, the guidewire was removed and good curl was noted in the kidney and the bladder.  The dangler string was taped to the dorsal aspect of the penis.  He tolerated the procedure well and was taken to the recovery room in good stable condition.

## 2019-04-10 NOTE — DISCHARGE INSTRUCTIONS
General Anesthesia, Adult, Care After  These instructions provide you with information about caring for yourself after your procedure. Your health care provider may also give you more specific instructions. Your treatment has been planned according to current medical practices, but problems sometimes occur. Call your health care provider if you have any problems or questions after your procedure.  What can I expect after the procedure?  After the procedure, it is common to have:  · Vomiting.  · A sore throat.  · Mental slowness.  It is common to feel:  · Nauseous.  · Cold or shivery.  · Sleepy.  · Tired.  · Sore or achy, even in parts of your body where you did not have surgery.  Follow these instructions at home:  For at least 24 hours after the procedure:  · Do not:  ¨ Participate in activities where you could fall or become injured.  ¨ Drive.  ¨ Use heavy machinery.  ¨ Drink alcohol.  ¨ Take sleeping pills or medicines that cause drowsiness.  ¨ Make important decisions or sign legal documents.  ¨ Take care of children on your own.  · Rest.  Eating and drinking  · If you vomit, drink water, juice, or soup when you can drink without vomiting.  · Drink enough fluid to keep your urine clear or pale yellow.  · Make sure you have little or no nausea before eating solid foods.  · Follow the diet recommended by your health care provider.  General instructions  · Have a responsible adult stay with you until you are awake and alert.  · Return to your normal activities as told by your health care provider. Ask your health care provider what activities are safe for you.  · Take over-the-counter and prescription medicines only as told by your health care provider.  · If you smoke, do not smoke without supervision.  · Keep all follow-up visits as told by your health care provider. This is important.  Contact a health care provider if:  · You continue to have nausea or vomiting at home, and medicines are not helpful.  · You  cannot drink fluids or start eating again.  · You cannot urinate after 8-12 hours.  · You develop a skin rash.  · You have fever.  · You have increasing redness at the site of your procedure.  Get help right away if:  · You have difficulty breathing.  · You have chest pain.  · You have unexpected bleeding.  · You feel that you are having a life-threatening or urgent problem.  This information is not intended to replace advice given to you by your health care provider. Make sure you discuss any questions you have with your health care provider.  Document Released: 03/26/2002 Document Revised: 05/22/2017 Document Reviewed: 12/01/2016  [employer] Interactive Patient Education © 2018 [employer]  General Anesthesia, Adult, Care After  These instructions provide you with information about caring for yourself after your procedure. Your health care provider may also give you more specific instructions. Your treatment has been planned according to current medical practices, but problems sometimes occur. Call your health care provider if you have any problems or questions after your procedure.  What can I expect after the procedure?  After the procedure, it is common to have:  · Vomiting.  · A sore throat.  · Mental slowness.  It is common to feel:  · Nauseous.  · Cold or shivery.  · Sleepy.  · Tired.  · Sore or achy, even in parts of your body where you did not have surgery.  Follow these instructions at home:  For at least 24 hours after the procedure:  · Do not:  ¨ Participate in activities where you could fall or become injured.  ¨ Drive.  ¨ Use heavy machinery.  ¨ Drink alcohol.  ¨ Take sleeping pills or medicines that cause drowsiness.  ¨ Make important decisions or sign legal documents.  ¨ Take care of children on your own.  · Rest.  Eating and drinking  · If you vomit, drink water, juice, or soup when you can drink without vomiting.  · Drink enough fluid to keep your urine clear or pale yellow.  · Make sure you have  little or no nausea before eating solid foods.  · Follow the diet recommended by your health care provider.  General instructions  · Have a responsible adult stay with you until you are awake and alert.  · Return to your normal activities as told by your health care provider. Ask your health care provider what activities are safe for you.  · Take over-the-counter and prescription medicines only as told by your health care provider.  · If you smoke, do not smoke without supervision.  · Keep all follow-up visits as told by your health care provider. This is important.  Contact a health care provider if:  · You continue to have nausea or vomiting at home, and medicines are not helpful.  · You cannot drink fluids or start eating again.  · You cannot urinate after 8-12 hours.  · You develop a skin rash.  · You have fever.  · You have increasing redness at the site of your procedure.  Get help right away if:  · You have difficulty breathing.  · You have chest pain.  · You have unexpected bleeding.  · You feel that you are having a life-threatening or urgent problem.  This information is not intended to replace advice given to you by your health care provider. Make sure you discuss any questions you have with your health care provider.  Document Released: 03/26/2002 Document Revised: 05/22/2017 Document Reviewed: 12/01/2016  [email or web address] Interactive Patient Education © 2018 [employer]

## 2019-04-10 NOTE — PERIOPERATIVE NURSING NOTE
Patient hypertensive on arrival to PACU.  Dr. Kearney and Dr. Andrews at bedside.  Labetolol 5mg IV given by Dr. Kearney with adequate reduction in BP.

## 2019-04-10 NOTE — ANESTHESIA PREPROCEDURE EVALUATION
Anesthesia ROS/MED HX    Anesthesia History    Previous anesthetics  Pulmonary    Sleep apnea and CPAP Compliant  Neuro/Psych - neg  Cardiovascular   CAD   hypertension  Dysrhythmias and atrial fibrillation   ECG reviewed  Abnormal ECG  Hematological - neg  GI/Hepatic   liver disease   GERD    Control: well controlled  Musculoskeletal  Chronic musculoskeletal pain  Renal Disease   renal calculi  Endo/Other   Diabetes   Hypothyroidism  Body Habitus: Morbidly Obese      Past Surgical History:   Procedure Laterality Date   • COLONOSCOPY  2016   • CORONARY ARTERY BYPASS GRAFT  2017    x3   • KIDNEY STONE SURGERY Right 2018   • NEPHRECTOMY Right 2016    PARTIAL    • TONSILLECTOMY     • UVULECTOMY  2005       Physical Exam    Airway   Mallampati: II   TM distance: >3 FB   Neck ROM: full  Cardiovascular - normal   Rhythm: regular   Rate: normal  Pulmonary - normal   clear to auscultation  Dental - normal        Anesthesia Plan    Plan: general    Technique: general LMA     Lines and Monitors: PIV     Airway: natural airway / supplemental oxygen   ASA 3  Anesthetic plan and risks discussed with: patient  Induction:    intravenous   Postop Plan:   Patient Disposition: phase II then home   Pain Management: IV analgesics

## 2019-04-13 LAB
COMPN STONE: NORMAL
COMPN STONE: NORMAL
NIDUS STONE QL: NORMAL
ORIGIN STONE: NORMAL
WT STONE: 0.16 G

## 2019-04-26 ENCOUNTER — HOSPITAL ENCOUNTER (OUTPATIENT)
Dept: RADIOLOGY | Facility: HOSPITAL | Age: 69
Discharge: HOME | End: 2019-04-26
Attending: UROLOGY
Payer: MEDICARE

## 2019-04-26 DIAGNOSIS — N18.9 CHRONIC KIDNEY DISEASE: ICD-10-CM

## 2019-04-26 DIAGNOSIS — N20.0 CALCULUS OF KIDNEY: ICD-10-CM

## 2019-04-26 PROCEDURE — 74018 RADEX ABDOMEN 1 VIEW: CPT

## 2019-05-01 NOTE — DISCHARGE SUMMARY
Urology discharge summary    Date of admission 1/22/2019  Date of discharge 1/29/2019    Hospital course      Nathan is a 68-year-old male with a history of nephrolithiasis admitted on 1/22/2019 for placement of a percutaneous 24 Syriac Malecot nephroureteral catheter with tract dilatation in interventional radiology.  He underwent left percutaneous nephrostolithotomy of a 15 mm left renal pelvic calculus and 9 mm left lower pole calculus on 1/23/2019.  He was followed by multiple services during his hospitalization including the hospitalist service, infectious disease service, endocrinology nephrology, and cardiology.  He was afebrile postoperative day 1 and hemodynamically stable.  Neurology was consulted during hospitalization for evaluation of recent tremors.  Evaluation was consistent with an essential tremor.  He was started on a trial of low-dose propranolol.  The Malecot nephroureteral catheter was downsized to a percutaneous nephrostomy tube in interventional radiology on 1/25/2019.  He underwent an antegrade nephrostogram interventional radiology on 1/29/2019.  There is no antegrade obstruction and therefore the nephrostomy tube was removed.  He was discharged home on 1/29/2019 and was given follow-up instructions.  At the time of discharge, he was hemodynamically stable and afebrile.

## 2019-05-07 ENCOUNTER — APPOINTMENT (OUTPATIENT)
Dept: URBAN - METROPOLITAN AREA CLINIC 200 | Age: 69
Setting detail: DERMATOLOGY
End: 2019-05-07

## 2019-05-07 DIAGNOSIS — Z85.828 PERSONAL HISTORY OF OTHER MALIGNANT NEOPLASM OF SKIN: ICD-10-CM

## 2019-05-07 DIAGNOSIS — D485 NEOPLASM OF UNCERTAIN BEHAVIOR OF SKIN: ICD-10-CM

## 2019-05-07 DIAGNOSIS — Z85.820 PERSONAL HISTORY OF MALIGNANT MELANOMA OF SKIN: ICD-10-CM

## 2019-05-07 DIAGNOSIS — R73.09 ELEVATED HEMOGLOBIN A1C: Primary | ICD-10-CM

## 2019-05-07 DIAGNOSIS — L57.8 OTHER SKIN CHANGES DUE TO CHRONIC EXPOSURE TO NONIONIZING RADIATION: ICD-10-CM

## 2019-05-07 DIAGNOSIS — L57.0 ACTINIC KERATOSIS: ICD-10-CM

## 2019-05-07 PROBLEM — D48.5 NEOPLASM OF UNCERTAIN BEHAVIOR OF SKIN: Status: ACTIVE | Noted: 2019-05-07

## 2019-05-07 PROBLEM — E03.9 HYPOTHYROIDISM, UNSPECIFIED: Status: ACTIVE | Noted: 2019-05-07

## 2019-05-07 LAB — HBA1C MFR BLD: 8.4 % (ref 4.8–5.6)

## 2019-05-07 PROCEDURE — OTHER COUNSELING: OTHER

## 2019-05-07 PROCEDURE — OTHER BIOPSY BY SHAVE METHOD: OTHER

## 2019-05-07 PROCEDURE — OTHER MIPS QUALITY: OTHER

## 2019-05-07 PROCEDURE — 17004 DESTROY PREMAL LESIONS 15/>: CPT

## 2019-05-07 PROCEDURE — OTHER LIQUID NITROGEN: OTHER

## 2019-05-07 PROCEDURE — 69100 BIOPSY OF EXTERNAL EAR: CPT | Mod: 76

## 2019-05-07 PROCEDURE — 99213 OFFICE O/P EST LOW 20 MIN: CPT | Mod: 25

## 2019-05-07 PROCEDURE — 69100 BIOPSY OF EXTERNAL EAR: CPT

## 2019-05-07 ASSESSMENT — LOCATION DETAILED DESCRIPTION DERM
LOCATION DETAILED: RIGHT SUPERIOR HELIX
LOCATION DETAILED: RIGHT MID TEMPLE
LOCATION DETAILED: LEFT CENTRAL MALAR CHEEK
LOCATION DETAILED: LEFT SUPERIOR LATERAL BUCCAL CHEEK
LOCATION DETAILED: LEFT SUPERIOR LATERAL MALAR CHEEK
LOCATION DETAILED: LEFT LATERAL MALAR CHEEK
LOCATION DETAILED: LEFT INFERIOR TEMPLE
LOCATION DETAILED: RIGHT INFERIOR CENTRAL MALAR CHEEK
LOCATION DETAILED: RIGHT INFERIOR LATERAL FOREHEAD
LOCATION DETAILED: LEFT MEDIAL SUPERIOR CHEST
LOCATION DETAILED: RIGHT LATERAL MALAR CHEEK
LOCATION DETAILED: LEFT INFERIOR ANTERIOR NECK
LOCATION DETAILED: RIGHT SUPERIOR LATERAL BUCCAL CHEEK
LOCATION DETAILED: LEFT FOREHEAD
LOCATION DETAILED: RIGHT SUPERIOR LATERAL MALAR CHEEK
LOCATION DETAILED: RIGHT MEDIAL MALAR CHEEK
LOCATION DETAILED: RIGHT INFERIOR TEMPLE

## 2019-05-07 ASSESSMENT — LOCATION ZONE DERM
LOCATION ZONE: NECK
LOCATION ZONE: FACE
LOCATION ZONE: EAR
LOCATION ZONE: TRUNK

## 2019-05-07 ASSESSMENT — LOCATION SIMPLE DESCRIPTION DERM
LOCATION SIMPLE: LEFT FOREHEAD
LOCATION SIMPLE: RIGHT EAR
LOCATION SIMPLE: LEFT TEMPLE
LOCATION SIMPLE: LEFT CHEEK
LOCATION SIMPLE: RIGHT TEMPLE
LOCATION SIMPLE: CHEST
LOCATION SIMPLE: RIGHT FOREHEAD
LOCATION SIMPLE: RIGHT CHEEK
LOCATION SIMPLE: LEFT ANTERIOR NECK

## 2019-05-07 ASSESSMENT — PAIN INTENSITY VAS: HOW INTENSE IS YOUR PAIN 0 BEING NO PAIN, 10 BEING THE MOST SEVERE PAIN POSSIBLE?: NO PAIN

## 2019-05-07 NOTE — PROCEDURE: BIOPSY BY SHAVE METHOD
Hemostasis: Drysol
Post-Care Instructions: I reviewed with the patient in detail post-care instructions. Patient is to keep the biopsy site dry overnight, and then apply bacitracin twice daily until healed. Patient may apply hydrogen peroxide soaks to remove any crusting.
Additional Anesthesia Volume In Cc (Will Not Render If 0): 0
Size Of Lesion In Cm: 0.5
Biopsy Method: Dermablade
Silver Nitrate Text: The wound bed was treated with silver nitrate after the biopsy was performed.
Consent: Written consent was obtained and risks were reviewed including but not limited to scarring, infection, bleeding, scabbing, incomplete removal, nerve damage and allergy to anesthesia.
Electrodesiccation Text: The wound bed was treated with electrodesiccation after the biopsy was performed.
Cryotherapy Text: The wound bed was treated with cryotherapy after the biopsy was performed.
Dressing: bandage
Bill 96353 For Specimen Handling/Conveyance To Laboratory?: no
Biopsy Type: H and E
Wound Care: Aquaphor
Notification Instructions: Patient will be notified of biopsy results. However, patient instructed to call the office if not contacted within 2 weeks.
Was A Bandage Applied: Yes
Billing Type: Third-Party Bill
Detail Level: Detailed
Curettage Text: The wound bed was treated with curettage after the biopsy was performed.
Body Location Override (Optional - Billing Will Still Be Based On Selected Body Map Location If Applicable): right helix posterior
Electrodesiccation And Curettage Text: The wound bed was treated with electrodesiccation and curettage after the biopsy was performed.
Depth Of Biopsy: dermis
Type Of Destruction Used: Curettage
Body Location Override (Optional - Billing Will Still Be Based On Selected Body Map Location If Applicable): right helix anterior

## 2019-05-07 NOTE — PROCEDURE: LIQUID NITROGEN
Consent: The patient's consent was obtained including but not limited to risks of crusting, scabbing, blistering, scarring, darker or lighter pigmentary change, recurrence, incomplete removal and infection.
Detail Level: Simple
Render Note In Bullet Format When Appropriate: No
Post-Care Instructions: I reviewed with the patient in detail post-care instructions. Patient is to wear sunprotection, and avoid picking at any of the treated lesions. Pt may apply Vaseline to crusted or scabbing areas.
Duration Of Freeze Thaw-Cycle (Seconds): 2

## 2019-05-28 ENCOUNTER — OFFICE VISIT (OUTPATIENT)
Dept: PRIMARY CARE | Facility: CLINIC | Age: 69
End: 2019-05-28
Payer: MEDICARE

## 2019-05-28 ENCOUNTER — HOSPITAL ENCOUNTER (OUTPATIENT)
Dept: RADIOLOGY | Facility: HOSPITAL | Age: 69
Discharge: HOME | End: 2019-05-28
Attending: UROLOGY
Payer: MEDICARE

## 2019-05-28 ENCOUNTER — TRANSCRIBE ORDERS (OUTPATIENT)
Dept: SCHEDULING | Age: 69
End: 2019-05-28

## 2019-05-28 ENCOUNTER — HOSPITAL ENCOUNTER (OUTPATIENT)
Facility: CLINIC | Age: 69
Discharge: LEFT BEFORE DISCHARGE | End: 2019-05-28
Payer: MEDICARE

## 2019-05-28 VITALS
WEIGHT: 286.2 LBS | SYSTOLIC BLOOD PRESSURE: 152 MMHG | RESPIRATION RATE: 16 BRPM | BODY MASS INDEX: 36.73 KG/M2 | HEIGHT: 74 IN | HEART RATE: 89 BPM | DIASTOLIC BLOOD PRESSURE: 82 MMHG | TEMPERATURE: 98.4 F | OXYGEN SATURATION: 93 %

## 2019-05-28 DIAGNOSIS — N20.0 CALCULUS OF KIDNEY: Primary | ICD-10-CM

## 2019-05-28 DIAGNOSIS — N20.0 CALCULUS OF KIDNEY: ICD-10-CM

## 2019-05-28 DIAGNOSIS — I10 ESSENTIAL HYPERTENSION: ICD-10-CM

## 2019-05-28 DIAGNOSIS — N18.3 ACUTE RENAL FAILURE SUPERIMPOSED ON STAGE 3 CHRONIC KIDNEY DISEASE, UNSPECIFIED ACUTE RENAL FAILURE TYPE: ICD-10-CM

## 2019-05-28 DIAGNOSIS — N17.9 ACUTE RENAL FAILURE SUPERIMPOSED ON STAGE 3 CHRONIC KIDNEY DISEASE, UNSPECIFIED ACUTE RENAL FAILURE TYPE: ICD-10-CM

## 2019-05-28 DIAGNOSIS — R60.9 EDEMA, UNSPECIFIED TYPE: Primary | ICD-10-CM

## 2019-05-28 DIAGNOSIS — I25.810 CORONARY ARTERY DISEASE INVOLVING CORONARY BYPASS GRAFT OF NATIVE HEART WITHOUT ANGINA PECTORIS: ICD-10-CM

## 2019-05-28 PROCEDURE — 99214 OFFICE O/P EST MOD 30 MIN: CPT | Performed by: NURSE PRACTITIONER

## 2019-05-28 PROCEDURE — 74176 CT ABD & PELVIS W/O CONTRAST: CPT

## 2019-05-28 ASSESSMENT — ENCOUNTER SYMPTOMS
CHEST TIGHTNESS: 0
NEUROLOGICAL NEGATIVE: 1
SHORTNESS OF BREATH: 1
GASTROINTESTINAL NEGATIVE: 1
CONSTITUTIONAL NEGATIVE: 1

## 2019-05-28 NOTE — PROGRESS NOTES
Subjective      Patient ID: Nathan Alonzo is a 68 y.o. male.    Here for swelling of the legs - started 1 wk ago.  He talked to the nurse in the nephrologist office - was told to take Bumetanide.   He has appt with neprologist 6/25/19.  Was put on Minoxidil 5/03/19 by cardiologist (Andre Galvin) for b/p elevation & was taken off propranolol.  He feels SOB with exertion.  No chest pain.    He states that about 2 wks ago he had his creatinine tested by the nephrologist.        The following have been reviewed and updated as appropriate in this visit:       Review of Systems   Constitutional: Negative.    Respiratory: Positive for shortness of breath (with exertion.). Negative for chest tightness.    Cardiovascular: Positive for leg swelling (bl to the knees & including the feet.). Negative for chest pain.   Gastrointestinal: Negative.    Neurological: Negative.      Current Outpatient Prescriptions   Medication Sig Dispense Refill   • amiodarone (PACERONE) 200 mg tablet Take 200 mg by mouth daily.     • apixaban (ELIQUIS) 5 mg tablet Take 5 mg by mouth 2 (two) times a day. PT TO HOLD ELIQUIS 3 DAYS PRIOR TO SX AS PER HIS CARDIOLOGIST      • aspirin 81 mg enteric coated tablet Take 81 mg by mouth daily. PT TO STOP 5 DAYS PRIOR TO SX AS PER HIS CARDIOLOGIST      • atorvastatin (LIPITOR) 40 mg tablet Take 40 mg by mouth daily.       • cloNIDine (CATAPRES) 0.2 mg tablet Take 0.2 mg by mouth 2 (two) times a day.     • coenzyme Q10 (CO Q-10) 400 mg capsule Take 400 mg by mouth daily.       • finasteride (PROSCAR) 5 mg tablet Take 5 mg by mouth daily.     • insulin asp prt-insulin aspart (novoLOG MIX 70-30) 100 unit/mL (70-30) subcutaneous pen Inject 10 Units under the skin 2 (two) times a day before breakfast and dinner.  0   • isosorbide mononitrate (IMDUR) 60 mg 24 hr tablet Take 1 tablet (60 mg total) by mouth daily for 14 days. 14 tablet 0   • lamoTRIgine (LaMICtal) 150 mg tablet Take 1 tablet (150 mg total) by mouth  daily. 30 tablet 0   • levothyroxine (SYNTHROID) 100 mcg tablet Take 100 mcg by mouth daily.     • omeprazole (PriLOSEC) 40 mg capsule Take 40 mg by mouth daily before breakfast.     • compr.stocking,knee,long,small misc 1 Package daily. 2 each 0   • hydrALAZINE (APRESOLINE) 100 mg tablet Take 1 tablet (100 mg total) by mouth every 8 (eight) hours. 90 tablet 0     No current facility-administered medications for this visit.      Allergies   Allergen Reactions   • Oxycodone Other (see comments)      Hives     • Amlodipine Other (see comments)     ANKLE  & FEET  SWELLING Pt is currently taking med   • Cephalexin Monohydrate Other (see comments)     PT CAN'T REMEMBER  REACTION   • Latex Rash       Objective     Physical Exam   Constitutional: He is oriented to person, place, and time. He appears well-developed and well-nourished. He appears distressed (appears upset/worried about the legs being swollen.).   Eyes: No scleral icterus.   Neck: No JVD present.   Cardiovascular: Normal rate, regular rhythm and normal heart sounds.  Exam reveals no gallop and no friction rub.    No murmur heard.  Pulmonary/Chest: Effort normal and breath sounds normal. No respiratory distress. He has no wheezes. He has no rales. He exhibits no tenderness.   Musculoskeletal: He exhibits edema (2-3+ pitting edema of both lower legs & feet..).   Neurological: He is alert and oriented to person, place, and time. No cranial nerve deficit.   Skin: He is not diaphoretic.       Assessment/Plan     Problem List Items Addressed This Visit     CAD (coronary artery disease) of artery bypass graft    Essential hypertension    Acute renal failure superimposed on stage 3 chronic kidney disease (CMS/Formerly [facility])      Other Visit Diagnoses     Edema, unspecified type    -  Primary    Findings & plans discussed with Dr Zavaleta. He rec: have pt see his nephrologist & cardiologist. Wear ANURADHA hose & maintain protein intake. 2 wks ago creatinine done    Relevant Orders     TSH 3rd Generation      Pt was told to CTW current medications but to call to make an appt to see the nephrologist & the cardiologist ASAP since they are handling all of the medications & he already is on a high level diuretic and I don't have his recent creatinine level. .    MARIA ELENA Alvarenga  5/28/2019

## 2019-06-14 ENCOUNTER — APPOINTMENT (OUTPATIENT)
Dept: URBAN - METROPOLITAN AREA CLINIC 200 | Age: 69
Setting detail: DERMATOLOGY
End: 2019-06-14

## 2019-06-14 PROBLEM — Z85.828 PERSONAL HISTORY OF OTHER MALIGNANT NEOPLASM OF SKIN: Status: ACTIVE | Noted: 2019-06-14

## 2019-06-14 PROBLEM — D04.21 CARCINOMA IN SITU OF SKIN OF RIGHT EAR AND EXTERNAL AURICULAR CANAL: Status: ACTIVE | Noted: 2019-06-14

## 2019-06-14 PROCEDURE — OTHER REFERRAL: OTHER

## 2019-08-13 ENCOUNTER — HOSPITAL ENCOUNTER (OUTPATIENT)
Dept: CARDIOLOGY | Facility: HOSPITAL | Age: 69
End: 2019-08-13
Attending: OTOLARYNGOLOGY
Payer: MEDICARE

## 2019-08-13 ENCOUNTER — TRANSCRIBE ORDERS (OUTPATIENT)
Dept: CARDIOLOGY | Facility: HOSPITAL | Age: 69
End: 2019-08-13

## 2019-08-13 ENCOUNTER — APPOINTMENT (OUTPATIENT)
Dept: PREADMISSION TESTING | Facility: HOSPITAL | Age: 69
End: 2019-08-13
Attending: OTOLARYNGOLOGY
Payer: MEDICARE

## 2019-08-13 VITALS
BODY MASS INDEX: 33.2 KG/M2 | RESPIRATION RATE: 16 BRPM | HEIGHT: 74 IN | DIASTOLIC BLOOD PRESSURE: 70 MMHG | WEIGHT: 258.7 LBS | SYSTOLIC BLOOD PRESSURE: 150 MMHG | TEMPERATURE: 98.2 F | HEART RATE: 67 BPM

## 2019-08-13 DIAGNOSIS — C44.222 SQUAMOUS CELL CARCINOMA OF SKIN OF RIGHT EAR AND EXTERNAL AURICULAR CANAL: ICD-10-CM

## 2019-08-13 DIAGNOSIS — C44.222 SQUAMOUS CELL CARCINOMA OF SKIN OF RIGHT EAR AND EXTERNAL AURICULAR CANAL: Primary | ICD-10-CM

## 2019-08-13 DIAGNOSIS — Z01.818 PREOP TESTING: Primary | ICD-10-CM

## 2019-08-13 LAB
ANION GAP SERPL CALC-SCNC: 12 MEQ/L (ref 3–15)
APTT PPP: 70 SEC (ref 23–35)
BUN SERPL-MCNC: 56 MG/DL (ref 8–20)
CALCIUM SERPL-MCNC: 9.4 MG/DL (ref 8.9–10.3)
CHLORIDE SERPL-SCNC: 93 MEQ/L (ref 98–109)
CO2 SERPL-SCNC: 32 MEQ/L (ref 22–32)
CREAT SERPL-MCNC: 2.8 MG/DL
ERYTHROCYTE [DISTWIDTH] IN BLOOD BY AUTOMATED COUNT: 13.8 % (ref 11.6–14.4)
GFR SERPL CREATININE-BSD FRML MDRD: 22.5 ML/MIN/1.73M*2
GLUCOSE SERPL-MCNC: 260 MG/DL (ref 70–99)
HCT VFR BLDCO AUTO: 33.9 %
HGB BLD-MCNC: 11 G/DL
INR PPP: 1.5 INR
MCH RBC QN AUTO: 27.6 PG (ref 28–33.2)
MCHC RBC AUTO-ENTMCNC: 32.4 G/DL (ref 32.2–36.5)
MCV RBC AUTO: 85.2 FL (ref 83–98)
PDW BLD AUTO: 10.8 FL (ref 9.4–12.4)
PLATELET # BLD AUTO: 177 K/UL
POTASSIUM SERPL-SCNC: 3.4 MEQ/L (ref 3.6–5.1)
PROTHROMBIN TIME: 17.5 SEC (ref 12.2–14.5)
RBC # BLD AUTO: 3.98 M/UL (ref 4.5–5.8)
SODIUM SERPL-SCNC: 137 MEQ/L (ref 136–144)
WBC # BLD AUTO: 9.75 K/UL

## 2019-08-13 PROCEDURE — 36415 COLL VENOUS BLD VENIPUNCTURE: CPT

## 2019-08-13 PROCEDURE — 85610 PROTHROMBIN TIME: CPT | Mod: DBM,GZ

## 2019-08-13 PROCEDURE — 80048 BASIC METABOLIC PNL TOTAL CA: CPT

## 2019-08-13 PROCEDURE — 85027 COMPLETE CBC AUTOMATED: CPT

## 2019-08-13 PROCEDURE — 85730 THROMBOPLASTIN TIME PARTIAL: CPT | Mod: DBM,GZ

## 2019-08-13 RX ORDER — HYDRALAZINE HYDROCHLORIDE 25 MG/1
25 TABLET, FILM COATED ORAL 2 TIMES DAILY
Status: ON HOLD | COMMUNITY
End: 2023-05-17

## 2019-08-13 RX ORDER — POTASSIUM CHLORIDE 1500 MG/1
20 TABLET, EXTENDED RELEASE ORAL 2 TIMES DAILY
COMMUNITY

## 2019-08-13 RX ORDER — MINOXIDIL 2.5 MG/1
10 TABLET ORAL DAILY
COMMUNITY

## 2019-08-13 RX ORDER — BUMETANIDE 1 MG/1
2 TABLET ORAL 2 TIMES DAILY
COMMUNITY

## 2019-08-13 ASSESSMENT — ENCOUNTER SYMPTOMS
HEADACHES: 0
ENDOCRINE COMMENTS: THYROID DISORDER
ROS GI COMMENTS: REFLUX
ABDOMINAL PAIN: 0
NAUSEA: 0
WEAKNESS: 0
POLYDIPSIA: 0
BACK PAIN: 0
COUGH: 0
PALPITATIONS: 0
UNEXPECTED WEIGHT CHANGE: 0
TREMORS: 0
NECK STIFFNESS: 0
FREQUENCY: 0
TROUBLE SWALLOWING: 0
DYSURIA: 0
NUMBNESS: 0
EYE REDNESS: 0
CONFUSION: 0
APNEA: 1
HEMATURIA: 0
BLOOD IN STOOL: 0
POLYPHAGIA: 0
FEVER: 0
CHILLS: 0
CHEST TIGHTNESS: 0
DIARRHEA: 0
AGITATION: 0
ADENOPATHY: 0
NERVOUS/ANXIOUS: 1
DIZZINESS: 0
DIAPHORESIS: 0
WHEEZING: 0
LIGHT-HEADEDNESS: 0
SORE THROAT: 0
SINUS PRESSURE: 0
FLANK PAIN: 0
FACIAL SWELLING: 0
SHORTNESS OF BREATH: 0
BRUISES/BLEEDS EASILY: 0
EYE DISCHARGE: 0
CONSTIPATION: 0
JOINT SWELLING: 0
SEIZURES: 0

## 2019-08-13 ASSESSMENT — PAIN SCALES - GENERAL: PAINLEVEL: 0-NO PAIN

## 2019-08-13 NOTE — PRE-PROCEDURE INSTRUCTIONS
1. We will call you between (4:00 pm-7:00 pm) the day before surgery to determine the arrival time for your procedure.   2. Please report to Out Patient Registration on the day of your procedure.   3. 1. Nothing to eat or drink 8 hours pre- arrival   4. Early on the morning of the procedure please take your usual dose of the listed medications with a sip of water:    Levothyroxine  Amiodarone  Prilosec     5. Other Instructions: Pre-op Showers; Follow Instructions for Eliquis & Insulin   6. If you develop a cold, cough, fever, rash, or other symptom prior to the data of the procedure, please report it to your physician immediately.   7. If you need to cancel the procedure for any reason, please contact your physician or call the unit listed above.   8. Make arrangements to have someone drive you home from the procedure. If you have not arranged for transportation home, your surgery may be cancelled.    9. You may not take public transportation unless accompanied by a responsible person.   10. You may not drive a car or operate complex or potentially dangerous machinery for 24 hours following anesthesia and/or sedation.   11. If it is medically necessary for you to have a longer stay, you will be informed as soon as the decision is made.   12. Do not wear or being anything of value to the hospital including jewelry of any kind. Do not wear make-up or contact lenses. DO bring your glasses and hearing aid.   13. Dress in comfortable clothes.   14.  If instructed, please bring a copy of your Advanced Directive (Living Will/Durable Power of ) on the day of your procedure.      Pre operative instructions given as per protocol.  Form explained by:      I have read and understand the above information. I have had sufficient opportunity to ask questions I might have and they have been answered to my satisfaction. I agree to comply with the Patient Responsibilities listed above and have received a copy of this form.

## 2019-08-13 NOTE — H&P
Chief complaint: Cicatricial Ectropion of right Lower Eyelid, Squamous cell Carcinoma, Right Ear   HPI: 67 Yo M with h/o Skin Ca s/p excision presents for reconstructive surgery     Allergies   Allergen Reactions   • Oxycodone Other (see comments)      Hives     • Amlodipine Other (see comments)     ANKLE  & FEET  SWELLING Pt is currently taking med   • Cephalexin Monohydrate Other (see comments)     PT CAN'T REMEMBER  REACTION   • Latex Rash      Current Outpatient Prescriptions:   •  hydrALAZINE (APRESOLINE) 25 mg tablet, Take 25 mg by mouth 2 (two) times a day., Disp: , Rfl:   •  amiodarone (PACERONE) 200 mg tablet, Take 200 mg by mouth daily., Disp: , Rfl:   •  apixaban (ELIQUIS) 5 mg tablet, Take 5 mg by mouth 2 (two) times a day. PT TO HOLD ELIQUIS 3 DAYS PRIOR TO SX AS PER HIS CARDIOLOGIST , Disp: , Rfl:   •  aspirin 81 mg enteric coated tablet, Take 81 mg by mouth daily. PT TO STOP 5 DAYS PRIOR TO SX AS PER HIS CARDIOLOGIST , Disp: , Rfl:   •  atorvastatin (LIPITOR) 40 mg tablet, Take 40 mg by mouth daily.  , Disp: , Rfl:   •  cloNIDine (CATAPRES) 0.2 mg tablet, Take 0.2 mg by mouth 2 (two) times a day., Disp: , Rfl:   •  coenzyme Q10 (CO Q-10) 400 mg capsule, Take 400 mg by mouth daily.  , Disp: , Rfl:   •  compr.stocking,knee,long,small misc, 1 Package daily., Disp: 2 each, Rfl: 0  •  finasteride (PROSCAR) 5 mg tablet, Take 5 mg by mouth daily., Disp: , Rfl:   •  lamoTRIgine (LaMICtal) 150 mg tablet, Take 1 tablet (150 mg total) by mouth daily., Disp: 30 tablet, Rfl: 0  •  levothyroxine (SYNTHROID) 100 mcg tablet, Take 100 mcg by mouth daily., Disp: , Rfl:   •  omeprazole (PriLOSEC) 40 mg capsule, Take 40 mg by mouth daily before breakfast., Disp: , Rfl:      Past Medical History:   Diagnosis Date   • A-fib (CMS/HCC) (HCC)    • Abnormal ECG     a fib   • Aortic valve disorder    • BPH (benign prostatic hyperplasia)    • BPH (benign prostatic hyperplasia)    • Coronary artery disease    • Disease of thyroid  gland    • Hypertension    • Hypothyroidism    • Kidney stones    • Nocturia    • Renal calculi     right   • Renal cancer (CMS/HCC) (HCC)     pt denies   • Right bundle branch block    • Sleep apnea     uses cpap   • Type 2 diabetes mellitus (CMS/HCC) (HCC)      Past Surgical History:   Procedure Laterality Date   • COLONOSCOPY  2016   • CORONARY ARTERY BYPASS GRAFT  2017    x3   • KIDNEY STONE SURGERY Right 2018   • NEPHRECTOMY Right 2016    PARTIAL    • TONSILLECTOMY     • UVULECTOMY  2005     Social History     Social History   • Marital status:      Spouse name: N/A   • Number of children: N/A   • Years of education: N/A     Social History Main Topics   • Smoking status: Never Smoker   • Smokeless tobacco: Never Used   • Alcohol use No   • Drug use: No   • Sexual activity: Defer     Other Topics Concern   • None     Social History Narrative   • None     Family History   Problem Relation Age of Onset   • Lung cancer Mother    • Lung cancer Father    • Early death Father    • Diabetes Sister      Review of Systems   Constitutional: Negative for chills, diaphoresis, fever and unexpected weight change.   HENT: Negative for congestion, dental problem, ear pain, facial swelling, postnasal drip, sinus pressure, sore throat, tinnitus and trouble swallowing.    Eyes: Negative for discharge, redness and visual disturbance.   Respiratory: Positive for apnea (Cpap). Negative for cough, chest tightness, shortness of breath and wheezing.    Cardiovascular: Negative for chest pain, palpitations and leg swelling.        H/o Afib on Eliquis,CAD, RBBB, s/p CABG   Gastrointestinal: Negative for abdominal pain, blood in stool, constipation, diarrhea and nausea.        Reflux   Endocrine: Negative for polydipsia, polyphagia and polyuria.        Thyroid disorder   Genitourinary: Negative for dysuria, flank pain, frequency, hematuria and urgency.        H/o Kidney stones, Renal Ca, BPH   Musculoskeletal: Negative for back  pain, gait problem, joint swelling and neck stiffness.   Skin: Positive for rash (Generalized 2/2 Ca). Negative for pallor.        Skin Ca, SCCA   Allergic/Immunologic: Negative for immunocompromised state.   Neurological: Negative for dizziness, tremors, seizures, weakness, light-headedness, numbness and headaches.   Hematological: Negative for adenopathy. Does not bruise/bleed easily.   Psychiatric/Behavioral: Negative for agitation, behavioral problems and confusion. The patient is nervous/anxious (Reported Bipolar/pt does not agree).      Vitals:    08/13/19 0739   BP: (!) 150/70   Pulse: 67   Resp: 16   Temp: 36.8 °C (98.2 °F)     Body mass index is 33.22 kg/m².  Physical Exam   Constitutional: He is oriented to person, place, and time. He appears well-developed and well-nourished.   HENT:   Head: Normocephalic and atraumatic.   Mouth/Throat: Oropharynx is clear and moist.   Uvula absent Pharynx benign otherwise   Eyes: Pupils are equal, round, and reactive to light. EOM are normal.   Neck: Normal range of motion. Neck supple.   Cardiovascular: Normal rate, regular rhythm and intact distal pulses.    EKG copy=SB, L/Axis, R/BBB, Twave abnormality   Pulmonary/Chest: Effort normal and breath sounds normal.   Abdominal: Soft. Bowel sounds are normal.   Genitourinary:   Genitourinary Comments: Deferred   Musculoskeletal: Normal range of motion. He exhibits edema (B/LEs L.R). He exhibits no tenderness.   Lymphadenopathy:     He has no cervical adenopathy.   Neurological: He is alert and oriented to person, place, and time.   Skin: Skin is warm and dry. Rash (Generalized rash-like lesions on Face.R/side, UEs ) noted.   Raised open lesion R/Forearm/FB Dermatologist    Psychiatric: He has a normal mood and affect. His behavior is normal.   Nursing note and vitals reviewed.    Assessment/Plan:  Cicatricial Ectropion o Right Lower Eyelid, Squamous cell carcinoma Right Ear  ///  Right Ear Pedicle Flap takedown and inset;  Right Medial Canthoplasty     Continue with current management; NPO 8 hrs Pre-Arrival; Follow Instructions for Eliquis and Insulin; Prilosec, Amiodarone, Levothyroxine with sip water morning of surgery

## 2019-08-19 ENCOUNTER — TELEPHONE (OUTPATIENT)
Dept: PRIMARY CARE | Facility: CLINIC | Age: 69
End: 2019-08-19

## 2019-08-19 NOTE — TELEPHONE ENCOUNTER
I called patient to reschedule his 6 mo f/u with you on a day you are off and he told me to just cancel appt and that he is changing pcp to a Dr. Villegas (?) with [facility]. I asked him if it was due to location or anything wrong and he said to just leave it alone.

## 2019-08-23 NOTE — H&P (VIEW-ONLY)
Chief Complaint   Patient presents with   â¢ Pain     Onset:2days- Upper Back 5/10 Scale; Mom squeezed the back Wednesday & felt better but it recurred; No meds taken, No injury or trauma         Caryn Sneed is a 3year old female who presents today with reports of upper back pain. Patient denies any trauma, denies any playing of sports, denies any feelings of illness. Patient denies any chest pain or shortness of breath. Mother denies any immediate family history of bony abnormalities. Mother reports that they have not tried any symptomatic relief measures. Subjective:  Symptoms have been occurring for: 2 days  Any pain: yes  Any fevers: no  Any headaches: no  Any neurologic deficits: no  Any nasal congestion: no  Any sore throat: no  Any cough: no  Any abdominal pain: no  Any nausea vomiting or diarrhea: no  Any rash: no  Any blood or discharge: no  Is patient acting there norm: yes  Eating and drinking normally: yes  Voiding and toileting normally: yes  Immunizations up-to-date: yes  Any allergies to medications: NKA  Any medical problems: mother denies    PAST MEDICAL HISTORY:  No past medical history on file. MEDICATIONS:  Current Outpatient Medications   Medication Sig   â¢ fluticasone (FLONASE) 50 MCG/ACT nasal spray SPRAY ONCE IN EACH NOSTRIL EVERY NIGHT AT BEDTIME THEN BRUSH TEETH     No current facility-administered medications for this visit. PAST SURGICAL HISTORY:  No past surgical history on file. FAMILY HISTORY:  No family history on file. SOCIAL HISTORY:  Social History     Tobacco Use   â¢ Smoking status: Not on file   Substance Use Topics   â¢ Alcohol use: Not on file   â¢ Drug use: Not on file           Allergies:   ALLERGIES:   Allergen Reactions   â¢ Eggs Or Egg-Derived Products   (Food Or Med) VOMITING         Review of Systems   Musculoskeletal: Positive for back pain. All other systems reviewed and are negative.         OBJECTIVE  Visit Vitals  BP 96/55 (BP Chief complaint: left renal calculus  HPI: 68 year old male with a history of renal calculus and renal cell carcinoma s/p right partial nephrectomy.He had bilateral renal calculus with stone basket extraction 3/2018  and left ESWL with stent placed 10/18/2018.He states that the left renal pelvic calculus is 15mm and left lower pole calculus is 9mm. He denies hematuria.He has occasional perineal discomfort.No fever/chills  Allergies:   Allergies   Allergen Reactions   • Oxycodone Other (see comments)      Hives     • Amlodipine Other (see comments)     ANKLE  & FEET  SWELLING    • Cephalexin Monohydrate Other (see comments)     PT CAN'T REMEMBER  REACTION   • Latex Rash     Patient's Meds:   Current Outpatient Prescriptions:   •  amiodarone (PACERONE) 200 mg tablet, Take 200 mg by mouth daily., Disp: , Rfl:   •  apixaban (ELIQUIS) 5 mg tablet, Take 5 mg by mouth 2 (two) times a day. PT TO HOLD ELIQUIS 3 DAYS PRIOR TO SX AS PER HIS CARDIOLOGIST , Disp: , Rfl:   •  aspirin 81 mg enteric coated tablet, Take 81 mg by mouth daily. PT TO STOP 5 DAYS PRIOR TO SX AS PER HIS CARDIOLOGIST , Disp: , Rfl:   •  atorvastatin (LIPITOR) 40 mg tablet, Take 40 mg by mouth daily.  , Disp: , Rfl:   •  coenzyme Q10 (CO Q-10) 400 mg capsule, Take 400 mg by mouth daily.  , Disp: , Rfl:   •  eplerenone (INSPRA) 25 mg tablet, Take 25 mg by mouth daily., Disp: , Rfl:   •  finasteride (PROSCAR) 5 mg tablet, Take 5 mg by mouth daily., Disp: , Rfl:   •  insulin glargine (LANTUS SOLOSTAR) 100 unit/mL (3 mL) subcutaneous pen, Inject 20 Units under the skin 2 (two) times a day.  , Disp: , Rfl:   •  levothyroxine (SYNTHROID) 100 mcg tablet, Take 100 mcg by mouth daily., Disp: , Rfl:   •  lisinopril (PRINIVIL) 40 mg tablet, Take 1 tablet (40 mg total) by mouth 2 (two) times a day., Disp: , Rfl:   •  omeprazole (PriLOSEC) 40 mg capsule, Take 40 mg by mouth daily before breakfast., Disp: , Rfl:     Current Facility-Administered Medications:   •   "[place] [facility] [place]itting, Cuff Size: Regular)   Pulse 84   Temp 98.7 Â°F (37.1 Â°C) (Oral)   Ht 4' 7.51"" (1.41 m)   Wt 34.2 kg (75 lb 8.1 oz)   SpO2 98%   BMI 17.23 kg/mÂ²         Physical Exam  Vitals signs and nursing note reviewed. Constitutional:       General: She is awake. She is not in acute distress. Appearance: Normal appearance. She is well-developed, well-groomed and normal weight. She is not ill-appearing, toxic-appearing or diaphoretic. HENT:      Head: Normocephalic and atraumatic. Right Ear: Hearing and external ear normal.      Left Ear: Hearing and external ear normal.      Nose: Nose normal.      Mouth/Throat:      Lips: Pink. Eyes:      General: Visual tracking is normal. Lids are normal. Gaze aligned appropriately. No allergic shiner or scleral icterus. Extraocular Movements: Extraocular movements intact. Conjunctiva/sclera: Conjunctivae normal.   Neck:      Musculoskeletal: Full passive range of motion without pain and normal range of motion. Trachea: Trachea normal.   Cardiovascular:      Rate and Rhythm: Normal rate and regular rhythm. Pulses: Normal pulses. Heart sounds: S1 normal and S2 normal. No murmur. Pulmonary:      Effort: Pulmonary effort is normal. No accessory muscle usage, prolonged expiration, respiratory distress, nasal flaring or retractions. Breath sounds: Normal breath sounds. No stridor, decreased air movement or transmitted upper airway sounds. No decreased breath sounds, wheezing, rhonchi or rales. Abdominal:      General: Abdomen is flat. Bowel sounds are normal. There is no distension. Palpations: Abdomen is soft. There is no mass. Tenderness: There is no tenderness. There is no guarding or rebound. Genitourinary:     Comments: The patient denies any dysuria or blood  Musculoskeletal: Normal range of motion.       Cervical back: Normal.        Thoracic back: She exhibits tenderness (With back " cloNIDine (CATAPRES) tablet 0.2 mg, 0.2 mg, oral, BID, Areli Gallo CRNP  Medical History:   Past Medical History:   Diagnosis Date   • A-fib (CMS/HCC) (HCC)    • Abnormal ECG     a fib   • Aortic valve disorder    • BPH (benign prostatic hyperplasia)    • BPH (benign prostatic hyperplasia)    • Coronary artery disease    • Disease of thyroid gland    • Hypertension    • Hypothyroidism    • Kidney stones    • Nocturia    • Renal calculi     right   • Renal cancer (CMS/HCC) (HCC)     pt denies   • Right bundle branch block    • Sleep apnea     uses cpap   • Type 2 diabetes mellitus (CMS/HCC) (HCC)      Surgical History:   Past Surgical History:   Procedure Laterality Date   • COLONOSCOPY  2016   • CORONARY ARTERY BYPASS GRAFT  2017    x3   • KIDNEY STONE SURGERY Right 2018   • NEPHRECTOMY Right 2016    PARTIAL    • TONSILLECTOMY     • UVULECTOMY  2005     Social History:   Social History     Social History   • Marital status:      Spouse name: N/A   • Number of children: N/A   • Years of education: N/A     Social History Main Topics   • Smoking status: Never Smoker   • Smokeless tobacco: Never Used   • Alcohol use No   • Drug use: No   • Sexual activity: Defer     Other Topics Concern   • None     Social History Narrative   • None     Family History:   Family History   Problem Relation Age of Onset   • Lung cancer Mother    • Lung cancer Father    • Early death Father    • Diabetes Sister      Review of Systems   Constitutional: Negative for chills and fever.   HENT: Negative for dental problem and hearing loss.    Eyes: Negative for visual disturbance.   Respiratory: Negative for apnea, chest tightness and shortness of breath.         LILLIAM +CPAP   Cardiovascular: Positive for palpitations (occasional in past) and leg swelling (recent lower leg edema). Negative for chest pain.        CAD s/p CABG 2017, atrial fib., HTN, dyslipidemia, >4MET activity   Gastrointestinal: Negative for abdominal pain, nausea and  "vomiting.        GERD   Endocrine:        DM;last HgbA1c 8.3 12/2018 states that combination drug/ lantus bid started in past few months   Genitourinary: Positive for frequency (nocturia). Negative for dysuria, flank pain and hematuria.        Renal calculus, right renal cell carcinoma s/p partial nephrectomy, CKD   Musculoskeletal: Negative.    Skin: Negative for wound.   Neurological: Negative for weakness and numbness.     Vitals:   Vitals:    01/15/19 1416   BP: (!) 215/94   Pulse: 61   Resp: 18   Temp: 36.3 °C (97.4 °F)     Body mass index is 33.88 kg/m².  Physical Exam   Constitutional: He is oriented to person, place, and time. He appears well-developed and well-nourished.   HENT:   Head: Normocephalic.   Eyes: Pupils are equal, round, and reactive to light.   Neck: Normal range of motion. Neck supple.   Cardiovascular: Normal rate, regular rhythm, normal heart sounds and intact distal pulses.    EKG SB with R BBB   Pulmonary/Chest: Effort normal and breath sounds normal.   Abdominal: Soft. Bowel sounds are normal. There is no tenderness.   Genitourinary:   Genitourinary Comments: deferred   Musculoskeletal: Normal range of motion. He exhibits edema (left lower leg +1 edema measures 17 \";right lower leg 16 \"). He exhibits no tenderness.   Neurological: He is alert and oriented to person, place, and time.   Skin: Skin is warm and dry.   Psychiatric: He has a normal mood and affect.     Patient Active Problem List   Diagnosis   • Kidney disease   • Atrial fibrillation (CMS/HCC)   • CAD (coronary artery disease) of artery bypass graft   • Hypertension   • Hypercholesteremia   • Hypothyroid   • Type 2 diabetes mellitus (CMS/HCC) ([facility])   • Anxiety   • GERD (gastroesophageal reflux disease)   • OCD (obsessive compulsive disorder)   • Sleep apnea   • Hypertensive urgency   • Hepatomegaly   • History of melanoma   • Mitral valve prolapse   • Renal cancer (CMS/HCC) ([facility])       Assessment/Plan:left renal " extension). She exhibits normal range of motion, no bony tenderness, no swelling, no edema, no deformity, no laceration, no pain, no spasm and normal pulse. Lumbar back: Normal.        Back:    Skin:     General: Skin is warm and dry. Capillary Refill: Capillary refill takes less than 2 seconds. Coloration: Skin is not jaundiced or pale. Findings: No petechiae or rash. Rash is not purpuric. Neurological:      General: No focal deficit present. Mental Status: She is alert. Cranial Nerves: Cranial nerves are intact. No cranial nerve deficit. Sensory: Sensation is intact. No sensory deficit. Motor: Motor function is intact. No abnormal muscle tone. Coordination: Coordination is intact. Coordination normal.      Gait: Gait is intact. Psychiatric:         Attention and Perception: Attention and perception normal.         Mood and Affect: Mood and affect normal.         Speech: Speech normal.         Behavior: Behavior normal. Behavior is cooperative. Thought Content: Thought content normal.         Cognition and Memory: Cognition and memory normal.           ASSESSMENT/PLAN  1.  Acute bilateral thoracic back pain  Differentials considered  -Paraspinal abscess  (Patient denies any feelings of illness which makes paraspinal abscess less likely)  -Pars fracture  (Mother patient denies any trauma or playing sports with makes pars fracture less likely)  -Scoliosis  (Patient for follow-up with her primary care)  -Muscle spasm  (Mother informed alternate Tylenol ibuprofen every 4 hours and apply heating pad to the area)      Josh Calderón, CNP calculus  Plan: left PCN,percutaneous stone fragmentation and removal  Clearance Dr Zavaleta  Cardiologist Dr Mercado-holding Eliquis 2 days pre op and aspirin 5 days pre op.  Nephrologist Dr Hernandez seen in office today-to start new diuretic-patient will bring medication name to hospital

## 2019-09-13 ENCOUNTER — APPOINTMENT (OUTPATIENT)
Dept: LAB | Facility: HOSPITAL | Age: 69
End: 2019-09-13
Attending: OTOLARYNGOLOGY
Payer: MEDICARE

## 2019-09-13 ENCOUNTER — TRANSCRIBE ORDERS (OUTPATIENT)
Dept: REGISTRATION | Facility: HOSPITAL | Age: 69
End: 2019-09-13

## 2019-09-13 ENCOUNTER — HOSPITAL ENCOUNTER (OUTPATIENT)
Dept: CARDIOLOGY | Facility: HOSPITAL | Age: 69
Discharge: HOME | End: 2019-09-13
Attending: OTOLARYNGOLOGY
Payer: MEDICARE

## 2019-09-13 ENCOUNTER — APPOINTMENT (OUTPATIENT)
Dept: PREADMISSION TESTING | Facility: HOSPITAL | Age: 69
End: 2019-09-13
Attending: OTOLARYNGOLOGY
Payer: MEDICARE

## 2019-09-13 VITALS
HEART RATE: 78 BPM | BODY MASS INDEX: 30.83 KG/M2 | DIASTOLIC BLOOD PRESSURE: 82 MMHG | SYSTOLIC BLOOD PRESSURE: 147 MMHG | TEMPERATURE: 97.6 F | HEIGHT: 74 IN | WEIGHT: 240.2 LBS | RESPIRATION RATE: 18 BRPM

## 2019-09-13 DIAGNOSIS — C44.222 SQUAMOUS CELL CARCINOMA OF SKIN OF RIGHT EAR AND EXTERNAL AURICULAR CANAL: ICD-10-CM

## 2019-09-13 DIAGNOSIS — H02.112 CICATRICIAL ECTROPION OF RIGHT LOWER EYELID: Primary | ICD-10-CM

## 2019-09-13 DIAGNOSIS — H02.112 CICATRICIAL ECTROPION OF RIGHT LOWER EYELID: ICD-10-CM

## 2019-09-13 LAB
ANION GAP SERPL CALC-SCNC: 11 MEQ/L (ref 3–15)
ATRIAL RATE: 208
BUN SERPL-MCNC: 64 MG/DL (ref 8–20)
CALCIUM SERPL-MCNC: 9.3 MG/DL (ref 8.9–10.3)
CHLORIDE SERPL-SCNC: 96 MEQ/L (ref 98–109)
CO2 SERPL-SCNC: 31 MEQ/L (ref 22–32)
CREAT SERPL-MCNC: 2.9 MG/DL
ERYTHROCYTE [DISTWIDTH] IN BLOOD BY AUTOMATED COUNT: 13.3 % (ref 11.6–14.4)
GFR SERPL CREATININE-BSD FRML MDRD: 21.6 ML/MIN/1.73M*2
GLUCOSE SERPL-MCNC: 205 MG/DL (ref 70–99)
HCT VFR BLDCO AUTO: 38.4 %
HGB BLD-MCNC: 12.9 G/DL
MCH RBC QN AUTO: 27.2 PG (ref 28–33.2)
MCHC RBC AUTO-ENTMCNC: 33.6 G/DL (ref 32.2–36.5)
MCV RBC AUTO: 80.8 FL (ref 83–98)
PDW BLD AUTO: 10.4 FL (ref 9.4–12.4)
PLATELET # BLD AUTO: 191 K/UL
POTASSIUM SERPL-SCNC: 3.1 MEQ/L (ref 3.6–5.1)
QRS DURATION: 184
QT INTERVAL: 486
QTC CALCULATION(BAZETT): 560
R AXIS: 263
RBC # BLD AUTO: 4.75 M/UL (ref 4.5–5.8)
SODIUM SERPL-SCNC: 138 MEQ/L (ref 136–144)
T WAVE AXIS: -6
VENTRICULAR RATE: 80
WBC # BLD AUTO: 10.07 K/UL

## 2019-09-13 PROCEDURE — 85027 COMPLETE CBC AUTOMATED: CPT

## 2019-09-13 PROCEDURE — 36415 COLL VENOUS BLD VENIPUNCTURE: CPT

## 2019-09-13 PROCEDURE — 80048 BASIC METABOLIC PNL TOTAL CA: CPT

## 2019-09-13 PROCEDURE — 93010 ELECTROCARDIOGRAM REPORT: CPT | Performed by: INTERNAL MEDICINE

## 2019-09-13 PROCEDURE — 93005 ELECTROCARDIOGRAM TRACING: CPT

## 2019-09-13 RX ORDER — LORATADINE 10 MG/1
10 TABLET ORAL DAILY PRN
COMMUNITY

## 2019-09-13 ASSESSMENT — ENCOUNTER SYMPTOMS
MUSCULOSKELETAL NEGATIVE: 1
VOMITING: 0
FEVER: 0
SHORTNESS OF BREATH: 0
CHILLS: 0
NAUSEA: 0
DYSURIA: 0
HEMATURIA: 0
ABDOMINAL PAIN: 0
WOUND: 0
CHEST TIGHTNESS: 0
DIZZINESS: 0
WEAKNESS: 0
APNEA: 1
TREMORS: 1
PALPITATIONS: 1
NUMBNESS: 0

## 2019-09-13 ASSESSMENT — PAIN SCALES - GENERAL: PAINLEVEL: 0-NO PAIN

## 2019-09-13 NOTE — PRE-PROCEDURE INSTRUCTIONS
1. We will call you between 4pm to 7pm 1 business day before the date of your surgery to determine that arrival time for your procedure.   2. Please report to outpatient pavilion on the day of your procedure.   3. Please follow the following fasting guidelines:   · Nothing to eat or drink 8 hours prior to arrival   4. Early on the morning of the procedure please take your usual dose of the listed medications with a sip of water:  Amiodorone    Stop Eliquis and aspirin as directed   5. Other Instructions: wash with antibacterial soap as directed   6. If you develop a cold, cough, fever, rash, or other symptom prior to the data of the procedure, please report it to your physician immediately.   7. If you need to cancel the procedure for any reason, please contact your physician or call the unit listed above.   8. Make arrangements to have someone drive you home from the procedure. If you have not arranged for transportation home, your surgery may be cancelled.    9. You may not take public transportation unless accompanied by a responsible person.   10. You may not drive a car or operate complex or potentially dangerous machinery for 24 hours following anesthesia and/or sedation.   11. If it is medically necessary for you to have a longer stay, you will be informed as soon as the decision is made.   12. Do not wear or being anything of value to the hospital including jewelry of any kind. Do not wear make-up or contact lenses. DO bring your glasses and hearing aid.   13. Dress in comfortable clothes.   14.  If instructed, please bring a copy of your Advanced Directive (Living Will/Durable Power of ) on the day of your procedure.      Pre operative instructions given as per protocol.  Form explained by: MARIA ELENA Langley     I have read and understand the above information. I have had sufficient opportunity to ask questions I might have and they have been answered to my satisfaction. I agree to comply with  the Patient Responsibilities listed above and have received a copy of this form.

## 2019-09-13 NOTE — H&P
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, he had biopsy and office procedure for resection right ear in July 2019, he had postop bleeding from right ear surgery that required ER visits in [place], he is on Eliquis and aspirin and had stopped as instructed for the previous procedures, he and his wife are concerned about bleeding and requested the patient stay overnight after surgery, the eye procedure to remove cancer was one year ago, he is experiencing right eye dryness and pain   Allergies:  Allergies   Allergen Reactions   • Amlodipine Other (see comments)     ANKLE  & FEET  SWELLING Pt is currently taking med   • Oxycodone Other (see comments)      Hives     • Cephalexin Monohydrate Other (see comments)     PT CAN'T REMEMBER  REACTION   • Latex Rash     Patient's Meds:   Current Outpatient Prescriptions:   •  loratadine (CLARITIN) 10 mg tablet, Take 10 mg by mouth daily as needed for allergies., Disp: , Rfl:   •  amiodarone (PACERONE) 200 mg tablet, Take 200 mg by mouth daily., Disp: , Rfl:   •  apixaban (ELIQUIS) 5 mg tablet, Take 5 mg by mouth 2 (two) times a day. PT TO HOLD ELIQUIS 3 DAYS PRIOR TO SX AS PER HIS CARDIOLOGIST , Disp: , Rfl:   •  aspirin 81 mg enteric coated tablet, Take 81 mg by mouth daily. PT TO STOP 5 DAYS PRIOR TO SX AS PER HIS CARDIOLOGIST , Disp: , Rfl:   •  atorvastatin (LIPITOR) 40 mg tablet, Take 40 mg by mouth daily.  , Disp: , Rfl:   •  bumetanide (BUMEX) 1 mg tablet, Take 2 mg by mouth 2 (two) times a day., Disp: , Rfl:   •  cloNIDine (CATAPRES) 0.2 mg tablet, Take 0.2 mg by mouth 2 (two) times a day., Disp: , Rfl:   •  coenzyme Q10 (CO Q-10) 400 mg capsule, Take 400 mg by mouth daily.  , Disp: , Rfl:   •  compr.stocking,knee,long,small misc, 1 Package daily., Disp: 2 each, Rfl: 0  •  finasteride (PROSCAR) 5 mg tablet, Take 5 mg by mouth daily., Disp: , Rfl:   •  hydrALAZINE (APRESOLINE) 25 mg  tablet, Take 25 mg by mouth 2 (two) times a day., Disp: , Rfl:   •  insulin glargine,hum.rec.anlog (INSULIN GLARGINE SUBQ), Inject 20 Units under the skin 2 (two) times a day. 70/30 mix, Disp: , Rfl:   •  lamoTRIgine (LaMICtal) 150 mg tablet, Take 1 tablet (150 mg total) by mouth daily., Disp: 30 tablet, Rfl: 0  •  levothyroxine (SYNTHROID) 100 mcg tablet, Take 100 mcg by mouth daily., Disp: , Rfl:   •  minoxidil (LONITEN) 2.5 mg tablet, Take 2.5 mg by mouth daily. 2 tablets daily, Disp: , Rfl:   •  multivitamin tablet, Take 1 tablet by mouth daily., Disp: , Rfl:   •  omeprazole (PriLOSEC) 40 mg capsule, Take 40 mg by mouth daily before breakfast., Disp: , Rfl:   •  potassium chloride (K-TAB) 20 mEq CR tablet, Take 20 mEq by mouth 2 (two) times a day., Disp: , Rfl:   Medical History:   Past Medical History:   Diagnosis Date   • A-fib (CMS/HCC) ([facility])    • Abnormal ECG     a fib   • Aortic valve disorder    • Arthritis     Knees   • Bipolar disorder (CMS/HCC) (HCC)    • BPH (benign prostatic hyperplasia)    • Bruises easily    • Constipation     Ocassional   • Coronary artery disease     CABG 3 vessels 2016   • Disease of thyroid gland    • GERD (gastroesophageal reflux disease)    • Hypertension    • Hypothyroidism    • Kidney stones     Surgical removal of stones   • Lipid disorder    • Nocturia    • Renal calculi     right   • Renal cancer (CMS/HCC) ([facility])    • Right bundle branch block    • Seasonal allergies    • Skin cancer     squamous-right ear, face   • Sleep apnea     uses cpap   • Type 2 diabetes mellitus (CMS/HCC) (HCC)      Surgical History:   Past Surgical History:   Procedure Laterality Date   • CARDIAC CATHETERIZATION  05/2016   • COLONOSCOPY  2016   • CORONARY ARTERY BYPASS GRAFT  05/2016    x3   • CYSTOSCOPY INSERTION / REMOVAL STENT / STONE  03/27/2019   • CYSTOSCOPY INSERTION / REMOVAL STENT / STONE  04/10/2019    Stent placement   • CYSTOSCOPY, STENT REMOVAL  05/2019   • KIDNEY STONE SURGERY Right  2018   • NEPHRECTOMY Right 2016    PARTIAL    • TONSILLECTOMY      Childhood   • UVULECTOMY  2005   • WISDOM TOOTH EXTRACTION  1970     Social History:   Social History     Social History   • Marital status:      Spouse name: N/A   • Number of children: N/A   • Years of education: N/A     Social History Main Topics   • Smoking status: Never Smoker   • Smokeless tobacco: Never Used   • Alcohol use No   • Drug use: No   • Sexual activity: Defer     Other Topics Concern   • None     Social History Narrative   • None     Family History:   Family History   Problem Relation Age of Onset   • Lung cancer Biological Mother    • Lung cancer Biological Father    • Early death Biological Father    • Diabetes Sister      Review of Systems   Constitutional: Negative for chills and fever.   HENT: Negative for dental problem and hearing loss.    Eyes: Negative for visual disturbance.   Respiratory: Positive for apnea (uvulectomy, uses CPAP). Negative for chest tightness and shortness of breath.         Small pleural effusions May 2019   Cardiovascular: Positive for palpitations (felt his heart racing one week ago, denies associated CP SOB ir dizziness). Negative for chest pain and leg swelling.        A fib- on Eliquis, CAD s/p CABG  Climbs 44 steps  In his house without symptoms   Gastrointestinal: Negative for abdominal pain, nausea and vomiting.   Endocrine:        Insulin dependent DM- not testing fingersticks daily only 2 times per week   Genitourinary: Negative for discharge, dysuria and hematuria.        Kidney stones with acute kidney failure, addressed 1-2019 and 4-2019, kidney cancer right partial nephrectomy 2016   Musculoskeletal: Negative.    Skin: Negative for wound.   Allergic/Immunologic: Positive for environmental allergies.   Neurological: Positive for tremors. Negative for dizziness, weakness and numbness.   Psychiatric/Behavioral:        Bipolar disorder     Vitals:   Vitals:    09/13/19 0733   BP: (!)  147/82   Pulse: 78   Resp: 18   Temp: 36.4 °C (97.6 °F)     Body mass index is 30.84 kg/m².  Physical Exam   Constitutional: He is oriented to person, place, and time. He appears well-developed and well-nourished.   HENT:   Head: Normocephalic and atraumatic.   Mouth/Throat: Oropharynx is clear and moist.   Eyes: Pupils are equal, round, and reactive to light. Conjunctivae are normal.   Neck: Normal range of motion. Neck supple. No thyromegaly present.   Cardiovascular: Normal rate and intact distal pulses.    Murmur (2/6 systolic) heard.  slightly irregular, +2 pedal pulses, chronic edema- legs wrapped   Pulmonary/Chest: Effort normal and breath sounds normal. No respiratory distress. He has no wheezes.   Abdominal: Soft. Bowel sounds are normal. He exhibits no mass. There is no tenderness.   Musculoskeletal: Normal range of motion. He exhibits no deformity.   Lymphadenopathy:     He has no cervical adenopathy.   Neurological: He is alert and oriented to person, place, and time. No cranial nerve deficit.   Skin: Skin is warm and dry. No rash noted.   Bruises on arms   Psychiatric: He has a normal mood and affect. His behavior is normal. Thought content normal.   Vitals reviewed.  labs pending  EKG atrial flutter  Patient Active Problem List   Diagnosis   • Atrial fibrillation (CMS/HCC)   • CAD (coronary artery disease) of artery bypass graft   • Essential hypertension   • Hypercholesteremia   • Acquired hypothyroidism   • Type 2 diabetes mellitus with complication, with long-term current use of insulin (CMS/HCC)   • Anxiety   • GERD (gastroesophageal reflux disease)   • OCD (obsessive compulsive disorder)   • Sleep apnea   • Hypertensive urgency   • Hepatomegaly   • History of melanoma   • Mitral valve prolapse   • Renal cancer (CMS/HCC) (HCC)   • Kidney stone   • Bradycardia   • Acute renal failure superimposed on stage 3 chronic kidney disease (CMS/HCC)   • Bipolar 1 disorder (CMS/HCC) (HCC)   • Tremor   • Renal  colic on right side         Assessment/Plan:  Right ear pedicle flap takedown and inset right medial canthoplasty takedown  Cardiac clearance- sotp Eliquis and aspirin as directed  NPO for surgery, take Amiodarone AM of surgery  Today's EKG was faxed to cardiologist office, Dr. Castellanos called me back and is aware of arrhythmia, does not need to see patient back in office before surgery

## 2019-09-24 ENCOUNTER — TELEPHONE (OUTPATIENT)
Dept: PREADMISSION TESTING | Facility: HOSPITAL | Age: 69
End: 2019-09-24

## 2019-09-24 NOTE — TELEPHONE ENCOUNTER
Reviewed labs- K+ 3.1 was faxed to cardiologist office. Creatinine similar to prior labs in [facility].  I have followed up by phone with the cardiology office. Received clearance and office note. They did not repeat the K+.  I have ordered a STAT K+ on arrival.

## 2019-09-25 ENCOUNTER — HOSPITAL ENCOUNTER (OUTPATIENT)
Facility: HOSPITAL | Age: 69
Discharge: HOME | End: 2019-09-26
Attending: OTOLARYNGOLOGY | Admitting: OTOLARYNGOLOGY
Payer: MEDICARE

## 2019-09-25 ENCOUNTER — ANESTHESIA (OUTPATIENT)
Dept: OPERATING ROOM | Facility: HOSPITAL | Age: 69
Setting detail: HOSPITAL OUTPATIENT SURGERY
End: 2019-09-25
Payer: MEDICARE

## 2019-09-25 DIAGNOSIS — E87.6 HYPOKALEMIA: ICD-10-CM

## 2019-09-25 DIAGNOSIS — I48.91 ATRIAL FIBRILLATION, UNSPECIFIED TYPE (CMS/HCC): Primary | ICD-10-CM

## 2019-09-25 PROBLEM — G89.18 POST-OP PAIN: Status: ACTIVE | Noted: 2019-09-25

## 2019-09-25 PROBLEM — C44.90 SKIN CANCER: Status: ACTIVE | Noted: 2019-09-25

## 2019-09-25 PROBLEM — I48.92 ATRIAL FLUTTER (CMS/HCC): Status: ACTIVE | Noted: 2018-03-16

## 2019-09-25 PROBLEM — R94.31 ABNORMAL EKG: Status: ACTIVE | Noted: 2019-09-25

## 2019-09-25 PROBLEM — N40.0 BENIGN PROSTATIC HYPERPLASIA WITHOUT LOWER URINARY TRACT SYMPTOMS: Status: ACTIVE | Noted: 2019-09-25

## 2019-09-25 PROBLEM — N18.4 CKD (CHRONIC KIDNEY DISEASE) STAGE 4, GFR 15-29 ML/MIN (CMS/HCC): Status: ACTIVE | Noted: 2019-09-25

## 2019-09-25 LAB
ANION GAP SERPL CALC-SCNC: 13 MEQ/L (ref 3–15)
ANION GAP SERPL CALC-SCNC: 9 MEQ/L (ref 3–15)
ANION GAP SERPL CALC-SCNC: 9 MEQ/L (ref 3–15)
ATRIAL RATE: 208
BUN SERPL-MCNC: 49 MG/DL (ref 8–20)
BUN SERPL-MCNC: 50 MG/DL (ref 8–20)
BUN SERPL-MCNC: 56 MG/DL (ref 8–20)
CALCIUM SERPL-MCNC: 8.7 MG/DL (ref 8.9–10.3)
CALCIUM SERPL-MCNC: 8.7 MG/DL (ref 8.9–10.3)
CALCIUM SERPL-MCNC: 9.2 MG/DL (ref 8.9–10.3)
CHLORIDE SERPL-SCNC: 95 MEQ/L (ref 98–109)
CHLORIDE SERPL-SCNC: 97 MEQ/L (ref 98–109)
CHLORIDE SERPL-SCNC: 99 MEQ/L (ref 98–109)
CO2 SERPL-SCNC: 28 MEQ/L (ref 22–32)
CO2 SERPL-SCNC: 30 MEQ/L (ref 22–32)
CO2 SERPL-SCNC: 31 MEQ/L (ref 22–32)
CREAT SERPL-MCNC: 2.3 MG/DL
CREAT SERPL-MCNC: 2.4 MG/DL
CREAT SERPL-MCNC: 2.6 MG/DL
GFR SERPL CREATININE-BSD FRML MDRD: 24.5 ML/MIN/1.73M*2
GFR SERPL CREATININE-BSD FRML MDRD: 26.9 ML/MIN/1.73M*2
GFR SERPL CREATININE-BSD FRML MDRD: 28.3 ML/MIN/1.73M*2
GLUCOSE BLD-MCNC: 161 MG/DL (ref 70–99)
GLUCOSE BLD-MCNC: 214 MG/DL (ref 70–99)
GLUCOSE BLD-MCNC: 219 MG/DL (ref 70–99)
GLUCOSE BLD-MCNC: 269 MG/DL (ref 70–99)
GLUCOSE BLD-MCNC: 296 MG/DL (ref 70–99)
GLUCOSE SERPL-MCNC: 167 MG/DL (ref 70–99)
GLUCOSE SERPL-MCNC: 238 MG/DL (ref 70–99)
GLUCOSE SERPL-MCNC: 276 MG/DL (ref 70–99)
MAGNESIUM SERPL-MCNC: 2.1 MG/DL (ref 1.8–2.5)
P AXIS: 77
POCT TEST: ABNORMAL
POTASSIUM SERPL-SCNC: 2.6 MEQ/L (ref 3.6–5.1)
POTASSIUM SERPL-SCNC: 2.6 MEQ/L (ref 3.6–5.1)
POTASSIUM SERPL-SCNC: 2.9 MEQ/L (ref 3.6–5.1)
QRS DURATION: 194
QT INTERVAL: 576
QTC CALCULATION(BAZETT): 603
R AXIS: -89
SODIUM SERPL-SCNC: 136 MEQ/L (ref 136–144)
SODIUM SERPL-SCNC: 137 MEQ/L (ref 136–144)
SODIUM SERPL-SCNC: 138 MEQ/L (ref 136–144)
T WAVE AXIS: 264
VENTRICULAR RATE: 66

## 2019-09-25 PROCEDURE — 63700000 HC SELF-ADMINISTRABLE DRUG: Performed by: INTERNAL MEDICINE

## 2019-09-25 PROCEDURE — 63600000 HC DRUGS/DETAIL CODE: Performed by: OTOLARYNGOLOGY

## 2019-09-25 PROCEDURE — 36000003 HC OR LEVEL 3 INITIAL 30MIN: Performed by: OTOLARYNGOLOGY

## 2019-09-25 PROCEDURE — 63700000 HC SELF-ADMINISTRABLE DRUG: Performed by: STUDENT IN AN ORGANIZED HEALTH CARE EDUCATION/TRAINING PROGRAM

## 2019-09-25 PROCEDURE — 63600000 HC DRUGS/DETAIL CODE: Performed by: ANESTHESIOLOGY

## 2019-09-25 PROCEDURE — 93005 ELECTROCARDIOGRAM TRACING: CPT | Performed by: STUDENT IN AN ORGANIZED HEALTH CARE EDUCATION/TRAINING PROGRAM

## 2019-09-25 PROCEDURE — 36000013 HC OR LEVEL 3 EA ADDL MIN: Performed by: OTOLARYNGOLOGY

## 2019-09-25 PROCEDURE — 71000001 HC PACU PHASE 1 INITIAL 30MIN: Performed by: OTOLARYNGOLOGY

## 2019-09-25 PROCEDURE — 63600000 HC DRUGS/DETAIL CODE: Performed by: STUDENT IN AN ORGANIZED HEALTH CARE EDUCATION/TRAINING PROGRAM

## 2019-09-25 PROCEDURE — 0HR2X73 REPLACEMENT OF RIGHT EAR SKIN WITH AUTOLOGOUS TISSUE SUBSTITUTE, FULL THICKNESS, EXTERNAL APPROACH: ICD-10-PCS | Performed by: OTOLARYNGOLOGY

## 2019-09-25 PROCEDURE — 63600000 HC DRUGS/DETAIL CODE: Performed by: INTERNAL MEDICINE

## 2019-09-25 PROCEDURE — 08SQXZZ REPOSITION RIGHT LOWER EYELID, EXTERNAL APPROACH: ICD-10-PCS | Performed by: OTOLARYNGOLOGY

## 2019-09-25 PROCEDURE — 80048 BASIC METABOLIC PNL TOTAL CA: CPT | Performed by: OTOLARYNGOLOGY

## 2019-09-25 PROCEDURE — 63700000 HC SELF-ADMINISTRABLE DRUG: Performed by: OTOLARYNGOLOGY

## 2019-09-25 PROCEDURE — 25000000 HC PHARMACY GENERAL: Performed by: OTOLARYNGOLOGY

## 2019-09-25 PROCEDURE — 25800000 HC PHARMACY IV SOLUTIONS: Performed by: ANESTHESIOLOGY

## 2019-09-25 PROCEDURE — 80048 BASIC METABOLIC PNL TOTAL CA: CPT | Mod: 91 | Performed by: STUDENT IN AN ORGANIZED HEALTH CARE EDUCATION/TRAINING PROGRAM

## 2019-09-25 PROCEDURE — 37000002 HC ANESTHESIA MAC: Performed by: OTOLARYNGOLOGY

## 2019-09-25 PROCEDURE — 83735 ASSAY OF MAGNESIUM: CPT | Performed by: STUDENT IN AN ORGANIZED HEALTH CARE EDUCATION/TRAINING PROGRAM

## 2019-09-25 PROCEDURE — 25800000 HC PHARMACY IV SOLUTIONS: Performed by: STUDENT IN AN ORGANIZED HEALTH CARE EDUCATION/TRAINING PROGRAM

## 2019-09-25 PROCEDURE — 36415 COLL VENOUS BLD VENIPUNCTURE: CPT | Performed by: OTOLARYNGOLOGY

## 2019-09-25 PROCEDURE — 71000011 HC PACU PHASE 1 EA ADDL MIN: Performed by: OTOLARYNGOLOGY

## 2019-09-25 PROCEDURE — 25000000 HC PHARMACY GENERAL: Performed by: STUDENT IN AN ORGANIZED HEALTH CARE EDUCATION/TRAINING PROGRAM

## 2019-09-25 PROCEDURE — 63600000 HC DRUGS/DETAIL CODE: Performed by: NURSE ANESTHETIST, CERTIFIED REGISTERED

## 2019-09-25 PROCEDURE — 99219 PR INITIAL OBSERVATION CARE/DAY 50 MINUTES: CPT | Performed by: INTERNAL MEDICINE

## 2019-09-25 PROCEDURE — 25000000 HC PHARMACY GENERAL: Performed by: NURSE ANESTHETIST, CERTIFIED REGISTERED

## 2019-09-25 PROCEDURE — 27200000 HC STERILE SUPPLY: Performed by: OTOLARYNGOLOGY

## 2019-09-25 RX ORDER — AMIODARONE HYDROCHLORIDE 200 MG/1
200 TABLET ORAL DAILY
Status: DISCONTINUED | OUTPATIENT
Start: 2019-09-25 | End: 2019-09-25

## 2019-09-25 RX ORDER — SODIUM CHLORIDE, SODIUM LACTATE, POTASSIUM CHLORIDE, CALCIUM CHLORIDE 600; 310; 30; 20 MG/100ML; MG/100ML; MG/100ML; MG/100ML
INJECTION, SOLUTION INTRAVENOUS CONTINUOUS
Status: ACTIVE | OUTPATIENT
Start: 2019-09-25 | End: 2019-09-25

## 2019-09-25 RX ORDER — POTASSIUM CHLORIDE 20 MEQ/1
40 TABLET, EXTENDED RELEASE ORAL ONCE
Status: COMPLETED | OUTPATIENT
Start: 2019-09-26 | End: 2019-09-25

## 2019-09-25 RX ORDER — INSULIN ASPART 100 [IU]/ML
2 INJECTION, SOLUTION INTRAVENOUS; SUBCUTANEOUS
Status: DISCONTINUED | OUTPATIENT
Start: 2019-09-25 | End: 2019-09-26 | Stop reason: HOSPADM

## 2019-09-25 RX ORDER — POTASSIUM CHLORIDE 20 MEQ/1
20 TABLET, EXTENDED RELEASE ORAL 2 TIMES DAILY
Status: DISCONTINUED | OUTPATIENT
Start: 2019-09-25 | End: 2019-09-25

## 2019-09-25 RX ORDER — CLINDAMYCIN PHOSPHATE 600 MG/50ML
600 INJECTION, SOLUTION INTRAVENOUS
Status: DISCONTINUED | OUTPATIENT
Start: 2019-09-25 | End: 2019-09-26 | Stop reason: HOSPADM

## 2019-09-25 RX ORDER — SODIUM CHLORIDE 9 MG/ML
INJECTION, SOLUTION INTRAVENOUS CONTINUOUS
Status: DISCONTINUED | OUTPATIENT
Start: 2019-09-25 | End: 2019-09-25

## 2019-09-25 RX ORDER — DEXTROSE 50 % IN WATER (D50W) INTRAVENOUS SYRINGE
25 AS NEEDED
Status: DISCONTINUED | OUTPATIENT
Start: 2019-09-25 | End: 2019-09-25 | Stop reason: SDUPTHER

## 2019-09-25 RX ORDER — FINASTERIDE 5 MG/1
5 TABLET, FILM COATED ORAL DAILY
Status: DISCONTINUED | OUTPATIENT
Start: 2019-09-25 | End: 2019-09-26 | Stop reason: HOSPADM

## 2019-09-25 RX ORDER — LIDOCAINE HYDROCHLORIDE AND EPINEPHRINE 10; 10 UG/ML; MG/ML
INJECTION, SOLUTION INFILTRATION; PERINEURAL AS NEEDED
Status: DISCONTINUED | OUTPATIENT
Start: 2019-09-25 | End: 2019-09-25 | Stop reason: HOSPADM

## 2019-09-25 RX ORDER — ACETAMINOPHEN 325 MG/1
650 TABLET ORAL EVERY 6 HOURS PRN
Status: DISCONTINUED | OUTPATIENT
Start: 2019-09-25 | End: 2019-09-26 | Stop reason: HOSPADM

## 2019-09-25 RX ORDER — PROPOFOL 200MG/20ML
SYRINGE (ML) INTRAVENOUS AS NEEDED
Status: DISCONTINUED | OUTPATIENT
Start: 2019-09-25 | End: 2019-09-25 | Stop reason: SURG

## 2019-09-25 RX ORDER — AMIODARONE HYDROCHLORIDE 200 MG/1
200 TABLET ORAL DAILY
Status: DISCONTINUED | OUTPATIENT
Start: 2019-09-26 | End: 2019-09-26 | Stop reason: HOSPADM

## 2019-09-25 RX ORDER — ONDANSETRON HYDROCHLORIDE 2 MG/ML
4 INJECTION, SOLUTION INTRAVENOUS
Status: DISCONTINUED | OUTPATIENT
Start: 2019-09-25 | End: 2019-09-25 | Stop reason: HOSPADM

## 2019-09-25 RX ORDER — CLINDAMYCIN PHOSPHATE 600 MG/50ML
600 INJECTION, SOLUTION INTRAVENOUS ONCE
Status: COMPLETED | OUTPATIENT
Start: 2019-09-25 | End: 2019-09-25

## 2019-09-25 RX ORDER — HYDROCODONE BITARTRATE AND ACETAMINOPHEN 5; 325 MG/1; MG/1
1 TABLET ORAL EVERY 4 HOURS PRN
Status: DISCONTINUED | OUTPATIENT
Start: 2019-09-25 | End: 2019-09-26 | Stop reason: HOSPADM

## 2019-09-25 RX ORDER — TOBRAMYCIN 3 MG/ML
2 SOLUTION/ DROPS OPHTHALMIC 2 TIMES DAILY
Status: DISCONTINUED | OUTPATIENT
Start: 2019-09-25 | End: 2019-09-26 | Stop reason: HOSPADM

## 2019-09-25 RX ORDER — MIDAZOLAM HYDROCHLORIDE 2 MG/2ML
INJECTION, SOLUTION INTRAMUSCULAR; INTRAVENOUS AS NEEDED
Status: DISCONTINUED | OUTPATIENT
Start: 2019-09-25 | End: 2019-09-25 | Stop reason: SURG

## 2019-09-25 RX ORDER — LAMOTRIGINE 25 MG/1
150 TABLET ORAL DAILY
Status: DISCONTINUED | OUTPATIENT
Start: 2019-09-25 | End: 2019-09-26 | Stop reason: HOSPADM

## 2019-09-25 RX ORDER — MINOXIDIL 2.5 MG/1
5 TABLET ORAL DAILY
Status: DISCONTINUED | OUTPATIENT
Start: 2019-09-25 | End: 2019-09-26 | Stop reason: HOSPADM

## 2019-09-25 RX ORDER — HYDRALAZINE HYDROCHLORIDE 25 MG/1
25 TABLET, FILM COATED ORAL 2 TIMES DAILY
Status: DISCONTINUED | OUTPATIENT
Start: 2019-09-25 | End: 2019-09-26 | Stop reason: HOSPADM

## 2019-09-25 RX ORDER — INSULIN GLARGINE 100 [IU]/ML
10 INJECTION, SOLUTION SUBCUTANEOUS NIGHTLY
Status: DISCONTINUED | OUTPATIENT
Start: 2019-09-25 | End: 2019-09-26 | Stop reason: HOSPADM

## 2019-09-25 RX ORDER — ASPIRIN 81 MG/1
81 TABLET ORAL DAILY
Status: DISCONTINUED | OUTPATIENT
Start: 2019-09-25 | End: 2019-09-26 | Stop reason: HOSPADM

## 2019-09-25 RX ORDER — FENTANYL CITRATE 50 UG/ML
INJECTION, SOLUTION INTRAMUSCULAR; INTRAVENOUS AS NEEDED
Status: DISCONTINUED | OUTPATIENT
Start: 2019-09-25 | End: 2019-09-25 | Stop reason: SURG

## 2019-09-25 RX ORDER — CLONIDINE HYDROCHLORIDE 0.2 MG/1
0.2 TABLET ORAL 2 TIMES DAILY
Status: DISCONTINUED | OUTPATIENT
Start: 2019-09-25 | End: 2019-09-26 | Stop reason: HOSPADM

## 2019-09-25 RX ORDER — ATORVASTATIN CALCIUM 40 MG/1
40 TABLET, FILM COATED ORAL
Status: DISCONTINUED | OUTPATIENT
Start: 2019-09-25 | End: 2019-09-26 | Stop reason: HOSPADM

## 2019-09-25 RX ORDER — POTASSIUM CHLORIDE 20 MEQ/1
40 TABLET, EXTENDED RELEASE ORAL ONCE
Status: COMPLETED | OUTPATIENT
Start: 2019-09-25 | End: 2019-09-25

## 2019-09-25 RX ORDER — LEVOTHYROXINE SODIUM 100 UG/1
100 TABLET ORAL
Status: DISCONTINUED | OUTPATIENT
Start: 2019-09-25 | End: 2019-09-26 | Stop reason: HOSPADM

## 2019-09-25 RX ORDER — IBUPROFEN 200 MG
16-32 TABLET ORAL AS NEEDED
Status: DISCONTINUED | OUTPATIENT
Start: 2019-09-25 | End: 2019-09-25 | Stop reason: HOSPADM

## 2019-09-25 RX ORDER — IBUPROFEN 200 MG
16-32 TABLET ORAL AS NEEDED
Status: DISCONTINUED | OUTPATIENT
Start: 2019-09-25 | End: 2019-09-26 | Stop reason: HOSPADM

## 2019-09-25 RX ORDER — KETAMINE HYDROCHLORIDE 10 MG/ML
INJECTION, SOLUTION INTRAMUSCULAR; INTRAVENOUS AS NEEDED
Status: DISCONTINUED | OUTPATIENT
Start: 2019-09-25 | End: 2019-09-25 | Stop reason: SURG

## 2019-09-25 RX ORDER — DIPHENHYDRAMINE HCL 50 MG/ML
25 VIAL (ML) INJECTION EVERY 6 HOURS PRN
Status: DISCONTINUED | OUTPATIENT
Start: 2019-09-25 | End: 2019-09-26 | Stop reason: HOSPADM

## 2019-09-25 RX ORDER — LEVOTHYROXINE SODIUM 100 UG/1
100 TABLET ORAL
Status: DISCONTINUED | OUTPATIENT
Start: 2019-09-26 | End: 2019-09-25

## 2019-09-25 RX ORDER — DEXTROSE 40 %
15-30 GEL (GRAM) ORAL AS NEEDED
Status: DISCONTINUED | OUTPATIENT
Start: 2019-09-25 | End: 2019-09-25 | Stop reason: HOSPADM

## 2019-09-25 RX ORDER — FENTANYL CITRATE 50 UG/ML
50 INJECTION, SOLUTION INTRAMUSCULAR; INTRAVENOUS
Status: DISCONTINUED | OUTPATIENT
Start: 2019-09-25 | End: 2019-09-25 | Stop reason: HOSPADM

## 2019-09-25 RX ORDER — IBUPROFEN 200 MG
16-32 TABLET ORAL AS NEEDED
Status: DISCONTINUED | OUTPATIENT
Start: 2019-09-25 | End: 2019-09-25 | Stop reason: SDUPTHER

## 2019-09-25 RX ORDER — DEXTROSE 40 %
15-30 GEL (GRAM) ORAL AS NEEDED
Status: DISCONTINUED | OUTPATIENT
Start: 2019-09-25 | End: 2019-09-26 | Stop reason: HOSPADM

## 2019-09-25 RX ORDER — METOLAZONE 5 MG/1
5 TABLET ORAL 2 TIMES DAILY
COMMUNITY

## 2019-09-25 RX ORDER — LIDOCAINE HYDROCHLORIDE 10 MG/ML
INJECTION, SOLUTION EPIDURAL; INFILTRATION; INTRACAUDAL; PERINEURAL AS NEEDED
Status: DISCONTINUED | OUTPATIENT
Start: 2019-09-25 | End: 2019-09-25 | Stop reason: SURG

## 2019-09-25 RX ORDER — BUMETANIDE 1 MG/1
2 TABLET ORAL 2 TIMES DAILY
Status: DISCONTINUED | OUTPATIENT
Start: 2019-09-25 | End: 2019-09-25

## 2019-09-25 RX ORDER — DEXTROSE 50 % IN WATER (D50W) INTRAVENOUS SYRINGE
25 AS NEEDED
Status: DISCONTINUED | OUTPATIENT
Start: 2019-09-25 | End: 2019-09-26 | Stop reason: HOSPADM

## 2019-09-25 RX ORDER — DEXTROSE 50 % IN WATER (D50W) INTRAVENOUS SYRINGE
25 AS NEEDED
Status: DISCONTINUED | OUTPATIENT
Start: 2019-09-25 | End: 2019-09-25 | Stop reason: HOSPADM

## 2019-09-25 RX ORDER — SENNOSIDES 8.6 MG/1
1 TABLET ORAL 2 TIMES DAILY PRN
Status: DISCONTINUED | OUTPATIENT
Start: 2019-09-25 | End: 2019-09-26 | Stop reason: HOSPADM

## 2019-09-25 RX ORDER — SODIUM CHLORIDE, SODIUM LACTATE, POTASSIUM CHLORIDE, CALCIUM CHLORIDE 600; 310; 30; 20 MG/100ML; MG/100ML; MG/100ML; MG/100ML
INJECTION, SOLUTION INTRAVENOUS CONTINUOUS
Status: DISCONTINUED | OUTPATIENT
Start: 2019-09-25 | End: 2019-09-25

## 2019-09-25 RX ORDER — BUMETANIDE 1 MG/1
2 TABLET ORAL
Status: DISCONTINUED | OUTPATIENT
Start: 2019-09-26 | End: 2019-09-26 | Stop reason: HOSPADM

## 2019-09-25 RX ORDER — ALUMINUM HYDROXIDE, MAGNESIUM HYDROXIDE, AND SIMETHICONE 1200; 120; 1200 MG/30ML; MG/30ML; MG/30ML
30 SUSPENSION ORAL EVERY 4 HOURS PRN
Status: DISCONTINUED | OUTPATIENT
Start: 2019-09-25 | End: 2019-09-26 | Stop reason: HOSPADM

## 2019-09-25 RX ORDER — GLYCOPYRROLATE 0.6MG/3ML
SYRINGE (ML) INTRAVENOUS AS NEEDED
Status: DISCONTINUED | OUTPATIENT
Start: 2019-09-25 | End: 2019-09-25 | Stop reason: SURG

## 2019-09-25 RX ORDER — DEXTROSE MONOHYDRATE, SODIUM CHLORIDE, AND POTASSIUM CHLORIDE 50; 1.49; 4.5 G/1000ML; G/1000ML; G/1000ML
INJECTION, SOLUTION INTRAVENOUS ONCE
Status: COMPLETED | OUTPATIENT
Start: 2019-09-25 | End: 2019-09-25

## 2019-09-25 RX ORDER — DEXTROSE 40 %
15-30 GEL (GRAM) ORAL AS NEEDED
Status: DISCONTINUED | OUTPATIENT
Start: 2019-09-25 | End: 2019-09-25 | Stop reason: SDUPTHER

## 2019-09-25 RX ORDER — POVIDONE-IODINE 5 %
SOLUTION, NON-ORAL OPHTHALMIC (EYE) AS NEEDED
Status: DISCONTINUED | OUTPATIENT
Start: 2019-09-25 | End: 2019-09-25 | Stop reason: HOSPADM

## 2019-09-25 RX ORDER — PROPOFOL 10 MG/ML
INJECTION, EMULSION INTRAVENOUS CONTINUOUS PRN
Status: DISCONTINUED | OUTPATIENT
Start: 2019-09-25 | End: 2019-09-25 | Stop reason: SURG

## 2019-09-25 RX ADMIN — CLINDAMYCIN IN 5 PERCENT DEXTROSE 600 MG: 12 INJECTION, SOLUTION INTRAVENOUS at 15:51

## 2019-09-25 RX ADMIN — POTASSIUM CHLORIDE 40 MEQ: 20 TABLET, EXTENDED RELEASE ORAL at 15:49

## 2019-09-25 RX ADMIN — ATORVASTATIN CALCIUM 40 MG: 40 TABLET, FILM COATED ORAL at 17:48

## 2019-09-25 RX ADMIN — INSULIN GLARGINE 10 UNITS: 100 INJECTION, SOLUTION SUBCUTANEOUS at 21:54

## 2019-09-25 RX ADMIN — MIDAZOLAM HYDROCHLORIDE 0.5 MG: 1 INJECTION, SOLUTION INTRAMUSCULAR; INTRAVENOUS at 07:59

## 2019-09-25 RX ADMIN — ACETAMINOPHEN 650 MG: 325 TABLET ORAL at 19:51

## 2019-09-25 RX ADMIN — CLINDAMYCIN IN 5 PERCENT DEXTROSE 600 MG: 12 INJECTION, SOLUTION INTRAVENOUS at 23:54

## 2019-09-25 RX ADMIN — ASPIRIN 81 MG: 81 TABLET ORAL at 19:51

## 2019-09-25 RX ADMIN — GLYCOPYRROLATE 0.1 MG: 0.2 INJECTION INTRAMUSCULAR; INTRAVENOUS at 07:59

## 2019-09-25 RX ADMIN — DEXTROSE MONOHYDRATE, SODIUM CHLORIDE, AND POTASSIUM CHLORIDE 80 ML/HR: 50; 4.5; 1.49 INJECTION, SOLUTION INTRAVENOUS at 10:00

## 2019-09-25 RX ADMIN — Medication 25 MG: at 07:59

## 2019-09-25 RX ADMIN — HYDRALAZINE HYDROCHLORIDE 25 MG: 25 TABLET, FILM COATED ORAL at 19:52

## 2019-09-25 RX ADMIN — MIDAZOLAM HYDROCHLORIDE 2 MG: 1 INJECTION, SOLUTION INTRAMUSCULAR; INTRAVENOUS at 07:29

## 2019-09-25 RX ADMIN — ACETAMINOPHEN 650 MG: 325 TABLET ORAL at 13:19

## 2019-09-25 RX ADMIN — SODIUM CHLORIDE: 9 INJECTION, SOLUTION INTRAVENOUS at 13:20

## 2019-09-25 RX ADMIN — PROPOFOL 10 MG: 10 INJECTION, EMULSION INTRAVENOUS at 07:50

## 2019-09-25 RX ADMIN — INSULIN ASPART 6 UNITS: 100 INJECTION, SOLUTION INTRAVENOUS; SUBCUTANEOUS at 17:48

## 2019-09-25 RX ADMIN — INSULIN ASPART 8 UNITS: 100 INJECTION, SOLUTION INTRAVENOUS; SUBCUTANEOUS at 09:55

## 2019-09-25 RX ADMIN — CLONIDINE HYDROCHLORIDE 0.2 MG: 0.2 TABLET ORAL at 19:52

## 2019-09-25 RX ADMIN — LIDOCAINE HYDROCHLORIDE 6 ML: 10 INJECTION, SOLUTION EPIDURAL; INFILTRATION; INTRACAUDAL; PERINEURAL at 07:31

## 2019-09-25 RX ADMIN — PROPOFOL 35 MCG/KG/MIN: 10 INJECTION, EMULSION INTRAVENOUS at 07:35

## 2019-09-25 RX ADMIN — CLINDAMYCIN IN 5 PERCENT DEXTROSE 600 MG: 12 INJECTION, SOLUTION INTRAVENOUS at 07:30

## 2019-09-25 RX ADMIN — POTASSIUM CHLORIDE 40 MEQ: 20 TABLET, EXTENDED RELEASE ORAL at 23:54

## 2019-09-25 RX ADMIN — PROPOFOL 20 MG: 10 INJECTION, EMULSION INTRAVENOUS at 07:31

## 2019-09-25 RX ADMIN — FENTANYL CITRATE 50 MCG: 50 INJECTION INTRAMUSCULAR; INTRAVENOUS at 07:29

## 2019-09-25 RX ADMIN — APIXABAN 5 MG: 5 TABLET, FILM COATED ORAL at 19:51

## 2019-09-25 RX ADMIN — SODIUM CHLORIDE, POTASSIUM CHLORIDE, SODIUM LACTATE AND CALCIUM CHLORIDE: 600; 310; 30; 20 INJECTION, SOLUTION INTRAVENOUS at 06:36

## 2019-09-25 RX ADMIN — POTASSIUM CHLORIDE 40 MEQ: 149 INJECTION, SOLUTION, CONCENTRATE INTRAVENOUS at 10:06

## 2019-09-25 RX ADMIN — INSULIN ASPART 6 UNITS: 100 INJECTION, SOLUTION INTRAVENOUS; SUBCUTANEOUS at 14:21

## 2019-09-25 RX ADMIN — LEVOTHYROXINE SODIUM 100 MCG: 100 TABLET ORAL at 15:49

## 2019-09-25 RX ADMIN — TOBRAMYCIN 2 DROP: 3 SOLUTION OPHTHALMIC at 21:53

## 2019-09-25 RX ADMIN — FENTANYL CITRATE 50 MCG: 50 INJECTION, SOLUTION INTRAMUSCULAR; INTRAVENOUS at 10:10

## 2019-09-25 RX ADMIN — FENTANYL CITRATE 50 MCG: 50 INJECTION, SOLUTION INTRAMUSCULAR; INTRAVENOUS at 09:41

## 2019-09-25 RX ADMIN — FENTANYL CITRATE 50 MCG: 50 INJECTION INTRAMUSCULAR; INTRAVENOUS at 07:31

## 2019-09-25 ASSESSMENT — ENCOUNTER SYMPTOMS: DYSRHYTHMIAS: 1

## 2019-09-25 NOTE — ANESTHESIA PREPROCEDURE EVALUATION
Anesthesia ROS/MED HX    Anesthesia History - neg  Pulmonary    Sleep apnea and CPAP Compliant  Neuro/Psych    Bipolar disorder  Cardiovascular   CAD   hypertension  Dysrhythmias, atrial fibrillation and atrial flutter   ECG reviewed, cardiac clearance reviewed and echocardiogram reviewed  Hematological    anticoagulants  GI/Hepatic   liver disease   GERD  Musculoskeletal- neg  Renal Disease   chronic renal insufficiency  Endo/Other   Diabetes and patient Insulin dependent   Hypothyroidism  History of cancer  Body Habitus: Overweight  ROS/MED HX Comments:    Cardiology: RBBB      Past Surgical History:   Procedure Laterality Date   • CARDIAC CATHETERIZATION  05/2016   • COLONOSCOPY  2016   • CORONARY ARTERY BYPASS GRAFT  05/2016    x3   • CYSTOSCOPY INSERTION / REMOVAL STENT / STONE  03/27/2019   • CYSTOSCOPY INSERTION / REMOVAL STENT / STONE  04/10/2019    Stent placement   • CYSTOSCOPY, STENT REMOVAL  05/2019   • KIDNEY STONE SURGERY Right 2018   • NEPHRECTOMY Right 2016    PARTIAL    • TONSILLECTOMY      Childhood   • UVULECTOMY  2005   • WISDOM TOOTH EXTRACTION  1970     Past Medical History:   Diagnosis Date   • A-fib (CMS/HCC) ([facility])    • Abnormal ECG     a fib   • Aortic valve disorder    • Arthritis     Knees   • Bipolar disorder (CMS/HCC) ([facility])    • BPH (benign prostatic hyperplasia)    • Bruises easily    • Constipation     Ocassional   • Coronary artery disease     CABG 3 vessels 2016   • Disease of thyroid gland    • GERD (gastroesophageal reflux disease)    • Hypertension    • Hypothyroidism    • Kidney stones     Surgical removal of stones   • Lipid disorder    • Nocturia    • Renal calculi     right   • Renal cancer (CMS/HCC) ([facility])    • Right bundle branch block    • Seasonal allergies    • Skin cancer     squamous-right ear, face   • Sleep apnea     uses cpap   • Type 2 diabetes mellitus (CMS/HCC) ([facility])          Physical Exam    Airway   Mallampati: II   TM distance: >3 FB   Neck ROM:  full  Cardiovascular - normal   Rhythm: regular   Rate: normal  Pulmonary - normal   clear to auscultation  Dental - normal        Anesthesia Plan    Plan: MAC    Technique: MAC     Lines and Monitors: PIV     Airway: natural airway / supplemental oxygen   ASA 3  Blood Products:   Use of Blood Products Discussed: No   Anesthetic plan and risks discussed with: patient  Postop Plan:   Patient Disposition: inpatient floor planned admission   Pain Management: IV analgesics  Comments:    Plan: GA backup

## 2019-09-25 NOTE — OR SURGEON
Pre-Procedure patient identification:  I am the primary operating surgeon/proceduralist and I have identified the patient on 09/25/19 at 6:59 AM Bethel Alfonso DO  Phone Number: [phone]

## 2019-09-25 NOTE — H&P (VIEW-ONLY)
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, he had biopsy and office procedure for resection right ear in July 2019, he had postop bleeding from right ear surgery that required ER visits in [place], he is on Eliquis and aspirin and had stopped as instructed for the previous procedures, he and his wife are concerned about bleeding and requested the patient stay overnight after surgery, the eye procedure to remove cancer was one year ago, he is experiencing right eye dryness and pain   Allergies:  Allergies   Allergen Reactions   • Amlodipine Other (see comments)     ANKLE  & FEET  SWELLING Pt is currently taking med   • Oxycodone Other (see comments)      Hives     • Cephalexin Monohydrate Other (see comments)     PT CAN'T REMEMBER  REACTION   • Latex Rash     Patient's Meds:   Current Outpatient Prescriptions:   •  loratadine (CLARITIN) 10 mg tablet, Take 10 mg by mouth daily as needed for allergies., Disp: , Rfl:   •  amiodarone (PACERONE) 200 mg tablet, Take 200 mg by mouth daily., Disp: , Rfl:   •  apixaban (ELIQUIS) 5 mg tablet, Take 5 mg by mouth 2 (two) times a day. PT TO HOLD ELIQUIS 3 DAYS PRIOR TO SX AS PER HIS CARDIOLOGIST , Disp: , Rfl:   •  aspirin 81 mg enteric coated tablet, Take 81 mg by mouth daily. PT TO STOP 5 DAYS PRIOR TO SX AS PER HIS CARDIOLOGIST , Disp: , Rfl:   •  atorvastatin (LIPITOR) 40 mg tablet, Take 40 mg by mouth daily.  , Disp: , Rfl:   •  bumetanide (BUMEX) 1 mg tablet, Take 2 mg by mouth 2 (two) times a day., Disp: , Rfl:   •  cloNIDine (CATAPRES) 0.2 mg tablet, Take 0.2 mg by mouth 2 (two) times a day., Disp: , Rfl:   •  coenzyme Q10 (CO Q-10) 400 mg capsule, Take 400 mg by mouth daily.  , Disp: , Rfl:   •  compr.stocking,knee,long,small misc, 1 Package daily., Disp: 2 each, Rfl: 0  •  finasteride (PROSCAR) 5 mg tablet, Take 5 mg by mouth daily., Disp: , Rfl:   •  hydrALAZINE (APRESOLINE) 25 mg  tablet, Take 25 mg by mouth 2 (two) times a day., Disp: , Rfl:   •  insulin glargine,hum.rec.anlog (INSULIN GLARGINE SUBQ), Inject 20 Units under the skin 2 (two) times a day. 70/30 mix, Disp: , Rfl:   •  lamoTRIgine (LaMICtal) 150 mg tablet, Take 1 tablet (150 mg total) by mouth daily., Disp: 30 tablet, Rfl: 0  •  levothyroxine (SYNTHROID) 100 mcg tablet, Take 100 mcg by mouth daily., Disp: , Rfl:   •  minoxidil (LONITEN) 2.5 mg tablet, Take 2.5 mg by mouth daily. 2 tablets daily, Disp: , Rfl:   •  multivitamin tablet, Take 1 tablet by mouth daily., Disp: , Rfl:   •  omeprazole (PriLOSEC) 40 mg capsule, Take 40 mg by mouth daily before breakfast., Disp: , Rfl:   •  potassium chloride (K-TAB) 20 mEq CR tablet, Take 20 mEq by mouth 2 (two) times a day., Disp: , Rfl:   Medical History:   Past Medical History:   Diagnosis Date   • A-fib (CMS/HCC) ([facility])    • Abnormal ECG     a fib   • Aortic valve disorder    • Arthritis     Knees   • Bipolar disorder (CMS/HCC) ([facility])    • BPH (benign prostatic hyperplasia)    • Bruises easily    • Constipation     Ocassional   • Coronary artery disease     CABG 3 vessels 2016   • Disease of thyroid gland    • GERD (gastroesophageal reflux disease)    • Hypertension    • Hypothyroidism    • Kidney stones     Surgical removal of stones   • Lipid disorder    • Nocturia    • Renal calculi     right   • Renal cancer (CMS/HCC) ([facility])    • Right bundle branch block    • Seasonal allergies    • Skin cancer     squamous-right ear, face   • Sleep apnea     uses cpap   • Type 2 diabetes mellitus (CMS/HCC) ([facility])      Surgical History:   Past Surgical History:   Procedure Laterality Date   • CARDIAC CATHETERIZATION  05/2016   • COLONOSCOPY  2016   • CORONARY ARTERY BYPASS GRAFT  05/2016    x3   • CYSTOSCOPY INSERTION / REMOVAL STENT / STONE  03/27/2019   • CYSTOSCOPY INSERTION / REMOVAL STENT / STONE  04/10/2019    Stent placement   • CYSTOSCOPY, STENT REMOVAL  05/2019   • KIDNEY STONE SURGERY Right  2018   • NEPHRECTOMY Right 2016    PARTIAL    • TONSILLECTOMY      Childhood   • UVULECTOMY  2005   • WISDOM TOOTH EXTRACTION  1970     Social History:   Social History     Social History   • Marital status:      Spouse name: N/A   • Number of children: N/A   • Years of education: N/A     Social History Main Topics   • Smoking status: Never Smoker   • Smokeless tobacco: Never Used   • Alcohol use No   • Drug use: No   • Sexual activity: Defer     Other Topics Concern   • None     Social History Narrative   • None     Family History:   Family History   Problem Relation Age of Onset   • Lung cancer Biological Mother    • Lung cancer Biological Father    • Early death Biological Father    • Diabetes Sister      Review of Systems   Constitutional: Negative for chills and fever.   HENT: Negative for dental problem and hearing loss.    Eyes: Negative for visual disturbance.   Respiratory: Positive for apnea (uvulectomy, uses CPAP). Negative for chest tightness and shortness of breath.         Small pleural effusions May 2019   Cardiovascular: Positive for palpitations (felt his heart racing one week ago, denies associated CP SOB ir dizziness). Negative for chest pain and leg swelling.        A fib- on Eliquis, CAD s/p CABG  Climbs 44 steps  In his house without symptoms   Gastrointestinal: Negative for abdominal pain, nausea and vomiting.   Endocrine:        Insulin dependent DM- not testing fingersticks daily only 2 times per week   Genitourinary: Negative for discharge, dysuria and hematuria.        Kidney stones with acute kidney failure, addressed 1-2019 and 4-2019, kidney cancer right partial nephrectomy 2016   Musculoskeletal: Negative.    Skin: Negative for wound.   Allergic/Immunologic: Positive for environmental allergies.   Neurological: Positive for tremors. Negative for dizziness, weakness and numbness.   Psychiatric/Behavioral:        Bipolar disorder     Vitals:   Vitals:    09/13/19 0733   BP: (!)  147/82   Pulse: 78   Resp: 18   Temp: 36.4 °C (97.6 °F)     Body mass index is 30.84 kg/m².  Physical Exam   Constitutional: He is oriented to person, place, and time. He appears well-developed and well-nourished.   HENT:   Head: Normocephalic and atraumatic.   Mouth/Throat: Oropharynx is clear and moist.   Eyes: Pupils are equal, round, and reactive to light. Conjunctivae are normal.   Neck: Normal range of motion. Neck supple. No thyromegaly present.   Cardiovascular: Normal rate and intact distal pulses.    Murmur (2/6 systolic) heard.  slightly irregular, +2 pedal pulses, chronic edema- legs wrapped   Pulmonary/Chest: Effort normal and breath sounds normal. No respiratory distress. He has no wheezes.   Abdominal: Soft. Bowel sounds are normal. He exhibits no mass. There is no tenderness.   Musculoskeletal: Normal range of motion. He exhibits no deformity.   Lymphadenopathy:     He has no cervical adenopathy.   Neurological: He is alert and oriented to person, place, and time. No cranial nerve deficit.   Skin: Skin is warm and dry. No rash noted.   Bruises on arms   Psychiatric: He has a normal mood and affect. His behavior is normal. Thought content normal.   Vitals reviewed.  labs pending  EKG atrial flutter  Patient Active Problem List   Diagnosis   • Atrial fibrillation (CMS/HCC)   • CAD (coronary artery disease) of artery bypass graft   • Essential hypertension   • Hypercholesteremia   • Acquired hypothyroidism   • Type 2 diabetes mellitus with complication, with long-term current use of insulin (CMS/HCC)   • Anxiety   • GERD (gastroesophageal reflux disease)   • OCD (obsessive compulsive disorder)   • Sleep apnea   • Hypertensive urgency   • Hepatomegaly   • History of melanoma   • Mitral valve prolapse   • Renal cancer (CMS/HCC) ([facility])   • Kidney stone   • Bradycardia   • Acute renal failure superimposed on stage 3 chronic kidney disease (CMS/HCC)   • Bipolar 1 disorder (CMS/HCC) ([facility])   • Tremor   • Renal  colic on right side         Assessment/Plan:  Right ear pedicle flap takedown and inset right medial canthoplasty takedown  Cardiac clearance- sotp Eliquis and aspirin as directed  NPO for surgery, take Amiodarone AM of surgery  Today's EKG was faxed to cardiologist office, Dr. Castellanos called me back and is aware of arrhythmia, does not need to see patient back in office before surgery

## 2019-09-25 NOTE — PATIENT CARE CONFERENCE
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 Please update the Problem List and refresh.  Principal Problem: No Principal Problem: There is no principal problem currently on the Problem List. Please update the Problem List and refresh.    GMLOS: pending  Anticipated Discharge Date: 9/26/2019    AM-PAC  Mobility Score:      Discharge Planning:       Barriers to Discharge:  Barriers to Discharge: Medical issues not resolved    Participants:  , dietitian/nutrition services, nursing, pharmacy, social work/services

## 2019-09-25 NOTE — ASSESSMENT & PLAN NOTE
Aflutter with RBBB, inferiorlateral t-wave changes resolved.  Patient without any sx.   K repletion ongoing.

## 2019-09-25 NOTE — ASSESSMENT & PLAN NOTE
Hx aflutter/fib.  Amiodarone 200mg  Remains rate-controlled (chronic gurmeet). BB (was on propranolol previously) was d/c'd as outpatient.  Eliquis for stroke prevention -- has been resumed by ENT.

## 2019-09-25 NOTE — ASSESSMENT & PLAN NOTE
Home medications:   Continue, with hold parameters    Clonidine 0.2mg BID  Hydralazine 25mg BID  Minoxidil 5mg daily

## 2019-09-25 NOTE — PLAN OF CARE
Problem: Patient Care Overview  Goal: Plan of Care Review  Outcome: Ongoing (interventions implemented as appropriate)   19 1504   Coping/Psychosocial   Plan Of Care Reviewed With patient   Plan of Care Review   Progress improving   Outcome Summary Discharge home without services     Goal: Discharge Needs Assessment  Outcome: Ongoing (interventions implemented as appropriate)   19 1504   DC Needs Assessment   Concerns To Be Addressed no discharge needs identified   Readmission Within The Last 30 Days no previous admission in last 30 days   Provider Choice List(s) Given no   Anticipated Discharge Disposition home without services   Community Agency Name(s) Not current with HC or HHA at this time   Equipment Needed After Discharge none   Discharge Planning Comments CM met with the patient in the room. CC team role explained to the patient with verbal understanding. Patient's name, , address and PCP (Dr. Hickman) verified.   Living Arrangements: Patient lives in a 3SH with wife.  There is 1 step to enter the house and 10-12 steps between floors.  There is a full bath on the 1st floor.  Patient's bedroom is on the 2nd floor.  The patient has no trouble going up and down the steps.    P.L.O.F: Independent with ambulation and ADL's  Oxygen: none  Anticipated Discharge: Home without services   Current Health   Anticipated Changes Related to Illness none   Activity/Self Care ROS   Equipment Currently Used at Home CPAP;shower chair

## 2019-09-25 NOTE — CONSULTS
Hospital Medicine Service -  Inpatient Consultation         Requesting Physician: Dr. Alfonso  Reason for Consultation: Medical management     HISTORY OF PRESENT ILLNESS        This is a 68 y.o. male with past medical history of atrial fibrillation/atrial flutter, hypertension, chronic lymphedema, chronic kidney disease stage IV, obstructive sleep apnea, BPH, bipolar, esophageal reflux, hypothyroidism, nephrolithiasis, hyperlipidemia, CAD (status post three-vessel CABG in 2016). History of squamous cell carcinoma of right ear and right lower eyelid. Today, went to OR for right ear pedical flap takdown and right medial canthoplasty takedown. Noted to have hypokalemia preop labs (3.1), noted to have K 2.6 here, receiving repletion.     Patient reports that he has been on diuretics for lymphedema, has lost about 20 lbs if not greater with bumex 2mg BID and metolazone 5mg BID. Unsure if he has received potassium repletion after his preop labs, but medication list does contain KCL 20meq BID.     Currently, patient's only complaint is post-op pain of right ear. Otherwise, denies any N/V, chest pain. Reports that his heart rate is chronically in the 50s, though occasionally it has been in the 80s. Denies any muscle weakness.     PAST MEDICAL AND SURGICAL HISTORY        Past Medical History:   Diagnosis Date   • A-fib (CMS/[facility]) ([facility])    • Abnormal ECG     a fib   • Aortic valve disorder    • Arthritis     Knees   • Bipolar disorder (CMS/[facility]) ([facility])    • BPH (benign prostatic hyperplasia)    • Bruises easily    • Constipation     Ocassional   • Coronary artery disease     CABG 3 vessels 2016   • Disease of thyroid gland    • GERD (gastroesophageal reflux disease)    • Hypertension    • Hypothyroidism    • Kidney stones     Surgical removal of stones   • Lipid disorder    • Nocturia    • Renal calculi     right   • Renal cancer (CMS/[facility]) ([facility])    • Right bundle branch block    • Seasonal allergies    • Skin cancer      squamous-right ear, face   • Sleep apnea     uses cpap   • Type 2 diabetes mellitus (CMS/HCC) (HCC)        Past Surgical History:   Procedure Laterality Date   • CARDIAC CATHETERIZATION  05/2016   • COLONOSCOPY  2016   • CORONARY ARTERY BYPASS GRAFT  05/2016    x3   • CYSTOSCOPY INSERTION / REMOVAL STENT / STONE  03/27/2019   • CYSTOSCOPY INSERTION / REMOVAL STENT / STONE  04/10/2019    Stent placement   • CYSTOSCOPY, STENT REMOVAL  05/2019   • KIDNEY STONE SURGERY Right 2018   • NEPHRECTOMY Right 2016    PARTIAL    • TONSILLECTOMY      Childhood   • UVULECTOMY  2005   • WISDOM TOOTH EXTRACTION  1970     PCP: Joyce Hickman MD    MEDICATIONS        Home Medications:  Prescriptions Prior to Admission   Medication Sig Dispense Refill Last Dose   • amiodarone (PACERONE) 200 mg tablet Take 200 mg by mouth daily.   9/25/2019 at Unknown time   • atorvastatin (LIPITOR) 40 mg tablet Take 40 mg by mouth daily.     9/24/2019 at Unknown time   • bumetanide (BUMEX) 1 mg tablet Take 2 mg by mouth 2 (two) times a day.   9/24/2019 at Unknown time   • cloNIDine (CATAPRES) 0.2 mg tablet Take 0.2 mg by mouth 2 (two) times a day.   9/25/2019 at Unknown time   • coenzyme Q10 (CO Q-10) 400 mg capsule Take 400 mg by mouth daily.     9/24/2019 at Unknown time   • finasteride (PROSCAR) 5 mg tablet Take 5 mg by mouth daily.   9/24/2019 at Unknown time   • hydrALAZINE (APRESOLINE) 25 mg tablet Take 25 mg by mouth 2 (two) times a day.   9/25/2019 at Unknown time   • insulin glargine,hum.rec.anlog (INSULIN GLARGINE SUBQ) Inject 20 Units under the skin 2 (two) times a day. 70/30 mix   9/24/2019 at Unknown time   • levothyroxine (SYNTHROID) 100 mcg tablet Take 100 mcg by mouth daily.   9/24/2019 at Unknown time   • metOLazone (ZAROXOLYN) 5 mg tablet Take 5 mg by mouth 2 (two) times a day.      • minoxidil (LONITEN) 2.5 mg tablet Take 2.5 mg by mouth daily. 2 tablets daily   9/24/2019 at Unknown time   • omeprazole (PriLOSEC) 40 mg capsule Take  "40 mg by mouth daily before breakfast.   9/24/2019 at Unknown time   • potassium chloride (K-TAB) 20 mEq CR tablet Take 20 mEq by mouth 2 (two) times a day.   9/24/2019 at Unknown time   • apixaban (ELIQUIS) 5 mg tablet Take 5 mg by mouth 2 (two) times a day. PT TO HOLD ELIQUIS 3 DAYS PRIOR TO SX AS PER HIS CARDIOLOGIST    9/22/2019   • aspirin 81 mg enteric coated tablet Take 81 mg by mouth daily. PT TO STOP 5 DAYS PRIOR TO SX AS PER HIS CARDIOLOGIST    9/22/2019   • compr.stocking,knee,long,small misc 1 Package daily. 2 each 0    • lamoTRIgine (LaMICtal) 150 mg tablet Take 1 tablet (150 mg total) by mouth daily. 30 tablet 0 Taking   • loratadine (CLARITIN) 10 mg tablet Take 10 mg by mouth daily as needed for allergies.      • multivitamin tablet Take 1 tablet by mouth daily.          Current inpatient medications were personally reviewed.    ALLERGIES        Amlodipine; Oxycodone; Cephalexin monohydrate; and Latex    FAMILY HISTORY        Family History   Problem Relation Age of Onset   • Lung cancer Biological Mother    • Lung cancer Biological Father    • Early death Biological Father    • Diabetes Sister        SOCIAL HISTORY        Social History     Social History   • Marital status:      Spouse name: N/A   • Number of children: N/A   • Years of education: N/A     Social History Main Topics   • Smoking status: Never Smoker   • Smokeless tobacco: Never Used   • Alcohol use No   • Drug use: No   • Sexual activity: Defer     Other Topics Concern   • None     Social History Narrative   • None       REVIEW OF SYSTEMS        All other systems reviewed and negative except as noted in HPI    PHYSICAL EXAMINATION        /74 (BP Location: Right upper arm, Patient Position: Lying)   Pulse (!) 54   Temp 36.1 °C (97 °F) (Axillary)   Resp 20   Ht 1.88 m (6' 2\")   Wt 109 kg (240 lb)   SpO2 98%   BMI 30.81 kg/m²   Body mass index is 30.81 kg/m².    Intake/Output Summary (Last 24 hours) at 09/25/19 " 1500  Last data filed at 09/25/19 1010   Gross per 24 hour   Intake              800 ml   Output              250 ml   Net              550 ml       Physical Exam   Constitutional: Awake, in bed, complaint of ear pain  HENT: right ear dressing in place.   Eyes: EOMI, no conjunctival injection  CV: regular, gurmeet, 2/6 systolic murmur  Resp: Clear to auscultation bilaterally, no wheeze/rales/rhonchi  GI: Soft, non-tender to palpation, no rebound, no guarding. BS present.   : madsen not present  MSK: no edema  Neuro: alert and oriented x4, moves all extremities  Psych: no obvious sign of depression or change in affect    LABS / EKG        Labs  Lab Results   Component Value Date    WBC 10.07 09/13/2019    HGB 12.9 (L) 09/13/2019    HCT 38.4 (L) 09/13/2019    MCV 80.8 (L) 09/13/2019     09/13/2019     Lab Results   Component Value Date     09/25/2019    K 2.6 (LL) 09/25/2019    CL 97 (L) 09/25/2019    CO2 31 09/25/2019    BUN 50 (H) 09/25/2019    CREATININE 2.4 (H) 09/25/2019    GLUCOSE 238 (H) 09/25/2019     Lab Results   Component Value Date    INR 1.5 08/13/2019       ECG/Telemetry  Telemetry -- aflutter rate 50s  EKG with aflutter RBBB, inferiorlateral Twave inversions. Patient without any chest pain or sx. Monitor on telemetry, replete K.     Imaging      ASSESSMENT AND RECOMMENDATIONS           Hypokalemia   Assessment & Plan    Likely in setting of bumex 2mg BID and metolazone 5mg BID outpatient regimen.  Cardiac monitoring.  Bradycardia is chronic.   Mg is normal.  KCl 40meq IV being infused now.  Give PO KCl 40meq now.  Check K level after completion, replete this evening if needed to keep close to 4.  Reports taking diuretics for chronic severe lymphedema (which currently is looking great). Will hold bumex/metolazone today, may resume tomorrow.     Atrial flutter (CMS/HCC)   Assessment & Plan    Hx aflutter/fib.  Amiodarone 200mg  Remains rate-controlled (chronic gurmeet). BB (was on propranolol  previously) was d/c'd as outpatient.  Eliquis for stroke prevention -- resume once cleared by ENT.     Post-op pain   Assessment & Plan    Pain control per primary.     CKD (chronic kidney disease) stage 4, GFR 15-29 ml/min (CMS/[facility]) ([facility])   Assessment & Plan    Stable, at creatinine 2.4 today.  Renally dose medications.     Abnormal EKG   Assessment & Plan    Aflutter with RBBB, inferiorlateral t-wave changes.  Patient without any sx.   Monitor on telemetry.   Replete K.      Benign prostatic hyperplasia without lower urinary tract symptoms   Assessment & Plan    Continue home finasteride.      Skin cancer   Assessment & Plan    SqCC.   Management per ENT.     Bipolar disorder (CMS/[facility]) ([facility])   Assessment & Plan    Continue home medications: lamictal     Sleep apnea   Assessment & Plan    CPAP unable to be worn due to right ear.   Monitor sat during evening, PRN O2.      GERD (gastroesophageal reflux disease)   Assessment & Plan    Continue PPI     Type 2 diabetes mellitus with complication, with long-term current use of insulin (CMS/[facility])   Assessment & Plan    Home medication 70/30 20u BID.  Formulary alternative long-acting only.  10u QHS for tonight, re-eval in AM.   Continue additional SSI  Hypoglycemia orders     Acquired hypothyroidism   Assessment & Plan    Continue home levothyroxine 100mcg     Essential hypertension   Assessment & Plan    Home medications:   Continue, with hold parameters    Clonidine 0.2mg BID  Hydralazine 25mg BID  Minoxidil 5mg daily       CAD (coronary artery disease) of artery bypass graft   Assessment & Plan    S/p CABG x3 2017  Has been on ASA in addition to NOAC for aflutter/fib.  Continue lipitor 40mg.            DVT ppx: eliquis restarted by primary service     Sonia Bonilla MD  9/25/2019

## 2019-09-25 NOTE — ASSESSMENT & PLAN NOTE
Likely in setting of bumex 2mg BID and metolazone 5mg BID outpatient regimen.  EKG checked -- TWI resolved.  K 3.0 this AM.   KCl 40meq IV being infused now.  Given PO KCl 40meq this AM  Reports taking diuretics for chronic severe lymphedema (which currently is looking great), no hx of CHF.    I discussed the following instructions with the patient (in discharge instructions as well):  HOLD your bumex and metolazone until follow up at your primary care physician's office tomorrow.   TAKE your potassium, but take TWO tablets this evening when you get home.   Get your potassium level checked tomorrow. Your potassium was 3.0 this morning, and you were give IV potassium 40meq and oral potassium 40meq. Your creatinine was 2.2 today (it was 2.3 yesterday).    Discussed with RN regarding plan to HOLD his bumex/metolazone today in the hospital.

## 2019-09-26 VITALS
HEIGHT: 74 IN | OXYGEN SATURATION: 100 % | BODY MASS INDEX: 30.16 KG/M2 | HEART RATE: 52 BPM | WEIGHT: 235 LBS | TEMPERATURE: 98 F | RESPIRATION RATE: 18 BRPM | SYSTOLIC BLOOD PRESSURE: 159 MMHG | DIASTOLIC BLOOD PRESSURE: 94 MMHG

## 2019-09-26 LAB
ANION GAP SERPL CALC-SCNC: 9 MEQ/L (ref 3–15)
ATRIAL RATE: 202
BUN SERPL-MCNC: 46 MG/DL (ref 8–20)
CALCIUM SERPL-MCNC: 8.9 MG/DL (ref 8.9–10.3)
CHLORIDE SERPL-SCNC: 100 MEQ/L (ref 98–109)
CO2 SERPL-SCNC: 28 MEQ/L (ref 22–32)
CREAT SERPL-MCNC: 2.2 MG/DL
GFR SERPL CREATININE-BSD FRML MDRD: 29.7 ML/MIN/1.73M*2
GLUCOSE BLD-MCNC: 174 MG/DL (ref 70–99)
GLUCOSE BLD-MCNC: 205 MG/DL (ref 70–99)
GLUCOSE SERPL-MCNC: 232 MG/DL (ref 70–99)
P AXIS: 257
POCT TEST: ABNORMAL
POCT TEST: ABNORMAL
POTASSIUM SERPL-SCNC: 3 MEQ/L (ref 3.6–5.1)
QRS DURATION: 190
QT INTERVAL: 578
QTC CALCULATION(BAZETT): 542
R AXIS: -78
SODIUM SERPL-SCNC: 137 MEQ/L (ref 136–144)
T WAVE AXIS: -35
VENTRICULAR RATE: 53

## 2019-09-26 PROCEDURE — 63600000 HC DRUGS/DETAIL CODE: Performed by: HOSPITALIST

## 2019-09-26 PROCEDURE — 63700000 HC SELF-ADMINISTRABLE DRUG: Performed by: INTERNAL MEDICINE

## 2019-09-26 PROCEDURE — 25000000 HC PHARMACY GENERAL: Performed by: STUDENT IN AN ORGANIZED HEALTH CARE EDUCATION/TRAINING PROGRAM

## 2019-09-26 PROCEDURE — 93005 ELECTROCARDIOGRAM TRACING: CPT | Performed by: INTERNAL MEDICINE

## 2019-09-26 PROCEDURE — 99226 PR SBSQ OBSERVATION CARE/DAY 35 MINUTES: CPT | Performed by: INTERNAL MEDICINE

## 2019-09-26 PROCEDURE — 63700000 HC SELF-ADMINISTRABLE DRUG: Performed by: STUDENT IN AN ORGANIZED HEALTH CARE EDUCATION/TRAINING PROGRAM

## 2019-09-26 PROCEDURE — 80048 BASIC METABOLIC PNL TOTAL CA: CPT | Performed by: STUDENT IN AN ORGANIZED HEALTH CARE EDUCATION/TRAINING PROGRAM

## 2019-09-26 RX ORDER — SENNOSIDES 8.6 MG/1
1 TABLET ORAL ONCE
Status: COMPLETED | OUTPATIENT
Start: 2019-09-26 | End: 2019-09-26

## 2019-09-26 RX ORDER — ACETAMINOPHEN AND CODEINE PHOSPHATE 300; 30 MG/1; MG/1
1 TABLET ORAL EVERY 6 HOURS PRN
Qty: 20 TABLET | Refills: 0
Start: 2019-09-26 | End: 2019-10-01

## 2019-09-26 RX ORDER — CLINDAMYCIN HYDROCHLORIDE 300 MG/1
300 CAPSULE ORAL 4 TIMES DAILY
Qty: 28 CAPSULE | Refills: 0
Start: 2019-09-26 | End: 2019-10-03

## 2019-09-26 RX ORDER — TOBRAMYCIN 3 MG/ML
2 SOLUTION/ DROPS OPHTHALMIC
Qty: 5 ML | Refills: 0
Start: 2019-09-26 | End: 2019-10-03

## 2019-09-26 RX ORDER — POTASSIUM CHLORIDE 20 MEQ/1
40 TABLET, EXTENDED RELEASE ORAL ONCE
Status: COMPLETED | OUTPATIENT
Start: 2019-09-26 | End: 2019-09-26

## 2019-09-26 RX ADMIN — ASPIRIN 81 MG: 81 TABLET ORAL at 08:50

## 2019-09-26 RX ADMIN — POTASSIUM CHLORIDE 40 MEQ: 149 INJECTION, SOLUTION, CONCENTRATE INTRAVENOUS at 06:31

## 2019-09-26 RX ADMIN — HYDRALAZINE HYDROCHLORIDE 25 MG: 25 TABLET, FILM COATED ORAL at 08:50

## 2019-09-26 RX ADMIN — CLINDAMYCIN IN 5 PERCENT DEXTROSE 600 MG: 12 INJECTION, SOLUTION INTRAVENOUS at 06:31

## 2019-09-26 RX ADMIN — AMIODARONE HYDROCHLORIDE 200 MG: 200 TABLET ORAL at 08:50

## 2019-09-26 RX ADMIN — CLONIDINE HYDROCHLORIDE 0.2 MG: 0.2 TABLET ORAL at 08:52

## 2019-09-26 RX ADMIN — MINOXIDIL 5 MG: 2.5 TABLET ORAL at 08:50

## 2019-09-26 RX ADMIN — LEVOTHYROXINE SODIUM 100 MCG: 100 TABLET ORAL at 05:38

## 2019-09-26 RX ADMIN — TOBRAMYCIN 2 DROP: 3 SOLUTION OPHTHALMIC at 08:55

## 2019-09-26 RX ADMIN — LAMOTRIGINE 150 MG: 25 TABLET ORAL at 08:49

## 2019-09-26 RX ADMIN — POTASSIUM CHLORIDE 40 MEQ: 20 TABLET, EXTENDED RELEASE ORAL at 07:34

## 2019-09-26 RX ADMIN — SENNOSIDES 1 TABLET: 8.6 TABLET, FILM COATED ORAL at 09:39

## 2019-09-26 RX ADMIN — INSULIN ASPART 6 UNITS: 100 INJECTION, SOLUTION INTRAVENOUS; SUBCUTANEOUS at 08:52

## 2019-09-26 RX ADMIN — FINASTERIDE 5 MG: 5 TABLET, FILM COATED ORAL at 08:50

## 2019-09-26 RX ADMIN — APIXABAN 5 MG: 5 TABLET, FILM COATED ORAL at 08:50

## 2019-09-26 NOTE — PROGRESS NOTES
Hospital Medicine Service  Daily Progress Note       SUBJECTIVE   Interval History: No acute events overnight. Pain is controlled, eager to go home. Has f/u appt with PCP tomorrow.      ROS negative except those mentioned above.     OBJECTIVE      Vital signs in last 24 hours:  Temp:  [36.1 °C (97 °F)-36.7 °C (98.1 °F)] 36.7 °C (98 °F)  Heart Rate:  [50-54] 52  Resp:  [16-20] 18  BP: (121-159)/(73-94) 159/94  No intake or output data in the 24 hours ending 09/26/19 1029    PHYSICAL EXAMINATION      Physical Exam    Constitutional: Awake, in bed, appears comfortable  HENT: right ear dressing in place.   Eyes: EOMI, no conjunctival injection  CV: regular, gurmeet, 2/6 systolic murmur  Resp: Clear to auscultation bilaterally, no wheeze/rales/rhonchi  GI: Soft, non-tender to palpation, no rebound, no guarding. BS present.   : madsen not present  MSK: no edema  Neuro: alert and oriented x4, moves all extremities  Psych: no obvious sign of depression or change in affect       LINES, CATHETERS, DRAINS, AIRWAYS, AND WOUNDS   Lines, Drains, Airways, Wounds:  Surgical Incision Ear Right (Active)   Number of days: 1       Surgical Incision Eye Right (Active)   Number of days: 1       Comments:      LABS / IMAGING / TELE      Labs    Lab Results   Component Value Date     09/26/2019    K 3.0 (L) 09/26/2019     09/26/2019    CO2 28 09/26/2019    BUN 46 (H) 09/26/2019    CREATININE 2.2 (H) 09/26/2019    GLUCOSE 232 (H) 09/26/2019       Lab Results   Component Value Date    INR 1.5 08/13/2019    INR 1.2 03/26/2019    INR 1.2 01/15/2019       Imaging  No results found.    ECG/Telemetry  ekg reviewed -- aflutter. RBBB. TWI no longer in lateral leads. QTc 542 (shortened from prior).     Scheduled Meds:    amiodarone 200 mg oral Daily   apixaban 5 mg oral BID   aspirin 81 mg oral Daily   atorvastatin 40 mg oral Daily (6p)   bumetanide 2 mg oral BID (9a, 4p)   clindamycin 600 mg intravenous q8h INT   cloNIDine 0.2 mg oral  BID   finasteride 5 mg oral Daily   hydrALAZINE 25 mg oral BID   insulin aspart U-100 2 Units subcutaneous TID with meals   insulin glargine 10 Units subcutaneous Nightly   lamoTRIgine 150 mg oral Daily   levothyroxine 100 mcg oral Daily (6:30a)   minoxidil 5 mg oral Daily   tobramycin 2 drop Right Eye BID     Continuous Infusions:   PRN Meds:.•  acetaminophen  •  alum-mag hydroxide-simeth  •  glucose **OR** dextrose **OR** glucagon **OR** dextrose in water  •  diphenhydrAMINE  •  HYDROcodone-acetaminophen  •  phenol  •  senna      ASSESSMENT AND PLAN      Hypokalemia   Assessment & Plan    Likely in setting of bumex 2mg BID and metolazone 5mg BID outpatient regimen.  EKG checked -- TWI resolved.  K 3.0 this AM.   KCl 40meq IV being infused now.  Given PO KCl 40meq this AM  Reports taking diuretics for chronic severe lymphedema (which currently is looking great), no hx of CHF.    I discussed the following instructions with the patient (in discharge instructions as well):  HOLD your bumex and metolazone until follow up at your primary care physician's office tomorrow.   TAKE your potassium, but take TWO tablets this evening when you get home.   Get your potassium level checked tomorrow. Your potassium was 3.0 this morning, and you were give IV potassium 40meq and oral potassium 40meq. Your creatinine was 2.2 today (it was 2.3 yesterday).    Discussed with RN regarding plan to HOLD his bumex/metolazone today in the hospital.     Atrial flutter (CMS/[facility])   Assessment & Plan    Hx aflutter/fib.  Amiodarone 200mg  Remains rate-controlled (chronic gurmeet). BB (was on propranolol previously) was d/c'd as outpatient.  Eliquis for stroke prevention -- has been resumed by ENT.     Post-op pain   Assessment & Plan    Pain control per primary.     CKD (chronic kidney disease) stage 4, GFR 15-29 ml/min (CMS/HCC) ([facility])   Assessment & Plan    Stable, at creatinine 2.4 today.  Renally dose medications.     Abnormal EKG   Assessment  & Plan    Aflutter with RBBB, inferiorlateral t-wave changes resolved.  Patient without any sx.   K repletion ongoing.     Benign prostatic hyperplasia without lower urinary tract symptoms   Assessment & Plan    Continue home finasteride.      Skin cancer   Assessment & Plan    SqCC.   Management per ENT.     Bipolar disorder (CMS/HCC) ([facility])   Assessment & Plan    Continue home medications: lamictal     Sleep apnea   Assessment & Plan    CPAP unable to be worn due to right ear.   Monitor sat during evening, PRN O2.      GERD (gastroesophageal reflux disease)   Assessment & Plan    Continue PPI     Type 2 diabetes mellitus with complication, with long-term current use of insulin (CMS/[facility])   Assessment & Plan    Home medication 70/30 20u BID -- resume at home.     Acquired hypothyroidism   Assessment & Plan    Continue home levothyroxine 100mcg     Essential hypertension   Assessment & Plan    Home medications:   Continue, with hold parameters    Clonidine 0.2mg BID  Hydralazine 25mg BID  Minoxidil 5mg daily       CAD (coronary artery disease) of artery bypass graft   Assessment & Plan    S/p CABG x3 2017  Has been on ASA in addition to NOAC for aflutter/fib.  Continue lipitor 40mg.            Plan of care discussed with: Patient and RN.    VTE Assessment: [place]    VTE Prophylaxis Plan: apixaban  Code Status: Full Code  Estimated Discharge Date: 9/26/2019  Disposition Planning: ok to discharge with PCP follow up tomorrow from medicine perspective     Sonia Bonilla MD  9/26/2019

## 2019-09-26 NOTE — DISCHARGE INSTRUCTIONS
Clean wound twice/day  Apply eye drops twice/day  F/U in the clinic in 7 days to have staples removed  Call [facility] for appointment and/or excessive pain, bleeding, fever, or drainage from the wound  Follow up with your primary care, cardiologist, and kidney doctor regarding your medications   Continue with all your home medications as instructed by your medical doctors    EXCEPT ---    HOLD your bumex and metolazone until follow up at your primary care physician's office tomorrow.   TAKE your potassium, but take TWO tablets this evening when you get home.   Get your potassium level checked tomorrow. Your potassium was 3.0 this morning, and you were give IV potassium 40meq and oral potassium 40meq. Your creatinine was 2.2 today (it was 2.3 yesterday).

## 2019-09-26 NOTE — PLAN OF CARE
Problem: Patient Care Overview  Goal: Plan of Care Review  Outcome: Ongoing (interventions implemented as appropriate)    Goal: Individualization & Mutuality  Outcome: Ongoing (interventions implemented as appropriate)      Problem: Skin Integrity Impairment, Risk/Actual (Adult)  Goal: Identify Related Risk Factors and Signs and Symptoms  Outcome: Outcome(s) Achieved Date Met: 09/26/19    Goal: Skin Integrity/Wound Healing  Outcome: Ongoing (interventions implemented as appropriate)

## 2019-09-26 NOTE — PROGRESS NOTES
ENT PROGRESS Note    Subjective     The patient s/e this am  Denies any n/v/d/c/cp/sob  Mild ear pain alleviated with pain meds      Medical History:   Past Medical History:   Diagnosis Date   • A-fib (CMS/HCC) ([facility])    • Abnormal ECG     a fib   • Aortic valve disorder    • Arthritis     Knees   • Bipolar disorder (CMS/HCC) ([facility])    • BPH (benign prostatic hyperplasia)    • Bruises easily    • Constipation     Ocassional   • Coronary artery disease     CABG 3 vessels 2016   • Disease of thyroid gland    • GERD (gastroesophageal reflux disease)    • Hypertension    • Hypothyroidism    • Kidney stones     Surgical removal of stones   • Lipid disorder    • Nocturia    • Renal calculi     right   • Renal cancer (CMS/HCC) ([facility])    • Right bundle branch block    • Seasonal allergies    • Skin cancer     squamous-right ear, face   • Sleep apnea     uses cpap   • Type 2 diabetes mellitus (CMS/[facility]) ([facility])        Surgical History:   Past Surgical History:   Procedure Laterality Date   • CARDIAC CATHETERIZATION  05/2016   • COLONOSCOPY  2016   • CORONARY ARTERY BYPASS GRAFT  05/2016    x3   • CYSTOSCOPY INSERTION / REMOVAL STENT / STONE  03/27/2019   • CYSTOSCOPY INSERTION / REMOVAL STENT / STONE  04/10/2019    Stent placement   • CYSTOSCOPY, STENT REMOVAL  05/2019   • KIDNEY STONE SURGERY Right 2018   • NEPHRECTOMY Right 2016    PARTIAL    • TONSILLECTOMY      Childhood   • UVULECTOMY  2005   • WISDOM TOOTH EXTRACTION  1970       Social History:   Social History     Social History   • Marital status:      Spouse name: N/A   • Number of children: N/A   • Years of education: N/A     Social History Main Topics   • Smoking status: Never Smoker   • Smokeless tobacco: Never Used   • Alcohol use No   • Drug use: No   • Sexual activity: Defer     Other Topics Concern   • None     Social History Narrative   • None       Family History:   Family History   Problem Relation Age of Onset   • Lung cancer Biological Mother    • Lung  cancer Biological Father    • Early death Biological Father    • Diabetes Sister        Allergies: Amlodipine; Oxycodone; Cephalexin monohydrate; and Latex    Current Inpatient Medications   Medication Dose Route Frequency Provider Last Rate Last Dose   • acetaminophen (TYLENOL) tablet 650 mg  650 mg oral q6h PRN Madhu Wilder, DO   650 mg at 09/25/19 1951   • alum-mag hydroxide-simeth (MAALOX) 200-200-20 mg/5 mL suspension 30 mL  30 mL oral q4h PRN Madhu Wilder, DO       • amiodarone (PACERONE) tablet 200 mg  200 mg oral Daily MeñoMadhu mercedes, DO       • apixaban (ELIQUIS) tablet 5 mg  5 mg oral BID Madhu Wilder, DO   5 mg at 09/25/19 1951   • aspirin enteric coated tablet 81 mg  81 mg oral Daily Madhu Wilder, DO   81 mg at 09/25/19 1951   • atorvastatin (LIPITOR) tablet 40 mg  40 mg oral Daily (6p) Madhu Wilder, DO   40 mg at 09/25/19 1748   • bumetanide (BUMEX) tablet 2 mg  2 mg oral BID (9a, 4p) Sonia Bonilla MD       • clindamycin (CLEOCIN) in dextrose 5% 600 mg  600 mg intravenous q8h INT Madhu Wilder  mL/hr at 09/26/19 0631 600 mg at 09/26/19 0631   • cloNIDine (CATAPRES) tablet 0.2 mg  0.2 mg oral BID Madhu Wilder, DO   0.2 mg at 09/25/19 1952   • glucose chewable tablet 16-32 g of dextrose  16-32 g of dextrose oral PRN Sonia Bonilla MD        Or   • dextrose 40 % oral gel 15-30 g of dextrose  15-30 g of dextrose oral PRN Sonia Bonilla MD        Or   • glucagon (GLUCAGEN) injection 1 mg  1 mg intramuscular PRN Sonia Bonilla MD        Or   • dextrose in water injection 12.5 g  25 mL intravenous PRN Sonia Bonilla MD       • diphenhydrAMINE (BENADRYL) injection 25 mg  25 mg intravenous q6h PRN Madhu Wilder DO       • finasteride (PROSCAR) tablet 5 mg  5 mg oral Daily Madhu Wilder DO       • hydrALAZINE (APRESOLINE) tablet 25 mg  25 mg oral BID Sonia Bonilla MD   25 mg at 09/25/19 1952   • HYDROcodone-acetaminophen (NORCO) 5-325 mg per tablet 1 tablet  1  tablet oral q4h PRN Mat Cedillo DO       • insulin aspart U-100 (NovoLOG) pen 2 Units  2 Units subcutaneous TID with meals Trever Zuñiga DO   6 Units at 09/25/19 1748   • insulin glargine (LANTUS SOLOSTAR) pen 10 Units  10 Units subcutaneous Nightly Sonia Bonilla MD   10 Units at 09/25/19 2154   • lamoTRIgine (LaMICtal) tablet 150 mg  150 mg oral Daily Madhu Wilder DO       • levothyroxine (SYNTHROID) tablet 100 mcg  100 mcg oral Daily (6:30a) Madhu Wilder DO   100 mcg at 09/26/19 0538   • minoxidil (LONITEN) tablet 5 mg  5 mg oral Daily Madhu Wilder DO       • phenol (SORE THROAT SPRAY) 1.4 % spray 2 spray  2 spray Mouth/Throat q2h PRN Madhu Wilder DO       • potassium chloride (KCL) 40 mEq/250 mL IVPB in NSS 40 mEq  40 mEq intravenous Daily Madhu Wilder DO   Stopped at 09/25/19 1406   • potassium chloride (KCL) 40 mEq/250 mL IVPB in NSS 40 mEq  40 mEq intravenous Once Joaquin Valencia DO 62.5 mL/hr at 09/26/19 0631 40 mEq at 09/26/19 0631   • senna (SENOKOT) tablet 1 tablet  1 tablet oral 2x daily PRN Madhu Wilder DO       • tobramycin (TOBREX) 0.3 % ophthalmic solution 2 drop  2 drop Right Eye BID Mat Cedillo DO   2 drop at 09/25/19 2153        Medication List      START taking these medications    acetaminophen-codeine 300-30 mg per tablet  Commonly known as:  TYLENOL #3  Take 1 tablet by mouth every 6 (six) hours as needed for moderate pain for up to 5 days.  Dose:  1 tablet     clindamycin 300 mg capsule  Commonly known as:  CLEOCIN HCL  Take 1 capsule (300 mg total) by mouth 4 (four) times a day for 7 days.  Dose:  300 mg     tobramycin 0.3 % ophthalmic solution  Commonly known as:  TOBREX  Administer 2 drops into the right eye every 12 (twelve) hours for 7 days.  Dose:  2 drop        CONTINUE taking these medications    amiodarone 200 mg tablet  Commonly known as:  PACERONE  Take 200 mg by mouth daily.  Dose:  200 mg     apixaban 5 mg tablet  Commonly known as:   ELIQUIS  Take 5 mg by mouth 2 (two) times a day. PT TO HOLD ELIQUIS 3 DAYS PRIOR TO SX AS PER HIS CARDIOLOGIST  Dose:  5 mg     aspirin 81 mg enteric coated tablet  Take 81 mg by mouth daily. PT TO STOP 5 DAYS PRIOR TO SX AS PER HIS CARDIOLOGIST  Dose:  81 mg     atorvastatin 40 mg tablet  Commonly known as:  LIPITOR  Take 40 mg by mouth daily.  Dose:  40 mg     bumetanide 1 mg tablet  Commonly known as:  BUMEX  Take 2 mg by mouth 2 (two) times a day.  Dose:  2 mg     cloNIDine 0.2 mg tablet  Commonly known as:  CATAPRES  Take 0.2 mg by mouth 2 (two) times a day.  Dose:  0.2 mg     CO Q-10 400 mg capsule  Take 400 mg by mouth daily.  Dose:  400 mg  Generic drug:  coenzyme Q10     compr.stocking,knee,long,small misc  1 Package daily.  Dose:  1 Package     finasteride 5 mg tablet  Commonly known as:  PROSCAR  Take 5 mg by mouth daily.  Dose:  5 mg     hydrALAZINE 25 mg tablet  Commonly known as:  APRESOLINE  Take 25 mg by mouth 2 (two) times a day.  Dose:  25 mg     INSULIN GLARGINE SUBQ  Inject 20 Units under the skin 2 (two) times a day. 70/30 mix  Dose:  20 Units     lamoTRIgine 150 mg tablet  Commonly known as:  LaMICtal  Take 1 tablet (150 mg total) by mouth daily.  Dose:  150 mg     levothyroxine 100 mcg tablet  Commonly known as:  SYNTHROID  Take 100 mcg by mouth daily.  Dose:  100 mcg     loratadine 10 mg tablet  Commonly known as:  CLARITIN  Take 10 mg by mouth daily as needed for allergies.  Dose:  10 mg     metOLazone 5 mg tablet  Commonly known as:  ZAROXOLYN  Take 5 mg by mouth 2 (two) times a day.  Dose:  5 mg     minoxidil 2.5 mg tablet  Commonly known as:  LONITEN  Take 2.5 mg by mouth daily. 2 tablets daily  Dose:  2.5 mg     multivitamin tablet  Take 1 tablet by mouth daily.  Dose:  1 tablet     omeprazole 40 mg capsule  Commonly known as:  PriLOSEC  Take 40 mg by mouth daily before breakfast.  Dose:  40 mg     potassium chloride 20 mEq CR tablet  Commonly known as:  K-TAB  Take 20 mEq by mouth 2  (two) times a day.  Dose:  20 mEq            Objective   Labs  BMP Results       09/26/19 09/25/19 09/25/19                    0438 2206 1213          138 137         K 3.0 (L) 2.9 (L) 2.6 (LL)         Cl 100 99 97 (L)         CO2 28 30 31         Glucose 232 (H) 167 (H) 238 (H)         BUN 46 (H) 49 (H) 50 (H)         Creatinine 2.2 (H) 2.3 (H) 2.4 (H)         Calcium 8.9 8.7 (L) 8.7 (L)         Anion Gap 9 9 9         EGFR 29.7 (L) 28.3 (L) 26.9 (L)                         PHYSICAL EXAM:  -GEN: NAD, A&Ox3  -HEAD: NC/AT  -FACE: No masses/lesions/abrasions/lacerations. No facial asymmetry/weakness  -HEENT NEURO: CN II-XII grossly intact  -EYES: PERRLA/EOMI  -NOSE: patent b/l, no rhinorrhea/epistaxis  -EARS:   --RT Ear: Dressing in place, replaced Xeroform, wound healing well, sutures and staples in place without any signs of edema/erythema/exudate/cellulitic changes/palapable fluid collection. No otorrhea  --LT Ear: Normal external ear, no otorrhea.  -OC/OP: mmm, FOM soft, uvula midline   -NECK: supple, trachea midline, no palpable LAD   -RESPIRATORY: nonlabored, no wheezing/stridor, no increased wob. No hoarseness.   -ABDOMEN: Soft. NTTP  -EXT: warm, dry, no palpable edema, no erythema/pain of calves to palpations       A/P: 69YO M S/P FLAP TAKE DOWN AND RECONSTRUCTION OF THE RIGHT AURICLE FOR DEFECT CREATED AFTER EXCISION OF CUTANEOUS MALIGNANCY AND TIGHT CANTHOPLASTY/CANTHOPEXY   -The patient doing well this am  -HEENT:   --Dressing in place, minimal drainage   --Replaced Xeroform this am  --Minimal drainage   -CV  --Repleting K  --Appreciate medicine team recs  --Cont   -GI  --Cont with diet   -  --Appreciate medical team recs regarding diuretics   -Puml  --Off O2 this am  -Endocrine:  --Cont with thyroid replacement hormone   -DVT ppx:  --SCD's   --Eliquis  -DW patient   -RHONDA nursing staff  -RHONDA attending Dr. Eason

## 2019-10-24 ENCOUNTER — APPOINTMENT (OUTPATIENT)
Dept: LAB | Age: 69
End: 2019-10-24
Attending: FAMILY MEDICINE
Payer: MEDICARE

## 2019-10-24 ENCOUNTER — TRANSCRIBE ORDERS (OUTPATIENT)
Dept: LAB | Age: 69
End: 2019-10-24

## 2019-10-24 DIAGNOSIS — E87.6 HYPOKALEMIA: Primary | ICD-10-CM

## 2019-10-24 DIAGNOSIS — E87.6 HYPOKALEMIA: ICD-10-CM

## 2019-10-24 DIAGNOSIS — M79.89 OTHER SPECIFIED SOFT TISSUE DISORDERS: ICD-10-CM

## 2019-10-24 LAB
ALBUMIN SERPL-MCNC: 4.1 G/DL (ref 3.4–5)
ALP SERPL-CCNC: 109 IU/L (ref 35–126)
ALT SERPL-CCNC: 28 IU/L (ref 16–63)
ANION GAP SERPL CALC-SCNC: 15 MEQ/L (ref 3–15)
AST SERPL-CCNC: 30 IU/L (ref 15–41)
BILIRUB SERPL-MCNC: 1 MG/DL (ref 0.3–1.2)
BUN SERPL-MCNC: 47 MG/DL (ref 8–20)
CALCIUM SERPL-MCNC: 9.5 MG/DL (ref 8.9–10.3)
CHLORIDE SERPL-SCNC: 90 MEQ/L (ref 98–109)
CO2 SERPL-SCNC: 29 MEQ/L (ref 22–32)
CREAT SERPL-MCNC: 2.5 MG/DL
EST. AVERAGE GLUCOSE BLD GHB EST-MCNC: 232 MG/DL
GFR SERPL CREATININE-BSD FRML MDRD: 25.7 ML/MIN/1.73M*2
GLUCOSE SERPL-MCNC: 298 MG/DL (ref 70–99)
HBA1C MFR BLD HPLC: 9.7 %
MAGNESIUM SERPL-MCNC: 2 MG/DL (ref 1.8–2.5)
POTASSIUM SERPL-SCNC: 2.7 MEQ/L (ref 3.6–5.1)
PROT SERPL-MCNC: 6.4 G/DL (ref 6–8.2)
SODIUM SERPL-SCNC: 134 MEQ/L (ref 136–144)

## 2019-10-24 PROCEDURE — 83880 ASSAY OF NATRIURETIC PEPTIDE: CPT

## 2019-10-24 PROCEDURE — 83735 ASSAY OF MAGNESIUM: CPT

## 2019-10-24 PROCEDURE — 80053 COMPREHEN METABOLIC PANEL: CPT

## 2019-10-24 PROCEDURE — 83036 HEMOGLOBIN GLYCOSYLATED A1C: CPT | Mod: GA

## 2019-10-24 PROCEDURE — 36415 COLL VENOUS BLD VENIPUNCTURE: CPT

## 2019-10-29 LAB — NT-PROBNP SERPL-MCNC: 1221 PG/ML

## 2019-11-06 ENCOUNTER — APPOINTMENT (OUTPATIENT)
Dept: URBAN - METROPOLITAN AREA CLINIC 200 | Age: 69
Setting detail: DERMATOLOGY
End: 2019-11-07

## 2019-11-06 ENCOUNTER — RX ONLY (RX ONLY)
Age: 69
End: 2019-11-06

## 2019-11-06 DIAGNOSIS — L57.0 ACTINIC KERATOSIS: ICD-10-CM

## 2019-11-06 DIAGNOSIS — L57.8 OTHER SKIN CHANGES DUE TO CHRONIC EXPOSURE TO NONIONIZING RADIATION: ICD-10-CM

## 2019-11-06 DIAGNOSIS — Z85.828 PERSONAL HISTORY OF OTHER MALIGNANT NEOPLASM OF SKIN: ICD-10-CM

## 2019-11-06 DIAGNOSIS — D485 NEOPLASM OF UNCERTAIN BEHAVIOR OF SKIN: ICD-10-CM

## 2019-11-06 PROBLEM — I10 ESSENTIAL (PRIMARY) HYPERTENSION: Status: ACTIVE | Noted: 2019-11-06

## 2019-11-06 PROBLEM — E03.9 HYPOTHYROIDISM, UNSPECIFIED: Status: ACTIVE | Noted: 2019-11-06

## 2019-11-06 PROBLEM — E13.9 OTHER SPECIFIED DIABETES MELLITUS WITHOUT COMPLICATIONS: Status: ACTIVE | Noted: 2019-11-06

## 2019-11-06 PROBLEM — D48.5 NEOPLASM OF UNCERTAIN BEHAVIOR OF SKIN: Status: ACTIVE | Noted: 2019-11-06

## 2019-11-06 PROCEDURE — OTHER LIQUID NITROGEN: OTHER

## 2019-11-06 PROCEDURE — OTHER BIOPSY BY SHAVE METHOD: OTHER

## 2019-11-06 PROCEDURE — OTHER MIPS QUALITY: OTHER

## 2019-11-06 PROCEDURE — 17000 DESTRUCT PREMALG LESION: CPT | Mod: 59

## 2019-11-06 PROCEDURE — 17003 DESTRUCT PREMALG LES 2-14: CPT

## 2019-11-06 PROCEDURE — 11102 TANGNTL BX SKIN SINGLE LES: CPT

## 2019-11-06 PROCEDURE — OTHER COUNSELING: OTHER

## 2019-11-06 PROCEDURE — 11103 TANGNTL BX SKIN EA SEP/ADDL: CPT

## 2019-11-06 PROCEDURE — 99213 OFFICE O/P EST LOW 20 MIN: CPT | Mod: 25

## 2019-11-06 RX ORDER — FLUOROURACIL 50 MG/G
CREAM TOPICAL
Qty: 1 | Refills: 2 | Status: ERX | COMMUNITY
Start: 2019-11-06

## 2019-11-06 ASSESSMENT — LOCATION DETAILED DESCRIPTION DERM
LOCATION DETAILED: RIGHT SUPERIOR CENTRAL MALAR CHEEK
LOCATION DETAILED: LEFT SUPERIOR LATERAL NECK
LOCATION DETAILED: RIGHT LATERAL MALAR CHEEK
LOCATION DETAILED: RIGHT SUPERIOR PREAURICULAR CHEEK
LOCATION DETAILED: LEFT CENTRAL MALAR CHEEK
LOCATION DETAILED: LEFT DISTAL DORSAL FOREARM
LOCATION DETAILED: LEFT PROXIMAL RADIAL DORSAL FOREARM
LOCATION DETAILED: RIGHT SUPERIOR HELIX
LOCATION DETAILED: LEFT SUPERIOR FOREHEAD
LOCATION DETAILED: LEFT PROXIMAL DORSAL FOREARM
LOCATION DETAILED: LEFT INFERIOR ANTERIOR NECK
LOCATION DETAILED: LEFT MEDIAL SUPERIOR CHEST
LOCATION DETAILED: LEFT VENTRAL LATERAL PROXIMAL FOREARM

## 2019-11-06 ASSESSMENT — LOCATION SIMPLE DESCRIPTION DERM
LOCATION SIMPLE: RIGHT CHEEK
LOCATION SIMPLE: LEFT FOREARM
LOCATION SIMPLE: LEFT ANTERIOR NECK
LOCATION SIMPLE: LEFT CHEEK
LOCATION SIMPLE: CHEST
LOCATION SIMPLE: NECK
LOCATION SIMPLE: RIGHT EAR
LOCATION SIMPLE: LEFT FOREHEAD

## 2019-11-06 ASSESSMENT — PAIN INTENSITY VAS: HOW INTENSE IS YOUR PAIN 0 BEING NO PAIN, 10 BEING THE MOST SEVERE PAIN POSSIBLE?: NO PAIN

## 2019-11-06 ASSESSMENT — LOCATION ZONE DERM
LOCATION ZONE: ARM
LOCATION ZONE: TRUNK
LOCATION ZONE: EAR
LOCATION ZONE: NECK
LOCATION ZONE: FACE

## 2019-11-06 NOTE — PROCEDURE: BIOPSY BY SHAVE METHOD
Silver Nitrate Text: The wound bed was treated with silver nitrate after the biopsy was performed.
Detail Level: Detailed
Dressing: bandage
Render Post-Care Instructions In Note?: no
Hemostasis: Drysol
Type Of Destruction Used: Curettage
Cryotherapy Text: The wound bed was treated with cryotherapy after the biopsy was performed.
Billing Type: Third-Party Bill
Electrodesiccation Text: The wound bed was treated with electrodesiccation after the biopsy was performed.
Depth Of Biopsy: dermis
Biopsy Type: H and E
Post-Care Instructions: I reviewed with the patient in detail post-care instructions. Patient is to keep the biopsy site dry overnight, and then apply bacitracin twice daily until healed. Patient may apply hydrogen peroxide soaks to remove any crusting.
Biopsy Method: Dermablade
Size Of Lesion In Cm: 0.5
Electrodesiccation And Curettage Text: The wound bed was treated with electrodesiccation and curettage after the biopsy was performed.
Consent: Written consent was obtained and risks were reviewed including but not limited to scarring, infection, bleeding, scabbing, incomplete removal, nerve damage and allergy to anesthesia.
Notification Instructions: Patient will be notified of biopsy results. However, patient instructed to call the office if not contacted within 2 weeks.
Additional Anesthesia Volume In Cc (Will Not Render If 0): 0
Wound Care: Aquaphor
Curettage Text: The wound bed was treated with curettage after the biopsy was performed.
Was A Bandage Applied: Yes

## 2019-12-17 ENCOUNTER — APPOINTMENT (OUTPATIENT)
Dept: URBAN - METROPOLITAN AREA CLINIC 200 | Age: 69
Setting detail: DERMATOLOGY
End: 2019-12-20

## 2019-12-17 DIAGNOSIS — L57.0 ACTINIC KERATOSIS: ICD-10-CM

## 2019-12-17 DIAGNOSIS — D485 NEOPLASM OF UNCERTAIN BEHAVIOR OF SKIN: ICD-10-CM

## 2019-12-17 PROBLEM — D04.62 CARCINOMA IN SITU OF SKIN OF LEFT UPPER LIMB, INCLUDING SHOULDER: Status: ACTIVE | Noted: 2019-12-17

## 2019-12-17 PROBLEM — D48.5 NEOPLASM OF UNCERTAIN BEHAVIOR OF SKIN: Status: ACTIVE | Noted: 2019-12-17

## 2019-12-17 PROCEDURE — 17261 DSTRJ MAL LES T/A/L .6-1.0CM: CPT

## 2019-12-17 PROCEDURE — OTHER CURETTAGE AND DESTRUCTION: OTHER

## 2019-12-17 PROCEDURE — 11102 TANGNTL BX SKIN SINGLE LES: CPT | Mod: 59

## 2019-12-17 PROCEDURE — OTHER BIOPSY BY SHAVE METHOD: OTHER

## 2019-12-17 ASSESSMENT — LOCATION DETAILED DESCRIPTION DERM: LOCATION DETAILED: LEFT CENTRAL LATERAL NECK

## 2019-12-17 ASSESSMENT — LOCATION SIMPLE DESCRIPTION DERM: LOCATION SIMPLE: NECK

## 2019-12-17 ASSESSMENT — LOCATION ZONE DERM: LOCATION ZONE: NECK

## 2019-12-17 NOTE — PROCEDURE: CURETTAGE AND DESTRUCTION
Total Volume (Ccs): 1
Number Of Curettages: 3
Consent was obtained from the patient. The risks, benefits and alternatives to therapy were discussed in detail. Specifically, the risks of infection, scarring, bleeding, prolonged wound healing, nerve injury, incomplete removal, allergy to anesthesia and recurrence were addressed. Alternatives to ED&C, such as: surgical removal and XRT were also discussed.  Prior to the procedure, the treatment site was clearly identified and confirmed by the patient. All components of Universal Protocol/PAUSE Rule completed.
Bill As A Line Item Or As Units: Line Item
Cautery Type: electrodesiccation
Anesthesia Volume In Cc: 2
Post-Care Instructions: I reviewed with the patient in detail post-care instructions. Patient is to keep the area dry for 48 hours, and not to engage in any swimming until the area is healed. Should the patient develop any fevers, chills, bleeding, severe pain patient will contact the office immediately.
Add Intralesional Injection: No
Additional Information: (Optional): The wound was cleaned, and a pressure dressing was applied.  The patient received detailed post-op instructions.
Anesthesia Type: 1% lidocaine with epinephrine
What Was Performed First?: Curettage
Detail Level: Detailed
Concentration (Mg/Ml Or Millions Of Plaque Forming Units/Cc): 0.01

## 2019-12-23 ENCOUNTER — TRANSCRIBE ORDERS (OUTPATIENT)
Dept: LAB | Age: 69
End: 2019-12-23

## 2019-12-23 ENCOUNTER — APPOINTMENT (OUTPATIENT)
Dept: LAB | Age: 69
End: 2019-12-23
Attending: FAMILY MEDICINE
Payer: MEDICARE

## 2019-12-23 DIAGNOSIS — E87.6 HYPOKALEMIA: ICD-10-CM

## 2019-12-23 DIAGNOSIS — E03.9 HYPOTHYROIDISM, UNSPECIFIED: ICD-10-CM

## 2019-12-23 DIAGNOSIS — E03.9 HYPOTHYROIDISM, UNSPECIFIED: Primary | ICD-10-CM

## 2019-12-23 LAB
ANION GAP SERPL CALC-SCNC: 9 MEQ/L (ref 3–15)
BUN SERPL-MCNC: 45 MG/DL (ref 8–20)
CALCIUM SERPL-MCNC: 9.4 MG/DL (ref 8.9–10.3)
CHLORIDE SERPL-SCNC: 102 MEQ/L (ref 98–109)
CO2 SERPL-SCNC: 31 MEQ/L (ref 22–32)
CREAT SERPL-MCNC: 2.5 MG/DL
GFR SERPL CREATININE-BSD FRML MDRD: 25.6 ML/MIN/1.73M*2
GLUCOSE SERPL-MCNC: 155 MG/DL (ref 70–99)
POTASSIUM SERPL-SCNC: 3.6 MEQ/L (ref 3.6–5.1)
SODIUM SERPL-SCNC: 142 MEQ/L (ref 136–144)
TSH SERPL DL<=0.05 MIU/L-ACNC: 3.45 MIU/L (ref 0.34–5.6)

## 2019-12-23 PROCEDURE — 84443 ASSAY THYROID STIM HORMONE: CPT

## 2019-12-23 PROCEDURE — 36415 COLL VENOUS BLD VENIPUNCTURE: CPT

## 2019-12-23 PROCEDURE — 80048 BASIC METABOLIC PNL TOTAL CA: CPT

## 2020-05-05 ENCOUNTER — APPOINTMENT (OUTPATIENT)
Dept: URBAN - METROPOLITAN AREA CLINIC 200 | Age: 70
Setting detail: DERMATOLOGY
End: 2020-05-08

## 2020-05-05 DIAGNOSIS — L57.0 ACTINIC KERATOSIS: ICD-10-CM

## 2020-05-05 DIAGNOSIS — L57.8 OTHER SKIN CHANGES DUE TO CHRONIC EXPOSURE TO NONIONIZING RADIATION: ICD-10-CM

## 2020-05-05 DIAGNOSIS — Z85.828 PERSONAL HISTORY OF OTHER MALIGNANT NEOPLASM OF SKIN: ICD-10-CM

## 2020-05-05 PROBLEM — E13.9 OTHER SPECIFIED DIABETES MELLITUS WITHOUT COMPLICATIONS: Status: ACTIVE | Noted: 2020-05-05

## 2020-05-05 PROCEDURE — 99213 OFFICE O/P EST LOW 20 MIN: CPT | Mod: 25

## 2020-05-05 PROCEDURE — OTHER COUNSELING: OTHER

## 2020-05-05 PROCEDURE — 17003 DESTRUCT PREMALG LES 2-14: CPT

## 2020-05-05 PROCEDURE — OTHER LIQUID NITROGEN: OTHER

## 2020-05-05 PROCEDURE — 17000 DESTRUCT PREMALG LESION: CPT

## 2020-05-05 ASSESSMENT — LOCATION DETAILED DESCRIPTION DERM
LOCATION DETAILED: LEFT SUPERIOR FOREHEAD
LOCATION DETAILED: RIGHT SUPERIOR HELIX
LOCATION DETAILED: LEFT INFERIOR ANTERIOR NECK
LOCATION DETAILED: RIGHT LATERAL FOREHEAD
LOCATION DETAILED: LEFT MEDIAL SUPERIOR CHEST

## 2020-05-05 ASSESSMENT — LOCATION ZONE DERM
LOCATION ZONE: TRUNK
LOCATION ZONE: FACE
LOCATION ZONE: EAR
LOCATION ZONE: NECK

## 2020-05-05 ASSESSMENT — PAIN INTENSITY VAS: HOW INTENSE IS YOUR PAIN 0 BEING NO PAIN, 10 BEING THE MOST SEVERE PAIN POSSIBLE?: NO PAIN

## 2020-05-05 ASSESSMENT — LOCATION SIMPLE DESCRIPTION DERM
LOCATION SIMPLE: LEFT FOREHEAD
LOCATION SIMPLE: CHEST
LOCATION SIMPLE: RIGHT FOREHEAD
LOCATION SIMPLE: LEFT ANTERIOR NECK
LOCATION SIMPLE: RIGHT EAR

## 2020-05-25 ENCOUNTER — HOSPITAL ENCOUNTER (EMERGENCY)
Facility: HOSPITAL | Age: 70
Discharge: HOME | End: 2020-05-25
Attending: EMERGENCY MEDICINE
Payer: MEDICARE

## 2020-05-25 ENCOUNTER — APPOINTMENT (EMERGENCY)
Dept: RADIOLOGY | Facility: HOSPITAL | Age: 70
End: 2020-05-25
Attending: EMERGENCY MEDICINE
Payer: MEDICARE

## 2020-05-25 VITALS
DIASTOLIC BLOOD PRESSURE: 82 MMHG | TEMPERATURE: 98.7 F | HEART RATE: 66 BPM | OXYGEN SATURATION: 97 % | RESPIRATION RATE: 19 BRPM | SYSTOLIC BLOOD PRESSURE: 162 MMHG

## 2020-05-25 DIAGNOSIS — H49.21 ABDUCENS NERVE PALSY, RIGHT: Primary | ICD-10-CM

## 2020-05-25 LAB
ALBUMIN SERPL-MCNC: 4.2 G/DL (ref 3.4–5)
ALP SERPL-CCNC: 101 IU/L (ref 35–126)
ALT SERPL-CCNC: 24 IU/L (ref 16–63)
ANION GAP SERPL CALC-SCNC: 12 MEQ/L (ref 3–15)
AST SERPL-CCNC: 25 IU/L (ref 15–41)
BASOPHILS # BLD: 0.07 K/UL (ref 0.01–0.1)
BASOPHILS NFR BLD: 0.7 %
BILIRUB SERPL-MCNC: 1.1 MG/DL (ref 0.3–1.2)
BUN SERPL-MCNC: 46 MG/DL (ref 8–20)
CALCIUM SERPL-MCNC: 9 MG/DL (ref 8.9–10.3)
CHLORIDE SERPL-SCNC: 99 MEQ/L (ref 98–109)
CO2 SERPL-SCNC: 27 MEQ/L (ref 22–32)
CREAT SERPL-MCNC: 2.5 MG/DL (ref 0.8–1.3)
DIFFERENTIAL METHOD BLD: ABNORMAL
EOSINOPHIL # BLD: 0.28 K/UL (ref 0.04–0.54)
EOSINOPHIL NFR BLD: 2.8 %
ERYTHROCYTE [DISTWIDTH] IN BLOOD BY AUTOMATED COUNT: 14.7 % (ref 11.6–14.4)
ERYTHROCYTE [SEDIMENTATION RATE] IN BLOOD BY WESTERGREN METHOD: 7 MM/HR
GFR SERPL CREATININE-BSD FRML MDRD: 25.6 ML/MIN/1.73M*2
GLUCOSE SERPL-MCNC: 264 MG/DL (ref 70–99)
HCT VFR BLDCO AUTO: 40.3 % (ref 40.1–51)
HGB BLD-MCNC: 13.8 G/DL (ref 13.7–17.5)
IMM GRANULOCYTES # BLD AUTO: 0.05 K/UL (ref 0–0.08)
IMM GRANULOCYTES NFR BLD AUTO: 0.5 %
LYMPHOCYTES # BLD: 0.91 K/UL (ref 1.2–3.5)
LYMPHOCYTES NFR BLD: 9.1 %
MCH RBC QN AUTO: 28.1 PG (ref 28–33.2)
MCHC RBC AUTO-ENTMCNC: 34.2 G/DL (ref 32.2–36.5)
MCV RBC AUTO: 82.1 FL (ref 83–98)
MONOCYTES # BLD: 0.86 K/UL (ref 0.3–1)
MONOCYTES NFR BLD: 8.6 %
NEUTROPHILS # BLD: 7.85 K/UL (ref 1.7–7)
NEUTS SEG NFR BLD: 78.3 %
NRBC BLD-RTO: 0 %
PDW BLD AUTO: 10.3 FL (ref 9.4–12.4)
PLATELET # BLD AUTO: 185 K/UL (ref 150–350)
POTASSIUM SERPL-SCNC: 2.9 MEQ/L (ref 3.6–5.1)
PROT SERPL-MCNC: 7.3 G/DL (ref 6–8.2)
RBC # BLD AUTO: 4.91 M/UL (ref 4.5–5.8)
SARS-COV-2 RNA RESP QL NAA+PROBE: NEGATIVE
SODIUM SERPL-SCNC: 138 MEQ/L (ref 136–144)
TSH SERPL DL<=0.05 MIU/L-ACNC: 4.36 MIU/L (ref 0.34–5.6)
WBC # BLD AUTO: 10.02 K/UL (ref 3.8–10.5)

## 2020-05-25 PROCEDURE — U0002 COVID-19 LAB TEST NON-CDC: HCPCS | Performed by: EMERGENCY MEDICINE

## 2020-05-25 PROCEDURE — 80053 COMPREHEN METABOLIC PANEL: CPT | Performed by: EMERGENCY MEDICINE

## 2020-05-25 PROCEDURE — 85025 COMPLETE CBC W/AUTO DIFF WBC: CPT | Performed by: EMERGENCY MEDICINE

## 2020-05-25 PROCEDURE — 84443 ASSAY THYROID STIM HORMONE: CPT | Performed by: EMERGENCY MEDICINE

## 2020-05-25 PROCEDURE — 85652 RBC SED RATE AUTOMATED: CPT | Performed by: EMERGENCY MEDICINE

## 2020-05-25 PROCEDURE — 70450 CT HEAD/BRAIN W/O DYE: CPT

## 2020-05-25 PROCEDURE — 93005 ELECTROCARDIOGRAM TRACING: CPT | Performed by: EMERGENCY MEDICINE

## 2020-05-25 PROCEDURE — 36415 COLL VENOUS BLD VENIPUNCTURE: CPT | Performed by: EMERGENCY MEDICINE

## 2020-05-25 PROCEDURE — 63700000 HC SELF-ADMINISTRABLE DRUG: Performed by: EMERGENCY MEDICINE

## 2020-05-25 PROCEDURE — 71045 X-RAY EXAM CHEST 1 VIEW: CPT

## 2020-05-25 PROCEDURE — 99284 EMERGENCY DEPT VISIT MOD MDM: CPT | Mod: 25

## 2020-05-25 RX ORDER — POTASSIUM CHLORIDE 20 MEQ/1
40 TABLET, EXTENDED RELEASE ORAL ONCE
Status: COMPLETED | OUTPATIENT
Start: 2020-05-25 | End: 2020-05-25

## 2020-05-25 RX ADMIN — POTASSIUM CHLORIDE 40 MEQ: 1500 TABLET, EXTENDED RELEASE ORAL at 15:37

## 2020-05-25 ASSESSMENT — ENCOUNTER SYMPTOMS
SHORTNESS OF BREATH: 0
FEVER: 0
DIFFICULTY URINATING: 0
ABDOMINAL PAIN: 0
COUGH: 0
BACK PAIN: 0

## 2020-05-25 NOTE — ED PROVIDER NOTES
HPI pt presents to ED with right eye pain with double vision for 1 wk. Pt states he has been seen by ophthalmologist Dr. Rincon who prescribed steroid cream without improvement. Pt states he was noted to have a right lateral gaze deficit today and was told to come to ED to r/o temporal arteritis and stroke. Pt denies fever, cough, chest pain, sob, abd pain, nausea, vomiting, weakness or numbness.    Chief Complaint   Patient presents with   • Eye Pain     Pt stating he has had pain and double vision to right eye for about one week. Saw opthlamologist, Dr. Rincon [phone], who prescribed a steroid cream for his eye. Pt without improvement. Saw Dr. Rincon today who sent pt to ED for r/o cva and/or temporal arteritis.    • Diplopia       HPI     Patient History     Past Medical History:   Diagnosis Date   • A-fib (CMS/HCC)    • Abnormal ECG     a fib   • Aortic valve disorder    • Arthritis     Knees   • Bipolar disorder (CMS/HCC)    • BPH (benign prostatic hyperplasia)    • Bruises easily    • Constipation     Ocassional   • Coronary artery disease     CABG 3 vessels 2016   • Disease of thyroid gland    • GERD (gastroesophageal reflux disease)    • Hypertension    • Hypothyroidism    • Kidney stones     Surgical removal of stones   • Lipid disorder    • Nocturia    • Renal calculi     right   • Renal cancer (CMS/HCC)    • Right bundle branch block    • Seasonal allergies    • Skin cancer     squamous-right ear, face   • Sleep apnea     uses cpap   • Type 2 diabetes mellitus (CMS/HCC)        Past Surgical History:   Procedure Laterality Date   • CARDIAC CATHETERIZATION  05/2016   • COLONOSCOPY  2016   • CORONARY ARTERY BYPASS GRAFT  05/2016    x3   • CYSTOSCOPY INSERTION / REMOVAL STENT / STONE  03/27/2019   • CYSTOSCOPY INSERTION / REMOVAL STENT / STONE  04/10/2019    Stent placement   • CYSTOSCOPY, STENT REMOVAL  05/2019   • KIDNEY STONE SURGERY Right 2018   • NEPHRECTOMY Right 2016    PARTIAL    •  TONSILLECTOMY      Childhood   • UVULECTOMY  2005   • WISDOM TOOTH EXTRACTION  1970       Family History   Problem Relation Age of Onset   • Lung cancer Biological Mother    • Lung cancer Biological Father    • Early death Biological Father    • Diabetes Biological Sister        Social History     Tobacco Use   • Smoking status: Never Smoker   • Smokeless tobacco: Never Used   Substance Use Topics   • Alcohol use: No   • Drug use: No       Systems Reviewed from Nursing Triage:          Review of Systems     Review of Systems   Constitutional: Negative for fever.   HENT: Negative for congestion.    Eyes: Positive for visual disturbance.   Respiratory: Negative for cough and shortness of breath.    Cardiovascular: Negative for chest pain.   Gastrointestinal: Negative for abdominal pain.   Genitourinary: Negative for difficulty urinating.   Musculoskeletal: Negative for back pain.   Skin: Negative for rash.   Neurological:        Right temporal tenderness        Physical Exam     ED Triage Vitals   Temp Heart Rate Resp BP SpO2   05/25/20 1348 05/25/20 1348 05/25/20 1348 05/25/20 1349 05/25/20 1348   37.1 °C (98.7 °F) 88 18 136/69 98 %      Temp Source Heart Rate Source Patient Position BP Location FiO2 (%) (Set)   05/25/20 1348 -- 05/25/20 1348 05/25/20 1348 --   Oral  Sitting Left upper arm                      Patient Vitals for the past 24 hrs:   BP Temp Temp src Pulse Resp SpO2   05/25/20 1501 (!) 162/82 -- -- 66 19 97 %   05/25/20 1456 (!) 165/79 -- -- 65 19 98 %   05/25/20 1349 136/69 -- -- -- -- --   05/25/20 1348 -- 37.1 °C (98.7 °F) Oral 88 18 98 %                                          Physical Exam   Constitutional: He is oriented to person, place, and time. He appears well-developed and well-nourished.   HENT:   Head: Normocephalic and atraumatic.   Eyes: Conjunctivae are normal. Right eye exhibits no discharge. Left eye exhibits no discharge. No scleral icterus.   Right lateral gaze deficit right eye;  mild right temporal artery tenderness   Neck: Neck supple.   Cardiovascular: Normal rate and regular rhythm.   No murmur heard.  Pulmonary/Chest: Effort normal and breath sounds normal. No respiratory distress.   Abdominal: Soft. There is no tenderness.   Musculoskeletal: He exhibits no edema.   Neurological: He is alert and oriented to person, place, and time. No cranial nerve deficit or sensory deficit.   5/5 strength b/l UE and LE   Skin: Skin is warm and dry.   Psychiatric: He has a normal mood and affect.   Nursing note and vitals reviewed.           Procedures    ED Course & MDM     Labs Reviewed   CBC AND DIFF - Abnormal       Result Value    WBC 10.02      RBC 4.91      Hemoglobin 13.8      Hematocrit 40.3      MCV 82.1 (*)     MCH 28.1      MCHC 34.2      RDW 14.7 (*)     Platelets 185      MPV 10.3      Differential Type Auto      nRBC 0.0      Immature Granulocytes 0.5      Neutrophils 78.3      Lymphocytes 9.1      Monocytes 8.6      Eosinophils 2.8      Basophils 0.7      Immature Granulocytes, Absolute 0.05      Neutrophils, Absolute 7.85 (*)     Lymphocytes, Absolute 0.91 (*)     Monocytes, Absolute 0.86      Eosinophils, Absolute 0.28      Basophils, Absolute 0.07     COMPREHENSIVE METABOLIC PANEL - Abnormal    Sodium 138      Potassium 2.9 (*)     Chloride 99      CO2 27      BUN 46 (*)     Creatinine 2.5 (*)     Glucose 264 (*)     Calcium 9.0      AST (SGOT) 25      ALT (SGPT) 24      Alkaline Phosphatase 101      Total Protein 7.3      Albumin 4.2      Bilirubin, Total 1.1      eGFR 25.6 (*)     Anion Gap 12     SEDIMENTATION RATE - Normal    Sed Rate 7     TSH 3RD GENERATION W/REFLEX FT4 - Normal    TSH 4.36     SARS-COV-2 (COVID 19), PCR       X-RAY CHEST 1 VIEW   Final Result   IMPRESSION:   Mild central pulmonary vascular congestion and interstitial prominence.   Cardiomegaly.      CT HEAD WITHOUT IV CONTRAST   Final Result   IMPRESSION:   No acute intracranial hemorrhage or mass-effect.       ECG 12 lead   ED Interpretation   NSR @ 61 bpm with occasional PVC, nml pr interval, LAD, RBBB, t wave inversion leads V1-V5                        MDM         ED Course as of May 25 1634   Mon May 25, 2020   1405 Symptoms are ongoing for 1wk. Pt is not a tpa candidate    [EW]   1505 3:06 PM  D/w pt's ophthalmologist, Dr. 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 We are still waiting for ESR. If equivocal or elevated recommends starting pt on steroids. Either way pt can f/u with him in office. Pt will need to keep a close eye on his blood sugars if started on steroids. Pt does not need to be admitted.    [EW]   1624 Pt has nml esr. Doubt temporal arteritis. Pt will be given right eye patch for relief of diplopia for isolated right 6th nerve palsy. Pt will f/u Dr. Rincon in office    [EW]      ED Course User Index  [EW] Rajiv Ricks MD         Clinical Impressions as of May 25 1634   Abducens nerve palsy, right        Rajiv Ricks MD  05/25/20 1635

## 2020-05-26 LAB
ATRIAL RATE: 61
P AXIS: 66
PR INTERVAL: 200
QRS DURATION: 178
QT INTERVAL: 636
QTC CALCULATION(BAZETT): 640
R AXIS: 260
T WAVE AXIS: 22
VENTRICULAR RATE: 61

## 2020-06-29 ENCOUNTER — TRANSCRIBE ORDERS (OUTPATIENT)
Dept: SCHEDULING | Age: 70
End: 2020-06-29

## 2020-06-29 DIAGNOSIS — N20.0 CALCULUS OF KIDNEY: Primary | ICD-10-CM

## 2020-08-27 ENCOUNTER — HOSPITAL ENCOUNTER (OUTPATIENT)
Dept: RADIOLOGY | Facility: HOSPITAL | Age: 70
Discharge: HOME | End: 2020-08-27
Attending: UROLOGY
Payer: MEDICARE

## 2020-08-27 DIAGNOSIS — N20.0 CALCULUS OF KIDNEY: ICD-10-CM

## 2020-08-27 PROCEDURE — 76775 US EXAM ABDO BACK WALL LIM: CPT

## 2020-08-27 PROCEDURE — 74018 RADEX ABDOMEN 1 VIEW: CPT

## 2020-11-05 ENCOUNTER — APPOINTMENT (OUTPATIENT)
Dept: URBAN - METROPOLITAN AREA CLINIC 200 | Age: 70
Setting detail: DERMATOLOGY
End: 2020-11-11

## 2020-11-05 DIAGNOSIS — L57.8 OTHER SKIN CHANGES DUE TO CHRONIC EXPOSURE TO NONIONIZING RADIATION: ICD-10-CM

## 2020-11-05 DIAGNOSIS — Z85.820 PERSONAL HISTORY OF MALIGNANT MELANOMA OF SKIN: ICD-10-CM

## 2020-11-05 DIAGNOSIS — L57.0 ACTINIC KERATOSIS: ICD-10-CM

## 2020-11-05 DIAGNOSIS — Z85.828 PERSONAL HISTORY OF OTHER MALIGNANT NEOPLASM OF SKIN: ICD-10-CM

## 2020-11-05 DIAGNOSIS — D485 NEOPLASM OF UNCERTAIN BEHAVIOR OF SKIN: ICD-10-CM

## 2020-11-05 PROBLEM — D48.5 NEOPLASM OF UNCERTAIN BEHAVIOR OF SKIN: Status: ACTIVE | Noted: 2020-11-05

## 2020-11-05 PROBLEM — J30.1 ALLERGIC RHINITIS DUE TO POLLEN: Status: ACTIVE | Noted: 2020-11-05

## 2020-11-05 PROCEDURE — OTHER MIPS QUALITY: OTHER

## 2020-11-05 PROCEDURE — 11102 TANGNTL BX SKIN SINGLE LES: CPT

## 2020-11-05 PROCEDURE — OTHER COUNSELING: OTHER

## 2020-11-05 PROCEDURE — OTHER LIQUID NITROGEN: OTHER

## 2020-11-05 PROCEDURE — OTHER BIOPSY BY SHAVE METHOD: OTHER

## 2020-11-05 PROCEDURE — 17003 DESTRUCT PREMALG LES 2-14: CPT

## 2020-11-05 PROCEDURE — 99213 OFFICE O/P EST LOW 20 MIN: CPT | Mod: 25

## 2020-11-05 PROCEDURE — OTHER PHOTO-DOCUMENTATION: OTHER

## 2020-11-05 PROCEDURE — 17000 DESTRUCT PREMALG LESION: CPT | Mod: 59

## 2020-11-05 ASSESSMENT — LOCATION SIMPLE DESCRIPTION DERM
LOCATION SIMPLE: RIGHT FOREHEAD
LOCATION SIMPLE: LEFT FOREARM
LOCATION SIMPLE: ABDOMEN
LOCATION SIMPLE: LEFT TEMPLE
LOCATION SIMPLE: LEFT CHEEK
LOCATION SIMPLE: RIGHT CHEEK
LOCATION SIMPLE: RIGHT ZYGOMA
LOCATION SIMPLE: LEFT FOREHEAD
LOCATION SIMPLE: CHEST
LOCATION SIMPLE: RIGHT EAR

## 2020-11-05 ASSESSMENT — LOCATION DETAILED DESCRIPTION DERM
LOCATION DETAILED: RIGHT CENTRAL ZYGOMA
LOCATION DETAILED: LEFT MEDIAL SUPERIOR CHEST
LOCATION DETAILED: PERIUMBILICAL SKIN
LOCATION DETAILED: RIGHT SUPERIOR HELIX
LOCATION DETAILED: RIGHT FOREHEAD
LOCATION DETAILED: LEFT INFERIOR LATERAL FOREHEAD
LOCATION DETAILED: LEFT PROXIMAL LATERAL DORSAL FOREARM
LOCATION DETAILED: RIGHT CENTRAL MALAR CHEEK
LOCATION DETAILED: LEFT SUPERIOR FOREHEAD
LOCATION DETAILED: LEFT LATERAL TEMPLE
LOCATION DETAILED: RIGHT SUPERIOR MEDIAL MALAR CHEEK
LOCATION DETAILED: LEFT SUPERIOR PREAURICULAR CHEEK

## 2020-11-05 ASSESSMENT — LOCATION ZONE DERM
LOCATION ZONE: TRUNK
LOCATION ZONE: EAR
LOCATION ZONE: FACE
LOCATION ZONE: ARM

## 2020-11-05 NOTE — PROCEDURE: LIQUID NITROGEN
Detail Level: Detailed
Consent: The patient's consent was obtained including but not limited to risks of crusting, scabbing, blistering, scarring, darker or lighter pigmentary change, recurrence, incomplete removal and infection.
Render Note In Bullet Format When Appropriate: No
Number Of Freeze-Thaw Cycles: 1 freeze-thaw cycle
Post-Care Instructions: I reviewed with the patient in detail post-care instructions. Patient is to wear sunprotection, and avoid picking at any of the treated lesions. Pt may apply Vaseline to crusted or scabbing areas.
Duration Of Freeze Thaw-Cycle (Seconds): 2

## 2020-11-05 NOTE — PROCEDURE: BIOPSY BY SHAVE METHOD
Silver Nitrate Text: The wound bed was treated with silver nitrate after the biopsy was performed.
Validate That Anesthesia Is Not 0: No
Detail Level: Detailed
Validate Note Data (See Information Below): Yes
Electrodesiccation And Curettage Text: The wound bed was treated with electrodesiccation and curettage after the biopsy was performed.
Hemostasis: Drysol
Anesthesia Volume In Cc (Will Not Render If 0): 0.5
Additional Anesthesia Volume In Cc (Will Not Render If 0): 0
Biopsy Method: sterile single edge surgical blade
Electrodesiccation Text: The wound bed was treated with electrodesiccation after the biopsy was performed.
Consent: Written consent was obtained and risks were reviewed including but not limited to scarring, infection, bleeding, scabbing, incomplete removal, nerve damage and allergy to anesthesia.
Post-Care Instructions: I reviewed with the patient in detail post-care instructions. Patient is to keep the biopsy site dry overnight, and then apply bacitracin twice daily until healed. Patient may apply hydrogen peroxide soaks to remove any crusting.
Notification Instructions: Patient will be notified of biopsy results. However, patient instructed to call the office if not contacted within 2 weeks.
Curettage Text: The wound bed was treated with curettage after the biopsy was performed.
Dressing: bandage
Biopsy Type: H and E
Depth Of Biopsy: dermis
Information: Selecting Yes will display possible errors in your note based on the variables you have selected. This validation is only offered as a suggestion for you. PLEASE NOTE THAT THE VALIDATION TEXT WILL BE REMOVED WHEN YOU FINALIZE YOUR NOTE. IF YOU WANT TO FAX A PRELIMINARY NOTE YOU WILL NEED TO TOGGLE THIS TO 'NO' IF YOU DO NOT WANT IT IN YOUR FAXED NOTE.
Type Of Destruction Used: Curettage
Wound Care: Aquaphor
Billing Type: Third-Party Bill
Cryotherapy Text: The wound bed was treated with cryotherapy after the biopsy was performed.

## 2020-11-20 ENCOUNTER — APPOINTMENT (OUTPATIENT)
Dept: URBAN - METROPOLITAN AREA CLINIC 200 | Age: 70
Setting detail: DERMATOLOGY
End: 2020-11-20

## 2020-11-20 PROBLEM — C44.629 SQUAMOUS CELL CARCINOMA OF SKIN OF LEFT UPPER LIMB, INCLUDING SHOULDER: Status: ACTIVE | Noted: 2020-11-20

## 2020-11-20 PROCEDURE — OTHER REFERRAL: OTHER

## 2021-05-06 ENCOUNTER — APPOINTMENT (OUTPATIENT)
Dept: URBAN - METROPOLITAN AREA CLINIC 200 | Age: 71
Setting detail: DERMATOLOGY
End: 2021-05-13

## 2021-05-06 DIAGNOSIS — Z85.828 PERSONAL HISTORY OF OTHER MALIGNANT NEOPLASM OF SKIN: ICD-10-CM

## 2021-05-06 DIAGNOSIS — L57.8 OTHER SKIN CHANGES DUE TO CHRONIC EXPOSURE TO NONIONIZING RADIATION: ICD-10-CM

## 2021-05-06 DIAGNOSIS — D485 NEOPLASM OF UNCERTAIN BEHAVIOR OF SKIN: ICD-10-CM

## 2021-05-06 DIAGNOSIS — L57.0 ACTINIC KERATOSIS: ICD-10-CM

## 2021-05-06 DIAGNOSIS — Z85.820 PERSONAL HISTORY OF MALIGNANT MELANOMA OF SKIN: ICD-10-CM

## 2021-05-06 PROBLEM — J30.1 ALLERGIC RHINITIS DUE TO POLLEN: Status: ACTIVE | Noted: 2021-05-06

## 2021-05-06 PROBLEM — E13.9 OTHER SPECIFIED DIABETES MELLITUS WITHOUT COMPLICATIONS: Status: ACTIVE | Noted: 2021-05-06

## 2021-05-06 PROBLEM — E03.9 HYPOTHYROIDISM, UNSPECIFIED: Status: ACTIVE | Noted: 2021-05-06

## 2021-05-06 PROBLEM — C44.629 SQUAMOUS CELL CARCINOMA OF SKIN OF LEFT UPPER LIMB, INCLUDING SHOULDER: Status: ACTIVE | Noted: 2021-05-06

## 2021-05-06 PROBLEM — D48.5 NEOPLASM OF UNCERTAIN BEHAVIOR OF SKIN: Status: ACTIVE | Noted: 2021-05-06

## 2021-05-06 PROCEDURE — OTHER PHOTO-DOCUMENTATION: OTHER

## 2021-05-06 PROCEDURE — OTHER LIQUID NITROGEN: OTHER

## 2021-05-06 PROCEDURE — 11102 TANGNTL BX SKIN SINGLE LES: CPT | Mod: 59

## 2021-05-06 PROCEDURE — OTHER COUNSELING: OTHER

## 2021-05-06 PROCEDURE — 17000 DESTRUCT PREMALG LESION: CPT | Mod: 59

## 2021-05-06 PROCEDURE — 11103 TANGNTL BX SKIN EA SEP/ADDL: CPT | Mod: 59

## 2021-05-06 PROCEDURE — 17261 DSTRJ MAL LES T/A/L .6-1.0CM: CPT

## 2021-05-06 PROCEDURE — 99212 OFFICE O/P EST SF 10 MIN: CPT | Mod: 25

## 2021-05-06 PROCEDURE — 69100 BIOPSY OF EXTERNAL EAR: CPT | Mod: 59

## 2021-05-06 PROCEDURE — OTHER BIOPSY BY SHAVE METHOD: OTHER

## 2021-05-06 PROCEDURE — OTHER PRESCRIPTION MEDICATION MANAGEMENT: OTHER

## 2021-05-06 PROCEDURE — OTHER CURETTAGE AND DESTRUCTION: OTHER

## 2021-05-06 ASSESSMENT — LOCATION SIMPLE DESCRIPTION DERM
LOCATION SIMPLE: ABDOMEN
LOCATION SIMPLE: LEFT UPPER ARM
LOCATION SIMPLE: CHEST
LOCATION SIMPLE: RIGHT CHEEK
LOCATION SIMPLE: LEFT FOREHEAD
LOCATION SIMPLE: RIGHT EAR
LOCATION SIMPLE: LEFT CHEEK

## 2021-05-06 ASSESSMENT — LOCATION DETAILED DESCRIPTION DERM
LOCATION DETAILED: RIGHT SUPERIOR HELIX
LOCATION DETAILED: RIGHT SUPERIOR CENTRAL MALAR CHEEK
LOCATION DETAILED: LEFT SUPERIOR CENTRAL BUCCAL CHEEK
LOCATION DETAILED: LEFT CENTRAL MALAR CHEEK
LOCATION DETAILED: LEFT LATERAL DISTAL UPPER ARM
LOCATION DETAILED: RIGHT CENTRAL MALAR CHEEK
LOCATION DETAILED: LEFT MEDIAL FOREHEAD
LOCATION DETAILED: LEFT MEDIAL SUPERIOR CHEST
LOCATION DETAILED: PERIUMBILICAL SKIN

## 2021-05-06 ASSESSMENT — LOCATION ZONE DERM
LOCATION ZONE: ARM
LOCATION ZONE: FACE
LOCATION ZONE: TRUNK
LOCATION ZONE: EAR

## 2021-05-06 NOTE — PROCEDURE: CURETTAGE AND DESTRUCTION
Total Volume (Ccs): 1
Add Intralesional Injection: No
Consent was obtained from the patient. The risks, benefits and alternatives to therapy were discussed in detail. Specifically, the risks of infection, scarring, bleeding, prolonged wound healing, nerve injury, incomplete removal, allergy to anesthesia and recurrence were addressed. Alternatives to ED&C, such as: surgical removal and XRT were also discussed.  Prior to the procedure, the treatment site was clearly identified and confirmed by the patient. All components of Universal Protocol/PAUSE Rule completed.
Post-Care Instructions: I reviewed with the patient in detail post-care instructions. Patient is to keep the area dry for 48 hours, and not to engage in any swimming until the area is healed. Should the patient develop any fevers, chills, bleeding, severe pain patient will contact the office immediately.
Bill As A Line Item Or As Units: Line Item
Additional Information: (Optional): The wound was cleaned, and a pressure dressing was applied.  The patient received detailed post-op instructions.
Cautery Type: electrodesiccation
Size Of Lesion In Cm: 1.1
Anesthesia Type: 1% lidocaine with epinephrine
Number Of Curettages: 3
Concentration (Mg/Ml Or Millions Of Plaque Forming Units/Cc): 0.01
Final Size Statement: The size of the lesion after curettage was
Detail Level: Detailed
Anesthesia Volume In Cc: 2
What Was Performed First?: Curettage

## 2021-05-06 NOTE — PROCEDURE: BIOPSY BY SHAVE METHOD
Validate Triangulation: No
Notification Instructions: Patient will be notified of biopsy results. However, patient instructed to call the office if not contacted within 2 weeks.
X Size Of Lesion In Cm: 0
Was A Bandage Applied: Yes
Hemostasis: Drysol
Type Of Destruction Used: Curettage
Electrodesiccation And Curettage Text: The wound bed was treated with electrodesiccation and curettage after the biopsy was performed.
Consent: Written consent was obtained and risks were reviewed including but not limited to scarring, infection, bleeding, scabbing, incomplete removal, nerve damage and allergy to anesthesia.
Silver Nitrate Text: The wound bed was treated with silver nitrate after the biopsy was performed.
Depth Of Biopsy: dermis
Wound Care: Aquaphor
Curettage Text: The wound bed was treated with curettage after the biopsy was performed.
Anesthesia Volume In Cc (Will Not Render If 0): 0.5
Post-Care Instructions: I reviewed with the patient in detail post-care instructions. Patient is to keep the biopsy site dry overnight, and then apply bacitracin twice daily until healed. Patient may apply hydrogen peroxide soaks to remove any crusting.
Detail Level: Detailed
Electrodesiccation Text: The wound bed was treated with electrodesiccation after the biopsy was performed.
Cryotherapy Text: The wound bed was treated with cryotherapy after the biopsy was performed.
Biopsy Type: H and E
Anesthesia Type: 0.5% lidocaine with 1:200,000 epinephrine
Billing Type: Third-Party Bill
Biopsy Method: sterile single edge surgical blade
Information: Selecting Yes will display possible errors in your note based on the variables you have selected. This validation is only offered as a suggestion for you. PLEASE NOTE THAT THE VALIDATION TEXT WILL BE REMOVED WHEN YOU FINALIZE YOUR NOTE. IF YOU WANT TO FAX A PRELIMINARY NOTE YOU WILL NEED TO TOGGLE THIS TO 'NO' IF YOU DO NOT WANT IT IN YOUR FAXED NOTE.
Dressing: bandage

## 2021-05-06 NOTE — PROCEDURE: LIQUID NITROGEN
Post-Care Instructions: I reviewed with the patient in detail post-care instructions. Patient is to wear sunprotection, and avoid picking at any of the treated lesions. Pt may apply Vaseline to crusted or scabbing areas.
Consent: The patient's consent was obtained including but not limited to risks of crusting, scabbing, blistering, scarring, darker or lighter pigmentary change, recurrence, incomplete removal and infection.
Duration Of Freeze Thaw-Cycle (Seconds): 2
Render Post-Care Instructions In Note?: no
Detail Level: Detailed
Number Of Freeze-Thaw Cycles: 1 freeze-thaw cycle

## 2021-05-26 ENCOUNTER — APPOINTMENT (OUTPATIENT)
Dept: URBAN - METROPOLITAN AREA CLINIC 200 | Age: 71
Setting detail: DERMATOLOGY
End: 2021-05-26

## 2021-05-26 PROBLEM — C44.329 SQUAMOUS CELL CARCINOMA OF SKIN OF OTHER PARTS OF FACE: Status: ACTIVE | Noted: 2021-05-26

## 2021-05-26 PROBLEM — Z85.828 PERSONAL HISTORY OF OTHER MALIGNANT NEOPLASM OF SKIN: Status: ACTIVE | Noted: 2021-05-26

## 2021-05-26 PROBLEM — C44.629 SQUAMOUS CELL CARCINOMA OF SKIN OF LEFT UPPER LIMB, INCLUDING SHOULDER: Status: ACTIVE | Noted: 2021-05-26

## 2021-05-26 PROBLEM — C44.222 SQUAMOUS CELL CARCINOMA OF SKIN OF RIGHT EAR AND EXTERNAL AURICULAR CANAL: Status: ACTIVE | Noted: 2021-05-26

## 2021-05-26 PROCEDURE — OTHER REFERRAL: OTHER

## 2021-06-02 NOTE — OP NOTE
Operative Report  Pre Op Dx: Wound s/p Moh's surgery, right medial ectropion  Post Op Dx: same  Procedure: Right ear pedicle takedown and inset, right medial canthoplasty  Surgeon: Naty  Assistant: Meño  Anesthesia: MAC  EBL: min  Complications: none  Diso: Pt ijeoma procedure well and transferred to PACU stable    After mutual identification between patient and surgeon in the preop area patient was taken to the OR and identified by anesthesia, patient was prepped and draped in usual sterile fashion, preop timeout was perfomed.  OR risk assessment was performed.  Local was injected 1% lidocaine with epi [id number] injected into the right postauricular flap pedicle and right medial canthal region.  After 10 min. Incision was made in the posterior aspect for the pedicle and undermining was perfomed of the donor site, which was closed with staples, the flap was thinned and the edges freshened and closed to the postauricular skin with 4-0 chromic.  The skin was denuded from the medial canthal tendons medial to the punctum on the superior and  inferior tendon and a wedge shaped piece of skin was excised with leah scissors, care was taken to not damage the punctum or canalicular system.  6-0 chromic was used to close the medial canthal tendons and the skin was closed by advancement over the medial aspect of the tendons.  Neomycin opthalmic ointment was applied, the eye was washed with BSS.  Bacitracin was applied to the postauricular incisions.  Counts were correct at the end of the case and the patient was transferred to PACU in stable condition.     160.9

## 2021-07-27 ENCOUNTER — HOSPITAL ENCOUNTER (OUTPATIENT)
Facility: HOSPITAL | Age: 71
Setting detail: HOSPITAL OUTPATIENT SURGERY
End: 2021-07-27
Attending: SURGERY | Admitting: SURGERY
Payer: MEDICARE

## 2021-08-18 ENCOUNTER — APPOINTMENT (OUTPATIENT)
Dept: PREADMISSION TESTING | Facility: HOSPITAL | Age: 71
End: 2021-08-18
Payer: MEDICARE

## 2021-08-18 VITALS — WEIGHT: 258 LBS | HEIGHT: 73 IN | BODY MASS INDEX: 34.19 KG/M2

## 2021-08-18 RX ORDER — ISOSORBIDE MONONITRATE 60 MG/1
60 TABLET, EXTENDED RELEASE ORAL DAILY
COMMUNITY
End: 2021-08-18 | Stop reason: ENTERED-IN-ERROR

## 2021-08-18 RX ORDER — INSULIN ASPART 100 [IU]/ML
INJECTION, SOLUTION INTRAVENOUS; SUBCUTANEOUS
COMMUNITY

## 2021-08-19 ENCOUNTER — APPOINTMENT (OUTPATIENT)
Dept: PREADMISSION TESTING | Facility: HOSPITAL | Age: 71
End: 2021-08-19
Payer: MEDICARE

## 2021-08-19 VITALS — WEIGHT: 258 LBS | BODY MASS INDEX: 34.19 KG/M2 | HEIGHT: 73 IN

## 2021-08-19 NOTE — PRE-PROCEDURE INSTRUCTIONS
1. We will call you between 3 pm and 7 pm on  to inform you of arrival time for your procedure. If you do not hear by 6PM, please call [phone] for arrival time.     2. Please arrive through the [facility] (across from the parking garage) and report to the Surgery Registration Desk on the day of your procedure.    3. Please follow the following fasting guidelines:     No solid food EIGHT HOURS prior to surgery.  Unlimited clear liquids, meaning water or PLAIN black coffee WITHOUT any milk, cream, sugar, or sweetener are permitted up to TWO HOURS prior to arrival at the hospital.    4. Early on the morning of the procedure please take your usual dose of the listed medications with a sip of water:  Check about eliquis and aspirin with Dorothy Evans and Dr Dominguez  No bumex   no potassium  Catapres  Finasteride  Apresoline  Synthroid   omeprazole  No zaroxolyn      5. Other Instructions: You may brush your teeth the morning of the procedure. Rinse and spit, do not swallow.  Bring a list of your medications with dosages.  Use surgical wash as directed.     6. If you develop a cold, cough, fever, rash, or other symptom prior to the day of the procedure, please report it to your physician immediately.    7. If you need to cancel the procedure for any reason, please contact your physician.    8. Make arrangements to have someone drive you home from the procedure. If you have not arranged for transportation home, your surgery may be cancelled.     9. You may not take public transportation unless accompanied by a responsible person.    10. You may not drive a car or operate complex or potentially dangerous machinery for 24 hours following anesthesia and/or sedation.    11.  If it is medically necessary for you to have a longer stay, you will be informed as soon as the decision is made.    12. Only bring essential items to the hospital.  Do not wear or bring anything of value to the hospital including  jewelry of any kind, money, or wallet. Do not wear make-up or contact lenses.  DO NOT BRING MEDICATIONS FROM HOME. DO bring your hearing aids, glasses, and a case    13. No lotion, creams, powders, or oils on skin the morning of procedure     14. Dress in comfortable clothes.    15.  If instructed, please bring a copy of your Advanced Directive (Living Will/Durable Power of ) on the day of your procedure.     16. Patients need to quarantine from the time of PAT COVID test to day of surgery, regardless of COVID vaccine status.      17. Ensuring your safety at all times is a very important part of our [facility] Culture of Safety. After having surgery and sedation, you are at risk for falling and balance issues. Although you may feel awake, the effects of the medication can last up to 24 hours after anesthesia. If you need to use the bathroom during your recovery period, nursing staff will escort you there and stay with you to ensure your safety.    Pre operative instructions given as per protocol.  Form explained by: Monica Traore RN    This was an pre anesthesia surgical phone interview. Patient  Verbalized understanding of instructions. All questions answered. Patient given a phone number to caLL BACK if any other questions or concerns.

## 2021-08-27 ENCOUNTER — APPOINTMENT (OUTPATIENT)
Dept: LAB | Facility: HOSPITAL | Age: 71
End: 2021-08-27
Attending: SURGERY
Payer: MEDICARE

## 2021-08-27 ENCOUNTER — APPOINTMENT (OUTPATIENT)
Dept: PREADMISSION TESTING | Facility: HOSPITAL | Age: 71
End: 2021-08-27
Attending: SURGERY
Payer: MEDICARE

## 2021-08-27 ENCOUNTER — TRANSCRIBE ORDERS (OUTPATIENT)
Dept: REGISTRATION | Facility: HOSPITAL | Age: 71
End: 2021-08-27

## 2021-08-27 ENCOUNTER — HOSPITAL ENCOUNTER (OUTPATIENT)
Dept: CARDIOLOGY | Facility: HOSPITAL | Age: 71
Discharge: HOME | End: 2021-08-27
Attending: SURGERY
Payer: MEDICARE

## 2021-08-27 DIAGNOSIS — D04.21 CARCINOMA IN SITU OF SKIN OF RIGHT EAR AND EXTERNAL AURICULAR CANAL: ICD-10-CM

## 2021-08-27 DIAGNOSIS — C44.329 SQUAMOUS CELL CARCINOMA OF SKIN OF OTHER PARTS OF FACE: ICD-10-CM

## 2021-08-27 DIAGNOSIS — C44.121 SQUAMOUS CELL CARCINOMA OF SKIN OF LEFT EYELID, INCLUDING CANTHUS: ICD-10-CM

## 2021-08-27 DIAGNOSIS — Z11.59 ENCOUNTER FOR SCREENING FOR OTHER VIRAL DISEASES: Primary | ICD-10-CM

## 2021-08-27 DIAGNOSIS — Z11.59 ENCOUNTER FOR SCREENING FOR OTHER VIRAL DISEASES: ICD-10-CM

## 2021-08-27 LAB
ALBUMIN SERPL-MCNC: 4.3 G/DL (ref 3.4–5)
ALP SERPL-CCNC: 112 IU/L (ref 35–126)
ALT SERPL-CCNC: 23 IU/L (ref 16–63)
ANION GAP SERPL CALC-SCNC: 13 MEQ/L (ref 3–15)
AST SERPL-CCNC: 23 IU/L (ref 15–41)
ATRIAL RATE: 70
BASOPHILS # BLD: 0.07 K/UL (ref 0.01–0.1)
BASOPHILS NFR BLD: 0.7 %
BILIRUB SERPL-MCNC: 1 MG/DL (ref 0.3–1.2)
BUN SERPL-MCNC: 40 MG/DL (ref 8–20)
CALCIUM SERPL-MCNC: 9.8 MG/DL (ref 8.9–10.3)
CHLORIDE SERPL-SCNC: 96 MEQ/L (ref 98–109)
CO2 SERPL-SCNC: 29 MEQ/L (ref 22–32)
CREAT SERPL-MCNC: 2.5 MG/DL (ref 0.8–1.3)
DIFFERENTIAL METHOD BLD: ABNORMAL
EOSINOPHIL # BLD: 0.43 K/UL (ref 0.04–0.54)
EOSINOPHIL NFR BLD: 4.4 %
ERYTHROCYTE [DISTWIDTH] IN BLOOD BY AUTOMATED COUNT: 14.1 % (ref 11.6–14.4)
GFR SERPL CREATININE-BSD FRML MDRD: 25.5 ML/MIN/1.73M*2
GLUCOSE SERPL-MCNC: 206 MG/DL (ref 70–99)
HCT VFR BLDCO AUTO: 41 % (ref 40.1–51)
HGB BLD-MCNC: 13.9 G/DL (ref 13.7–17.5)
IMM GRANULOCYTES # BLD AUTO: 0.05 K/UL (ref 0–0.08)
IMM GRANULOCYTES NFR BLD AUTO: 0.5 %
LYMPHOCYTES # BLD: 1.03 K/UL (ref 1.2–3.5)
LYMPHOCYTES NFR BLD: 10.7 %
MCH RBC QN AUTO: 27.5 PG (ref 28–33.2)
MCHC RBC AUTO-ENTMCNC: 33.9 G/DL (ref 32.2–36.5)
MCV RBC AUTO: 81.2 FL (ref 83–98)
MONOCYTES # BLD: 0.78 K/UL (ref 0.3–1)
MONOCYTES NFR BLD: 8.1 %
NEUTROPHILS # BLD: 7.31 K/UL (ref 1.7–7)
NEUTS SEG NFR BLD: 75.6 %
NRBC BLD-RTO: 0 %
PDW BLD AUTO: 11.3 FL (ref 9.4–12.4)
PLATELET # BLD AUTO: 176 K/UL (ref 150–350)
POTASSIUM SERPL-SCNC: 2.9 MEQ/L (ref 3.6–5.1)
PROT SERPL-MCNC: 6.7 G/DL (ref 6–8.2)
QRS DURATION: 180
QT INTERVAL: 476
QTC CALCULATION(BAZETT): 552
R AXIS: -81
RBC # BLD AUTO: 5.05 M/UL (ref 4.5–5.8)
SARS-COV-2 RNA RESP QL NAA+PROBE: NEGATIVE
SODIUM SERPL-SCNC: 138 MEQ/L (ref 136–144)
T WAVE AXIS: 90
VENTRICULAR RATE: 81
WBC # BLD AUTO: 9.67 K/UL (ref 3.8–10.5)

## 2021-08-27 PROCEDURE — 93005 ELECTROCARDIOGRAM TRACING: CPT

## 2021-08-27 PROCEDURE — 80053 COMPREHEN METABOLIC PANEL: CPT

## 2021-08-27 PROCEDURE — U0003 INFECTIOUS AGENT DETECTION BY NUCLEIC ACID (DNA OR RNA); SEVERE ACUTE RESPIRATORY SYNDROME CORONAVIRUS 2 (SARS-COV-2) (CORONAVIRUS DISEASE [COVID-19]), AMPLIFIED PROBE TECHNIQUE, MAKING USE OF HIGH THROUGHPUT TECHNOLOGIES AS DESCRIBED BY CMS-2020-01-R: HCPCS

## 2021-08-27 PROCEDURE — 36415 COLL VENOUS BLD VENIPUNCTURE: CPT

## 2021-08-27 PROCEDURE — 85025 COMPLETE CBC W/AUTO DIFF WBC: CPT

## 2021-11-04 ENCOUNTER — APPOINTMENT (OUTPATIENT)
Dept: URBAN - METROPOLITAN AREA CLINIC 200 | Age: 71
Setting detail: DERMATOLOGY
End: 2021-11-04

## 2021-11-04 DIAGNOSIS — L57.0 ACTINIC KERATOSIS: ICD-10-CM

## 2021-11-04 DIAGNOSIS — Z11.52 ENCOUNTER FOR SCREENING FOR COVID-19: ICD-10-CM

## 2021-11-04 DIAGNOSIS — L57.8 OTHER SKIN CHANGES DUE TO CHRONIC EXPOSURE TO NONIONIZING RADIATION: ICD-10-CM

## 2021-11-04 DIAGNOSIS — Z85.828 PERSONAL HISTORY OF OTHER MALIGNANT NEOPLASM OF SKIN: ICD-10-CM

## 2021-11-04 DIAGNOSIS — B35.3 TINEA PEDIS: ICD-10-CM

## 2021-11-04 DIAGNOSIS — Z85.820 PERSONAL HISTORY OF MALIGNANT MELANOMA OF SKIN: ICD-10-CM

## 2021-11-04 DIAGNOSIS — D485 NEOPLASM OF UNCERTAIN BEHAVIOR OF SKIN: ICD-10-CM

## 2021-11-04 DIAGNOSIS — L91.0 HYPERTROPHIC SCAR: ICD-10-CM

## 2021-11-04 PROBLEM — J30.1 ALLERGIC RHINITIS DUE TO POLLEN: Status: ACTIVE | Noted: 2021-11-04

## 2021-11-04 PROBLEM — E03.9 HYPOTHYROIDISM, UNSPECIFIED: Status: ACTIVE | Noted: 2021-11-04

## 2021-11-04 PROBLEM — D48.5 NEOPLASM OF UNCERTAIN BEHAVIOR OF SKIN: Status: ACTIVE | Noted: 2021-11-04

## 2021-11-04 PROCEDURE — OTHER SUNSCREEN RECOMMENDATIONS: OTHER

## 2021-11-04 PROCEDURE — 11103 TANGNTL BX SKIN EA SEP/ADDL: CPT

## 2021-11-04 PROCEDURE — OTHER PRESCRIPTION MEDICATION MANAGEMENT: OTHER

## 2021-11-04 PROCEDURE — OTHER PRESCRIPTION: OTHER

## 2021-11-04 PROCEDURE — 99213 OFFICE O/P EST LOW 20 MIN: CPT | Mod: 25

## 2021-11-04 PROCEDURE — OTHER REASSURANCE: OTHER

## 2021-11-04 PROCEDURE — OTHER BIOPSY BY SHAVE METHOD: OTHER

## 2021-11-04 PROCEDURE — 17003 DESTRUCT PREMALG LES 2-14: CPT

## 2021-11-04 PROCEDURE — 11102 TANGNTL BX SKIN SINGLE LES: CPT

## 2021-11-04 PROCEDURE — 17000 DESTRUCT PREMALG LESION: CPT | Mod: 59

## 2021-11-04 PROCEDURE — OTHER COUNSELING: OTHER

## 2021-11-04 PROCEDURE — OTHER MIPS QUALITY: OTHER

## 2021-11-04 PROCEDURE — OTHER SCREENING FOR COVID-19: OTHER

## 2021-11-04 PROCEDURE — OTHER LIQUID NITROGEN: OTHER

## 2021-11-04 RX ORDER — FLUOROURACIL 5 MG/G
CREAM TOPICAL
Qty: 40 | Refills: 1 | Status: ERX | COMMUNITY
Start: 2021-11-04

## 2021-11-04 RX ORDER — KETOCONAZOLE 20 MG/G
CREAM TOPICAL
Qty: 30 | Refills: 3 | Status: ERX | COMMUNITY
Start: 2021-11-04

## 2021-11-04 ASSESSMENT — LOCATION DETAILED DESCRIPTION DERM
LOCATION DETAILED: RIGHT SUPERIOR HELIX
LOCATION DETAILED: RIGHT DORSAL FOOT
LOCATION DETAILED: RIGHT CHIN
LOCATION DETAILED: LEFT DORSAL FOOT
LOCATION DETAILED: LEFT INFERIOR ANTERIOR NECK
LOCATION DETAILED: RIGHT INFERIOR CENTRAL MALAR CHEEK
LOCATION DETAILED: RIGHT INFERIOR HELIX
LOCATION DETAILED: LEFT CENTRAL MALAR CHEEK
LOCATION DETAILED: RIGHT LATERAL MALAR CHEEK
LOCATION DETAILED: LEFT PROXIMAL PRETIBIAL REGION
LOCATION DETAILED: LEFT MEDIAL SUPERIOR CHEST
LOCATION DETAILED: PERIUMBILICAL SKIN
LOCATION DETAILED: LEFT MEDIAL FOREHEAD
LOCATION DETAILED: RIGHT SUPERIOR MEDIAL MALAR CHEEK

## 2021-11-04 ASSESSMENT — LOCATION SIMPLE DESCRIPTION DERM
LOCATION SIMPLE: RIGHT CHEEK
LOCATION SIMPLE: CHEST
LOCATION SIMPLE: LEFT PRETIBIAL REGION
LOCATION SIMPLE: LEFT FOOT
LOCATION SIMPLE: CHIN
LOCATION SIMPLE: LEFT FOREHEAD
LOCATION SIMPLE: LEFT CHEEK
LOCATION SIMPLE: RIGHT FOOT
LOCATION SIMPLE: RIGHT EAR
LOCATION SIMPLE: ABDOMEN
LOCATION SIMPLE: LEFT ANTERIOR NECK

## 2021-11-04 ASSESSMENT — LOCATION ZONE DERM
LOCATION ZONE: FACE
LOCATION ZONE: FEET
LOCATION ZONE: EAR
LOCATION ZONE: LEG
LOCATION ZONE: NECK
LOCATION ZONE: TRUNK

## 2021-11-04 NOTE — PROCEDURE: LIQUID NITROGEN
Duration Of Freeze Thaw-Cycle (Seconds): 2
Number Of Freeze-Thaw Cycles: 1 freeze-thaw cycle
Consent: The patient's consent was obtained including but not limited to risks of crusting, scabbing, blistering, scarring, darker or lighter pigmentary change, recurrence, incomplete removal and infection.
Show Applicator Variable?: Yes
Render Post-Care Instructions In Note?: no
Detail Level: Detailed
Post-Care Instructions: I reviewed with the patient in detail post-care instructions. Patient is to wear sunprotection, and avoid picking at any of the treated lesions. Pt may apply Vaseline to crusted or scabbing areas.

## 2021-11-04 NOTE — PROCEDURE: BIOPSY BY SHAVE METHOD
Consent: Written consent was obtained and risks were reviewed including but not limited to scarring, infection, bleeding, scabbing, incomplete removal, nerve damage and allergy to anesthesia.
Bill For Surgical Tray: no
Additional Anesthesia Volume In Cc (Will Not Render If 0): 0
Biopsy Type: H and E
Silver Nitrate Text: The wound bed was treated with silver nitrate after the biopsy was performed.
Cryotherapy Text: The wound bed was treated with cryotherapy after the biopsy was performed.
Curettage Text: The wound bed was treated with curettage after the biopsy was performed.
Was A Bandage Applied: Yes
Billing Type: Third-Party Bill
Electrodesiccation Text: The wound bed was treated with electrodesiccation after the biopsy was performed.
Detail Level: Detailed
Electrodesiccation And Curettage Text: The wound bed was treated with electrodesiccation and curettage after the biopsy was performed.
Information: Selecting Yes will display possible errors in your note based on the variables you have selected. This validation is only offered as a suggestion for you. PLEASE NOTE THAT THE VALIDATION TEXT WILL BE REMOVED WHEN YOU FINALIZE YOUR NOTE. IF YOU WANT TO FAX A PRELIMINARY NOTE YOU WILL NEED TO TOGGLE THIS TO 'NO' IF YOU DO NOT WANT IT IN YOUR FAXED NOTE.
Dressing: bandage
Biopsy Method: sterile single edge surgical blade
Notification Instructions: Patient will be notified of biopsy results. However, patient instructed to call the office if not contacted within 2 weeks.
Wound Care: Aquaphor
Post-Care Instructions: I reviewed with the patient in detail post-care instructions. Patient is to keep the biopsy site dry overnight, and then apply bacitracin twice daily until healed. Patient may apply hydrogen peroxide soaks to remove any crusting.
Anesthesia Type: 0.5% lidocaine with 1:200,000 epinephrine
Hemostasis: Drysol
Depth Of Biopsy: dermis
Type Of Destruction Used: Curettage
Anesthesia Volume In Cc (Will Not Render If 0): 0.5

## 2022-01-20 ENCOUNTER — HOSPITAL ENCOUNTER (OUTPATIENT)
Facility: HOSPITAL | Age: 72
Setting detail: HOSPITAL OUTPATIENT SURGERY
End: 2022-01-20
Attending: SURGERY | Admitting: SURGERY
Payer: MEDICARE

## 2022-03-21 ENCOUNTER — APPOINTMENT (OUTPATIENT)
Dept: PREADMISSION TESTING | Facility: HOSPITAL | Age: 72
End: 2022-03-21
Payer: COMMERCIAL

## 2022-03-21 VITALS — BODY MASS INDEX: 32.87 KG/M2 | HEIGHT: 73 IN | WEIGHT: 248 LBS

## 2022-03-21 NOTE — PRE-PROCEDURE INSTRUCTIONS
1. We will call you between 3 pm and 7 pm on 3/29/22 to inform you of arrival time for your procedure. If you do not hear by 6PM, please call [phone] for arrival time.     2. Please arrive through the [facility] Entr[facility] (across from the parking garage) and report to the Surgery Registration Desk on the day of your procedure.    3. Please follow the following fasting guidelines:     No solid food EIGHT HOURS prior to surgery.  Unlimited clear liquids, meaning water or PLAIN black coffee WITHOUT any milk, cream, sugar, or sweetener are permitted up to TWO HOURS prior to arrival at the hospital.    4. Early on the morning of the procedure please take your usual dose of the listed medications with a sip of water:  Clonidine  Hydralazine  Levothyroxine  prilosec      Follow directions for holding aspiring or blood thinners from your Cardiologist or prescribing MD.-pt to call Dr. Mercado re: stopping aspirin and eliquis.     Call your Endocrinologist regarding your diabetic medication the evening prior to your scheduled surgery.- pt to follow up w/ Dr. Alcantara      5. Other Instructions: You may brush your teeth the morning of the procedure. Rinse and spit, do not swallow.  Bring a list of your medications with dosages.  Use surgical wash as directed.     6. If you develop a cold, cough, fever, rash, or other symptom prior to the day of the procedure, please report it to your physician immediately.    7. If you need to cancel the procedure for any reason, please contact your physician.    8. Make arrangements to have someone drive you home from the procedure. If you have not arranged for transportation home, your surgery may be cancelled.     9. You may not take public transportation unless accompanied by a responsible person.    10. You may not drive a car or operate complex or potentially dangerous machinery for 24 hours following anesthesia and/or sedation.    11.  If it is medically necessary for you  to have a longer stay, you will be informed as soon as the decision is made.    12. Only bring essential items to the hospital.  Do not wear or bring anything of value to the hospital including jewelry of any kind, money, or wallet. Do not wear make-up or contact lenses.  DO NOT BRING MEDICATIONS FROM HOME unless instructed to do so. DO bring your hearing aids, glasses, and a case    13. No lotion, creams, powders, or oils on skin the morning of procedure     14. Dress in comfortable clothes.    15.  If instructed, please bring a copy of your Advanced Directive (Living Will/Durable Power of ) on the day of your procedure.     16. Patients need to quarantine from the time of PAT COVID test to day of surgery, regardless of COVID vaccine status.      17. Ensuring your safety at all times is a very important part of our [facility] Culture of Safety. After having surgery and sedation, you are at risk for falling and balance issues. Although you may feel awake, the effects of the medication can last up to 24 hours after anesthesia. If you need to use the bathroom during your recovery period, nursing staff will escort you there and stay with you to ensure your safety.    18. Refrain from drinking alcohol and smoking cigarettes for 24 hours prior to surgery.    19. Shower with antibacterial soap (Dial) the night before and morning of your procedure.  If your procedure indicates the need for CHG antiseptic wash (Bactoshield or Hibiclens), please use this instead and follow instructions as discussed at the time of your Pre-Admission Testing phone interview or visit.    Above instructions reviewed with patient and patient acknowledges understanding.  Fasting guidelines repeated x3 and read back by patient.  Pt verbalized understanding of stated guidelines.       Form explained by: Cirsty Prasad RN

## 2022-03-25 ENCOUNTER — APPOINTMENT (OUTPATIENT)
Dept: LAB | Facility: HOSPITAL | Age: 72
End: 2022-03-25
Attending: SURGERY
Payer: MEDICARE

## 2022-03-25 ENCOUNTER — TRANSCRIBE ORDERS (OUTPATIENT)
Dept: REGISTRATION | Facility: HOSPITAL | Age: 72
End: 2022-03-25

## 2022-03-25 ENCOUNTER — APPOINTMENT (OUTPATIENT)
Dept: PREADMISSION TESTING | Facility: HOSPITAL | Age: 72
End: 2022-03-25
Attending: SURGERY
Payer: MEDICARE

## 2022-03-25 DIAGNOSIS — Z20.822 CONTACT WITH AND (SUSPECTED) EXPOSURE TO COVID-19: ICD-10-CM

## 2022-03-25 DIAGNOSIS — Z11.59 ENCOUNTER FOR SCREENING FOR OTHER VIRAL DISEASES: ICD-10-CM

## 2022-03-25 DIAGNOSIS — Z01.818 ENCOUNTER FOR OTHER PREPROCEDURAL EXAMINATION: ICD-10-CM

## 2022-03-25 DIAGNOSIS — Z20.822 CONTACT WITH AND (SUSPECTED) EXPOSURE TO COVID-19: Primary | ICD-10-CM

## 2022-03-25 LAB
ALBUMIN SERPL-MCNC: 3.8 G/DL (ref 3.4–5)
ALP SERPL-CCNC: 147 IU/L (ref 35–126)
ALT SERPL-CCNC: 19 IU/L (ref 16–63)
ANION GAP SERPL CALC-SCNC: 11 MEQ/L (ref 3–15)
AST SERPL-CCNC: 19 IU/L (ref 15–41)
BASOPHILS # BLD: 0.06 K/UL (ref 0.01–0.1)
BASOPHILS NFR BLD: 0.6 %
BILIRUB SERPL-MCNC: 1.2 MG/DL (ref 0.3–1.2)
BUN SERPL-MCNC: 37 MG/DL (ref 8–20)
CALCIUM SERPL-MCNC: 9.3 MG/DL (ref 8.9–10.3)
CHLORIDE SERPL-SCNC: 101 MEQ/L (ref 98–109)
CO2 SERPL-SCNC: 25 MEQ/L (ref 22–32)
CREAT SERPL-MCNC: 2.3 MG/DL (ref 0.8–1.3)
DIFFERENTIAL METHOD BLD: ABNORMAL
EOSINOPHIL # BLD: 0.63 K/UL (ref 0.04–0.54)
EOSINOPHIL NFR BLD: 6.8 %
ERYTHROCYTE [DISTWIDTH] IN BLOOD BY AUTOMATED COUNT: 13.9 % (ref 11.6–14.4)
GFR SERPL CREATININE-BSD FRML MDRD: 28 ML/MIN/1.73M*2
GLUCOSE SERPL-MCNC: 146 MG/DL (ref 70–99)
HCT VFR BLDCO AUTO: 36 % (ref 40.1–51)
HGB BLD-MCNC: 12.1 G/DL (ref 13.7–17.5)
IMM GRANULOCYTES # BLD AUTO: 0.04 K/UL (ref 0–0.08)
IMM GRANULOCYTES NFR BLD AUTO: 0.4 %
LYMPHOCYTES # BLD: 0.88 K/UL (ref 1.2–3.5)
LYMPHOCYTES NFR BLD: 9.5 %
MCH RBC QN AUTO: 28.3 PG (ref 28–33.2)
MCHC RBC AUTO-ENTMCNC: 33.6 G/DL (ref 32.2–36.5)
MCV RBC AUTO: 84.3 FL (ref 83–98)
MONOCYTES # BLD: 0.66 K/UL (ref 0.3–1)
MONOCYTES NFR BLD: 7.1 %
NEUTROPHILS # BLD: 7.01 K/UL (ref 1.7–7)
NEUTS SEG NFR BLD: 75.6 %
NRBC BLD-RTO: 0 %
PDW BLD AUTO: 11.1 FL (ref 9.4–12.4)
PLATELET # BLD AUTO: 157 K/UL (ref 150–350)
POTASSIUM SERPL-SCNC: 4.9 MEQ/L (ref 3.6–5.1)
PROT SERPL-MCNC: 6.2 G/DL (ref 6–8.2)
RBC # BLD AUTO: 4.27 M/UL (ref 4.5–5.8)
SARS-COV-2 RNA RESP QL NAA+PROBE: NEGATIVE
SODIUM SERPL-SCNC: 137 MEQ/L (ref 136–144)
WBC # BLD AUTO: 9.28 K/UL (ref 3.8–10.5)

## 2022-03-25 PROCEDURE — 85025 COMPLETE CBC W/AUTO DIFF WBC: CPT

## 2022-03-25 PROCEDURE — 80053 COMPREHEN METABOLIC PANEL: CPT

## 2022-03-25 PROCEDURE — C9803 HOPD COVID-19 SPEC COLLECT: HCPCS

## 2022-03-25 PROCEDURE — 36415 COLL VENOUS BLD VENIPUNCTURE: CPT

## 2022-03-25 PROCEDURE — U0003 INFECTIOUS AGENT DETECTION BY NUCLEIC ACID (DNA OR RNA); SEVERE ACUTE RESPIRATORY SYNDROME CORONAVIRUS 2 (SARS-COV-2) (CORONAVIRUS DISEASE [COVID-19]), AMPLIFIED PROBE TECHNIQUE, MAKING USE OF HIGH THROUGHPUT TECHNOLOGIES AS DESCRIBED BY CMS-2020-01-R: HCPCS

## 2022-03-29 RX ORDER — SODIUM CHLORIDE 9 MG/ML
INJECTION, SOLUTION INTRAVENOUS CONTINUOUS
Status: CANCELLED | OUTPATIENT
Start: 2022-03-29 | End: 2022-04-05

## 2022-06-15 ENCOUNTER — APPOINTMENT (OUTPATIENT)
Dept: URBAN - METROPOLITAN AREA CLINIC 200 | Age: 72
Setting detail: DERMATOLOGY
End: 2022-06-20

## 2022-06-15 DIAGNOSIS — Z11.52 ENCOUNTER FOR SCREENING FOR COVID-19: ICD-10-CM

## 2022-06-15 DIAGNOSIS — Z85.828 PERSONAL HISTORY OF OTHER MALIGNANT NEOPLASM OF SKIN: ICD-10-CM

## 2022-06-15 DIAGNOSIS — Z48.02 ENCOUNTER FOR REMOVAL OF SUTURES: ICD-10-CM

## 2022-06-15 DIAGNOSIS — L57.0 ACTINIC KERATOSIS: ICD-10-CM

## 2022-06-15 PROBLEM — C44.222 SQUAMOUS CELL CARCINOMA OF SKIN OF RIGHT EAR AND EXTERNAL AURICULAR CANAL: Status: ACTIVE | Noted: 2022-06-15

## 2022-06-15 PROBLEM — C44.629 SQUAMOUS CELL CARCINOMA OF SKIN OF LEFT UPPER LIMB, INCLUDING SHOULDER: Status: ACTIVE | Noted: 2022-06-15

## 2022-06-15 PROBLEM — C44.329 SQUAMOUS CELL CARCINOMA OF SKIN OF OTHER PARTS OF FACE: Status: ACTIVE | Noted: 2022-06-15

## 2022-06-15 PROCEDURE — OTHER COUNSELING: OTHER

## 2022-06-15 PROCEDURE — 99213 OFFICE O/P EST LOW 20 MIN: CPT

## 2022-06-15 PROCEDURE — OTHER PRESCRIPTION MEDICATION MANAGEMENT: OTHER

## 2022-06-15 PROCEDURE — OTHER MEDICATION COUNSELING: OTHER

## 2022-06-15 PROCEDURE — OTHER SUTURE REMOVAL (NO GLOBAL PERIOD): OTHER

## 2022-06-15 PROCEDURE — OTHER SCREENING FOR COVID-19: OTHER

## 2022-06-15 ASSESSMENT — LOCATION DETAILED DESCRIPTION DERM
LOCATION DETAILED: RIGHT SUPERIOR CENTRAL MALAR CHEEK
LOCATION DETAILED: EPIGASTRIC SKIN
LOCATION DETAILED: PERIUMBILICAL SKIN
LOCATION DETAILED: LEFT SUPERIOR TEMPLE

## 2022-06-15 ASSESSMENT — LOCATION SIMPLE DESCRIPTION DERM
LOCATION SIMPLE: RIGHT CHEEK
LOCATION SIMPLE: ABDOMEN
LOCATION SIMPLE: LEFT TEMPLE

## 2022-06-15 ASSESSMENT — LOCATION ZONE DERM
LOCATION ZONE: TRUNK
LOCATION ZONE: FACE

## 2022-06-15 NOTE — PROCEDURE: MEDICATION COUNSELING
Xeladiz Pregnancy And Lactation Text: This medication is Pregnancy Category D and is not considered safe during pregnancy.  The risk during breast feeding is also uncertain.

## 2022-06-15 NOTE — HPI: FOLLOW-UP
What Is The Condition That You Are Returning For Follow-Up?: squamous cell carcinoma
Additional History: Pt had mohs done on right helix, left proximal arm and left cheek. Pt is unsure of which cancerous legions were removed. PA will check medical records from Dr. Quiroga in regards to surgery. MA will remove stitches from surgery

## 2022-06-15 NOTE — PROCEDURE: PRESCRIPTION MEDICATION MANAGEMENT
Render In Strict Bullet Format?: No
Initiate Treatment: Fluorouracil 5% cream (pt has rx from previous visit) \\nPatient educated on application using mirror (8apx7gm on temples). Will follow up with [facility] for FBSE in 2 weeks where next application site will be discussed.
Detail Level: Zone

## 2022-07-01 ENCOUNTER — APPOINTMENT (OUTPATIENT)
Dept: URBAN - METROPOLITAN AREA CLINIC 203 | Age: 72
Setting detail: DERMATOLOGY
End: 2022-07-03

## 2022-07-01 DIAGNOSIS — L57.8 OTHER SKIN CHANGES DUE TO CHRONIC EXPOSURE TO NONIONIZING RADIATION: ICD-10-CM

## 2022-07-01 DIAGNOSIS — L57.0 ACTINIC KERATOSIS: ICD-10-CM

## 2022-07-01 DIAGNOSIS — Z85.828 PERSONAL HISTORY OF OTHER MALIGNANT NEOPLASM OF SKIN: ICD-10-CM

## 2022-07-01 DIAGNOSIS — Z11.52 ENCOUNTER FOR SCREENING FOR COVID-19: ICD-10-CM

## 2022-07-01 DIAGNOSIS — Z85.820 PERSONAL HISTORY OF MALIGNANT MELANOMA OF SKIN: ICD-10-CM

## 2022-07-01 PROCEDURE — 17000 DESTRUCT PREMALG LESION: CPT

## 2022-07-01 PROCEDURE — OTHER SUNSCREEN RECOMMENDATIONS: OTHER

## 2022-07-01 PROCEDURE — OTHER SCREENING FOR COVID-19: OTHER

## 2022-07-01 PROCEDURE — 99213 OFFICE O/P EST LOW 20 MIN: CPT | Mod: 25

## 2022-07-01 PROCEDURE — 17003 DESTRUCT PREMALG LES 2-14: CPT

## 2022-07-01 PROCEDURE — OTHER COUNSELING: OTHER

## 2022-07-01 PROCEDURE — OTHER LIQUID NITROGEN: OTHER

## 2022-07-01 ASSESSMENT — LOCATION DETAILED DESCRIPTION DERM
LOCATION DETAILED: NASAL DORSUM
LOCATION DETAILED: RIGHT FOREHEAD
LOCATION DETAILED: RIGHT SUPERIOR LATERAL MALAR CHEEK
LOCATION DETAILED: LEFT FOREHEAD
LOCATION DETAILED: LEFT MEDIAL INFERIOR CHEST
LOCATION DETAILED: PHILTRUM
LOCATION DETAILED: LEFT SUPERIOR LATERAL MALAR CHEEK
LOCATION DETAILED: RIGHT INFERIOR CENTRAL MALAR CHEEK
LOCATION DETAILED: LEFT CENTRAL MALAR CHEEK
LOCATION DETAILED: PERIUMBILICAL SKIN
LOCATION DETAILED: RIGHT CENTRAL ZYGOMA
LOCATION DETAILED: EPIGASTRIC SKIN

## 2022-07-01 ASSESSMENT — LOCATION SIMPLE DESCRIPTION DERM
LOCATION SIMPLE: LEFT FOREHEAD
LOCATION SIMPLE: RIGHT ZYGOMA
LOCATION SIMPLE: UPPER LIP
LOCATION SIMPLE: RIGHT FOREHEAD
LOCATION SIMPLE: RIGHT CHEEK
LOCATION SIMPLE: ABDOMEN
LOCATION SIMPLE: CHEST
LOCATION SIMPLE: LEFT CHEEK
LOCATION SIMPLE: NOSE

## 2022-07-01 ASSESSMENT — LOCATION ZONE DERM
LOCATION ZONE: LIP
LOCATION ZONE: NOSE
LOCATION ZONE: TRUNK
LOCATION ZONE: FACE

## 2022-07-01 ASSESSMENT — PAIN INTENSITY VAS: HOW INTENSE IS YOUR PAIN 0 BEING NO PAIN, 10 BEING THE MOST SEVERE PAIN POSSIBLE?: NO PAIN

## 2022-09-09 NOTE — ASSESSMENT & PLAN NOTE
Continue to monitor accuchecks  Mealtime novolog with correction insulin  Dietary intake has been variable and he has been intermittently NPO for procedures  Adjust regimen prn  Consulted endocrinology for management.      No

## 2022-12-12 ENCOUNTER — APPOINTMENT (OUTPATIENT)
Dept: URBAN - METROPOLITAN AREA CLINIC 203 | Age: 72
Setting detail: DERMATOLOGY
End: 2022-12-13

## 2022-12-12 DIAGNOSIS — Z11.52 ENCOUNTER FOR SCREENING FOR COVID-19: ICD-10-CM

## 2022-12-12 PROBLEM — D04.21 CARCINOMA IN SITU OF SKIN OF RIGHT EAR AND EXTERNAL AURICULAR CANAL: Status: ACTIVE | Noted: 2022-12-12

## 2022-12-12 PROBLEM — C44.329 SQUAMOUS CELL CARCINOMA OF SKIN OF OTHER PARTS OF FACE: Status: ACTIVE | Noted: 2022-12-12

## 2022-12-12 PROCEDURE — OTHER EXCISION: OTHER

## 2022-12-12 PROCEDURE — 11642 EXC F/E/E/N/L MAL+MRG 1.1-2: CPT | Mod: 59

## 2022-12-12 PROCEDURE — OTHER MIPS QUALITY: OTHER

## 2022-12-12 PROCEDURE — OTHER SCREENING FOR COVID-19: OTHER

## 2022-12-12 PROCEDURE — 12051 INTMD RPR FACE/MM 2.5 CM/<: CPT | Mod: 59

## 2022-12-12 PROCEDURE — OTHER CURETTAGE AND DESTRUCTION: OTHER

## 2022-12-12 PROCEDURE — 17280 DSTR MAL LS F/E/E/N/L/M .5/<: CPT

## 2022-12-12 ASSESSMENT — LOCATION ZONE DERM: LOCATION ZONE: TRUNK

## 2022-12-12 ASSESSMENT — LOCATION DETAILED DESCRIPTION DERM: LOCATION DETAILED: EPIGASTRIC SKIN

## 2022-12-12 ASSESSMENT — LOCATION SIMPLE DESCRIPTION DERM: LOCATION SIMPLE: ABDOMEN

## 2022-12-12 NOTE — PROCEDURE: CURETTAGE AND DESTRUCTION
Detail Level: Detailed
Number Of Curettages: 3
Size Of Lesion In Cm: 1
Size Of Lesion After Curettage: 0
Add Intralesional Injection: No
Concentration (Mg/Ml Or Millions Of Plaque Forming Units/Cc): 0.01
Anesthesia Type: 1% lidocaine with epinephrine
Cautery Type: electrodesiccation
What Was Performed First?: Curettage
Final Size Statement: The size of the lesion after curettage was
Additional Information: (Optional): The wound was cleaned, and a pressure dressing was applied.  The patient received detailed post-op instructions.
Consent was obtained from the patient. The risks, benefits and alternatives to therapy were discussed in detail. Specifically, the risks of infection, scarring, bleeding, prolonged wound healing, nerve injury, incomplete removal, allergy to anesthesia and recurrence were addressed. Alternatives to ED&C, such as: surgical removal and XRT were also discussed.  Prior to the procedure, the treatment site was clearly identified and confirmed by the patient. All components of Universal Protocol/PAUSE Rule completed.
Post-Care Instructions: I reviewed with the patient in detail post-care instructions. Patient is to keep the area dry for 48 hours, and not to engage in any swimming until the area is healed. Should the patient develop any fevers, chills, bleeding, severe pain patient will contact the office immediately.
Bill As A Line Item Or As Units: Line Item

## 2022-12-20 ENCOUNTER — APPOINTMENT (OUTPATIENT)
Dept: URBAN - METROPOLITAN AREA CLINIC 203 | Age: 72
Setting detail: DERMATOLOGY
End: 2022-12-26

## 2022-12-20 DIAGNOSIS — Z48.02 ENCOUNTER FOR REMOVAL OF SUTURES: ICD-10-CM

## 2022-12-20 PROCEDURE — OTHER SUTURE REMOVAL (GLOBAL PERIOD): OTHER

## 2022-12-20 ASSESSMENT — LOCATION DETAILED DESCRIPTION DERM: LOCATION DETAILED: RIGHT LATERAL MALAR CHEEK

## 2022-12-20 ASSESSMENT — LOCATION SIMPLE DESCRIPTION DERM: LOCATION SIMPLE: RIGHT CHEEK

## 2022-12-20 ASSESSMENT — LOCATION ZONE DERM: LOCATION ZONE: FACE

## 2023-02-11 NOTE — PROCEDURE: MEDICATION COUNSELING
Refill Routing Note   Medication(s) are not appropriate for processing by [facility] for the following reason(s):       NPP    [facility] action(s):  Route                          Appointments  past 12m or future 3m with PCP    Date Provider   Last Visit   Visit date not found Christopher Diaz MD   Next Visit   Visit date not found Christopher Diaz MD   ED visits in past 90 days: 0        Note composed:3:28 PM 02/11/2023          Glycopyrrolate Pregnancy And Lactation Text: This medication is Pregnancy Category B and is considered safe during pregnancy. It is unknown if it is excreted breast milk.

## 2023-02-21 ENCOUNTER — APPOINTMENT (OUTPATIENT)
Dept: URBAN - METROPOLITAN AREA CLINIC 203 | Age: 73
Setting detail: DERMATOLOGY
End: 2023-02-21

## 2023-02-21 DIAGNOSIS — L57.8 OTHER SKIN CHANGES DUE TO CHRONIC EXPOSURE TO NONIONIZING RADIATION: ICD-10-CM

## 2023-02-21 DIAGNOSIS — L57.0 ACTINIC KERATOSIS: ICD-10-CM

## 2023-02-21 DIAGNOSIS — D485 NEOPLASM OF UNCERTAIN BEHAVIOR OF SKIN: ICD-10-CM

## 2023-02-21 DIAGNOSIS — Z85.828 PERSONAL HISTORY OF OTHER MALIGNANT NEOPLASM OF SKIN: ICD-10-CM

## 2023-02-21 DIAGNOSIS — Z85.820 PERSONAL HISTORY OF MALIGNANT MELANOMA OF SKIN: ICD-10-CM

## 2023-02-21 PROBLEM — D48.5 NEOPLASM OF UNCERTAIN BEHAVIOR OF SKIN: Status: ACTIVE | Noted: 2023-02-21

## 2023-02-21 PROCEDURE — 11103 TANGNTL BX SKIN EA SEP/ADDL: CPT

## 2023-02-21 PROCEDURE — 17003 DESTRUCT PREMALG LES 2-14: CPT

## 2023-02-21 PROCEDURE — OTHER COUNSELING: OTHER

## 2023-02-21 PROCEDURE — 17000 DESTRUCT PREMALG LESION: CPT | Mod: 59

## 2023-02-21 PROCEDURE — OTHER PHOTO-DOCUMENTATION: OTHER

## 2023-02-21 PROCEDURE — 11102 TANGNTL BX SKIN SINGLE LES: CPT

## 2023-02-21 PROCEDURE — OTHER SUNSCREEN RECOMMENDATIONS: OTHER

## 2023-02-21 PROCEDURE — 99213 OFFICE O/P EST LOW 20 MIN: CPT | Mod: 25

## 2023-02-21 PROCEDURE — OTHER LIQUID NITROGEN: OTHER

## 2023-02-21 PROCEDURE — OTHER BIOPSY BY SHAVE METHOD: OTHER

## 2023-02-21 ASSESSMENT — LOCATION DETAILED DESCRIPTION DERM
LOCATION DETAILED: EPIGASTRIC SKIN
LOCATION DETAILED: LEFT CENTRAL MALAR CHEEK
LOCATION DETAILED: LEFT DISTAL PRETIBIAL REGION
LOCATION DETAILED: LEFT SUPERIOR HELIX
LOCATION DETAILED: LEFT SUPERIOR CENTRAL MALAR CHEEK
LOCATION DETAILED: LEFT LATERAL MALAR CHEEK
LOCATION DETAILED: RIGHT INFERIOR TEMPLE
LOCATION DETAILED: RIGHT CENTRAL MALAR CHEEK
LOCATION DETAILED: LEFT MEDIAL INFERIOR CHEST
LOCATION DETAILED: LEFT PROXIMAL DORSAL FOREARM

## 2023-02-21 ASSESSMENT — LOCATION SIMPLE DESCRIPTION DERM
LOCATION SIMPLE: LEFT PRETIBIAL REGION
LOCATION SIMPLE: ABDOMEN
LOCATION SIMPLE: LEFT EAR
LOCATION SIMPLE: CHEST
LOCATION SIMPLE: RIGHT CHEEK
LOCATION SIMPLE: RIGHT TEMPLE
LOCATION SIMPLE: LEFT CHEEK
LOCATION SIMPLE: LEFT FOREARM

## 2023-02-21 ASSESSMENT — LOCATION ZONE DERM
LOCATION ZONE: ARM
LOCATION ZONE: LEG
LOCATION ZONE: TRUNK
LOCATION ZONE: EAR
LOCATION ZONE: FACE

## 2023-02-21 ASSESSMENT — PAIN INTENSITY VAS: HOW INTENSE IS YOUR PAIN 0 BEING NO PAIN, 10 BEING THE MOST SEVERE PAIN POSSIBLE?: NO PAIN

## 2023-02-21 NOTE — PROCEDURE: BIOPSY BY SHAVE METHOD
Procedures EMG Needle exam performed by me.  Nerve conduction studies were also performed by me without the assistance of the technician   Electrodesiccation Text: The wound bed was treated with electrodesiccation after the biopsy was performed.

## 2023-05-17 ENCOUNTER — ANESTHESIA EVENT (OUTPATIENT)
Dept: ENDOSCOPY | Facility: HOSPITAL | Age: 73
End: 2023-05-17
Payer: MEDICARE

## 2023-05-17 ENCOUNTER — ANESTHESIA (OUTPATIENT)
Dept: ENDOSCOPY | Facility: HOSPITAL | Age: 73
End: 2023-05-17
Payer: MEDICARE

## 2023-05-17 ENCOUNTER — HOSPITAL ENCOUNTER (OUTPATIENT)
Facility: HOSPITAL | Age: 73
Discharge: HOME | End: 2023-05-17
Attending: INTERNAL MEDICINE | Admitting: INTERNAL MEDICINE
Payer: MEDICARE

## 2023-05-17 VITALS
WEIGHT: 258 LBS | HEIGHT: 73 IN | RESPIRATION RATE: 18 BRPM | BODY MASS INDEX: 34.19 KG/M2 | DIASTOLIC BLOOD PRESSURE: 88 MMHG | OXYGEN SATURATION: 98 % | HEART RATE: 87 BPM | TEMPERATURE: 97.7 F | SYSTOLIC BLOOD PRESSURE: 146 MMHG

## 2023-05-17 DIAGNOSIS — R10.84 ABDOMINAL PAIN, GENERALIZED: ICD-10-CM

## 2023-05-17 DIAGNOSIS — R14.0 ABDOMINAL DISTENTION: ICD-10-CM

## 2023-05-17 PROCEDURE — 0DB78ZX EXCISION OF STOMACH, PYLORUS, VIA NATURAL OR ARTIFICIAL OPENING ENDOSCOPIC, DIAGNOSTIC: ICD-10-PCS | Performed by: INTERNAL MEDICINE

## 2023-05-17 PROCEDURE — 75000060 HC EGD BIOPSY: Performed by: INTERNAL MEDICINE

## 2023-05-17 PROCEDURE — 71000001 HC PACU PHASE 1 INITIAL 30MIN: Performed by: INTERNAL MEDICINE

## 2023-05-17 PROCEDURE — 0DBL8ZZ EXCISION OF TRANSVERSE COLON, VIA NATURAL OR ARTIFICIAL OPENING ENDOSCOPIC: ICD-10-PCS | Performed by: INTERNAL MEDICINE

## 2023-05-17 PROCEDURE — 88305 TISSUE EXAM BY PATHOLOGIST: CPT | Performed by: INTERNAL MEDICINE

## 2023-05-17 PROCEDURE — 63700000 HC SELF-ADMINISTRABLE DRUG: Performed by: PHYSICIAN ASSISTANT

## 2023-05-17 PROCEDURE — C1773 RET DEV, INSERTABLE: HCPCS | Performed by: INTERNAL MEDICINE

## 2023-05-17 PROCEDURE — 37000001 HC ANESTHESIA GENERAL: Performed by: INTERNAL MEDICINE

## 2023-05-17 PROCEDURE — 75000020 HC COLONSCOPY SNARE: Performed by: INTERNAL MEDICINE

## 2023-05-17 PROCEDURE — 71000011 HC PACU PHASE 1 EA ADDL MIN: Performed by: INTERNAL MEDICINE

## 2023-05-17 PROCEDURE — 63600000 HC DRUGS/DETAIL CODE: Performed by: SPECIALIST

## 2023-05-17 PROCEDURE — 25800000 HC PHARMACY IV SOLUTIONS: Performed by: SPECIALIST

## 2023-05-17 PROCEDURE — 88342 IMHCHEM/IMCYTCHM 1ST ANTB: CPT | Performed by: INTERNAL MEDICINE

## 2023-05-17 RX ORDER — SODIUM CHLORIDE 9 MG/ML
INJECTION, SOLUTION INTRAVENOUS CONTINUOUS PRN
Status: DISCONTINUED | OUTPATIENT
Start: 2023-05-17 | End: 2023-05-17 | Stop reason: SURG

## 2023-05-17 RX ORDER — PROPOFOL 200MG/20ML
SYRINGE (ML) INTRAVENOUS AS NEEDED
Status: DISCONTINUED | OUTPATIENT
Start: 2023-05-17 | End: 2023-05-17 | Stop reason: SURG

## 2023-05-17 RX ADMIN — PROPOFOL 50 MG: 10 INJECTION, EMULSION INTRAVENOUS at 14:36

## 2023-05-17 RX ADMIN — SODIUM PHOSPHATE 1 ENEMA: 7; 19 ENEMA RECTAL at 12:27

## 2023-05-17 RX ADMIN — PROPOFOL 150 MG: 10 INJECTION, EMULSION INTRAVENOUS at 14:19

## 2023-05-17 RX ADMIN — PROPOFOL 100 MG: 10 INJECTION, EMULSION INTRAVENOUS at 14:25

## 2023-05-17 RX ADMIN — SODIUM CHLORIDE: 0.9 INJECTION, SOLUTION INTRAVENOUS at 14:13

## 2023-05-17 RX ADMIN — PROPOFOL 50 MG: 10 INJECTION, EMULSION INTRAVENOUS at 14:44

## 2023-05-17 RX ADMIN — SODIUM PHOSPHATE 1 ENEMA: 7; 19 ENEMA RECTAL at 13:10

## 2023-05-17 ASSESSMENT — PAIN SCALES - GENERAL: PAIN_LEVEL: 0

## 2023-05-17 ASSESSMENT — ENCOUNTER SYMPTOMS: DYSRHYTHMIAS: 1

## 2023-05-17 NOTE — PERIOPERATIVE NURSING NOTE
The patient was discharged to home. Doctor at bedside spoke to patient. They were given the doctor's discharge instructions, patient verbalized understanding. Patient ambulated to dressing room with steady gait and denied dizziness. The patient was taken down to the [facility] in a wheelchair with member of the transport team.

## 2023-05-17 NOTE — ANESTHESIA PREPROCEDURE EVALUATION
Relevant Problems   CARDIOVASCULAR   (+) Atrial flutter (CMS/HCC)   (+) CAD (coronary artery disease) of artery bypass graft   (+) Essential hypertension   (+) Hypertensive urgency   (+) Mitral valve prolapse      GASTROINTESTINAL   (+) GERD (gastroesophageal reflux disease)   (+) Hepatomegaly      NEUROLOGY   (+) Anxiety   (+) OCD (obsessive compulsive disorder)      RESPIRATORY SYSTEM   (+) Sleep apnea      URINARY SYSTEM   (+) Acute renal failure superimposed on stage 3 chronic kidney disease (CMS/HCC)   (+) Benign prostatic hyperplasia without lower urinary tract symptoms   (+) Hypokalemia   (+) Kidney stone      Other   (+) Acquired hypothyroidism   (+) Type 2 diabetes mellitus with complication, with long-term current use of insulin (CMS/HCC)       Anesthesia ROS/MED HX    Anesthesia History    Previous anesthetics  Pulmonary    Sleep apnea  Neuro/Psych - neg  Cardiovascular   CAD   hypertension  Dysrhythmias and atrial flutter   CABG  Hematological    anticoagulants  GI/Hepatic   liver disease   GERD  Musculoskeletal   Osteoarthritis  Renal Disease   chronic renal insufficiency  Endo/Other   Diabetes and patient Insulin dependent   Hypothyroidism  History of cancer  Body Habitus: Overweight       Past Surgical History:   Procedure Laterality Date   • CARDIAC CATHETERIZATION  05/2016   • COLONOSCOPY  2016   • CORONARY ARTERY BYPASS GRAFT  05/2016    x3   • CYSTOSCOPY INSERTION / REMOVAL STENT / STONE  03/27/2019   • CYSTOSCOPY INSERTION / REMOVAL STENT / STONE  04/10/2019    Stent placement   • CYSTOSCOPY, STENT REMOVAL  05/2019   • KIDNEY STONE SURGERY Right 2018   • NEPHRECTOMY Right 2016    PARTIAL    • TONSILLECTOMY      Childhood   • UVULECTOMY  2005   • WISDOM TOOTH EXTRACTION  1970       Physical Exam    Airway   Mallampati: III  Cardiovascular - normal   Rhythm: regular   Rate: normalPulmonary - normal   clear to auscultation  Dental - normal        Anesthesia Plan    Plan: general    Technique: total  IV anesthesia     Airway: natural airway / supplemental oxygen   3 ASA  Anesthetic plan and risks discussed with: patient  Induction:    intravenous   Postop Plan:   Patient Disposition: phase II then home   Pain Management: IV analgesics

## 2023-05-17 NOTE — ANESTHESIOLOGIST PRE-PROCEDURE ATTESTATION
Pre-Procedure Patient Identification:  I am the Primary Anesthesiologist and have identified the patient on 05/17/23 at 2:09 PM.   I have confirmed the procedure(s) will be performed by the following surgeon/proceduralist Naz, Earl ATKINSON MD.

## 2023-05-17 NOTE — OP NOTE
_______________________________________________________________________________  Patient Name: Nathan Dillon         Procedure Date: 5/17/2023 2:01 PM  MRN: [id number]                     Account Number: [id number]  YOB: 1950             Age: 72  Gender: Male                          Note Status: Finalized  Attending MD: EVONNE BROWN,  _______________________________________________________________________________  Procedure:             Colonoscopy  Indications:           Screening for colorectal malignant neoplasm  Providers:             EVONNE SAHA (Doctor), Cris Ceron,  Staff Nurse (Nurse), Dawna Quintero (Nurse), Jelena Hennessy RN (Nurse)  Referring MD:          EDOUARD MORA MD  Requesting Provider:  Medicines:             Monitored Anesthesia Care  Complications:         No immediate complications.  _______________________________________________________________________________  Procedure:             After I obtained informed consent, the scope was  passed under direct vision. Throughout the procedure,  the patient's blood pressure, pulse, and oxygen  saturations were monitored continuously. The  colonoscope was introduced through the anus and  advanced to the cecum, identified by appendiceal  orifice and ileocecal valve. The colonoscopy was  performed without difficulty. The patient tolerated  the procedure well. The quality of the bowel  preparation was adequate to identify polyps greater  than 5 mm in size. The ileocecal valve, appendiceal  orifice, and rectum were photographed.  Estimated Blood Loss:  Estimated blood loss was minimal.  Findings:  A 9 mm polyp was found in the distal transverse colon. The polyp was  sessile. The polyp was removed with a cold snare. Resection and  retrieval were complete. Estimated blood loss was minimal.  The exam was otherwise without abnormality.  Impression:            - One 9 mm polyp in the distal transverse  colon,  removed with a cold snare. Resected and retrieved.  - The examination was otherwise normal.  Recommendation:        - Await pathology results.  - defer surveillance/screening in future in absence of  symptoms  Procedure Code(s):     --- Professional ---  99182, Colonoscopy, flexible; with removal of  tumor(s), polyp(s), or other lesion(s) by snare  technique  Diagnosis Code(s):     --- Professional ---  Z12.11, Encounter for screening for malignant neoplasm  of colon  D12.3, Benign neoplasm of transverse colon (hepatic  flexure or splenic flexure)  CPT copyright 2021 [employer]. All rights reserved.  The codes documented in this report are preliminary and upon  review may  be revised to meet current compliance requirements.  Dr Inder Childs  _________________________  INDER CHILDS MD~BAN  5/17/2023 2:50:53 PM  This report has been signed electronically.  Number of Addenda: 0  Note Initiated On: 5/17/2023 2:01 PM

## 2023-05-17 NOTE — ANESTHESIA POSTPROCEDURE EVALUATION
Patient: Nathan Alonzo    Procedure Summary     Date: 05/17/23 Room / Location: [facility] GI 2 / [facility] GI    Anesthesia Start: 1416 Anesthesia Stop: 1457    Procedures:       EGD (Esophagus)      Colon (Anus) Diagnosis:       Abdominal distention      Abdominal pain, generalized      (Abdominal distention [R14.0])      (Abdominal pain, generalized [R10.84])    Providers: Earl Childs MD Responsible Provider: Eusebia Marino MD    Anesthesia Type: general ASA Status: 3          Anesthesia Type: general  PACU Vitals    No data found in the last 10 encounters.           Anesthesia Post Evaluation    Pain score: 0  Pain management: adequate  Patient location during evaluation: PACU  Patient participation: waiting for patient participation  Level of consciousness: awake and alert and arousable  Cardiovascular status: acceptable  Airway Patency: adequate  Respiratory status: acceptable and face mask  Hydration status: acceptable  Anesthetic complications: no

## 2023-05-17 NOTE — OP NOTE
_______________________________________________________________________________  Patient Name: Nathan Dillon         Procedure Date: 5/17/2023 7:11 AM  MRN: [id number]                     Account Number: [id number]  YOB: 1950             Age: 72  Gender: Male                          Note Status: Finalized  Attending MD: EVONNE BROWN,  _______________________________________________________________________________  Procedure:             Upper GI endoscopy  Indications:           Abdominal bloating  Providers:             EVONNE SAHA (Doctor), Dawna Quintero  (Nurse), Cris Ceron, Staff Nurse (Nurse), Jelena Hennessy RN (Nurse)  Referring MD:          EDOUARD MORA MD  Requesting Provider:  Medicines:             Monitored Anesthesia Care  Complications:         No immediate complications.  _______________________________________________________________________________  Procedure:             After obtaining informed consent, the endoscope was  passed under direct vision. Throughout the procedure,  the patient's blood pressure, pulse, and oxygen  saturations were monitored continuously. The Endoscope  was introduced through the mouth, and advanced to the  third part of duodenum. The upper GI endoscopy was  accomplished without difficulty. The patient tolerated  the procedure well.  Estimated Blood Loss:  Estimated blood loss: none.  Findings:  A small hiatal hernia was present.  Localized moderate inflammation characterized by erythema was found on  the greater curvature of the gastric body. Biopsies were taken with a  cold forceps for histology.  The exam was otherwise without abnormality.  Impression:            - Small hiatal hernia.  - Gastritis. Biopsied.  - The examination was otherwise normal.  Recommendation:        - Await pathology results.  - No ibuprofen, naproxen, or other non-steroidal  anti-inflammatory drugs.  Procedure Code(s):     --- Professional  ---  09301, Esophagogastroduodenoscopy, flexible,  transoral; with biopsy, single or multiple  Diagnosis Code(s):     --- Professional ---  K44.9, Diaphragmatic hernia without obstruction or  gangrene  K29.70, Gastritis, unspecified, without bleeding  R14.0, Abdominal distension (gaseous)  CPT copyright 2021 [employer]. All rights reserved.  The codes documented in this report are preliminary and upon  review may  be revised to meet current compliance requirements.  Dr Inder Childs  _________________________  INDER CHILDS MD~BAN  5/17/2023 2:31:02 PM  This report has been signed electronically.  Number of Addenda: 0  Note Initiated On: 5/17/2023 7:11 AM

## 2023-05-19 LAB
CASE RPRT: NORMAL
CLINICAL INFO: NORMAL
IMMUNE STAIN STUDY: NORMAL
PATH REPORT.FINAL DX SPEC: NORMAL
PATH REPORT.GROSS SPEC: NORMAL

## 2023-06-06 NOTE — PROCEDURE: EXCISION
Burow's Advancement Flap Text: The defect edges were debeveled with a #15 scalpel blade.  Given the location of the defect and the proximity to free margins a Burow's advancement flap was deemed most appropriate.  Using a sterile surgical marker, the appropriate advancement flap was drawn incorporating the defect and placing the expected incisions within the relaxed skin tension lines where possible.    The area thus outlined was incised deep to adipose tissue with a #15 scalpel blade.  The skin margins were undermined to an appropriate distance in all directions utilizing iris scissors. Complex Repair And Rotation Flap Text: The defect edges were debeveled with a #15 scalpel blade.  The primary defect was closed partially with a complex linear closure.  Given the location of the remaining defect, shape of the defect and the proximity to free margins a rotation flap was deemed most appropriate for complete closure of the defect.  Using a sterile surgical marker, an appropriate advancement flap was drawn incorporating the defect and placing the expected incisions within the relaxed skin tension lines where possible.    The area thus outlined was incised deep to adipose tissue with a #15 scalpel blade.  The skin margins were undermined to an appropriate distance in all directions utilizing iris scissors.

## 2023-08-29 ENCOUNTER — APPOINTMENT (OUTPATIENT)
Dept: URBAN - METROPOLITAN AREA CLINIC 203 | Age: 73
Setting detail: DERMATOLOGY
End: 2023-08-30

## 2023-08-29 DIAGNOSIS — D485 NEOPLASM OF UNCERTAIN BEHAVIOR OF SKIN: ICD-10-CM

## 2023-08-29 DIAGNOSIS — L57.0 ACTINIC KERATOSIS: ICD-10-CM

## 2023-08-29 DIAGNOSIS — L57.8 OTHER SKIN CHANGES DUE TO CHRONIC EXPOSURE TO NONIONIZING RADIATION: ICD-10-CM

## 2023-08-29 DIAGNOSIS — Z85.828 PERSONAL HISTORY OF OTHER MALIGNANT NEOPLASM OF SKIN: ICD-10-CM

## 2023-08-29 DIAGNOSIS — Z85.820 PERSONAL HISTORY OF MALIGNANT MELANOMA OF SKIN: ICD-10-CM

## 2023-08-29 PROBLEM — D48.5 NEOPLASM OF UNCERTAIN BEHAVIOR OF SKIN: Status: ACTIVE | Noted: 2023-08-29

## 2023-08-29 PROBLEM — C44.619 BASAL CELL CARCINOMA OF SKIN OF LEFT UPPER LIMB, INCLUDING SHOULDER: Status: ACTIVE | Noted: 2023-08-29

## 2023-08-29 PROCEDURE — OTHER CURETTAGE AND DESTRUCTION: OTHER

## 2023-08-29 PROCEDURE — 69100 BIOPSY OF EXTERNAL EAR: CPT | Mod: 59

## 2023-08-29 PROCEDURE — 11103 TANGNTL BX SKIN EA SEP/ADDL: CPT | Mod: 59

## 2023-08-29 PROCEDURE — 17000 DESTRUCT PREMALG LESION: CPT | Mod: 59

## 2023-08-29 PROCEDURE — 99213 OFFICE O/P EST LOW 20 MIN: CPT | Mod: 25

## 2023-08-29 PROCEDURE — 11102 TANGNTL BX SKIN SINGLE LES: CPT | Mod: 59

## 2023-08-29 PROCEDURE — 17262 DSTRJ MAL LES T/A/L 1.1-2.0: CPT

## 2023-08-29 PROCEDURE — OTHER BIOPSY BY SHAVE METHOD: OTHER

## 2023-08-29 PROCEDURE — OTHER COUNSELING: OTHER

## 2023-08-29 PROCEDURE — OTHER LIQUID NITROGEN: OTHER

## 2023-08-29 PROCEDURE — OTHER PRESCRIPTION MEDICATION MANAGEMENT: OTHER

## 2023-08-29 PROCEDURE — OTHER SUNSCREEN RECOMMENDATIONS: OTHER

## 2023-08-29 PROCEDURE — OTHER PHOTO-DOCUMENTATION: OTHER

## 2023-08-29 PROCEDURE — 17003 DESTRUCT PREMALG LES 2-14: CPT

## 2023-08-29 ASSESSMENT — LOCATION SIMPLE DESCRIPTION DERM
LOCATION SIMPLE: LEFT CHEEK
LOCATION SIMPLE: RIGHT LIP
LOCATION SIMPLE: RIGHT HAND
LOCATION SIMPLE: LEFT PRETIBIAL REGION
LOCATION SIMPLE: RIGHT CHEEK
LOCATION SIMPLE: RIGHT EAR
LOCATION SIMPLE: LEFT NOSE
LOCATION SIMPLE: CHEST
LOCATION SIMPLE: ABDOMEN

## 2023-08-29 ASSESSMENT — LOCATION ZONE DERM
LOCATION ZONE: LEG
LOCATION ZONE: FACE
LOCATION ZONE: NOSE
LOCATION ZONE: TRUNK
LOCATION ZONE: LIP
LOCATION ZONE: EAR
LOCATION ZONE: HAND

## 2023-08-29 ASSESSMENT — LOCATION DETAILED DESCRIPTION DERM
LOCATION DETAILED: RIGHT RADIAL DORSAL HAND
LOCATION DETAILED: LEFT LATERAL MANDIBULAR CHEEK
LOCATION DETAILED: LEFT MEDIAL INFERIOR CHEST
LOCATION DETAILED: LEFT INFERIOR CENTRAL MALAR CHEEK
LOCATION DETAILED: LEFT NASAL ALA
LOCATION DETAILED: LEFT DISTAL PRETIBIAL REGION
LOCATION DETAILED: RIGHT SUPERIOR HELIX
LOCATION DETAILED: PERIUMBILICAL SKIN
LOCATION DETAILED: EPIGASTRIC SKIN
LOCATION DETAILED: RIGHT CENTRAL MALAR CHEEK
LOCATION DETAILED: RIGHT UPPER CUTANEOUS LIP

## 2023-08-29 ASSESSMENT — PAIN INTENSITY VAS: HOW INTENSE IS YOUR PAIN 0 BEING NO PAIN, 10 BEING THE MOST SEVERE PAIN POSSIBLE?: NO PAIN

## 2023-08-29 NOTE — PROCEDURE: PRESCRIPTION MEDICATION MANAGEMENT
Detail Level: Zone
Initiate Treatment: Patient to do phillip u \\nRecommend n-acetyl cystine for picking
Render In Strict Bullet Format?: No

## 2023-08-29 NOTE — PROCEDURE: LIQUID NITROGEN
Show Aperture Variable?: Yes
Consent: The patient's consent was obtained including but not limited to risks of crusting, scabbing, blistering, scarring, darker or lighter pigmentary change, recurrence, incomplete removal and infection.
Detail Level: Zone
Render Post-Care Instructions In Note?: no
Duration Of Freeze Thaw-Cycle (Seconds): 5
Post-Care Instructions: I reviewed with the patient in detail post-care instructions. Patient is to wear sunprotection, and avoid picking at any of the treated lesions. Pt may apply Vaseline to crusted or scabbing areas.
Application Tool (Optional): Liquid Nitrogen Sprayer
Number Of Freeze-Thaw Cycles: 2 freeze-thaw cycles

## 2023-08-29 NOTE — PROCEDURE: EXCISION
Pt on 2A per litter with RN post Y90 Mapping, pt awake and alert, denies pain. Site at right groin is flat, dry and intact. Family updated per pt.  Will continue to monitor.      Detail Level: Detailed Advancement Flap (Double) Text: The defect edges were debeveled with a #15 scalpel blade.  Given the location of the defect and the proximity to free margins a double advancement flap was deemed most appropriate.  Using a sterile surgical marker, the appropriate advancement flaps were drawn incorporating the defect and placing the expected incisions within the relaxed skin tension lines where possible.    The area thus outlined was incised deep to adipose tissue with a #15 scalpel blade.  The skin margins were undermined to an appropriate distance in all directions utilizing iris scissors.

## 2023-09-07 ENCOUNTER — APPOINTMENT (OUTPATIENT)
Dept: URBAN - METROPOLITAN AREA CLINIC 203 | Age: 73
Setting detail: DERMATOLOGY
End: 2023-09-07

## 2023-09-07 PROBLEM — D04.21 CARCINOMA IN SITU OF SKIN OF RIGHT EAR AND EXTERNAL AURICULAR CANAL: Status: ACTIVE | Noted: 2023-09-07

## 2023-09-07 PROCEDURE — OTHER REFERRAL: OTHER

## 2023-09-15 ENCOUNTER — APPOINTMENT (OUTPATIENT)
Dept: URBAN - METROPOLITAN AREA CLINIC 203 | Age: 73
Setting detail: DERMATOLOGY
End: 2023-09-25

## 2023-09-15 DIAGNOSIS — L57.0 ACTINIC KERATOSIS: ICD-10-CM

## 2023-09-15 PROCEDURE — OTHER LIQUID NITROGEN: OTHER

## 2023-09-15 PROCEDURE — 17003 DESTRUCT PREMALG LES 2-14: CPT

## 2023-09-15 PROCEDURE — 17000 DESTRUCT PREMALG LESION: CPT

## 2023-09-15 ASSESSMENT — LOCATION ZONE DERM
LOCATION ZONE: HAND
LOCATION ZONE: FACE
LOCATION ZONE: EAR

## 2023-09-15 ASSESSMENT — LOCATION DETAILED DESCRIPTION DERM
LOCATION DETAILED: RIGHT ULNAR DORSAL HAND
LOCATION DETAILED: LEFT LATERAL MALAR CHEEK
LOCATION DETAILED: LEFT INFERIOR LATERAL MALAR CHEEK
LOCATION DETAILED: LEFT TRAGUS

## 2023-09-15 ASSESSMENT — LOCATION SIMPLE DESCRIPTION DERM
LOCATION SIMPLE: LEFT EAR
LOCATION SIMPLE: RIGHT HAND
LOCATION SIMPLE: LEFT CHEEK

## 2023-09-15 NOTE — PROCEDURE: LIQUID NITROGEN
Application Tool (Optional): Liquid Nitrogen Sprayer
Render Note In Bullet Format When Appropriate: No
Duration Of Freeze Thaw-Cycle (Seconds): 5
Show Applicator Variable?: Yes
Detail Level: Zone
Consent: The patient's consent was obtained including but not limited to risks of crusting, scabbing, blistering, scarring, darker or lighter pigmentary change, recurrence, incomplete removal and infection.
Post-Care Instructions: I reviewed with the patient in detail post-care instructions. Patient is to wear sunprotection, and avoid picking at any of the treated lesions. Pt may apply Vaseline to crusted or scabbing areas.
Number Of Freeze-Thaw Cycles: 2 freeze-thaw cycles

## 2023-10-18 NOTE — PROGRESS NOTES
Hospital Medicine Service -  Daily Progress Note       SUBJECTIVE   No acute events.  Cr bumped today to 2.9  Plan for OR tomorrow w/ urology.  Pt w/o further issues w/ flank pain.     OBJECTIVE      Vital signs in last 24 hours:  Temp:  [36.2 °C (97.2 °F)-36.8 °C (98.2 °F)] 36.8 °C (98.2 °F)  Heart Rate:  [41-78] 41  Resp:  [15-22] 22  BP: (110-217)/(55-98) 174/86    Intake/Output Summary (Last 24 hours) at 03/26/19 1307  Last data filed at 03/26/19 1200   Gross per 24 hour   Intake                0 ml   Output              175 ml   Net             -175 ml       PHYSICAL EXAMINATION      General: WM, appropriate, cooperative, comfortable  HENT: normocephalic, atraumatic, MMM, no oral lesions  Eyes: anicteric sclera  Neck: supple, normal ROM  Resp: CTA B/L, no wheezes/rhonchi/rales  Cardiac: RRR, no murmurs/rubs/gallop  Abdomen: soft, nontender, normal BS, no significant CVA or R flank tenderness  Extremities: no significant edema  Neuro: nonfocal, awake, alert  Behavior: cooperative, calm  Lymph: no adenopathy noted  Skin: clean, dry, intact; no rashes or lesions noted   LABS / IMAGING / TELE      Labs  All new labs reviewed  Cr 2.9    Imaging  No new    ECG/Telemetry  EKG reviewed - sinus gurmeet, TWIs stable on prior tracings, RBBB    Lines, Drains, Airways, Wounds:  Peripheral IV 03/25/19 Left Forearm (Active)   Number of days: 1     Discussed case with  Ethel Patton  Outside cardiologist Dr. Mercado    ASSESSMENT AND PLAN      * Renal colic on right side   Assessment & Plan    Hx of renal stones, stone analysis mostly uric acid stones; serum uric acid normal  Had perc nephrostomy tube in 1/2019 and removal w/ stone treatment  Presents to ER w/ severe acute onset right renal colic and nausea    - afebrile, mild leukocytosis  - UA appears clean so hold off on Abx  - elevated Cr above baseline today 3/26 so plans for OR with  tomorrow 3/27  - IV morphine PRN pain  - IVF        Acute renal failure  Patient called to see if provider can send a referral to sports medicine instead of the hand clinic before Monday as they have appointment with Dr. Meek then.    superimposed on stage 3 chronic kidney disease (CMS/[facility])   Assessment & Plan    Baseline Cr 1.5-2 and higher at some times    - Cr now at 2.9  - plan for surgical intervention for pt's obstructing stone/mild hydro  - renal consulted  - maintain on IVF  - hold ACEI/Bumex  - monitor        Essential hypertension   Assessment & Plan    Home meds: hydralazine 75mg BID, lisinopril 20mg BID, Bumex 1mg BID, clonidine 0.2mg BID  Follows w/ cardiologist in [place]; pt's wife requests he not see a cardiologist from [facility] due to insurance issues  BPs high in ER likely 2/2 pain and need for AM meds    - cont home meds except ACEI and diuretic on hold for ASHELY  - added Imdur 30mg Q24 and increased hydralazine to 100mg Q8 for persistently elevated BPs  - pt has had blood pressures issues associated w/ anesthesia in the past; consult placed to cardiology for assistance w/ francesca-operative management  - also spoke w/ pt's cardiologist Dr. Mercado and discussed case with him; he is comfortable with him undergoing the procedure and recommends low-dose amlodipine for francesca-operative BP management in the setting of holding his ACEI/diuretic; he was also in agreement with the low-dose Imdur  - per-op EKG sinus gurmeet, RBBB, TWIs noted - have been present on prior tracings  - monitor        Tremor   Assessment & Plan    Cont home propranolol 20mg BID        Bipolar 1 disorder (CMS/HCC) ([facility])   Assessment & Plan    Cont home Lamictal 150mg Q24        GERD (gastroesophageal reflux disease)   Assessment & Plan    Cont PPI        Type 2 diabetes mellitus with complication, with long-term current use of insulin (CMS/[facility])   Assessment & Plan    Home regimen: Novolog 70-30 - 20 units BID    - home insulin on hold   - ISS/accuchecks        Acquired hypothyroidism   Assessment & Plan    Cont home Synthroid        Hypercholesteremia   Assessment & Plan    Cont home statin        CAD (coronary artery disease) of artery bypass graft   Assessment &  Plan    On ASA 81mg Q24, BB/statin    - hold ASA for OR  - cont BB (on propranolol more for tremor) and statin        Atrial fibrillation (CMS/HCC)   Assessment & Plan    Has had resting bradycardia; on propranolol for tremor but not other BB  On home ASA 81mg Q24 and Eliquis 5mg BID    - holding anticoagulation for OR  - cont home BB             VTE Assessment: Padua VTE Score: 5  Current VTE Prophylaxis Plan: SCDs francesca-operatively    Code Status: Full Code    Estimated discharge date: 3/28/2019  Dispo Plan: home     Eleanor Jiang MD  3/26/2019  1:07 PM

## 2023-12-05 ENCOUNTER — APPOINTMENT (OUTPATIENT)
Dept: URBAN - METROPOLITAN AREA CLINIC 203 | Age: 73
Setting detail: DERMATOLOGY
End: 2023-12-12

## 2023-12-05 DIAGNOSIS — L57.0 ACTINIC KERATOSIS: ICD-10-CM

## 2023-12-05 DIAGNOSIS — B35.3 TINEA PEDIS: ICD-10-CM

## 2023-12-05 DIAGNOSIS — D485 NEOPLASM OF UNCERTAIN BEHAVIOR OF SKIN: ICD-10-CM

## 2023-12-05 PROBLEM — D48.5 NEOPLASM OF UNCERTAIN BEHAVIOR OF SKIN: Status: ACTIVE | Noted: 2023-12-05

## 2023-12-05 PROCEDURE — 17003 DESTRUCT PREMALG LES 2-14: CPT

## 2023-12-05 PROCEDURE — OTHER PRESCRIPTION: OTHER

## 2023-12-05 PROCEDURE — 11102 TANGNTL BX SKIN SINGLE LES: CPT

## 2023-12-05 PROCEDURE — OTHER BIOPSY BY SHAVE METHOD: OTHER

## 2023-12-05 PROCEDURE — 17000 DESTRUCT PREMALG LESION: CPT | Mod: 59

## 2023-12-05 PROCEDURE — 99213 OFFICE O/P EST LOW 20 MIN: CPT | Mod: 25

## 2023-12-05 PROCEDURE — OTHER LIQUID NITROGEN: OTHER

## 2023-12-05 RX ORDER — CICLOPIROX OLAMINE 7.7 MG/G
CREAM TOPICAL
Qty: 30 | Refills: 6 | Status: ERX | COMMUNITY
Start: 2023-12-05

## 2023-12-05 ASSESSMENT — LOCATION DETAILED DESCRIPTION DERM
LOCATION DETAILED: LEFT SUPERIOR CENTRAL MALAR CHEEK
LOCATION DETAILED: LEFT CENTRAL MALAR CHEEK
LOCATION DETAILED: RIGHT LATERAL GREAT TOE
LOCATION DETAILED: LEFT SUPERIOR FOREHEAD
LOCATION DETAILED: LEFT MEDIAL 2ND TOE
LOCATION DETAILED: LEFT CAVUM CONCHA
LOCATION DETAILED: RIGHT FOREHEAD
LOCATION DETAILED: LEFT INFERIOR LATERAL MALAR CHEEK
LOCATION DETAILED: LEFT SUPERIOR CRUS OF ANTIHELIX
LOCATION DETAILED: RIGHT LATERAL MALAR CHEEK
LOCATION DETAILED: NASAL SUPRATIP

## 2023-12-05 ASSESSMENT — LOCATION SIMPLE DESCRIPTION DERM
LOCATION SIMPLE: NOSE
LOCATION SIMPLE: LEFT CHEEK
LOCATION SIMPLE: RIGHT GREAT TOE
LOCATION SIMPLE: RIGHT CHEEK
LOCATION SIMPLE: LEFT 2ND TOE
LOCATION SIMPLE: LEFT EAR
LOCATION SIMPLE: LEFT FOREHEAD
LOCATION SIMPLE: RIGHT FOREHEAD

## 2023-12-05 ASSESSMENT — LOCATION ZONE DERM
LOCATION ZONE: NOSE
LOCATION ZONE: EAR
LOCATION ZONE: TOE
LOCATION ZONE: FACE

## 2023-12-05 NOTE — PROCEDURE: LIQUID NITROGEN
Number Of Freeze-Thaw Cycles: 2 freeze-thaw cycles
Post-Care Instructions: I reviewed with the patient in detail post-care instructions. Patient is to wear sunprotection, and avoid picking at any of the treated lesions. Pt may apply Vaseline to crusted or scabbing areas.
Render Post-Care Instructions In Note?: no
Consent: The patient's consent was obtained including but not limited to risks of crusting, scabbing, blistering, scarring, darker or lighter pigmentary change, recurrence, incomplete removal and infection.
Show Aperture Variable?: Yes
Detail Level: Zone
Duration Of Freeze Thaw-Cycle (Seconds): 5
Application Tool (Optional): Liquid Nitrogen Sprayer

## 2023-12-05 NOTE — PROCEDURE: BIOPSY BY SHAVE METHOD
Detail Level: Detailed
Depth Of Biopsy: dermis
Was A Bandage Applied: Yes
Size Of Lesion In Cm: 0
Biopsy Type: H and E
Biopsy Method: Dermablade
Anesthesia Type: 1% lidocaine with epinephrine
Anesthesia Volume In Cc: 0.5
Hemostasis: Drysol
Wound Care: Petrolatum
Dressing: bandage
Destruction After The Procedure: No
Type Of Destruction Used: Curettage
Curettage Text: The wound bed was treated with curettage after the biopsy was performed.
Cryotherapy Text: The wound bed was treated with cryotherapy after the biopsy was performed.
Electrodesiccation Text: The wound bed was treated with electrodesiccation after the biopsy was performed.
Electrodesiccation And Curettage Text: The wound bed was treated with electrodesiccation and curettage after the biopsy was performed.
Silver Nitrate Text: The wound bed was treated with silver nitrate after the biopsy was performed.
Lab: [id number]
Consent: Written consent was obtained and risks were reviewed including but not limited to scarring, infection, bleeding, scabbing, incomplete removal, nerve damage and allergy to anesthesia.
Post-Care Instructions: I reviewed with the patient in detail post-care instructions. Patient is to keep the biopsy site dry overnight, and then apply bacitracin twice daily until healed. Patient may apply hydrogen peroxide soaks to remove any crusting.
Notification Instructions: Patient will be notified of biopsy results. However, patient instructed to call the office if not contacted within 2 weeks.
Billing Type: Third-Party Bill
Information: Selecting Yes will display possible errors in your note based on the variables you have selected. This validation is only offered as a suggestion for you. PLEASE NOTE THAT THE VALIDATION TEXT WILL BE REMOVED WHEN YOU FINALIZE YOUR NOTE. IF YOU WANT TO FAX A PRELIMINARY NOTE YOU WILL NEED TO TOGGLE THIS TO 'NO' IF YOU DO NOT WANT IT IN YOUR FAXED NOTE.

## 2024-02-29 ENCOUNTER — APPOINTMENT (OUTPATIENT)
Dept: URBAN - METROPOLITAN AREA CLINIC 203 | Age: 74
Setting detail: DERMATOLOGY
End: 2024-03-01

## 2024-02-29 DIAGNOSIS — L57.0 ACTINIC KERATOSIS: ICD-10-CM

## 2024-02-29 PROCEDURE — 17000 DESTRUCT PREMALG LESION: CPT

## 2024-02-29 PROCEDURE — 17003 DESTRUCT PREMALG LES 2-14: CPT

## 2024-02-29 PROCEDURE — OTHER LIQUID NITROGEN: OTHER

## 2024-02-29 ASSESSMENT — LOCATION SIMPLE DESCRIPTION DERM
LOCATION SIMPLE: LEFT CHEEK
LOCATION SIMPLE: LEFT FOREHEAD
LOCATION SIMPLE: RIGHT CHEEK
LOCATION SIMPLE: RIGHT FOREHEAD

## 2024-02-29 ASSESSMENT — LOCATION DETAILED DESCRIPTION DERM
LOCATION DETAILED: LEFT SUPERIOR CENTRAL BUCCAL CHEEK
LOCATION DETAILED: RIGHT INFERIOR FOREHEAD
LOCATION DETAILED: RIGHT SUPERIOR MEDIAL MALAR CHEEK
LOCATION DETAILED: LEFT SUPERIOR LATERAL MALAR CHEEK
LOCATION DETAILED: LEFT SUPERIOR CENTRAL MALAR CHEEK
LOCATION DETAILED: RIGHT LATERAL MALAR CHEEK
LOCATION DETAILED: RIGHT SUPERIOR CENTRAL MALAR CHEEK
LOCATION DETAILED: RIGHT INFERIOR CENTRAL MALAR CHEEK
LOCATION DETAILED: LEFT SUPERIOR FOREHEAD
LOCATION DETAILED: LEFT INFERIOR LATERAL MALAR CHEEK

## 2024-02-29 ASSESSMENT — LOCATION ZONE DERM: LOCATION ZONE: FACE

## 2024-02-29 NOTE — PROCEDURE: LIQUID NITROGEN
Detail Level: Zone
Consent: The patient's consent was obtained including but not limited to risks of crusting, scabbing, blistering, scarring, darker or lighter pigmentary change, recurrence, incomplete removal and infection.
Application Tool (Optional): Liquid Nitrogen Sprayer
Post-Care Instructions: I reviewed with the patient in detail post-care instructions. Patient is to wear sunprotection, and avoid picking at any of the treated lesions. Pt may apply Vaseline to crusted or scabbing areas.
Render Post-Care Instructions In Note?: no
Show Applicator Variable?: Yes
Number Of Freeze-Thaw Cycles: 2 freeze-thaw cycles
Duration Of Freeze Thaw-Cycle (Seconds): 5

## 2024-04-17 NOTE — PROCEDURE: MEDICATION COUNSELING
Assisted pt with urinal.     Son and daughter currently at bedside.    Acitretin Pregnancy And Lactation Text: This medication is Pregnancy Category X and should not be given to women who are pregnant or may become pregnant in the future. This medication is excreted in breast milk.

## 2024-11-04 NOTE — PROCEDURE: EXCISION
chewing difficulty 2/2 poor dentition noted upon visual assessment  O-Z Flap Text: The defect edges were debeveled with a #15 scalpel blade.  Given the location of the defect, shape of the defect and the proximity to free margins an O-Z flap was deemed most appropriate.  Using a sterile surgical marker, an appropriate transposition flap was drawn incorporating the defect and placing the expected incisions within the relaxed skin tension lines where possible. The area thus outlined was incised deep to adipose tissue with a #15 scalpel blade.  The skin margins were undermined to an appropriate distance in all directions utilizing iris scissors.

## 2025-03-17 ENCOUNTER — APPOINTMENT (OUTPATIENT)
Dept: URBAN - METROPOLITAN AREA CLINIC 203 | Age: 75
Setting detail: DERMATOLOGY
End: 2025-03-25

## 2025-03-17 DIAGNOSIS — L81.4 OTHER MELANIN HYPERPIGMENTATION: ICD-10-CM

## 2025-03-17 DIAGNOSIS — D22 MELANOCYTIC NEVI: ICD-10-CM

## 2025-03-17 DIAGNOSIS — L82.1 OTHER SEBORRHEIC KERATOSIS: ICD-10-CM

## 2025-03-17 DIAGNOSIS — Z85.828 PERSONAL HISTORY OF OTHER MALIGNANT NEOPLASM OF SKIN: ICD-10-CM

## 2025-03-17 DIAGNOSIS — Z85.820 PERSONAL HISTORY OF MALIGNANT MELANOMA OF SKIN: ICD-10-CM

## 2025-03-17 DIAGNOSIS — L57.0 ACTINIC KERATOSIS: ICD-10-CM

## 2025-03-17 DIAGNOSIS — L57.8 OTHER SKIN CHANGES DUE TO CHRONIC EXPOSURE TO NONIONIZING RADIATION: ICD-10-CM

## 2025-03-17 PROBLEM — D22.5 MELANOCYTIC NEVI OF TRUNK: Status: ACTIVE | Noted: 2025-03-17

## 2025-03-17 PROCEDURE — OTHER DEFER: OTHER

## 2025-03-17 PROCEDURE — OTHER SUNSCREEN RECOMMENDATIONS: OTHER

## 2025-03-17 PROCEDURE — OTHER REASSURANCE: OTHER

## 2025-03-17 PROCEDURE — OTHER COUNSELING: OTHER

## 2025-03-17 PROCEDURE — 99213 OFFICE O/P EST LOW 20 MIN: CPT

## 2025-03-17 ASSESSMENT — LOCATION SIMPLE DESCRIPTION DERM
LOCATION SIMPLE: LEFT UPPER BACK
LOCATION SIMPLE: CHEST
LOCATION SIMPLE: ABDOMEN
LOCATION SIMPLE: LEFT CHEEK
LOCATION SIMPLE: UPPER BACK
LOCATION SIMPLE: RIGHT UPPER BACK

## 2025-03-17 ASSESSMENT — LOCATION DETAILED DESCRIPTION DERM
LOCATION DETAILED: LEFT MID-UPPER BACK
LOCATION DETAILED: LEFT CENTRAL MALAR CHEEK
LOCATION DETAILED: RIGHT SUPERIOR MEDIAL UPPER BACK
LOCATION DETAILED: LEFT SUPERIOR CENTRAL MALAR CHEEK
LOCATION DETAILED: LEFT MEDIAL INFERIOR CHEST
LOCATION DETAILED: SUPERIOR THORACIC SPINE
LOCATION DETAILED: RIGHT MEDIAL UPPER BACK
LOCATION DETAILED: RIGHT MID-UPPER BACK
LOCATION DETAILED: EPIGASTRIC SKIN
LOCATION DETAILED: RIGHT SUPERIOR UPPER BACK

## 2025-03-17 ASSESSMENT — LOCATION ZONE DERM
LOCATION ZONE: TRUNK
LOCATION ZONE: FACE

## 2025-03-17 NOTE — PROCEDURE: DEFER
Procedure To Be Performed At Next Visit: Curettage
X Size Of Lesion In Cm (Optional): 0
Detail Level: Detailed
Introduction Text (Please End With A Colon): The following procedure was deferred:patient to schedule 2-3 separate appointments to have lesions treated 2 weeks apart

## 2025-04-14 ENCOUNTER — APPOINTMENT (OUTPATIENT)
Dept: URBAN - METROPOLITAN AREA CLINIC 203 | Age: 75
Setting detail: DERMATOLOGY
End: 2025-04-15

## 2025-04-14 DIAGNOSIS — L57.0 ACTINIC KERATOSIS: ICD-10-CM

## 2025-04-14 PROCEDURE — OTHER CURETTAGE AND DESTRUCTION: OTHER

## 2025-04-14 PROCEDURE — 17003 DESTRUCT PREMALG LES 2-14: CPT

## 2025-04-14 PROCEDURE — 17000 DESTRUCT PREMALG LESION: CPT

## 2025-04-14 PROCEDURE — OTHER CURETTAGE AND DESTRUCTION WITH PATHOLOGY: OTHER

## 2025-04-14 ASSESSMENT — LOCATION DETAILED DESCRIPTION DERM
LOCATION DETAILED: RIGHT LATERAL MALAR CHEEK
LOCATION DETAILED: LEFT FOREHEAD
LOCATION DETAILED: RIGHT INFERIOR CENTRAL MALAR CHEEK
LOCATION DETAILED: LEFT CENTRAL ZYGOMA

## 2025-04-14 ASSESSMENT — LOCATION SIMPLE DESCRIPTION DERM
LOCATION SIMPLE: LEFT FOREHEAD
LOCATION SIMPLE: RIGHT CHEEK
LOCATION SIMPLE: LEFT ZYGOMA

## 2025-04-14 ASSESSMENT — LOCATION ZONE DERM: LOCATION ZONE: FACE

## 2025-04-14 NOTE — PROCEDURE: CURETTAGE AND DESTRUCTION WITH PATHOLOGY
Detail Level: Detailed
Size Of Lesion In Cm: 0.8
Size Of Lesion After Curettage: 1
Anesthesia Type: 1% lidocaine with epinephrine
Cautery Type: electrodesiccation
Number Of Curettages: 2
What Was Performed First?: Curettage
Additional Information: (Optional): The wound was cleaned, and a pressure dressing was applied.  The patient received detailed post-op instructions.
Lab: [id number]
Lab Facility: [id number]
Histology Text: Following the procedure a portion of the curetted material was sent for histologic evaluation.
Biopsy Type: H and E
Render Path Notes In Note?: No
Consent was obtained from the patient. The risks, benefits and alternatives to therapy were discussed in detail. Specifically, the risks of infection, scarring, bleeding, prolonged wound healing, nerve injury, incomplete removal, allergy to anesthesia and recurrence were addressed. Alternatives to ED&C, such as: surgical removal and XRT were also discussed.  Prior to the procedure, the treatment site was clearly identified and confirmed by the patient. All components of Universal Protocol/PAUSE Rule completed.
Post-Care Instructions: I reviewed with the patient in detail post-care instructions. Patient is to keep the area dry for 48 hours, and not to engage in any swimming until the area is healed. Should the patient develop any fevers, chills, bleeding, severe pain patient will contact the office immediately.
Billing Type: Third-Party Bill
Size Of Lesion In Cm: 1.5
Size Of Lesion After Curettage: 1.7
Number Of Curettages: 3
Size Of Lesion After Curettage: 2.2

## 2025-04-14 NOTE — PROCEDURE: CURETTAGE AND DESTRUCTION
Detail Level: Detailed
Size Of Lesion In Cm: 1.2
Size Of Lesion After Curettage: 1.4
Anesthesia Type: 1% lidocaine with epinephrine
Cautery Type: electrodesiccation
Number Of Curettages: 2
What Was Performed First?: Curettage
Additional Information: (Optional): The wound was cleaned, and a pressure dressing was applied.  The patient received detailed post-op instructions.
Bill For Surgical Tray: no
Consent was obtained from the patient. The risks, benefits and alternatives to therapy were discussed in detail. Specifically, the risks of infection, scarring, bleeding, prolonged wound healing, nerve injury, incomplete removal, allergy to anesthesia and recurrence were addressed. Alternatives to ED&C, such as: surgical removal and XRT were also discussed.  Prior to the procedure, the treatment site was clearly identified and confirmed by the patient. All components of Universal Protocol/PAUSE Rule completed.
Post-Care Instructions: I reviewed with the patient in detail post-care instructions. Patient is to keep the area dry for 48 hours, and not to engage in any swimming until the area is healed. Should the patient develop any fevers, chills, bleeding, severe pain patient will contact the office immediately.

## 2025-04-30 ENCOUNTER — TELEPHONE (OUTPATIENT)
Dept: SLEEP MEDICINE | Facility: HOSPITAL | Age: 75
End: 2025-04-30
Payer: MEDICARE

## 2025-07-20 ENCOUNTER — HOSPITAL ENCOUNTER (OUTPATIENT)
Dept: SLEEP MEDICINE | Facility: HOSPITAL | Age: 75
Discharge: HOME | End: 2025-07-20
Attending: INTERNAL MEDICINE
Payer: MEDICARE

## 2025-07-20 DIAGNOSIS — G47.33 OBSTRUCTIVE SLEEP APNEA (ADULT) (PEDIATRIC): ICD-10-CM

## 2025-07-20 PROCEDURE — 95811 POLYSOM 6/>YRS CPAP 4/> PARM: CPT

## 2025-07-23 NOTE — PRE-PROCEDURE INSTRUCTIONS
1. We will call you between (4:00 pm-7:00 pm) on on the day before surgery to determine that arrival time for your procedure.   2. Please report to Out Patient Registration on the day of your procedure.   3. 1. Nothing to eat or drink 8 hours pre- arrival   4. Early on the morning of the procedure please take your usual dose of the listed medications with a sip of water:    Levothyroxine  Amiodarone  Omeprazole     5. Other Instructions: Pre-op Showers; Follow Instructions for Eliquis & ASA; Bring Insulin dose to hospital with you   6. If you develop a cold, cough, fever, rash, or other symptom prior to the data of the procedure, please report it to your physician immediately.   7. If you need to cancel the procedure for any reason, please contact your physician or call the unit listed above.   8. Make arrangements to have someone drive you home from the procedure. If you have not arranged for transportation home, your surgery may be cancelled.    9. You may not take public transportation unless accompanied by a responsible person.   10. You may not drive a car or operate complex or potentially dangerous machinery for 24 hours following anesthesia and/or sedation.   11. If it is medically necessary for you to have a longer stay, you will be informed as soon as the decision is made.   12. Do not wear or being anything of value to the hospital including jewelry of any kind. Do not wear make-up or contact lenses. DO bring your glasses and hearing aid.   13. Dress in comfortable clothes.   14.  If instructed, please bring a copy of your Advanced Directive (Living Will/Durable Power of ) on the day of your procedure.      Pre operative instructions given as per protocol.  Form explained by:      I have read and understand the above information. I have had sufficient opportunity to ask questions I might have and they have been answered to my satisfaction. I agree to comply with the Patient Responsibilities listed  above and have received a copy of this form.       [Normocephalic] : normocephalic [Atraumatic] : atraumatic [Supple] : supple [No Supraclavicular Adenopathy] : no supraclavicular adenopathy [Examined in the supine and seated position] : examined in the supine and seated position [No dominant masses] : no dominant masses in right breast  [No Nipple Retraction] : no left nipple retraction [No Nipple Discharge] : no left nipple discharge [No Axillary Lymphadenopathy] : no left axillary lymphadenopathy [No Edema] : no edema [No Rashes] : no rashes [No Ulceration] : no ulceration [Symmetrical] : symmetrical [de-identified] : 1:00 N4 there is a 2cm mobile mass c/w ultrasound findings

## (undated) DEVICE — SYRINGE 10CC CONTROL LL

## (undated) DEVICE — ***USE 141021*** PACK CYSTOSCOPY III

## (undated) DEVICE — SYRINGE DISP LUER-LOK 20 CC

## (undated) DEVICE — NEEDLE BOX CONVENIENCE KIT

## (undated) DEVICE — NEEDLE DISP HYPO 30GX1/2IN

## (undated) DEVICE — DRAPE C-ARM X-RAY EQUIPMENT IMAGE

## (undated) DEVICE — CATH ACCESS 6FR OPEN END

## (undated) DEVICE — DRESSING ABD STER LGE 8X10

## (undated) DEVICE — FIBER LASER HOLMIUM 600 MICRON QUARTZ TIP

## (undated) DEVICE — MANIFOLD FOUR PORT NEPTUNE

## (undated) DEVICE — SOLN IRRIG .9%SOD 3L

## (undated) DEVICE — GOWN SURG STRL W/TWL LG

## (undated) DEVICE — SPONGE XRAY DETECTABLE 4X4

## (undated) DEVICE — NEEDLE DISP HYPO 25GX1-1/2IN

## (undated) DEVICE — SET LF NONVENTED T-U-R

## (undated) DEVICE — BASKET STONE RETRIEVAL GEMINI 3FR

## (undated) DEVICE — WIRE GUIDE SENSOR DUAL FLEX .038

## (undated) DEVICE — SYRINGE PUMP

## (undated) DEVICE — SPONGE GAUZE 4X4 4 PLY-2S

## (undated) DEVICE — PACK BASIC I

## (undated) DEVICE — PAD EYE OVAL STERILE

## (undated) DEVICE — UNDERPAD DURASORB 30 X 30

## (undated) DEVICE — BLADE SCALPEL #15

## (undated) DEVICE — PACK OR TOWEL

## (undated) DEVICE — SOLN IRRIG .9%SOD 500ML

## (undated) DEVICE — PAD GROUND ELECTROSURGICAL W/CORD

## (undated) DEVICE — GOWN SURG LARGE MICROCOOL

## (undated) DEVICE — SOLN IRRIG STER WATER 1500ML

## (undated) DEVICE — TRICEP FORCEP DISPOSABLE

## (undated) DEVICE — GLOVE PROTEXIS PI ORTHO 7.5

## (undated) DEVICE — GOWN SURGICAL REINFORCED X-LAR

## (undated) DEVICE — SURESEAL II  0-5FR

## (undated) DEVICE — ***USE 138494*** SUTURE CHROMIC GUT  6-0   796G

## (undated) DEVICE — CANISTER, SEMI-RIGID 1500CC W/1 ELBOW

## (undated) DEVICE — ***USE 124736***SYRINGE TOOMEY

## (undated) DEVICE — SYRINGE DISP LUER-LOK 10 CC

## (undated) DEVICE — APPLICATOR COTTON TIP 6IN STERILE MEDC

## (undated) DEVICE — FORCEP COLD RADIAL JAW

## (undated) DEVICE — DRAPE NEPHROSCOPY

## (undated) DEVICE — JELLY LUBRICATING STERILE 3GM PACKET

## (undated) DEVICE — STERISTRIP 1/2INX4IN

## (undated) DEVICE — SCALPEL SAFETY #11

## (undated) DEVICE — SPONGE LAP DISPOSABLE 18X18

## (undated) DEVICE — PENCIL ESU HANDSWITCHING W/HOL

## (undated) DEVICE — DRAPE LAPAROTOMY

## (undated) DEVICE — PROBE CYBER WAND

## (undated) DEVICE — TIP BOVIE COLORADO NEEDLE TIP

## (undated) DEVICE — BETADINE SOLUTION 8OZ BT

## (undated) DEVICE — TUBE SUCTION 1/4INX20FT STERILE

## (undated) DEVICE — BIOGUARD VALVES

## (undated) DEVICE — Device

## (undated) DEVICE — OINTMENT SURGILUBE 4OZ TUBE

## (undated) DEVICE — BASKET NITINOL STONE RETRIEVAL 1.9 FR

## (undated) DEVICE — LABEL MEDICATION ENT

## (undated) DEVICE — CATH OPEN ENDED 6FR

## (undated) DEVICE — DRESSING SPONGE ALL GAUZE 4X4 STER 10PK

## (undated) DEVICE — NEEDLE  HYPODERMIC NEEDLE-PRO EDGE SAFETY DEVICE 22G X 1 1/

## (undated) DEVICE — FIBER HOLMIUM DISP 270UM

## (undated) DEVICE — CATH URETERAL 5FR 70CM

## (undated) DEVICE — ***USE 138493*** SUTURE PLN GUT 6-0 G-1 18 INCH

## (undated) DEVICE — ***USE 56982*** SUTURE SILK 2-0  K833H

## (undated) DEVICE — GLOVE SZ 7.5 PROTEXIS PI

## (undated) DEVICE — CLEANSTART BEDSIDE KIT 500ML

## (undated) DEVICE — CATH 24FR MALECOT NEPHROSTOMY

## (undated) DEVICE — STRIP SUTURE 1/2X4

## (undated) DEVICE — TRAY URINE METER FOLEY SILVER 16 FR 5CC 2 W

## (undated) DEVICE — ***USE 141022*** PACK EENT I

## (undated) DEVICE — DRAPE IOBAN

## (undated) DEVICE — CATHETER NAVIGATOR URETERAL ACCESS 11/13 X 36CM

## (undated) DEVICE — TIP BOVIE NEEDLE COATED INSULATED

## (undated) DEVICE — ***USE 138780*** SUTURE CHROMIC GUT 4-0 1637G

## (undated) DEVICE — CATH OPEN ENDED 5FR

## (undated) DEVICE — GAUZE XEROFORM 1X8 NON-STERILE PACKET

## (undated) DEVICE — DILATOR AMPLATE SHEATH SET

## (undated) DEVICE — COVER LIGHTHANDLE

## (undated) DEVICE — CATH DUAL LUMEN

## (undated) DEVICE — GUIDEWIRE AMPLATZ SUPER STIFF .035 X 145

## (undated) DEVICE — CATH NOTTINGHAM 6-12FR

## (undated) DEVICE — MOUTHPIECE 60FR ENDO BITEBLOCK

## (undated) DEVICE — LABEL MEDICATION GEN/GYN

## (undated) DEVICE — SCALPEL SAFETY #15

## (undated) DEVICE — DRESSING COVADERM 2IN X 2IN

## (undated) DEVICE — E-TRAP POLYECTOMY

## (undated) DEVICE — SOLN IV 0.9% NSS 1000ML

## (undated) DEVICE — MARKER SURGICAL SKIN FINE

## (undated) DEVICE — SNARE CAPITVATOR II POLYPECTOMY